# Patient Record
Sex: FEMALE | Race: BLACK OR AFRICAN AMERICAN | NOT HISPANIC OR LATINO
[De-identification: names, ages, dates, MRNs, and addresses within clinical notes are randomized per-mention and may not be internally consistent; named-entity substitution may affect disease eponyms.]

---

## 2017-09-21 ENCOUNTER — RESULT REVIEW (OUTPATIENT)
Age: 57
End: 2017-09-21

## 2018-02-22 PROBLEM — Z00.00 ENCOUNTER FOR PREVENTIVE HEALTH EXAMINATION: Status: ACTIVE | Noted: 2018-02-22

## 2018-02-26 ENCOUNTER — APPOINTMENT (OUTPATIENT)
Dept: ORTHOPEDIC SURGERY | Facility: CLINIC | Age: 58
End: 2018-02-26
Payer: MEDICAID

## 2018-02-26 VITALS
WEIGHT: 140 LBS | BODY MASS INDEX: 25.76 KG/M2 | SYSTOLIC BLOOD PRESSURE: 169 MMHG | HEART RATE: 71 BPM | DIASTOLIC BLOOD PRESSURE: 80 MMHG | HEIGHT: 62 IN

## 2018-02-26 DIAGNOSIS — M51.36 OTHER INTERVERTEBRAL DISC DEGENERATION, LUMBAR REGION: ICD-10-CM

## 2018-02-26 PROCEDURE — 99204 OFFICE O/P NEW MOD 45 MIN: CPT

## 2018-02-26 PROCEDURE — 72100 X-RAY EXAM L-S SPINE 2/3 VWS: CPT

## 2018-02-26 PROCEDURE — 72040 X-RAY EXAM NECK SPINE 2-3 VW: CPT

## 2018-02-26 RX ORDER — METHOCARBAMOL 750 MG/1
750 TABLET, FILM COATED ORAL
Qty: 60 | Refills: 0 | Status: ACTIVE | COMMUNITY
Start: 2018-02-03

## 2018-02-26 RX ORDER — TRAMADOL HYDROCHLORIDE 50 MG/1
50 TABLET, COATED ORAL
Qty: 120 | Refills: 0 | Status: ACTIVE | COMMUNITY
Start: 2018-02-07

## 2018-02-26 RX ORDER — BISACODYL 5 MG/1
5 TABLET ORAL
Qty: 4 | Refills: 0 | Status: ACTIVE | COMMUNITY
Start: 2018-02-11

## 2018-02-26 RX ORDER — POLYETHYLENE GLYCOL 3350, SODIUM CHLORIDE, SODIUM BICARBONATE AND POTASSIUM CHLORIDE WITH LEMON FLAVOR 420; 11.2; 5.72; 1.48 G/4L; G/4L; G/4L; G/4L
420 POWDER, FOR SOLUTION ORAL
Qty: 4000 | Refills: 0 | Status: ACTIVE | COMMUNITY
Start: 2018-02-09

## 2018-03-16 ENCOUNTER — APPOINTMENT (OUTPATIENT)
Dept: ORTHOPEDIC SURGERY | Facility: CLINIC | Age: 58
End: 2018-03-16

## 2018-05-21 ENCOUNTER — APPOINTMENT (OUTPATIENT)
Dept: INTERNAL MEDICINE | Facility: CLINIC | Age: 58
End: 2018-05-21
Payer: MEDICAID

## 2018-05-21 VITALS — DIASTOLIC BLOOD PRESSURE: 66 MMHG | RESPIRATION RATE: 12 BRPM | SYSTOLIC BLOOD PRESSURE: 134 MMHG | HEART RATE: 78 BPM

## 2018-05-21 VITALS — BODY MASS INDEX: 24.69 KG/M2 | WEIGHT: 135 LBS

## 2018-05-21 DIAGNOSIS — C90.00 MULTIPLE MYELOMA NOT HAVING ACHIEVED REMISSION: ICD-10-CM

## 2018-05-21 DIAGNOSIS — C88.9 MULTIPLE MYELOMA NOT HAVING ACHIEVED REMISSION: ICD-10-CM

## 2018-05-21 PROCEDURE — 99203 OFFICE O/P NEW LOW 30 MIN: CPT
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
## 2018-05-21 NOTE — REVIEW OF SYSTEMS
[Negative] : Heme/Lymph [FreeTextEntry2] : see HPI [FreeTextEntry9] : Neck pain and disabled [de-identified] : see HPI

## 2018-05-21 NOTE — HISTORY OF PRESENT ILLNESS
[FreeTextEntry1] : Hx of Multiple Myeloma and wants labs. Says it is for "personal reasons"  Goes to [facility]. Had BM Bx.  Had neck MRI and that is how MM was Dx'd.
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
## 2018-06-01 ENCOUNTER — OUTPATIENT (OUTPATIENT)
Dept: OUTPATIENT SERVICES | Facility: HOSPITAL | Age: 58
LOS: 1 days | End: 2018-06-01
Payer: MEDICAID

## 2018-06-01 PROCEDURE — G9001: CPT

## 2018-06-26 ENCOUNTER — EMERGENCY (EMERGENCY)
Facility: HOSPITAL | Age: 58
LOS: 1 days | Discharge: HOME | End: 2018-06-26
Attending: EMERGENCY MEDICINE
Payer: MEDICAID

## 2018-06-26 VITALS
HEART RATE: 75 BPM | SYSTOLIC BLOOD PRESSURE: 130 MMHG | HEIGHT: 62 IN | TEMPERATURE: 98 F | OXYGEN SATURATION: 99 % | RESPIRATION RATE: 19 BRPM | DIASTOLIC BLOOD PRESSURE: 87 MMHG | WEIGHT: 130.07 LBS

## 2018-06-26 PROCEDURE — 99283 EMERGENCY DEPT VISIT LOW MDM: CPT

## 2018-06-26 RX ORDER — LIDOCAINE 4 G/100G
15 CREAM TOPICAL
Qty: 100 | Refills: 0
Start: 2018-06-26 | End: 2018-06-30

## 2018-06-26 NOTE — ED ADULT NURSE NOTE - OBJECTIVE STATEMENT
Pt is a 58y/o who came to the ED amb c/o sores in mouth x 3 weeks. States she was prescribed Nystatin swish and spit by her pmd but stopped using it because "I didn't like the way it made me feel". States she experienced pain and numbness and tingling when using the Nystatin. Now c/o worsening pain in her mouth, difficulty chewing her food, but no dysphagia and problems swallowing. A/O x3, NAD,  denies fevers/chills, no n/v/d.

## 2018-06-26 NOTE — ED PROVIDER NOTE - MEDICAL DECISION MAKING DETAILS
Mucositis most likely but would like the patient to complete a trial of empiric candidasis treatment with Nystatin. Imtiaz: Mucositis most likely but would like the patient to complete a trial of empiric candidasis treatment with Nystatin.

## 2018-06-26 NOTE — ED PROVIDER NOTE - CARE PLAN
Principal Discharge DX:	Mucositis  Goal:	Most likely mucositis from the immunosuppression from Multiple Myeloma but would need to still undergo the trial of Nystatin before ruling out Candiasis  Assessment and plan of treatment:	Most likely mucositis from the immunosuppression from Multiple Myeloma but would need to still undergo the trial of Nystatin before ruling out Candiasis. Please take the Nystatin for another 5 days twice a day and will be prescribing a trial of topical Lidocaine. If unresolved then please followup with your Primary care doctor Dr. Forbes and your hematologist Dr. Negro for further questions and concerns.

## 2018-06-26 NOTE — ED ADULT TRIAGE NOTE - CHIEF COMPLAINT QUOTE
pt c/o ulcers in mouth for 3 weeks. pt was prescribed mouth wash by primary care- pt states that symptoms persist  pt was recently diagnosed with multiple myeloma

## 2018-06-26 NOTE — ED PROVIDER NOTE - PLAN OF CARE
Most likely mucositis from the immunosuppression from Multiple Myeloma but would need to still undergo the trial of Nystatin before ruling out Candiasis Most likely mucositis from the immunosuppression from Multiple Myeloma but would need to still undergo the trial of Nystatin before ruling out Candiasis. Please take the Nystatin for another 5 days twice a day and will be prescribing a trial of topical Lidocaine. If unresolved then please followup with your Primary care doctor Dr. Forbes and your hematologist Dr. Negro for further questions and concerns.

## 2018-06-26 NOTE — ED PROVIDER NOTE - OBJECTIVE STATEMENT
58y/o no PM p/w 58y/o PMH of MM p/w bilateral cheek ulcers. She has been complaining of these ulcers for the past month and went to see her PMD who prescribed her Nystatin swish and spit which the patient did not want to take because when she took it, she started experiencing pain and numbness/tingling. The pain in her mouth has been getting progressively worse, with difficulty eating in her mouth itself but no dysphagia and problems swallowing. No recent infections, fevers, nausea, vomiting, diarrhea. Refused to undergo treatment of her MM due to the side effects and was diagnosed 3/2018. Has had prior gingivial ulcers in the gums that were present last year that had resolved. 56y/o PMH of MM p/w bilateral cheek ulcers of the oral mucosa. She has been complaining of these ulcers for the past month and went to see her PMD who prescribed her Nystatin swish and spit which the patient did not want to take because when she took it, she started experiencing pain and numbness/tingling. The pain in her mouth has been getting progressively worse, with difficulty eating in her mouth itself but no dysphagia and problems swallowing. No recent infections, fevers, nausea, vomiting, diarrhea. Refused to undergo treatment of her MM due to the side effects and was diagnosed 3/2018. Has had prior gingival ulcers in the gums that were present last year that had resolved.

## 2018-07-01 ENCOUNTER — OUTPATIENT (OUTPATIENT)
Dept: OUTPATIENT SERVICES | Facility: HOSPITAL | Age: 58
LOS: 1 days | End: 2018-07-01

## 2018-07-02 DIAGNOSIS — R69 ILLNESS, UNSPECIFIED: ICD-10-CM

## 2018-07-19 DIAGNOSIS — Z71.89 OTHER SPECIFIED COUNSELING: ICD-10-CM

## 2019-01-14 NOTE — ED PROVIDER NOTE - NS ED ATTENDING STATEMENT MOD
30.2
I have personally seen and examined this patient.  I have fully participated in the care of this patient. I have reviewed all pertinent clinical information, including history, physical exam, plan and the Resident’s note and agree except as noted.

## 2019-03-26 ENCOUNTER — OUTPATIENT (OUTPATIENT)
Dept: OUTPATIENT SERVICES | Facility: HOSPITAL | Age: 59
LOS: 1 days | Discharge: HOME | End: 2019-03-26

## 2019-03-26 DIAGNOSIS — C90.00 MULTIPLE MYELOMA NOT HAVING ACHIEVED REMISSION: ICD-10-CM

## 2019-04-02 ENCOUNTER — APPOINTMENT (OUTPATIENT)
Dept: HEMATOLOGY ONCOLOGY | Facility: CLINIC | Age: 59
End: 2019-04-02

## 2019-06-11 ENCOUNTER — OUTPATIENT (OUTPATIENT)
Dept: OUTPATIENT SERVICES | Facility: HOSPITAL | Age: 59
LOS: 1 days | End: 2019-06-11

## 2019-06-11 VITALS
HEIGHT: 62 IN | HEART RATE: 56 BPM | DIASTOLIC BLOOD PRESSURE: 80 MMHG | OXYGEN SATURATION: 99 % | TEMPERATURE: 97 F | SYSTOLIC BLOOD PRESSURE: 140 MMHG | WEIGHT: 115.96 LBS | RESPIRATION RATE: 16 BRPM

## 2019-06-11 DIAGNOSIS — C90.00 MULTIPLE MYELOMA NOT HAVING ACHIEVED REMISSION: ICD-10-CM

## 2019-06-11 DIAGNOSIS — T78.40XA ALLERGY, UNSPECIFIED, INITIAL ENCOUNTER: ICD-10-CM

## 2019-06-11 DIAGNOSIS — Z98.890 OTHER SPECIFIED POSTPROCEDURAL STATES: Chronic | ICD-10-CM

## 2019-06-11 DIAGNOSIS — Z98.891 HISTORY OF UTERINE SCAR FROM PREVIOUS SURGERY: Chronic | ICD-10-CM

## 2019-06-11 DIAGNOSIS — Z90.711 ACQUIRED ABSENCE OF UTERUS WITH REMAINING CERVICAL STUMP: Chronic | ICD-10-CM

## 2019-06-11 LAB
ALBUMIN SERPL ELPH-MCNC: 4.3 G/DL — SIGNIFICANT CHANGE UP (ref 3.3–5)
ALP SERPL-CCNC: 62 U/L — SIGNIFICANT CHANGE UP (ref 40–120)
ALT FLD-CCNC: 8 U/L — SIGNIFICANT CHANGE UP (ref 4–33)
ANION GAP SERPL CALC-SCNC: 9 MMO/L — SIGNIFICANT CHANGE UP (ref 7–14)
AST SERPL-CCNC: 11 U/L — SIGNIFICANT CHANGE UP (ref 4–32)
BILIRUB SERPL-MCNC: < 0.2 MG/DL — LOW (ref 0.2–1.2)
BUN SERPL-MCNC: 7 MG/DL — SIGNIFICANT CHANGE UP (ref 7–23)
CALCIUM SERPL-MCNC: 10 MG/DL — SIGNIFICANT CHANGE UP (ref 8.4–10.5)
CHLORIDE SERPL-SCNC: 100 MMOL/L — SIGNIFICANT CHANGE UP (ref 98–107)
CO2 SERPL-SCNC: 31 MMOL/L — SIGNIFICANT CHANGE UP (ref 22–31)
CREAT SERPL-MCNC: 0.64 MG/DL — SIGNIFICANT CHANGE UP (ref 0.5–1.3)
GLUCOSE SERPL-MCNC: 69 MG/DL — LOW (ref 70–99)
HCT VFR BLD CALC: 33.1 % — LOW (ref 34.5–45)
HGB BLD-MCNC: 10.9 G/DL — LOW (ref 11.5–15.5)
MCHC RBC-ENTMCNC: 31.5 PG — SIGNIFICANT CHANGE UP (ref 27–34)
MCHC RBC-ENTMCNC: 32.9 % — SIGNIFICANT CHANGE UP (ref 32–36)
MCV RBC AUTO: 95.7 FL — SIGNIFICANT CHANGE UP (ref 80–100)
NRBC # FLD: 0 K/UL — SIGNIFICANT CHANGE UP (ref 0–0)
PLATELET # BLD AUTO: 240 K/UL — SIGNIFICANT CHANGE UP (ref 150–400)
PMV BLD: 11.3 FL — SIGNIFICANT CHANGE UP (ref 7–13)
POTASSIUM SERPL-MCNC: 3.6 MMOL/L — SIGNIFICANT CHANGE UP (ref 3.5–5.3)
POTASSIUM SERPL-SCNC: 3.6 MMOL/L — SIGNIFICANT CHANGE UP (ref 3.5–5.3)
PROT SERPL-MCNC: 7.8 G/DL — SIGNIFICANT CHANGE UP (ref 6–8.3)
RBC # BLD: 3.46 M/UL — LOW (ref 3.8–5.2)
RBC # FLD: 13.2 % — SIGNIFICANT CHANGE UP (ref 10.3–14.5)
SODIUM SERPL-SCNC: 140 MMOL/L — SIGNIFICANT CHANGE UP (ref 135–145)
WBC # BLD: 9.33 K/UL — SIGNIFICANT CHANGE UP (ref 3.8–10.5)
WBC # FLD AUTO: 9.33 K/UL — SIGNIFICANT CHANGE UP (ref 3.8–10.5)

## 2019-06-11 NOTE — H&P PST ADULT - OTHER CARE PROVIDERS
Dr. Lebron Owusu (oncologist) [phone] / Dr. Devonte Tong (holistic medicine) Dr. Lebron Owusu (oncologist) [phone] / Dr. Devonte Tong (holistic medicine) [phone]

## 2019-06-11 NOTE — H&P PST ADULT - NEGATIVE ENMT SYMPTOMS
no hearing difficulty/no sinus symptoms/no post-nasal discharge/no throat pain/no dysphagia/no vertigo/no nasal congestion/no nasal obstruction/no ear pain/no nasal discharge/no tinnitus

## 2019-06-11 NOTE — H&P PST ADULT - NSICDXPASTSURGICALHX_GEN_ALL_CORE_FT
PAST SURGICAL HISTORY:  H/O  section 8/15/1985    H/O colonoscopy 2018- polyp found and removedin 2018    S/P partial hysterectomy  d/t fibroids PAST SURGICAL HISTORY:  H/O  section 8/15/1985    H/O colonoscopy 2018- polyp found and removed in 2018    S/P partial hysterectomy  d/t fibroids

## 2019-06-11 NOTE — H&P PST ADULT - NSICDXPROBLEM_GEN_ALL_CORE_FT
PROBLEM DIAGNOSES  Problem: Multiple myeloma not having achieved remission  Assessment and Plan: Scheduled for abdominal fat pad biopsy on 6/18/2019. labs done and results pending. Preop instruction given and explained. Chlorhexidine antiseptic solution with written and verbal instruction given & Verbalized understanding. Famotidine provided with instruction. Patient verbalized understanding.   Medical evaluation requested by surgeon. Patient had EKG done with PCP on 5/22/2019. Will obtain.     Problem: Allergy  Assessment and Plan: Allergic to sulfa drug, shellfish. OR booking was notified via fax.

## 2019-06-11 NOTE — H&P PST ADULT - VISION (WITH CORRECTIVE LENSES IF THE PATIENT USUALLY WEARS THEM):
wear glasses for reading/Partially impaired: cannot see medication labels or newsprint, but can see obstacles in path, and the surrounding layout; can count fingers at arm's length

## 2019-06-11 NOTE — H&P PST ADULT - RS GEN PE MLT RESP DETAILS PC
no wheezes/clear to auscultation bilaterally/no rales/respirations non-labored/no rhonchi/good air movement

## 2019-06-11 NOTE — H&P PST ADULT - ITE SK HX ROS MEA POS PC
mouth sore, sore in buttock - A & D ointment & covered with bandaid . pt states, "had multiple sores since 2017 - now I just have 1" mouth sore- treating with dexamethasone, antibiotic, sore in buttock - A & D ointment & covered with bandaid . pt states, "this is ongoing problem since 2017 - multiple sores on/off since 2017" - now I just have 1"

## 2019-06-11 NOTE — H&P PST ADULT - NEGATIVE GENERAL GENITOURINARY SYMPTOMS
no urinary hesitancy/no bladder infections/no flank pain L/no flank pain R/normal urinary frequency/no hematuria/no gas in urine

## 2019-06-11 NOTE — H&P PST ADULT - NSICDXPASTMEDICALHX_GEN_ALL_CORE_FT
PAST MEDICAL HISTORY:  Multiple myeloma dx 2018 - refused chemotherapy PAST MEDICAL HISTORY:  Multiple myeloma dx 2018 - refused chemotherapy, treating with "natural herbal remedies"

## 2019-06-11 NOTE — H&P PST ADULT - NSICDXFAMILYHX_GEN_ALL_CORE_FT
FAMILY HISTORY:  Family history of scleroderma, sister  FH: Alzheimers disease, mother  FH: heart disease, mother  FH: hypertension, mother

## 2019-06-11 NOTE — H&P PST ADULT - HISTORY OF PRESENT ILLNESS
59 yo female with hx of multiple myeloma (dx 2018- refused chemotherapy) presents to have PST evaluation for abdominal fat pad biopsy on 6/18/2019. Patient reports, hx of multiple myeloma dx 2018, abdominal fat pad bx was recommended to r/t amyloidosis.

## 2019-06-11 NOTE — H&P PST ADULT - NSANTHOSAYNRD_GEN_A_CORE
No. GIANLUCA screening performed.  STOP BANG Legend: 0-2 = LOW Risk; 3-4 = INTERMEDIATE Risk; 5-8 = HIGH Risk

## 2019-06-17 ENCOUNTER — TRANSCRIPTION ENCOUNTER (OUTPATIENT)
Age: 59
End: 2019-06-17

## 2019-06-18 ENCOUNTER — OUTPATIENT (OUTPATIENT)
Dept: OUTPATIENT SERVICES | Facility: HOSPITAL | Age: 59
LOS: 1 days | Discharge: HOME | End: 2019-06-18
Payer: MEDICARE

## 2019-06-18 VITALS
HEART RATE: 59 BPM | DIASTOLIC BLOOD PRESSURE: 68 MMHG | WEIGHT: 115.96 LBS | TEMPERATURE: 98 F | HEIGHT: 62 IN | RESPIRATION RATE: 18 BRPM | OXYGEN SATURATION: 100 % | SYSTOLIC BLOOD PRESSURE: 158 MMHG

## 2019-06-18 VITALS
DIASTOLIC BLOOD PRESSURE: 67 MMHG | RESPIRATION RATE: 20 BRPM | SYSTOLIC BLOOD PRESSURE: 110 MMHG | HEART RATE: 62 BPM | OXYGEN SATURATION: 100 %

## 2019-06-18 DIAGNOSIS — Z98.891 HISTORY OF UTERINE SCAR FROM PREVIOUS SURGERY: Chronic | ICD-10-CM

## 2019-06-18 DIAGNOSIS — Z90.711 ACQUIRED ABSENCE OF UTERUS WITH REMAINING CERVICAL STUMP: Chronic | ICD-10-CM

## 2019-06-18 DIAGNOSIS — C90.00 MULTIPLE MYELOMA NOT HAVING ACHIEVED REMISSION: ICD-10-CM

## 2019-06-18 DIAGNOSIS — Z98.890 OTHER SPECIFIED POSTPROCEDURAL STATES: Chronic | ICD-10-CM

## 2019-06-18 NOTE — ASU DISCHARGE PLAN (ADULT/PEDIATRIC) - NURSING INSTRUCTIONS
Narcotic pain medication may cause nausea or constipation. Take medication with food. Increase fluids and fiber intake. No creams, lotions, powders  or ointments to incision site. Apply ice for 20 minutes several times per day for the next 24-48 hours, then as needed for comfort.

## 2019-06-18 NOTE — ASU DISCHARGE PLAN (ADULT/PEDIATRIC) - CALL YOUR DOCTOR IF YOU HAVE ANY OF THE FOLLOWING:
Nausea and vomiting that does not stop/Inability to tolerate liquids or foods/Pain not relieved by Medications/Bleeding that does not stop/Swelling that gets worse

## 2019-06-18 NOTE — ASU DISCHARGE PLAN (ADULT/PEDIATRIC) - CARE PROVIDER_API CALL
Jacquelyn Schneider)  Surgery  [address], [address]  [place] [address]  Phone: [phone]  Fax: [phone]  Follow Up Time: 2 weeks

## 2019-06-18 NOTE — ASU DISCHARGE PLAN (ADULT/PEDIATRIC) - ASU DC SPECIAL INSTRUCTIONSFT
WOUND CARE:  Please keep incisions clean and dry. Please do not Scrub or rub incisions. Do not use lotion or powder on incisions. Leave your steri strips on, they will fall off by themselves.   BATHING: Please do not submerge wound underwater. You may shower and/or sponge bathe. You can shower in 24 hours.  ACTIVITY: No heavy lifting or straining. Otherwise, you may return to your usual level of physical activity. If you are taking narcotic pain medication (such as Percocet) DO NOT drive a car, operate machinery or make important decisions.  DIET: Return to your usual diet.  NOTIFY YOUR SURGEON IF: You have any bleeding that does not stop, any pus draining from your wound(s), any fever (over 100.4 F) or chills, persistent nausea/vomiting, persistent diarrhea, or if your pain is not controlled on your discharge pain medications.  FOLLOW-UP: Please follow-up with your surgeon, within 1-2 weeks following discharge- please call to schedule an appointment.

## 2019-06-19 ENCOUNTER — RESULT REVIEW (OUTPATIENT)
Age: 59
End: 2019-06-19

## 2019-06-19 PROCEDURE — 88305 TISSUE EXAM BY PATHOLOGIST: CPT | Mod: 26

## 2019-06-19 PROCEDURE — 88313 SPECIAL STAINS GROUP 2: CPT | Mod: 26

## 2019-06-24 LAB — HEMATOPATHOLOGY REPORT: SIGNIFICANT CHANGE UP

## 2022-10-18 ENCOUNTER — INPATIENT (INPATIENT)
Facility: HOSPITAL | Age: 62
LOS: 21 days | Discharge: NURSING, REHAB OR LONG-TERM CARE | DRG: 177 | End: 2022-11-09
Attending: INTERNAL MEDICINE | Admitting: INTERNAL MEDICINE
Payer: MEDICARE

## 2022-10-18 VITALS
OXYGEN SATURATION: 93 % | HEIGHT: 62 IN | WEIGHT: 98.11 LBS | SYSTOLIC BLOOD PRESSURE: 110 MMHG | RESPIRATION RATE: 22 BRPM | TEMPERATURE: 99 F | HEART RATE: 110 BPM | DIASTOLIC BLOOD PRESSURE: 66 MMHG

## 2022-10-18 DIAGNOSIS — R06.02 SHORTNESS OF BREATH: ICD-10-CM

## 2022-10-18 DIAGNOSIS — Z98.891 HISTORY OF UTERINE SCAR FROM PREVIOUS SURGERY: Chronic | ICD-10-CM

## 2022-10-18 DIAGNOSIS — Z90.711 ACQUIRED ABSENCE OF UTERUS WITH REMAINING CERVICAL STUMP: Chronic | ICD-10-CM

## 2022-10-18 DIAGNOSIS — U07.1 COVID-19: ICD-10-CM

## 2022-10-18 DIAGNOSIS — Z98.890 OTHER SPECIFIED POSTPROCEDURAL STATES: Chronic | ICD-10-CM

## 2022-10-18 DIAGNOSIS — C90.00 MULTIPLE MYELOMA NOT HAVING ACHIEVED REMISSION: ICD-10-CM

## 2022-10-18 DIAGNOSIS — Z29.9 ENCOUNTER FOR PROPHYLACTIC MEASURES, UNSPECIFIED: ICD-10-CM

## 2022-10-18 LAB
ALBUMIN SERPL ELPH-MCNC: 3.1 G/DL — LOW (ref 3.3–5)
ALP SERPL-CCNC: 67 U/L — SIGNIFICANT CHANGE UP (ref 40–120)
ALT FLD-CCNC: 60 U/L — HIGH (ref 10–45)
ANION GAP SERPL CALC-SCNC: 10 MMOL/L — SIGNIFICANT CHANGE UP (ref 5–17)
APTT BLD: 25.9 SEC — LOW (ref 27.5–35.5)
AST SERPL-CCNC: 58 U/L — HIGH (ref 10–40)
BASE EXCESS BLDV CALC-SCNC: 1.3 MMOL/L — SIGNIFICANT CHANGE UP (ref -2–3)
BASOPHILS # BLD AUTO: 0 K/UL — SIGNIFICANT CHANGE UP (ref 0–0.2)
BASOPHILS NFR BLD AUTO: 0 % — SIGNIFICANT CHANGE UP (ref 0–2)
BILIRUB SERPL-MCNC: 0.2 MG/DL — SIGNIFICANT CHANGE UP (ref 0.2–1.2)
BUN SERPL-MCNC: 18 MG/DL — SIGNIFICANT CHANGE UP (ref 7–23)
CA-I SERPL-SCNC: 1.18 MMOL/L — SIGNIFICANT CHANGE UP (ref 1.15–1.33)
CALCIUM SERPL-MCNC: 8.6 MG/DL — SIGNIFICANT CHANGE UP (ref 8.4–10.5)
CHLORIDE BLDV-SCNC: 92 MMOL/L — LOW (ref 96–108)
CHLORIDE SERPL-SCNC: 90 MMOL/L — LOW (ref 96–108)
CO2 BLDV-SCNC: 29 MMOL/L — HIGH (ref 22–26)
CO2 SERPL-SCNC: 25 MMOL/L — SIGNIFICANT CHANGE UP (ref 22–31)
CREAT SERPL-MCNC: 0.97 MG/DL — SIGNIFICANT CHANGE UP (ref 0.5–1.3)
EGFR: 66 ML/MIN/1.73M2 — SIGNIFICANT CHANGE UP
EOSINOPHIL # BLD AUTO: 0 K/UL — SIGNIFICANT CHANGE UP (ref 0–0.5)
EOSINOPHIL NFR BLD AUTO: 0 % — SIGNIFICANT CHANGE UP (ref 0–6)
GAS PNL BLDV: 125 MMOL/L — LOW (ref 136–145)
GAS PNL BLDV: SIGNIFICANT CHANGE UP
GAS PNL BLDV: SIGNIFICANT CHANGE UP
GLUCOSE BLDV-MCNC: 119 MG/DL — HIGH (ref 70–99)
GLUCOSE SERPL-MCNC: 123 MG/DL — HIGH (ref 70–99)
HCO3 BLDV-SCNC: 28 MMOL/L — SIGNIFICANT CHANGE UP (ref 22–29)
HCT VFR BLD CALC: 23.7 % — LOW (ref 34.5–45)
HCT VFR BLDA CALC: 27 % — LOW (ref 34.5–46.5)
HGB BLD CALC-MCNC: 8.9 G/DL — LOW (ref 11.7–16.1)
HGB BLD-MCNC: 7.7 G/DL — LOW (ref 11.5–15.5)
IMM GRANULOCYTES NFR BLD AUTO: 1.7 % — HIGH (ref 0–0.9)
INR BLD: 1.1 RATIO — SIGNIFICANT CHANGE UP (ref 0.88–1.16)
LACTATE BLDV-MCNC: 1.3 MMOL/L — SIGNIFICANT CHANGE UP (ref 0.5–2)
LYMPHOCYTES # BLD AUTO: 0.63 K/UL — LOW (ref 1–3.3)
LYMPHOCYTES # BLD AUTO: 12.2 % — LOW (ref 13–44)
MANUAL SMEAR VERIFICATION: SIGNIFICANT CHANGE UP
MCHC RBC-ENTMCNC: 31.6 PG — SIGNIFICANT CHANGE UP (ref 27–34)
MCHC RBC-ENTMCNC: 32.5 GM/DL — SIGNIFICANT CHANGE UP (ref 32–36)
MCV RBC AUTO: 97.1 FL — SIGNIFICANT CHANGE UP (ref 80–100)
MONOCYTES # BLD AUTO: 0.41 K/UL — SIGNIFICANT CHANGE UP (ref 0–0.9)
MONOCYTES NFR BLD AUTO: 7.9 % — SIGNIFICANT CHANGE UP (ref 2–14)
NEUTROPHILS # BLD AUTO: 4.05 K/UL — SIGNIFICANT CHANGE UP (ref 1.8–7.4)
NEUTROPHILS NFR BLD AUTO: 78.2 % — HIGH (ref 43–77)
NRBC # BLD: 0 /100 WBCS — SIGNIFICANT CHANGE UP (ref 0–0)
NT-PROBNP SERPL-SCNC: 159 PG/ML — SIGNIFICANT CHANGE UP (ref 0–300)
PCO2 BLDV: 51 MMHG — HIGH (ref 39–42)
PH BLDV: 7.34 — SIGNIFICANT CHANGE UP (ref 7.32–7.43)
PLAT MORPH BLD: NORMAL — SIGNIFICANT CHANGE UP
PLATELET # BLD AUTO: 63 K/UL — LOW (ref 150–400)
PO2 BLDV: 23 MMHG — LOW (ref 25–45)
POTASSIUM BLDV-SCNC: 5.2 MMOL/L — HIGH (ref 3.5–5.1)
POTASSIUM SERPL-MCNC: 5.1 MMOL/L — SIGNIFICANT CHANGE UP (ref 3.5–5.3)
POTASSIUM SERPL-SCNC: 5.1 MMOL/L — SIGNIFICANT CHANGE UP (ref 3.5–5.3)
PROCALCITONIN SERPL-MCNC: 0.28 NG/ML — HIGH (ref 0.02–0.1)
PROT SERPL-MCNC: 8.4 G/DL — HIGH (ref 6–8.3)
PROTHROM AB SERPL-ACNC: 12.7 SEC — SIGNIFICANT CHANGE UP (ref 10.5–13.4)
RAPID RVP RESULT: DETECTED
RBC # BLD: 2.44 M/UL — LOW (ref 3.8–5.2)
RBC # FLD: 17.8 % — HIGH (ref 10.3–14.5)
RBC BLD AUTO: SIGNIFICANT CHANGE UP
SAO2 % BLDV: 29.5 % — LOW (ref 67–88)
SARS-COV-2 RNA SPEC QL NAA+PROBE: DETECTED
SODIUM SERPL-SCNC: 125 MMOL/L — LOW (ref 135–145)
TROPONIN T, HIGH SENSITIVITY RESULT: 30 NG/L — SIGNIFICANT CHANGE UP (ref 0–51)
WBC # BLD: 5.18 K/UL — SIGNIFICANT CHANGE UP (ref 3.8–10.5)
WBC # FLD AUTO: 5.18 K/UL — SIGNIFICANT CHANGE UP (ref 3.8–10.5)

## 2022-10-18 PROCEDURE — 99285 EMERGENCY DEPT VISIT HI MDM: CPT

## 2022-10-18 PROCEDURE — 71045 X-RAY EXAM CHEST 1 VIEW: CPT | Mod: 26

## 2022-10-18 PROCEDURE — 99223 1ST HOSP IP/OBS HIGH 75: CPT

## 2022-10-18 PROCEDURE — 71275 CT ANGIOGRAPHY CHEST: CPT | Mod: 26,MA

## 2022-10-18 RX ORDER — SENNA PLUS 8.6 MG/1
2 TABLET ORAL AT BEDTIME
Refills: 0 | Status: DISCONTINUED | OUTPATIENT
Start: 2022-10-18 | End: 2022-11-02

## 2022-10-18 RX ORDER — REMDESIVIR 5 MG/ML
200 INJECTION INTRAVENOUS EVERY 24 HOURS
Refills: 0 | Status: COMPLETED | OUTPATIENT
Start: 2022-10-19 | End: 2022-10-19

## 2022-10-18 RX ORDER — NALOXONE HYDROCHLORIDE 4 MG/.1ML
0.4 SPRAY NASAL ONCE
Refills: 0 | Status: DISCONTINUED | OUTPATIENT
Start: 2022-10-18 | End: 2022-11-09

## 2022-10-18 RX ORDER — AMLODIPINE BESYLATE 2.5 MG/1
10 TABLET ORAL DAILY
Refills: 0 | Status: DISCONTINUED | OUTPATIENT
Start: 2022-10-18 | End: 2022-10-21

## 2022-10-18 RX ORDER — ONDANSETRON 8 MG/1
4 TABLET, FILM COATED ORAL EVERY 8 HOURS
Refills: 0 | Status: DISCONTINUED | OUTPATIENT
Start: 2022-10-18 | End: 2022-11-09

## 2022-10-18 RX ORDER — PANTOPRAZOLE SODIUM 20 MG/1
40 TABLET, DELAYED RELEASE ORAL
Refills: 0 | Status: DISCONTINUED | OUTPATIENT
Start: 2022-10-18 | End: 2022-11-09

## 2022-10-18 RX ORDER — MORPHINE SULFATE 50 MG/1
2 CAPSULE, EXTENDED RELEASE ORAL ONCE
Refills: 0 | Status: DISCONTINUED | OUTPATIENT
Start: 2022-10-18 | End: 2022-10-18

## 2022-10-18 RX ORDER — VANCOMYCIN HCL 1 G
1000 VIAL (EA) INTRAVENOUS ONCE
Refills: 0 | Status: COMPLETED | OUTPATIENT
Start: 2022-10-18 | End: 2022-10-18

## 2022-10-18 RX ORDER — POLYETHYLENE GLYCOL 3350 17 G/17G
17 POWDER, FOR SOLUTION ORAL DAILY
Refills: 0 | Status: DISCONTINUED | OUTPATIENT
Start: 2022-10-18 | End: 2022-11-02

## 2022-10-18 RX ORDER — DEXAMETHASONE 0.5 MG/5ML
10 ELIXIR ORAL
Qty: 0 | Refills: 0 | DISCHARGE

## 2022-10-18 RX ORDER — DEXAMETHASONE 0.5 MG/5ML
6 ELIXIR ORAL DAILY
Refills: 0 | Status: COMPLETED | OUTPATIENT
Start: 2022-10-19 | End: 2022-10-27

## 2022-10-18 RX ORDER — DEXAMETHASONE 0.5 MG/5ML
6 ELIXIR ORAL ONCE
Refills: 0 | Status: COMPLETED | OUTPATIENT
Start: 2022-10-18 | End: 2022-10-18

## 2022-10-18 RX ORDER — CEFEPIME 1 G/1
1000 INJECTION, POWDER, FOR SOLUTION INTRAMUSCULAR; INTRAVENOUS ONCE
Refills: 0 | Status: COMPLETED | OUTPATIENT
Start: 2022-10-18 | End: 2022-10-18

## 2022-10-18 RX ORDER — HYDROMORPHONE HYDROCHLORIDE 2 MG/ML
1 INJECTION INTRAMUSCULAR; INTRAVENOUS; SUBCUTANEOUS EVERY 4 HOURS
Refills: 0 | Status: DISCONTINUED | OUTPATIENT
Start: 2022-10-18 | End: 2022-10-25

## 2022-10-18 RX ORDER — GUAIFENESIN/DEXTROMETHORPHAN 600MG-30MG
10 TABLET, EXTENDED RELEASE 12 HR ORAL EVERY 4 HOURS
Refills: 0 | Status: DISCONTINUED | OUTPATIENT
Start: 2022-10-18 | End: 2022-10-28

## 2022-10-18 RX ORDER — MEGESTROL ACETATE 40 MG/ML
40 SUSPENSION ORAL DAILY
Refills: 0 | Status: DISCONTINUED | OUTPATIENT
Start: 2022-10-18 | End: 2022-11-09

## 2022-10-18 RX ORDER — LANOLIN ALCOHOL/MO/W.PET/CERES
3 CREAM (GRAM) TOPICAL AT BEDTIME
Refills: 0 | Status: DISCONTINUED | OUTPATIENT
Start: 2022-10-18 | End: 2022-11-09

## 2022-10-18 RX ORDER — REMDESIVIR 5 MG/ML
100 INJECTION INTRAVENOUS EVERY 24 HOURS
Refills: 0 | Status: COMPLETED | OUTPATIENT
Start: 2022-10-20 | End: 2022-10-23

## 2022-10-18 RX ORDER — HYDROMORPHONE HYDROCHLORIDE 2 MG/ML
2 INJECTION INTRAMUSCULAR; INTRAVENOUS; SUBCUTANEOUS EVERY 4 HOURS
Refills: 0 | Status: DISCONTINUED | OUTPATIENT
Start: 2022-10-18 | End: 2022-10-25

## 2022-10-18 RX ORDER — REMDESIVIR 5 MG/ML
INJECTION INTRAVENOUS
Refills: 0 | Status: COMPLETED | OUTPATIENT
Start: 2022-10-19 | End: 2022-10-23

## 2022-10-18 RX ORDER — ENOXAPARIN SODIUM 100 MG/ML
40 INJECTION SUBCUTANEOUS EVERY 24 HOURS
Refills: 0 | Status: DISCONTINUED | OUTPATIENT
Start: 2022-10-18 | End: 2022-10-19

## 2022-10-18 RX ORDER — ALBUTEROL 90 UG/1
2 AEROSOL, METERED ORAL EVERY 4 HOURS
Refills: 0 | Status: DISCONTINUED | OUTPATIENT
Start: 2022-10-18 | End: 2022-11-09

## 2022-10-18 RX ORDER — TRAMADOL HYDROCHLORIDE 50 MG/1
1 TABLET ORAL
Qty: 0 | Refills: 0 | DISCHARGE

## 2022-10-18 RX ORDER — ACYCLOVIR SODIUM 500 MG
400 VIAL (EA) INTRAVENOUS DAILY
Refills: 0 | Status: DISCONTINUED | OUTPATIENT
Start: 2022-10-18 | End: 2022-11-09

## 2022-10-18 RX ADMIN — MORPHINE SULFATE 2 MILLIGRAM(S): 50 CAPSULE, EXTENDED RELEASE ORAL at 18:36

## 2022-10-18 RX ADMIN — Medication 250 MILLIGRAM(S): at 21:29

## 2022-10-18 RX ADMIN — MORPHINE SULFATE 2 MILLIGRAM(S): 50 CAPSULE, EXTENDED RELEASE ORAL at 20:18

## 2022-10-18 RX ADMIN — CEFEPIME 100 MILLIGRAM(S): 1 INJECTION, POWDER, FOR SOLUTION INTRAMUSCULAR; INTRAVENOUS at 20:22

## 2022-10-18 NOTE — H&P ADULT - PROBLEM SELECTOR PLAN 1
-reportedly hypoxic to 80s at [facility]  -currently saturating >95% on 3L NC  -c/w supplemental O2 and wean as tolerated  -started on decadron   -remdesivir ordered  -f/u cultures  - -reported hypoxic to 80s at [facility]  -currently saturating well on 3L NC  -likely from COVID-19 vs bacterial PNA  -continous pulse ox monitoring  -c/w supplemental O2 and wean as tolerated  -will hold off on abx for now as sxs likely d/t COVID-19  -if she spikes a fever, can start empirically on abx pending BCx

## 2022-10-18 NOTE — H&P ADULT - PROBLEM SELECTOR PLAN 2
-s/p Vanc/Cefepime, decadron in ED  -c/w supplemental O2 and wean as tolerated  -c/w dexamethasone   -remdesivir ordered  -f/u blood cultures  -antitussive prn

## 2022-10-18 NOTE — ED PROVIDER NOTE - PROGRESS NOTE DETAILS
Randy PGY2: hx of MM on chemo p/w fever, cough, dyspnea, hypoxia, now on 6L oxygen satting 95%, CXR showed bilateral pleural effusion, CTA negative for PE. Vancomycin and cefepime started. Will be admitted to medicine.

## 2022-10-18 NOTE — ED ADULT NURSE NOTE - OBJECTIVE STATEMENT
62YO female with PMH of sickle cell, multiple myeloma, GERD, & HLD via EMS from [facility] presenting with complaints of Chest pain. Pt stated having chest pain yesterday with, cough and fever of 102F. Chest pain is described as sharp and under b/l breast. During [facility] visit pt SPO2 was 80% on RA & was placed on 2L NC. Pt Axox4, respirations even, & labored with accessory muscle usage. Sinus Tachy on cardiac monitor lead 2, pulses strong and equal bilaterally. Skin warm, dry, and intact. Pt denies taking today analgesic, shortness of breath/o2 use at home, headache, numbness/tingling, nausea, or vomiting. Pt placed in position of comfort. Daughter at bedside. Pt educated on call bell system and provided call bell. Bed in lowest position, wheels locked, appropriate side rails raised. Pt denies needs at this time.

## 2022-10-18 NOTE — ED PROVIDER NOTE - ATTENDING CONTRIBUTION TO CARE
I, Shen Juarez, performed a history and physical exam of the patient and discussed their management with the resident and /or advanced care provider. I reviewed the resident and /or ACP's note and agree with the documented findings and plan of care. I was present and available for all procedures.    61-year-old female past with a history of multiple myeloma on active chemo presents with chest pain associated with cough and fever.  Presentation concerning for hypoxia off oxygen with a saturation of 80% and improved on 2 L satting 93 to 94%.  Patient with coarse lung sounds bilaterally and tenderness to palpation along the chest.  Presentation concerning for pneumonia, ACS, and/or PE.  Patient ordered for sepsis work-up with CT pulmonary angiogram.  CT pulm angiogram negative for acute PE patient found to be COVID-positive which is most likely the etiology for the patient's symptoms.  Bilateral haziness seen on imaging though with bilateral pleural effusions.  Patient will need admission and given a dose of Decadron while in the ED.

## 2022-10-18 NOTE — ED PROVIDER NOTE - PHYSICAL EXAMINATION
gen: Appears uncomfortable  Mentation: AAO x 3  psych: mood appropriate  HEENT: airway patent, conjunctivae clear bilaterally  Cardio: RRR, no m/r/g  Resp: Coarse breath sounds bilaterally  GI: soft/nondistended/nontender  Neuro: sensation and motor function grossly intact  Skin: No evidence of rash  [facility]: normal movement of all extremities  Lymph/Vasc: no LE edema

## 2022-10-18 NOTE — ED PROVIDER NOTE - OBJECTIVE STATEMENT
61 year old female with a PMHx of MM presenting with chest pain. Patient developed cough productive with green sputum and chest pain since yesterday. Associated fever of 102F. Patient was evaluated at [facility] and was found to be hypoxemic to the 80s and sent to the ED for further workup. Patient denies nausea, vomiting, abdominal pain.

## 2022-10-18 NOTE — H&P ADULT - NSICDXFAMILYHX_GEN_ALL_CORE_FT
FAMILY HISTORY:  Family history of scleroderma, twin sister  FH: Alzheimers disease, mother  FH: heart disease, mother  FH: hypertension, mother    Sibling  Still living? Unknown  FH: breast cancer, Age at diagnosis: Age Unknown

## 2022-10-18 NOTE — ED PROVIDER NOTE - CLINICAL SUMMARY MEDICAL DECISION MAKING FREE TEXT BOX
61 year old female with a PMHx of MM presenting with chest pain, cough productive with sputum, fever since yesterday, found to be hypoxemic at [facility] and sent to ED. Sepsis workup, troponin.

## 2022-10-18 NOTE — ED ADULT NURSE NOTE - NSIMPLEMENTINTERV_GEN_ALL_ED
Implemented All Fall Risk Interventions:  [place] to call system. Call bell, personal items and telephone within reach. Instruct patient to call for assistance. Room bathroom lighting operational. Non-slip footwear when patient is off stretcher. Physically safe environment: no spills, clutter or unnecessary equipment. Stretcher in lowest position, wheels locked, appropriate side rails in place. Provide visual cue, wrist band, yellow gown, etc. Monitor gait and stability. Monitor for mental status changes and reorient to person, place, and time. Review medications for side effects contributing to fall risk. Reinforce activity limits and safety measures with patient and family.

## 2022-10-18 NOTE — H&P ADULT - NSHPPHYSICALEXAM_GEN_ALL_CORE
Vital Signs Last 24 Hrs  T(C): 37.1 (18 Oct 2022 21:35), Max: 37.3 (18 Oct 2022 17:02)  T(F): 98.8 (18 Oct 2022 21:35), Max: 99.2 (18 Oct 2022 17:02)  HR: 107 (18 Oct 2022 21:35) (106 - 111)  BP: 122/82 (18 Oct 2022 21:35) (110/66 - 122/82)  BP(mean): 94 (18 Oct 2022 21:35) (93 - 94)  RR: 20 (18 Oct 2022 21:35) (20 - 22)  SpO2: 96% (18 Oct 2022 21:35) (93% - 96%)    Parameters below as of 18 Oct 2022 21:35  Patient On (Oxygen Delivery Method): nasal cannula  O2 Flow (L/min): 3

## 2022-10-18 NOTE — H&P ADULT - NSHPLABSRESULTS_GEN_ALL_CORE
7.7    5.18  )-----------( 63       ( 18 Oct 2022 18:09 )             23.7       10-18    125<L>  |  90<L>  |  18  ----------------------------<  123<H>  5.1   |  25  |  0.97    Ca    8.6      18 Oct 2022 18:09    TPro  8.4<H>  /  Alb  3.1<L>  /  TBili  0.2  /  DBili  x   /  AST  58<H>  /  ALT  60<H>  /  AlkPhos  67  10-18            LIVER FUNCTIONS - ( 18 Oct 2022 18:09 )  Alb: 3.1 g/dL / Pro: 8.4 g/dL / ALK PHOS: 67 U/L / ALT: 60 U/L / AST: 58 U/L / GGT: x             PT/INR - ( 18 Oct 2022 19:16 )   PT: 12.7 sec;   INR: 1.10 ratio         PTT - ( 18 Oct 2022 19:16 )  PTT:25.9 sec      I have personally reviewed imaging, CT chest with L>R pleural effusion  I have personally reviewed EKG, no e/o acute ischemia

## 2022-10-18 NOTE — ED PROVIDER NOTE - NSICDXPASTMEDICALHX_GEN_ALL_CORE_FT
PAST MEDICAL HISTORY:  Multiple myeloma dx 2018 - refused chemotherapy, treating with "natural herbal remedies"

## 2022-10-18 NOTE — H&P ADULT - PROBLEM SELECTOR PLAN 3
-very unlikely ACS given neg trops, EKG w/o acute changes and pleuritic nature of pain  -CTA r/o PE  -suspect related to moderate pleural effusions of unknown etiology at this time, very unlikely CHF given nl BNP, could be related to MM?  -can consult pulm in AM for possible thoracentesis given moderate Lt sided pleural effusions  -pain control

## 2022-10-18 NOTE — ED PROCEDURE NOTE - ATTENDING CONTRIBUTION TO CARE
I, Shen Juarez, performed a history and physical exam of the patient and discussed their management with the resident and /or advanced care provider. I reviewed the resident and /or ACP's note and agree with the documented findings and plan of care. I was present and available for all procedures.

## 2022-10-18 NOTE — ED PROVIDER NOTE - NSICDXPASTSURGICALHX_GEN_ALL_CORE_FT
PAST SURGICAL HISTORY:  H/O  section 8/15/1985    H/O colonoscopy 2018- polyp found and removed in 2018    S/P partial hysterectomy  d/t fibroids

## 2022-10-18 NOTE — H&P ADULT - PROBLEM SELECTOR PLAN 4
-currently undergoing chemo at [facility]  -heme/onc following  -pain control  -palliative consult in AM

## 2022-10-18 NOTE — ED ADULT NURSE NOTE - PAIN: BODY LOCATION
chest, general
Implemented All Universal Safety Interventions:  [place] to call system. Call bell, personal items and telephone within reach. Instruct patient to call for assistance. Room bathroom lighting operational. Non-slip footwear when patient is off stretcher. Physically safe environment: no spills, clutter or unnecessary equipment. Stretcher in lowest position, wheels locked, appropriate side rails in place.

## 2022-10-18 NOTE — H&P ADULT - NSICDXPASTMEDICALHX_GEN_ALL_CORE_FT
PAST MEDICAL HISTORY:  Multiple myeloma dx 2018 - initially refused chemotherapy, treating with "natural herbal remedies" now undergoing chemo at [facility]

## 2022-10-19 DIAGNOSIS — J96.01 ACUTE RESPIRATORY FAILURE WITH HYPOXIA: ICD-10-CM

## 2022-10-19 DIAGNOSIS — R07.9 CHEST PAIN, UNSPECIFIED: ICD-10-CM

## 2022-10-19 LAB
ALBUMIN SERPL ELPH-MCNC: 3 G/DL — LOW (ref 3.3–5)
ALP SERPL-CCNC: 65 U/L — SIGNIFICANT CHANGE UP (ref 40–120)
ALT FLD-CCNC: 45 U/L — SIGNIFICANT CHANGE UP (ref 10–45)
ANION GAP SERPL CALC-SCNC: 8 MMOL/L — SIGNIFICANT CHANGE UP (ref 5–17)
APTT BLD: 32.6 SEC — SIGNIFICANT CHANGE UP (ref 27.5–35.5)
AST SERPL-CCNC: 34 U/L — SIGNIFICANT CHANGE UP (ref 10–40)
BILIRUB SERPL-MCNC: 0.2 MG/DL — SIGNIFICANT CHANGE UP (ref 0.2–1.2)
BUN SERPL-MCNC: 19 MG/DL — SIGNIFICANT CHANGE UP (ref 7–23)
CALCIUM SERPL-MCNC: 8.7 MG/DL — SIGNIFICANT CHANGE UP (ref 8.4–10.5)
CHLORIDE SERPL-SCNC: 92 MMOL/L — LOW (ref 96–108)
CK MB BLD-MCNC: 1.2 % — SIGNIFICANT CHANGE UP (ref 0–3.5)
CK MB CFR SERPL CALC: 3.6 NG/ML — SIGNIFICANT CHANGE UP (ref 0–3.8)
CK SERPL-CCNC: 294 U/L — HIGH (ref 25–170)
CO2 SERPL-SCNC: 25 MMOL/L — SIGNIFICANT CHANGE UP (ref 22–31)
CREAT SERPL-MCNC: 0.94 MG/DL — SIGNIFICANT CHANGE UP (ref 0.5–1.3)
CRP SERPL-MCNC: 81 MG/L — HIGH (ref 0–4)
D DIMER BLD IA.RAPID-MCNC: 612 NG/ML DDU — HIGH
EGFR: 69 ML/MIN/1.73M2 — SIGNIFICANT CHANGE UP
FERRITIN SERPL-MCNC: HIGH NG/ML (ref 15–150)
GLUCOSE SERPL-MCNC: 141 MG/DL — HIGH (ref 70–99)
HCT VFR BLD CALC: 22.6 % — LOW (ref 34.5–45)
HCV AB S/CO SERPL IA: 0.06 S/CO — SIGNIFICANT CHANGE UP (ref 0–0.99)
HCV AB SERPL-IMP: SIGNIFICANT CHANGE UP
HGB BLD-MCNC: 7.3 G/DL — LOW (ref 11.5–15.5)
INR BLD: 1.14 RATIO — SIGNIFICANT CHANGE UP (ref 0.88–1.16)
LDH SERPL L TO P-CCNC: 224 U/L — SIGNIFICANT CHANGE UP (ref 50–242)
MCHC RBC-ENTMCNC: 31.2 PG — SIGNIFICANT CHANGE UP (ref 27–34)
MCHC RBC-ENTMCNC: 32.3 GM/DL — SIGNIFICANT CHANGE UP (ref 32–36)
MCV RBC AUTO: 96.6 FL — SIGNIFICANT CHANGE UP (ref 80–100)
NRBC # BLD: 0 /100 WBCS — SIGNIFICANT CHANGE UP (ref 0–0)
PLATELET # BLD AUTO: 74 K/UL — LOW (ref 150–400)
POTASSIUM SERPL-MCNC: 4.9 MMOL/L — SIGNIFICANT CHANGE UP (ref 3.5–5.3)
POTASSIUM SERPL-SCNC: 4.9 MMOL/L — SIGNIFICANT CHANGE UP (ref 3.5–5.3)
PROT SERPL-MCNC: 8 G/DL — SIGNIFICANT CHANGE UP (ref 6–8.3)
PROTHROM AB SERPL-ACNC: 13.3 SEC — SIGNIFICANT CHANGE UP (ref 10.5–13.4)
RBC # BLD: 2.34 M/UL — LOW (ref 3.8–5.2)
RBC # FLD: 17.6 % — HIGH (ref 10.3–14.5)
SODIUM SERPL-SCNC: 125 MMOL/L — LOW (ref 135–145)
TROPONIN T, HIGH SENSITIVITY RESULT: 27 NG/L — SIGNIFICANT CHANGE UP (ref 0–51)
WBC # BLD: 3.66 K/UL — LOW (ref 3.8–10.5)
WBC # FLD AUTO: 3.66 K/UL — LOW (ref 3.8–10.5)

## 2022-10-19 PROCEDURE — 99223 1ST HOSP IP/OBS HIGH 75: CPT

## 2022-10-19 PROCEDURE — 99221 1ST HOSP IP/OBS SF/LOW 40: CPT

## 2022-10-19 RX ORDER — CHLORHEXIDINE GLUCONATE 213 G/1000ML
1 SOLUTION TOPICAL DAILY
Refills: 0 | Status: DISCONTINUED | OUTPATIENT
Start: 2022-10-19 | End: 2022-11-09

## 2022-10-19 RX ORDER — MORPHINE SULFATE 50 MG/1
15 CAPSULE, EXTENDED RELEASE ORAL EVERY 4 HOURS
Refills: 0 | Status: DISCONTINUED | OUTPATIENT
Start: 2022-10-19 | End: 2022-10-19

## 2022-10-19 RX ORDER — ENOXAPARIN SODIUM 100 MG/ML
40 INJECTION SUBCUTANEOUS EVERY 12 HOURS
Refills: 0 | Status: COMPLETED | OUTPATIENT
Start: 2022-10-19 | End: 2022-10-27

## 2022-10-19 RX ORDER — FENTANYL CITRATE 50 UG/ML
1 INJECTION INTRAVENOUS
Refills: 0 | Status: DISCONTINUED | OUTPATIENT
Start: 2022-10-19 | End: 2022-10-25

## 2022-10-19 RX ORDER — CHLORHEXIDINE GLUCONATE 213 G/1000ML
1 SOLUTION TOPICAL DAILY
Refills: 0 | Status: DISCONTINUED | OUTPATIENT
Start: 2022-10-19 | End: 2022-10-19

## 2022-10-19 RX ADMIN — Medication 6 MILLIGRAM(S): at 00:11

## 2022-10-19 RX ADMIN — HYDROMORPHONE HYDROCHLORIDE 2 MILLIGRAM(S): 2 INJECTION INTRAMUSCULAR; INTRAVENOUS; SUBCUTANEOUS at 21:06

## 2022-10-19 RX ADMIN — ENOXAPARIN SODIUM 40 MILLIGRAM(S): 100 INJECTION SUBCUTANEOUS at 04:31

## 2022-10-19 RX ADMIN — HYDROMORPHONE HYDROCHLORIDE 2 MILLIGRAM(S): 2 INJECTION INTRAMUSCULAR; INTRAVENOUS; SUBCUTANEOUS at 22:49

## 2022-10-19 RX ADMIN — REMDESIVIR 500 MILLIGRAM(S): 5 INJECTION INTRAVENOUS at 11:51

## 2022-10-19 RX ADMIN — HYDROMORPHONE HYDROCHLORIDE 2 MILLIGRAM(S): 2 INJECTION INTRAMUSCULAR; INTRAVENOUS; SUBCUTANEOUS at 06:11

## 2022-10-19 RX ADMIN — Medication 400 MILLIGRAM(S): at 11:51

## 2022-10-19 RX ADMIN — AMLODIPINE BESYLATE 10 MILLIGRAM(S): 2.5 TABLET ORAL at 06:04

## 2022-10-19 RX ADMIN — PANTOPRAZOLE SODIUM 40 MILLIGRAM(S): 20 TABLET, DELAYED RELEASE ORAL at 06:03

## 2022-10-19 RX ADMIN — HYDROMORPHONE HYDROCHLORIDE 2 MILLIGRAM(S): 2 INJECTION INTRAMUSCULAR; INTRAVENOUS; SUBCUTANEOUS at 11:52

## 2022-10-19 RX ADMIN — SENNA PLUS 2 TABLET(S): 8.6 TABLET ORAL at 21:07

## 2022-10-19 RX ADMIN — HYDROMORPHONE HYDROCHLORIDE 2 MILLIGRAM(S): 2 INJECTION INTRAMUSCULAR; INTRAVENOUS; SUBCUTANEOUS at 12:22

## 2022-10-19 RX ADMIN — POLYETHYLENE GLYCOL 3350 17 GRAM(S): 17 POWDER, FOR SOLUTION ORAL at 11:53

## 2022-10-19 RX ADMIN — ENOXAPARIN SODIUM 40 MILLIGRAM(S): 100 INJECTION SUBCUTANEOUS at 18:05

## 2022-10-19 RX ADMIN — Medication 6 MILLIGRAM(S): at 06:04

## 2022-10-19 RX ADMIN — HYDROMORPHONE HYDROCHLORIDE 2 MILLIGRAM(S): 2 INJECTION INTRAMUSCULAR; INTRAVENOUS; SUBCUTANEOUS at 04:31

## 2022-10-19 RX ADMIN — FENTANYL CITRATE 1 PATCH: 50 INJECTION INTRAVENOUS at 18:02

## 2022-10-19 RX ADMIN — HYDROMORPHONE HYDROCHLORIDE 2 MILLIGRAM(S): 2 INJECTION INTRAMUSCULAR; INTRAVENOUS; SUBCUTANEOUS at 00:14

## 2022-10-19 RX ADMIN — Medication 5 MILLIGRAM(S): at 04:31

## 2022-10-19 RX ADMIN — HYDROMORPHONE HYDROCHLORIDE 2 MILLIGRAM(S): 2 INJECTION INTRAMUSCULAR; INTRAVENOUS; SUBCUTANEOUS at 00:35

## 2022-10-19 NOTE — DIETITIAN INITIAL EVALUATION ADULT - ETIOLOGY
related to increased physiological demand  related to inadequate protein-energy intake in the setting of MM

## 2022-10-19 NOTE — CONSULT NOTE ADULT - SUBJECTIVE AND OBJECTIVE BOX
10-19-22 @ 16:12    Patient is a 61y old  Female who presents with a chief complaint of SOB, CP (19 Oct 2022 15:16)      HPI:  61F unvaccinated w/ PMH MM(dx'd ) currently on chemo(last session 10/13) p/w 1-day h/o chest pain and fever. Most history per daughter at baseline as pt in significant pain and unable to talk. Pt had initially presented to [facility] with a sharp mid-chest pain gradual in onset that progressively got worse associated with a fever as high as 102.2 and a productive cough with greenish sputum. Describes the pain as lingering and worse with breathing. Of note, she has lesions in her chest from  however reports this pain feels different. At [facility], she was found to be tachycardic, hypoxic to 80% and referred to [facility] for further eval. Of note, pt had been recently hospitalized for about a week at [facility] for pain crisis. Denied dizziness, paresthesias, palpitations, abdominal pain,       ED course: Afebrile, tachy to 111, BP stable. Tachypneic to 22, 93% on  2LNC improved to >95% on 3L NC. Given vanc, cefepime, (18 Oct 2022 23:24)     ow she is found to have covid pneumonia and is on oxygen: being changed to 100% high flow:   she is alert and awake and understands my questions : daughter is at bedside :  she never tool covid vaccination:    she has pain in sternum: deniEs any sob; or significant cough :   never had pe or dvt   ?FOLLOWING PRESENT  [ X] Hx of PE/DVT, [ X] Hx COPD, [X ] Hx of Asthma, [ Y] Hx of Hospitalization, [ X]  Hx of BiPAP/CPAP use, [ X] Hx of GIANLUCA    Allergies    acetaminophen (Hives)  aspirin (Rash)  Benadryl (Rash)  shellfish (Hives)    Intolerances        PAST MEDICAL & SURGICAL HISTORY:  Multiple myeloma  dx 2018 - initially refused chemotherapy, treating with &quot;natural herbal remedies&quot; now undergoing chemo at [facility]      H/O  section  8/15/1985      H/O colonoscopy  2018- polyp found and removed in 2018      S/P partial hysterectomy   d/t fibroids          FAMILY HISTORY:  FH: hypertension  mother    FH: heart disease  mother    FH: Alzheimers disease  mother    Family history of scleroderma  twin sister    FH: breast cancer (Sibling)  sister        Social History: [ X ] TOBACCO                  [  X] ETOH                                 [  X] IVDA/DRUGS    REVIEW OF SYSTEMS      General:	FEVER    Skin/Breast:x  	  Ophthalmologic:x  	  ENMT:	x    Respiratory and Thorax:  chest pain : fever  	  Cardiovascular:	x    Gastrointestinal:	x    Genitourinary:	x    Musculoskeletal:	x    Neurological:	x    Psychiatric:	x    Hematology/Lymphatics:	x    Endocrine:	x    Allergic/Immunologic:	x    MEDICATIONS  (STANDING):  acyclovir   Oral Tab/Cap 400 milliGRAM(s) Oral daily  amLODIPine   Tablet 10 milliGRAM(s) Oral daily  dexAMETHasone  Injectable 6 milliGRAM(s) IV Push daily  enoxaparin Injectable 40 milliGRAM(s) SubCutaneous every 12 hours  fentaNYL   Patch  50 MICROgram(s)/Hr 1 Patch Transdermal every 72 hours  naloxone Injectable 0.4 milliGRAM(s) IV Push once  pantoprazole    Tablet 40 milliGRAM(s) Oral before breakfast  polyethylene glycol 3350 17 Gram(s) Oral daily  remdesivir  IVPB   IV Intermittent   senna 2 Tablet(s) Oral at bedtime    MEDICATIONS  (PRN):  ALBUTerol    90 MICROgram(s) HFA Inhaler 2 Puff(s) Inhalation every 4 hours PRN Shortness of Breath and/or Wheezing  aluminum hydroxide/magnesium hydroxide/simethicone Suspension 30 milliLiter(s) Oral every 4 hours PRN Dyspepsia  benzonatate 100 milliGRAM(s) Oral every 8 hours PRN Cough  bisacodyl 5 milliGRAM(s) Oral daily PRN Constipation  guaifenesin/dextromethorphan Oral Liquid 10 milliLiter(s) Oral every 4 hours PRN Cough  HYDROmorphone  Injectable 1 milliGRAM(s) IV Push every 4 hours PRN Moderate Pain (4 - 6)  HYDROmorphone  Injectable 2 milliGRAM(s) IV Push every 4 hours PRN Severe Pain (7 - 10)  megestrol 40 milliGRAM(s) Oral daily PRN appetite  melatonin 3 milliGRAM(s) Oral at bedtime PRN Insomnia  ondansetron Injectable 4 milliGRAM(s) IV Push every 8 hours PRN Nausea and/or Vomiting       Vital Signs Last 24 Hrs  T(C): 36.6 (19 Oct 2022 13:57), Max: 38.3 (18 Oct 2022 23:42)  T(F): 97.9 (19 Oct 2022 13:57), Max: 101 (18 Oct 2022 23:42)  HR: 109 (19 Oct 2022 13:57) (103 - 116)  BP: 118/83 (19 Oct 2022 13:57) (110/66 - 122/82)  BP(mean): 94 (18 Oct 2022 21:35) (93 - 94)  RR: 18 (19 Oct 2022 13:57) (18 - 22)  SpO2: 96% (19 Oct 2022 13:57) (89% - 96%)    Parameters below as of 19 Oct 2022 13:57  Patient On (Oxygen Delivery Method): mask, nonrebreather    Orthostatic VS          I&O's Summary    19 Oct 2022 07:01  -  19 Oct 2022 16:12  --------------------------------------------------------  IN: 240 mL / OUT: 0 mL / NET: 240 mL        Physical Exam:   GENERAL: thin cachectic women  HEENT: CARLA/   Atraumatic, Normocephalic  ENMT: No tonsillar erythema, exudates, or enlargement; Moist mucous membranes, Good dentition, No lesions  NECK: Supple, No JVD, Normal thyroid  CHEST/LUNG: poor air entry  : no wheezing:   CVS: Regular rate and rhythm; No murmurs, rubs, or gallops  GI: : Soft, Nontender, Nondistended; Bowel sounds present  NERVOUS SYSTEM:  Alert & Oriented X3  EXTREMITIES:  - edema  LYMPH: No lymphadenopathy noted  SKIN: No rashes or lesions  ENDOCRINOLOGY: No Thyromegaly  PSYCH: Appropriate    Labs:  Venous<51<4>>23<<7.345>>Venous<<3><<4><<5<<239>>SARS-CoV-2: Detected (18 Oct 2022 18:46)    CARDIAC MARKERS ( 19 Oct 2022 07:15 )  x     / x     / 294 U/L / x     / 3.6 ng/mL                            7.3    3.66  )-----------( 74       ( 19 Oct 2022 07:16 )             22.6                         7.7    5.18  )-----------( 63       ( 18 Oct 2022 18:09 )             23.7     10-19    125<L>  |  92<L>  |  19  ----------------------------<  141<H>  4.9   |  25  |  0.94  10-18    125<L>  |  90<L>  |  18  ----------------------------<  123<H>  5.1   |  25  |  0.97    Ca    8.7      19 Oct 2022 07:15  Ca    8.6      18 Oct 2022 18:09    TPro  8.0  /  Alb  3.0<L>  /  TBili  0.2  /  DBili  x   /  AST  34  /  ALT  45  /  AlkPhos  65  10-19  TPro  8.4<H>  /  Alb  3.1<L>  /  TBili  0.2  /  DBili  x   /  AST  58<H>  /  ALT  60<H>  /  AlkPhos  67  10-18    CAPILLARY BLOOD GLUCOSE        LIVER FUNCTIONS - ( 19 Oct 2022 07:15 )  Alb: 3.0 g/dL / Pro: 8.0 g/dL / ALK PHOS: 65 U/L / ALT: 45 U/L / AST: 34 U/L / GGT: x           PT/INR - ( 19 Oct 2022 07:29 )   PT: 13.3 sec;   INR: 1.14 ratio         PTT - ( 19 Oct 2022 07:29 )  PTT:32.6 sec    D DImer  Procalcitonin, Serum: 0.28 ng/mL (10-18 @ 18:09)  D-Dimer Assay, Quantitative: 612 ng/mL DDU (10-19 @ 07:29)  Serum Pro-Brain Natriuretic Peptide: 159 pg/mL (10-18 @ 18:09)      Studies  Chest X-RAY  CT SCAN Chest   CT Abdomen  Venous Dopplers: LE:   Others    rad< from: CT Angio Chest PE Protocol w/ IV Cont (10.18.22 @ 20:03) >    FINDINGS:    PULMONARY ANGIOGRAM: Contrast bolus is satisfactory. No main, right main,   left main, lobar or segmental pulmonary embolism.    LYMPH NODES: No lymphadenopathy.    HEART/VASCULATURE: The heart is normal in size. No pericardial effusion.    AIRWAYS/LUNGS/PLEURA: Small right and moderate left pleural effusions and   compressive atelectasis. Patchy areas of linear atelectasis in the   bilateral upper lobes.    UPPER ABDOMEN: Within normal limits.    BONES/SOFT TISSUES: Permeative lytic lesions throughout the osseous   structures suggestive of myeloma. Compression deformities involving T3,   T6, T10, T11, L1 and L2 vertebral bodies.    IMPRESSION:    No pulmonary embolism.    Smallright and moderate left pleural effusions and compressive   atelectasis.    --- End of Report ---           GIULIANO NAILS MD; Resident Radiologist  This document has been electronically signed.  BERT LA MD; Attending Radiologist  This document has been electronically signed. Oct 18 2022  8:21PM    < end of copied text >

## 2022-10-19 NOTE — PROGRESS NOTE ADULT - ASSESSMENT
61F unvaccinated w/ PMH MM(dx'd 2018) currently on chemo(last session 10/13) p/w 1-day h/o chest pain and fever. Found to have hypoxia in s/o COVID-19  and small-moderate pleural effusions.

## 2022-10-19 NOTE — CONSULT NOTE ADULT - SUBJECTIVE AND OBJECTIVE BOX
Reason for consult: Multiple Myeloma    HPI:  61F unvaccinated w/ PMH MM(dx'd 2018) currently on chemo(last session 10/13) p/w 1-day h/o chest pain and fever. Most history per daughter at baseline as pt in significant pain and unable to talk. Pt had initially presented to [facility] with a sharp mid-chest pain gradual in onset that progressively got worse associated with a fever as high as 102.2 and a productive cough with greenish sputum. Describes the pain as lingering and worse with breathing. Of note, she has lesions in her chest from MM however reports this pain feels different. At [facility], she was found to be tachycardic, hypoxic to 80% and referred to [facility] for further eval. Of note, pt had been recently hospitalized for about a week at [facility] for pain crisis. Denied dizziness, paresthesias, palpitations, abdominal pain,       ED course: Afebrile, tachy to 111, BP stable. Tachypneic to 22, 93% on  2LNC improved to >95% on 3L NC. Given vanc, cefepime, (18 Oct 2022 23:24)    Hematology/Oncology called to see patient who is under care of Dr. 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 She was recently hospitalized for chemotherapy/pain management at [facility], discharged 10/15/2022.     PAST MEDICAL & SURGICAL HISTORY:  Multiple myeloma  dx 2018 - initially refused chemotherapy, treating with &quot;natural herbal remedies&quot; now undergoing chemo at [facility]      H/O  section  8/15/1985      H/O colonoscopy  2018- polyp found and removed in 2018      S/P partial hysterectomy   d/t fibroids          FAMILY HISTORY:  FH: hypertension  mother    FH: heart disease  mother    FH: Alzheimers disease  mother    Family history of scleroderma  twin sister    FH: breast cancer (Sibling)  sister        Alcohol Denied  Smoking: Nonsmoker  Drug Use: Denied  Marital Status:         Allergies    acetaminophen (Hives)  aspirin (Rash)  Benadryl (Rash)  shellfish (Hives)    Intolerances        MEDICATIONS  (STANDING):  acyclovir   Oral Tab/Cap 400 milliGRAM(s) Oral daily  amLODIPine   Tablet 10 milliGRAM(s) Oral daily  dexAMETHasone  Injectable 6 milliGRAM(s) IV Push daily  enoxaparin Injectable 40 milliGRAM(s) SubCutaneous every 24 hours  naloxone Injectable 0.4 milliGRAM(s) IV Push once  pantoprazole    Tablet 40 milliGRAM(s) Oral before breakfast  polyethylene glycol 3350 17 Gram(s) Oral daily  remdesivir  IVPB   IV Intermittent   remdesivir  IVPB 200 milliGRAM(s) IV Intermittent every 24 hours  senna 2 Tablet(s) Oral at bedtime    MEDICATIONS  (PRN):  ALBUTerol    90 MICROgram(s) HFA Inhaler 2 Puff(s) Inhalation every 4 hours PRN Shortness of Breath and/or Wheezing  aluminum hydroxide/magnesium hydroxide/simethicone Suspension 30 milliLiter(s) Oral every 4 hours PRN Dyspepsia  benzonatate 100 milliGRAM(s) Oral every 8 hours PRN Cough  bisacodyl 5 milliGRAM(s) Oral daily PRN Constipation  guaifenesin/dextromethorphan Oral Liquid 10 milliLiter(s) Oral every 4 hours PRN Cough  HYDROmorphone  Injectable 1 milliGRAM(s) IV Push every 4 hours PRN Moderate Pain (4 - 6)  HYDROmorphone  Injectable 2 milliGRAM(s) IV Push every 4 hours PRN Severe Pain (7 - 10)  megestrol 40 milliGRAM(s) Oral daily PRN appetite  melatonin 3 milliGRAM(s) Oral at bedtime PRN Insomnia  ondansetron Injectable 4 milliGRAM(s) IV Push every 8 hours PRN Nausea and/or Vomiting      ROS  Fevers  Dementia  No epistaxis, HA, sore throat  Cough with green sputum  No n/v/d, abd pain, melena, hematochezia  No edema  No rash  No anxiety  Chest, back pain  No bleeding, bruising  No dysuria, hematuria    T(C): 36.8 (10-19-22 @ 03:11), Max: 38.3 (10-18-22 @ 23:42)  HR: 103 (10-19-22 @ 03:11) (103 - 116)  BP: 110/74 (10-19-22 @ 03:11) (110/66 - 122/82)  RR: 18 (10-19-22 @ 03:11) (18 - 22)  SpO2: 95% (10-19-22 @ 03:11) (89% - 96%)  Wt(kg): --    PE  NAD  Awake, alert  Anicteric, MMM  RRR  CTAB  Abd soft, NT, ND  No c/c/e  No rash grossly  FROM                          7.7    5.18  )-----------( 63       ( 18 Oct 2022 18:09 )             23.7       10-18    125<L>  |  90<L>  |  18  ----------------------------<  123<H>  5.1   |  25  |  0.97    Ca    8.6      18 Oct 2022 18:09    TPro  8.4<H>  /  Alb  3.1<L>  /  TBili  0.2  /  DBili  x   /  AST  58<H>  /  ALT  60<H>  /  AlkPhos  67  10-18    ACC: [id number] [facility]                          PROCEDURE DATE:  10/18/2022          INTERPRETATION:  CLINICAL INFORMATION: Concern for pulmonary embolism.   Chest pain.    COMPARISON: None.    CONTRAST/COMPLICATIONS:  IV Contrast: Omnipaque 350  90 cc administered   0 cc discarded  Oral Contrast: NONE  Complications: None reported at time of study completion    PROCEDURE:  CT Angiogram of the chest was obtained with intravenous contrast. Three   dimensional maximum intensity projection (MIP) images were generated.    FINDINGS:    PULMONARY ANGIOGRAM: Contrast bolus is satisfactory. No main, right main,   left main, lobar or segmental pulmonary embolism.    LYMPH NODES: No lymphadenopathy.    HEART/VASCULATURE: The heart is normal in size. No pericardial effusion.    AIRWAYS/LUNGS/PLEURA: Small right and moderate left pleural effusions and   compressive atelectasis. Patchy areas of linear atelectasis in the   bilateral upper lobes.    UPPER ABDOMEN: Within normal limits.    BONES/SOFT TISSUES: Permeative lytic lesions throughout the osseous   structures suggestive of myeloma. Compression deformities involving T3,   T6, T10, T11, L1 and L2 vertebral bodies.    IMPRESSION:    No pulmonary embolism.    Small right and moderate left pleural effusions and compressive   atelectasis.    --- End of Report ---     Reason for consult: Multiple Myeloma    HPI:  61F unvaccinated w/ PMH MM(dx'd 2018) currently on chemo(last session 10/13) p/w 1-day h/o chest pain and fever. Most history per daughter at baseline as pt in significant pain and unable to talk. Pt had initially presented to [facility] with a sharp mid-chest pain gradual in onset that progressively got worse associated with a fever as high as 102.2 and a productive cough with greenish sputum. Describes the pain as lingering and worse with breathing. Of note, she has lesions in her chest from MM however reports this pain feels different. At [facility], she was found to be tachycardic, hypoxic to 80% and referred to [facility] for further eval. Of note, pt had been recently hospitalized for about a week at [facility] for pain crisis. Denied dizziness, paresthesias, palpitations, abdominal pain,       ED course: Afebrile, tachy to 111, BP stable. Tachypneic to 22, 93% on  2LNC improved to >95% on 3L NC. Given vanc, cefepime, (18 Oct 2022 23:24)    Hematology/Oncology called to see patient who is under care of Dr. 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 She was recently hospitalized for chemotherapy/pain management at [facility], discharged 10/15/2022.     PAST MEDICAL & SURGICAL HISTORY:  Multiple myeloma  dx 2018 - initially refused chemotherapy, treating with &quot;natural herbal remedies&quot; now undergoing chemo at [facility]      H/O  section  8/15/1985      H/O colonoscopy  2018- polyp found and removed in 2018      S/P partial hysterectomy   d/t fibroids          FAMILY HISTORY:  FH: hypertension  mother    FH: heart disease  mother    FH: Alzheimers disease  mother    Family history of scleroderma  twin sister    FH: breast cancer (Sibling)  sister        Alcohol Denied  Smoking: Nonsmoker  Drug Use: Denied  Marital Status:         Allergies    acetaminophen (Hives)  aspirin (Rash)  Benadryl (Rash)  shellfish (Hives)    Intolerances        MEDICATIONS  (STANDING):  acyclovir   Oral Tab/Cap 400 milliGRAM(s) Oral daily  amLODIPine   Tablet 10 milliGRAM(s) Oral daily  dexAMETHasone  Injectable 6 milliGRAM(s) IV Push daily  enoxaparin Injectable 40 milliGRAM(s) SubCutaneous every 24 hours  naloxone Injectable 0.4 milliGRAM(s) IV Push once  pantoprazole    Tablet 40 milliGRAM(s) Oral before breakfast  polyethylene glycol 3350 17 Gram(s) Oral daily  remdesivir  IVPB   IV Intermittent   remdesivir  IVPB 200 milliGRAM(s) IV Intermittent every 24 hours  senna 2 Tablet(s) Oral at bedtime    MEDICATIONS  (PRN):  ALBUTerol    90 MICROgram(s) HFA Inhaler 2 Puff(s) Inhalation every 4 hours PRN Shortness of Breath and/or Wheezing  aluminum hydroxide/magnesium hydroxide/simethicone Suspension 30 milliLiter(s) Oral every 4 hours PRN Dyspepsia  benzonatate 100 milliGRAM(s) Oral every 8 hours PRN Cough  bisacodyl 5 milliGRAM(s) Oral daily PRN Constipation  guaifenesin/dextromethorphan Oral Liquid 10 milliLiter(s) Oral every 4 hours PRN Cough  HYDROmorphone  Injectable 1 milliGRAM(s) IV Push every 4 hours PRN Moderate Pain (4 - 6)  HYDROmorphone  Injectable 2 milliGRAM(s) IV Push every 4 hours PRN Severe Pain (7 - 10)  megestrol 40 milliGRAM(s) Oral daily PRN appetite  melatonin 3 milliGRAM(s) Oral at bedtime PRN Insomnia  ondansetron Injectable 4 milliGRAM(s) IV Push every 8 hours PRN Nausea and/or Vomiting      ROS  Fevers  Dementia by history  No epistaxis, HA, sore throat  Cough with green sputum prior to admission  Still dyspneic - on Non-rebreather mask  No n/v/d, abd pain, melena, hematochezia  No edema  No rash  No anxiety  Chest, back pain  No bleeding, bruising  No dysuria, hematuria    T(C): 36.8 (10-19-22 @ 03:11), Max: 38.3 (10-18-22 @ 23:42)  HR: 103 (10-19-22 @ 03:11) (103 - 116)  BP: 110/74 (10-19-22 @ 03:11) (110/66 - 122/82)  RR: 18 (10-19-22 @ 03:11) (18 - 22)  SpO2: 95% (10-19-22 @ 03:11) (89% - 96%)  Wt(kg): --    PE  NAD  Awake, alert  Anicteric, MMM  RRR  Rhonchi diffusely on left  Abd soft, NT, ND  No c/c/e  No rash grossly  FROM                          7.7    5.18  )-----------( 63       ( 18 Oct 2022 18:09 )             23.7       1018    125<L>  |  90<L>  |  18  ----------------------------<  123<H>  5.1   |  25  |  0.97    Ca    8.6      18 Oct 2022 18:09    TPro  8.4<H>  /  Alb  3.1<L>  /  TBili  0.2  /  DBili  x   /  AST  58<H>  /  ALT  60<H>  /  AlkPhos  67  10-18    ACC: [id number] [facility]                          PROCEDURE DATE:  10/18/2022          INTERPRETATION:  CLINICAL INFORMATION: Concern for pulmonary embolism.   Chest pain.    COMPARISON: None.    CONTRAST/COMPLICATIONS:  IV Contrast: Omnipaque 350  90 cc administered   0 cc discarded  Oral Contrast: NONE  Complications: None reported at time of study completion    PROCEDURE:  CT Angiogram of the chest was obtained with intravenous contrast. Three   dimensional maximum intensity projection (MIP) images were generated.    FINDINGS:    PULMONARY ANGIOGRAM: Contrast bolus is satisfactory. No main, right main,   left main, lobar or segmental pulmonary embolism.    LYMPH NODES: No lymphadenopathy.    HEART/VASCULATURE: The heart is normal in size. No pericardial effusion.    AIRWAYS/LUNGS/PLEURA: Small right and moderate left pleural effusions and   compressive atelectasis. Patchy areas of linear atelectasis in the   bilateral upper lobes.    UPPER ABDOMEN: Within normal limits.    BONES/SOFT TISSUES: Permeative lytic lesions throughout the osseous   structures suggestive of myeloma. Compression deformities involving T3,   T6, T10, T11, L1 and L2 vertebral bodies.    IMPRESSION:    No pulmonary embolism.    Small right and moderate left pleural effusions and compressive   atelectasis.    --- End of Report ---

## 2022-10-19 NOTE — CONSULT NOTE ADULT - ASSESSMENT
A/P:61F unvaccinated w/ PMH MM(dx'd 2018) currently on chemo(last session 10/13) p/w 1-day h/o chest pain and fever. Found to have hypoxia in s/o COVID-19  and small-moderate pleural effusions.    Wound Consult requested to assist w/ management of Incontinence Dermatitis of Buttocks  Incontinence of Urine and Stool    Buttocks/ Sacrum [place] cream BID and prn soiling //       Continue w/ attends under pads and Pericare w/ purewick care as per protocol  Abx per Medicine/ ID  Moisturize intact skin w/ SWEEN cream BID  Nutrition Consult for optimization in pt w/ Severe Protein Calorie Malnutrition,          encourage high quality protein, MVI & Vit C to promote wound healing  Hyperglycemia - improving w/ ADA diet and Lantus/ NPH,         FS w/ ISS q6h/ qmeal & qhs, consider Endo Consult, consider HgA1c  Anemia- improving transfusions, Fe studies, protonix  Continue turning and positioning w/ offloading assistive devices as per protocol  Waffle Cushion to chair when oob to chair  Continue w/ low air loss pressure redistribution bed surface   Care as per medicine, remain available as requested  Upon discharge f/u as outpatient at [facility] [address] [place], [place] [phone]  Seen w/ attng and D/w team & RN  Thank you for this consult  Estrellita Anderson PA-C CWS [id number]  I spent 30minutes face to face w/ this pt of which more than 50% of the time was spent counseling & coordinating care of this pt.  A/P:61F unvaccinated w/ PMH MM(dx'd 2018) currently on chemo(last session 10/13) p/w 1-day h/o chest pain and fever. Found to have hypoxia in s/o COVID-19  and small-moderate pleural effusions.    Wound Consult requested to assist w/ management of:  Incontinence Dermatitis of Buttocks  Incontinence of Urine and Stool    Buttocks/ Sacrum [place] cream BID and prn soiling //       Continue w/ attends under pads and Pericare w/ purewick care as per protocol  Abx per Medicine/ ID  Moisturize intact skin w/ SWEEN cream BID  Nutrition Consult for optimization in pt w/ Severe Protein Calorie Malnutrition,          encourage high quality protein, MVI & Vit C to promote wound healing  Hyperglycemia - improving w/ ADA diet and Lantus/ NPH,         FS w/ ISS q6h/ qmeal & qhs, consider Endo Consult, consider HgA1c  Anemia- improving transfusions, Fe studies, protonix  Continue turning and positioning w/ offloading assistive devices as per protocol  Waffle Cushion to chair when oob to chair  Continue w/ low air loss pressure redistribution bed surface   Care as per medicine, remain available as requested  Upon discharge f/u as outpatient at [facility] [address] [place] [address] [phone]  Seen w/ attng and D/w team & RN  Thank you for this consult  Estrellita Anderson PA-C CWS [id number]

## 2022-10-19 NOTE — CONSULT NOTE ADULT - SUBJECTIVE AND OBJECTIVE BOX
C A R D I O L O G Y  *********************    DATE OF SERVICE: 10-19-22    HISTORY OF PRESENT ILLNESS: HPI:  Patient is a 60 y/o Female with PMH of Multiple Myeloma (dx 2018) currently on chemo who presented with chest pain, SOB, and fever admitted with +COVID and b/l pleural effusions. Cardiology consulted for further evaluation. Daughter at bedside assisted with history. Patient reports chest pain slightly improved. She was recently admitted to [facility] for pain crisis due to MM. Her pain was mostly in the back and only intermittent chest pain at that time but now the chest pain has become persistent. The chest pain is non-exertional, pleuritic, and very tender to touch. She also reports sore throat and productive cough. No prior cardiac history or recent testing. Denies palpitations, dizziness, or syncope.     PAST MEDICAL & SURGICAL HISTORY:  Multiple myeloma  dx 2018 - initially refused chemotherapy, treating with &quot;natural herbal remedies&quot; now undergoing chemo at [facility]      H/O  section  8/15/1985      H/O colonoscopy  2018- polyp found and removed in 2018      S/P partial hysterectomy   d/t fibroids    MEDICATIONS:  MEDICATIONS  (STANDING):  acyclovir   Oral Tab/Cap 400 milliGRAM(s) Oral daily  amLODIPine   Tablet 10 milliGRAM(s) Oral daily  dexAMETHasone  Injectable 6 milliGRAM(s) IV Push daily  enoxaparin Injectable 40 milliGRAM(s) SubCutaneous every 24 hours  fentaNYL   Patch  50 MICROgram(s)/Hr 1 Patch Transdermal every 72 hours  naloxone Injectable 0.4 milliGRAM(s) IV Push once  pantoprazole    Tablet 40 milliGRAM(s) Oral before breakfast  polyethylene glycol 3350 17 Gram(s) Oral daily  remdesivir  IVPB   IV Intermittent   senna 2 Tablet(s) Oral at bedtime      Allergies    acetaminophen (Hives)  aspirin (Rash)  Benadryl (Rash)  shellfish (Hives)    Intolerances        FAMILY HISTORY:  FH: hypertension  mother    FH: heart disease  mother    FH: Alzheimers disease  mother    Family history of scleroderma  twin sister    FH: breast cancer (Sibling)  sister      Non-contributary for premature coronary disease or sudden cardiac death    SOCIAL HISTORY:    [x ] Non-smoker  [ ] Smoker  [ ] Alcohol    FLU VACCINE THIS YEAR STARTS IN AUGUST:  [ ] Yes    [ ] No    IF OVER 65 HAVE YOU EVER HAD A PNA VACCINE:  [ ] Yes    [ ] No       [ ] N/A      REVIEW OF SYSTEMS:  [x ]chest pain  [ x ]shortness of breath  [  ]palpitations  [  ]syncope  [ ]near syncope [ ]upper extremity weakness   [ ] lower extremity weakness  [  ]diplopia  [  ]altered mental status   [ x ]fevers  [ ]chills [ ]nausea  [ ]vomiting  [  ]dysphagia    [ ]abdominal pain  [ ]melena  [ ]BRBPR    [  ]epistaxis  [  ]rash    [ ]lower extremity edema    +cough    [X] All others negative	  [ ] Unable to obtain      LABS:	 	    CARDIAC MARKERS:  CARDIAC MARKERS ( 19 Oct 2022 07:15 )  x     / x     / 294 U/L / x     / 3.6 ng/mL                              7.3    3.66  )-----------( 74       ( 19 Oct 2022 07:16 )             22.6     Hb Trend: 7.3<--, 7.7<--    1019    125<L>  |  92<L>  |  19  ----------------------------<  141<H>  4.9   |  25  |  0.94    Ca    8.7      19 Oct 2022 07:15    TPro  8.0  /  Alb  3.0<L>  /  TBili  0.2  /  DBili  x   /  AST  34  /  ALT  45  /  AlkPhos  65  10-19    Creatinine Trend: 0.94<--, 0.97<--    Coags:  PT/INR - ( 19 Oct 2022 07:29 )   PT: 13.3 sec;   INR: 1.14 ratio         PTT - ( 19 Oct 2022 07:29 )  PTT:32.6 sec    proBNP: Serum Pro-Brain Natriuretic Peptide: 159 pg/mL (10-18 @ 18:09)    Lipid Profile:   HgA1c:   TSH:         PHYSICAL EXAM:  T(C): 36.6 (10-19-22 @ 13:57), Max: 38.3 (10-18-22 @ 23:42)  HR: 109 (10-19-22 @ 13:57) (103 - 116)  BP: 118/83 (10-19-22 @ 13:57) (110/66 - 122/82)  RR: 18 (10-19-22 @ 13:57) (18 - )  SpO2: 96% (10-19-22 @ 13:57) (89% - 96%)  Wt(kg): --   BMI (kg/m2): 19.8 (10-19-22 @ 06:24)  I&O's Summary    19 Oct 2022 07:01  -  19 Oct 2022 14:55  --------------------------------------------------------  IN: 240 mL / OUT: 0 mL / NET: 240 mL        Gen: NAD  HEENT:  (-)icterus (-)pallor  CV: N S1 S2 1/6 CHARLY (+)2 Pulses B/l  Resp:  Diminished BS at bases B/L, normal effort  GI: (+) BS Soft, NT, ND  Lymph:  (-)Edema, (-)obvious lymphadenopathy  Skin: Warm to touch, Normal turgor  Psych: Appropriate mood and affect    TELEMETRY: None	      ECG: Pending (Ordered)  	    RADIOLOGY:  < from: CT Angio Chest PE Protocol w/ IV Cont (10.18.22 @ 20:03) >  IMPRESSION:    No pulmonary embolism.    Smallright and moderate left pleural effusions and compressive   atelectasis.    --- End of Report ---    < end of copied text >      ASSESSMENT/PLAN: Patient is a 60 y/o Female with PMH of Multiple Myeloma (dx 2018) currently on chemo who presented with chest pain, SOB, and fever admitted with +COVID and b/l pleural effusions. Cardiology consulted for further evaluation.     - Patient with nonanginal chest pain that is pleuritic and very tender to palpation - suspect MSK related likely due to costochondritis and multiple myeloma lytic lesions  - Follow up EKG (ordered)  - Cardiac enzymes unremarkable  - CTA chest negative for PE, small R and mod L pleural effusions and compressive atelectasis  - Check TTE to eval LV function given pleural effusions however low BNP noted therefore unlikely due to CHF  - ID follow up - BCx pending  - Supportive care/covid management per primary team    Artur Cortes PA-C  Pager: [phone]

## 2022-10-19 NOTE — PATIENT PROFILE ADULT - FALL HARM RISK - HARM RISK INTERVENTIONS

## 2022-10-19 NOTE — CONSULT NOTE ADULT - ASSESSMENT
61F unvaccinated w/ PMH MM(dx'd 2018) currently on chemo(last session 10/13) p/w 1-day h/o chest pain and fever. Most history per daughter at baseline as pt in significant pain and unable to talk. Pt had initially [place] [facility] [place] a sharp mid-chest pain gradual in onset that progressively got worse associated with a fever as high as 102.2 and a productive cough with greenish sputum. Describes the pain as lingering and worse with breathing. Of note, she has lesions in her chest from MM however reports this pain feels different. At [facility] she was found to be tachycardic, hypoxic to 80% and referred to [facility] for further eval. Of note, pt had been recently hospitalized for about a week at [facility] for pain crisis. Denied dizziness, paresthesias, palpitations, abdominal pain,       ED course: Afebrile, tachy to 111, BP stable. Tachypneic to 22, 93% on  2LNC improved to >95% on 3L NC. Given vanc, cefepime, (18 Oct 2022 23:24)    Hematology/Oncology called to see patient who is under care of Dr. 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 She was recently hospitalized for chemotherapy/pain management at [facility], discharged 10/15/2022.     IgG lambda multiple myeloma  --Under care at [facility] - Dr. Denise Fatima  --Chemotherapy on hold during hospitalization    COVID-19 Infection  --Likely cause of SOB, fever, sputum production  --Started on Remdesivir  --O2 supplement as needed  --Management per ID, Pulm, Primary Team    Pain Management  --S/P multiple pathologic compression fractures from disease  --Patient needs aggressive pain management  --Palliative care recommended if current regimen not adequate    Anemia  --Secondary to disease, recent chemotherapy  --Please transfuse PRBC's if <7 grams    Thrombocytopenia  --Secondary to chemotherapy  --Please transfuse platelets if <10K or <50K with bleeding or prior to procedures    We will continue to follow patient and coordinate with [facility]    Bertin Rae PA-C  Hematology/Oncology  [facility]   [phone] (office)  [phone] (alt office)  Evenings and weekends please call MD on call or office       61F unvaccinated w/ PMH MM(dx'd 2018) currently on chemo(last session 10/13) p/w 1-day h/o chest pain and fever. Most history per daughter at baseline as pt in significant pain and unable to talk. Pt had initially presented to [facility] with a sharp mid-chest pain gradual in onset that progressively got worse associated with a fever as high as 102.2 and a productive cough with greenish sputum. Describes the pain as lingering and worse with breathing. Of note, she has lesions in her chest from MM however reports this pain feels different. At [facility], she was found to be tachycardic, hypoxic to 80% and referred to [facility] for further eval. Of note, pt had been recently hospitalized for about a week at [facility] for pain crisis. Denied dizziness, paresthesias, palpitations, abdominal pain,       ED course: Afebrile, tachy to 111, BP stable. Tachypneic to 22, 93% on  2LNC improved to >95% on 3L NC. Given vanc, cefepime, (18 Oct 2022 23:24)    Hematology/Oncology called to see patient who is under care of Dr. 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 She was recently hospitalized for chemotherapy/pain management at [facility], discharged 10/15/2022.     IgG lambda multiple myeloma  --Under care at [facility] - Dr. Denise Fatima  --Chemotherapy on hold during hospitalization    COVID-19 Infection  --Likely cause of SOB, fever, sputum production  --Started on Remdesevir, Dexamethasone  --O2 supplement as needed - currently on non-rebreather mask  --Management per ID, Pulm, Primary Team    Pain Management  --S/P multiple pathologic compression fractures from disease  --Patient needs aggressive pain management  --Palliative care recommended if current regimen not adequate    Anemia  --Secondary to disease, recent chemotherapy  --Please transfuse PRBC's if <7 grams    Thrombocytopenia  --Secondary to chemotherapy  --Please transfuse platelets if <10K or <50K with bleeding or prior to procedures    We will continue to follow patient and coordinate with [facility]    Bertin Rae PA-C  Hematology/Oncology  [facility]   [phone] (office)  [phone] (alt office)  Evenings and weekends please call MD on call or office       61F unvaccinated w/ PMH MM(dx'd 2018) currently on chemo(last session 10/13) p/w 1-day h/o chest pain and fever. Most history per daughter at baseline as pt in significant pain and unable to talk. Pt had initially presented to [facility] with a sharp mid-chest pain gradual in onset that progressively got worse associated with a fever as high as 102.2 and a productive cough with greenish sputum. Describes the pain as lingering and worse with breathing. Of note, she has lesions in her chest from MM however reports this pain feels different. At [facility], she was found to be tachycardic, hypoxic to 80% and referred to [facility] for further eval. Of note, pt had been recently hospitalized for about a week at [facility] for pain crisis. Denied dizziness, paresthesias, palpitations, abdominal pain,       ED course: Afebrile, tachy to 111, BP stable. Tachypneic to 22, 93% on  2LNC improved to >95% on 3L NC. Given vanc, cefepime, (18 Oct 2022 23:24)    Hematology/Oncology called to see patient who is under care of Dr. 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 She was recently hospitalized for chemotherapy/pain management at [facility], discharged 10/15/2022.     IgG lambda multiple myeloma  --Under care at [facility] [place]. Denise Fatima  --Chemotherapy on hold during hospitalization    COVID-19 Infection  --Likely cause of SOB, fever, sputum production  --Started on Remdesevir, Dexamethasone  --O2 supplement as needed - currently on non-rebreather mask  --Management per ID, Pulm, Primary Team    Chest pain  --Underlying bone disease, COVID infection  --Cardiology has been called    Pain Management  --S/P multiple pathologic compression fractures from disease  --Patient needs aggressive pain management  --Palliative care recommended if current regimen not adequate    Anemia  --Secondary to disease, recent chemotherapy  --Please transfuse PRBC's if <7 grams    Thrombocytopenia  --Secondary to chemotherapy  --Please transfuse platelets if <10K or <50K with bleeding or prior to procedures    We will continue to follow patient and coordinate with [facility]    Bertin Rae PA-C  Hematology/Oncology  [facility]   [phone] (office)  [phone] (alt office)  Evenings and weekends please call MD on call or office

## 2022-10-19 NOTE — CONSULT NOTE ADULT - GASTROINTESTINAL
Instructions (Optional): Detail Level: Simple negative

## 2022-10-19 NOTE — PROGRESS NOTE ADULT - PROBLEM SELECTOR PLAN 4
-[place] [facility]  -heme/onc following  -pain control  -palliative consult in AM -currently undergoing chemo at [facility]  -heme/onc following  -pain control  -f/u palliative consult

## 2022-10-19 NOTE — PATIENT PROFILE ADULT - FUNCTIONAL ASSESSMENT - BASIC MOBILITY 5.
Nutrition Services: RD Consultation/ New XRT Start  Geena Richey is a 61 y.o. female with diagnosis of primary lung squamous cell carcinoma, left     Past Medical History:   Diagnosis Date    Arthritis     hips    Cancer (HCC) 2014    uterine    Cholesterol blood decreased     Other specified symptom associated with female genital organs     post menopausal bleeding       Past Surgical History:   Procedure Laterality Date    WA BRONCHOSCOPY,DIAGNOSTIC N/A 8/10/2022    Procedure: BRONCHOSCOPY WITH ENDOBRONCHIAL ULTRASOUND;  Surgeon: Bonnie Boucher M.D.;  Location: SURGERY [facility];  Service: Pulmonary    ENDOBRONCHIAL US ADD-ON N/A 8/10/2022    Procedure: ENDOBRONCHIAL ULTRASOUND (EBUS);  Surgeon: Bonnie Boucher M.D.;  Location: SURGERY [facility];  Service: Pulmonary    HYSTERECTOMY ROBOTIC XI  9/25/2014    Performed by Anjum Simpson M.D. at SURGERY [facility]    LYMPH NODE DISSECTION ROBOTIC XI  9/25/2014    Performed by Anjum Simpson M.D. at SURGERY [facility]    OMENTECTOMY  9/25/2014    Performed by Anjum Simpson M.D. at SURGERY [facility]    DILATION AND CURETTAGE  8/14/2014    Performed by Sarath Waller M.D. at SURGERY SAME DAY [facility]    GYN SURGERY  1980's    tubal ligation       RD met with pt to assess current intake, appetite, and nutritional status.  Pt presents to appointment unaccompanied. Per pt, she has had a loss of appetite since July and reports it is due to her cancer diagnosis. She states she has a small appetite and usually eats 1 meal daily. Per pt, she had a UBW of 220 lbs 3 weeks ago and has been losing weight. Pt reports she does not have any nausea or vomiting. She does have dentures she uses to help with chewing. She states she has some constipation and usually has a  BM every few days and had 1 small BM today. Per pt, she does not do any type of physical activity daily. Per pt, she is currently staying in a [facility] where she has a refrigerator and a  "microwave. Per pt, she does not have any food allergies and she does not like to eat canned vegetables.  Pt did not express any further nutrition-related questions or concerns at this time.     Dietary intake pattern:  Pt reports that yesterday she had chicken fried steak with potatoes and eggs for breakfast and 2% milk with sugar cookies in the evening. She normally drinks orange juice, 2% milk and ginger ale. She likes Greek yogurt and peanut butter.     Biochemical/ Anthropometric Assessment:  Treatment Regimen: XRT and chemo (Paclitaxel and Carboplatin)  Pertinent Labs: (8/29) Glucose 114, Bun 25, Alk Phos 103, HgbAC 5.8  Pertinent Meds: Percocet  Wt Readings from Last 1 Encounters:   08/29/22 90.3 kg (199 lb 1.2 oz)      Ht Readings from Last 1 Encounters:   08/29/22 1.71 m (5' 7.32\")      BMI Readings from Last 1 Encounters:   08/29/22 30.88 kg/m²   BMI Classification: Obesity Class I    Weight History:  Wt Readings from Last 6 Encounters:   08/29/22 90.3 kg (199 lb 1.2 oz)   08/13/22 90.6 kg (199 lb 11.8 oz)   08/09/22 90 kg (198 lb 6.6 oz)   08/11/22 86.2 kg (190 lb)   08/08/22 95.3 kg (210 lb)   09/25/14 95.6 kg (210 lb 12.2 oz)       Weight Change/Malnutrition Risk: Based on weight history in chart, pt appears to have a weight loss of 5 kg in < 1 month. This results in a weight loss of 5.2% in < 1 month which is considered severe.     Interventions:  Introduced self and discussed role of dietitian throughout treatment process   Provided and and briefly discussed handout regarding general nutrition guidelines as well as nutritional recommendations for patient's with lung cancer, pt preferred to review handout on her own  Encouraged balanced diet with plant-based focus. Advised reducing or eliminating red/ processed meats as well as foods high in saturated fats, sodium, and added sugars as able.   Encouraged more frequent meals 4-6x/ day  Focus on high calorie and protein foods, fruits and vegetables with " all meals/snacks - handout provided.    Pt reports understanding and was receptive to information provided.   RD provided contact information. Encouraged pt to reach out as questions/concerns arise.   RD following and to make further recommendations as indicated.  [phone]     2 = A lot of assistance

## 2022-10-19 NOTE — PROGRESS NOTE ADULT - SUBJECTIVE AND OBJECTIVE BOX
SUBJECTIVE  Pt seen at bedside this AM. Complaining of shortness of breath and severe chest pain. No other complaints elicited 2/2 patient distress    OBJECTIVE:  ICU Vital Signs Last 24 Hrs  T(C): 36.8 (19 Oct 2022 03:11), Max: 38.3 (18 Oct 2022 23:42)  T(F): 98.2 (19 Oct 2022 03:11), Max: 101 (18 Oct 2022 23:42)  HR: 103 (19 Oct 2022 03:11) (103 - 116)  BP: 110/74 (19 Oct 2022 03:11) (110/66 - 122/82)  BP(mean): 94 (18 Oct 2022 21:35) (93 - 94)  ABP: --  ABP(mean): --  RR: 18 (19 Oct 2022 03:11) (18 - 22)  SpO2: 95% (19 Oct 2022 03:11) (89% - 96%)    O2 Parameters below as of 19 Oct 2022 00:37  Patient On (Oxygen Delivery Method): mask, nonrebreather  O2 Flow (L/min): 10            CAPILLARY BLOOD GLUCOSE          PHYSICAL EXAM:  General: in moderate distress, laying in bed, on Non-rebreather    Neck:  symmetric,  JVD absent  Respiratory: coarse breath sounds and wheezing bilaterally. Crackles in R lower field. +Accessory muscle use  Cardiovascular:  RRR, no murmurs/rubs/gallops  Abdomen: Soft, NT, ND  Extremities: No edema noted  Skin: No rashes or lesions noted  Neurological: Sensation grossly intact;  Psychiatry: AOx3,    PRN Meds:  ALBUTerol    90 MICROgram(s) HFA Inhaler 2 Puff(s) Inhalation every 4 hours PRN  aluminum hydroxide/magnesium hydroxide/simethicone Suspension 30 milliLiter(s) Oral every 4 hours PRN  benzonatate 100 milliGRAM(s) Oral every 8 hours PRN  bisacodyl 5 milliGRAM(s) Oral daily PRN  guaifenesin/dextromethorphan Oral Liquid 10 milliLiter(s) Oral every 4 hours PRN  HYDROmorphone  Injectable 1 milliGRAM(s) IV Push every 4 hours PRN  HYDROmorphone  Injectable 2 milliGRAM(s) IV Push every 4 hours PRN  megestrol 40 milliGRAM(s) Oral daily PRN  melatonin 3 milliGRAM(s) Oral at bedtime PRN  ondansetron Injectable 4 milliGRAM(s) IV Push every 8 hours PRN      LABS:                        7.3    3.66  )-----------( 74       ( 19 Oct 2022 07:16 )             22.6     Hgb Trend: 7.3<--, 7.7<--  10-19    125<L>  |  92<L>  |  19  ----------------------------<  141<H>  4.9   |  25  |  0.94    Ca    8.7      19 Oct 2022 07:15    TPro  8.0  /  Alb  3.0<L>  /  TBili  0.2  /  DBili  x   /  AST  34  /  ALT  45  /  AlkPhos  65  10-19    Creatinine Trend: 0.94<--, 0.97<--  PT/INR - ( 19 Oct 2022 07:29 )   PT: 13.3 sec;   INR: 1.14 ratio         PTT - ( 19 Oct 2022 07:29 )  PTT:32.6 sec      Venous Blood Gas:  10-18 @ 17:40  7.34/51/23/28/29.5  VBG Lactate: 1.3      MICROBIOLOGY:       RADIOLOGY:  [ ] Reviewed and interpreted by me    EKG:     SUBJECTIVE  Pt seen at bedside this AM. Complaining of shortness of breath and severe chest pain. No other complaints elicited 2/2 patient distress    OBJECTIVE:  ICU Vital Signs Last 24 Hrs  T(C): 36.8 (19 Oct 2022 03:11), Max: 38.3 (18 Oct 2022 23:42)  T(F): 98.2 (19 Oct 2022 03:11), Max: 101 (18 Oct 2022 23:42)  HR: 103 (19 Oct 2022 03:11) (103 - 116)  BP: 110/74 (19 Oct 2022 03:11) (110/66 - 122/82)  BP(mean): 94 (18 Oct 2022 21:35) (93 - 94)  ABP: --  ABP(mean): --  RR: 18 (19 Oct 2022 03:11) (18 - 22)  SpO2: 95% (19 Oct 2022 03:11) (89% - 96%)    O2 Parameters below as of 19 Oct 2022 00:37  Patient On (Oxygen Delivery Method): mask, nonrebreather  O2 Flow (L/min): 10            CAPILLARY BLOOD GLUCOSE          PHYSICAL EXAM:  General: in moderate distress, laying in bed, on Non-rebreather  Neck:  symmetric,  JVD absent  Respiratory: coarse breath sounds and wheezing bilaterally. Crackles in R lower field. +Accessory muscle use  Cardiovascular:  RRR, no murmurs/rubs/gallops  Abdomen: Soft, NT, ND  Extremities: No edema noted  Skin: No rashes or lesions noted  Neurological: Sensation grossly intact;  Psychiatry: AOx3,    PRN Meds:  ALBUTerol    90 MICROgram(s) HFA Inhaler 2 Puff(s) Inhalation every 4 hours PRN  aluminum hydroxide/magnesium hydroxide/simethicone Suspension 30 milliLiter(s) Oral every 4 hours PRN  benzonatate 100 milliGRAM(s) Oral every 8 hours PRN  bisacodyl 5 milliGRAM(s) Oral daily PRN  guaifenesin/dextromethorphan Oral Liquid 10 milliLiter(s) Oral every 4 hours PRN  HYDROmorphone  Injectable 1 milliGRAM(s) IV Push every 4 hours PRN  HYDROmorphone  Injectable 2 milliGRAM(s) IV Push every 4 hours PRN  megestrol 40 milliGRAM(s) Oral daily PRN  melatonin 3 milliGRAM(s) Oral at bedtime PRN  ondansetron Injectable 4 milliGRAM(s) IV Push every 8 hours PRN      LABS:                        7.3    3.66  )-----------( 74       ( 19 Oct 2022 07:16 )             22.6     Hgb Trend: 7.3<--, 7.7<--  10-19    125<L>  |  92<L>  |  19  ----------------------------<  141<H>  4.9   |  25  |  0.94    Ca    8.7      19 Oct 2022 07:15    TPro  8.0  /  Alb  3.0<L>  /  TBili  0.2  /  DBili  x   /  AST  34  /  ALT  45  /  AlkPhos  65  10-19    Creatinine Trend: 0.94<--, 0.97<--  PT/INR - ( 19 Oct 2022 07:29 )   PT: 13.3 sec;   INR: 1.14 ratio         PTT - ( 19 Oct 2022 07:29 )  PTT:32.6 sec      Venous Blood Gas:  10-18 @ 17:40  7.34/51/23/28/29.5  VBG Lactate: 1.3      MICROBIOLOGY:       RADIOLOGY:  [ ] Reviewed and interpreted by me    EKG:

## 2022-10-19 NOTE — CONSULT NOTE ADULT - RS GEN HX ROS MEA POS PC
Received call from Idalia in [address] asking for an appointment for this pt.  She had pt on the other line & transferred call to here. Pt stated that she feels like she is retaining fluid & is SOB.    Call transferred to  for a new pt appt.  
dyspnea/cough

## 2022-10-19 NOTE — PROGRESS NOTE ADULT - PROBLEM SELECTOR PLAN 3
-very unlikely ACS given neg trops, EKG w/o acute changes and pleuritic nature of pain  -CTA r/o PE  -patient has MM with known bone mets, most likely cause  -Aggressive pain control     -Fentanyl 50 patch q72h     -Dilaudid 1-2mg IV PRN q4h    -can consult pulm in AM for possible thoracentesis given moderate Lt sided pleural effusions

## 2022-10-19 NOTE — DIETITIAN INITIAL EVALUATION ADULT - PERTINENT MEDS FT
MEDICATIONS  (STANDING):  acyclovir   Oral Tab/Cap 400 milliGRAM(s) Oral daily  amLODIPine   Tablet 10 milliGRAM(s) Oral daily  dexAMETHasone  Injectable 6 milliGRAM(s) IV Push daily  enoxaparin Injectable 40 milliGRAM(s) SubCutaneous every 24 hours  fentaNYL   Patch  50 MICROgram(s)/Hr 1 Patch Transdermal every 72 hours  naloxone Injectable 0.4 milliGRAM(s) IV Push once  pantoprazole    Tablet 40 milliGRAM(s) Oral before breakfast  polyethylene glycol 3350 17 Gram(s) Oral daily  remdesivir  IVPB   IV Intermittent   senna 2 Tablet(s) Oral at bedtime    MEDICATIONS  (PRN):  ALBUTerol    90 MICROgram(s) HFA Inhaler 2 Puff(s) Inhalation every 4 hours PRN Shortness of Breath and/or Wheezing  aluminum hydroxide/magnesium hydroxide/simethicone Suspension 30 milliLiter(s) Oral every 4 hours PRN Dyspepsia  benzonatate 100 milliGRAM(s) Oral every 8 hours PRN Cough  bisacodyl 5 milliGRAM(s) Oral daily PRN Constipation  guaifenesin/dextromethorphan Oral Liquid 10 milliLiter(s) Oral every 4 hours PRN Cough  HYDROmorphone  Injectable 1 milliGRAM(s) IV Push every 4 hours PRN Moderate Pain (4 - 6)  HYDROmorphone  Injectable 2 milliGRAM(s) IV Push every 4 hours PRN Severe Pain (7 - 10)  megestrol 40 milliGRAM(s) Oral daily PRN appetite  melatonin 3 milliGRAM(s) Oral at bedtime PRN Insomnia  ondansetron Injectable 4 milliGRAM(s) IV Push every 8 hours PRN Nausea and/or Vomiting

## 2022-10-19 NOTE — CONSULT NOTE ADULT - NS ATTEND AMEND GEN_ALL_CORE FT
Pt seen and examined with ACP.  Assessment and plan reviewed and discussed.  Agree with above.      I spent 30  minutes face to face w/ this pt of which more than 50% of the time was spent counseling & coordinating care of this pt.
pt with covid. on treatment. Chest pain cardiology called. possibly from covid. [place] [facility] [place]

## 2022-10-19 NOTE — CONSULT NOTE ADULT - ASSESSMENT
CARDIOLOGY ATTENDING    Agree with above. Given low BNP the pleural effusions are unlikely cardiac. Would get EKG and echo. If echo and EKG unremarkable no further inpatient cardiac workup expected.

## 2022-10-19 NOTE — PATIENT PROFILE ADULT - HEALTH LITERACY UNDERSTANDING DOCTOR- DETAILS
Final Anesthesia Post-op Assessment    Patient: Bradford Park  Procedure(s) Performed: LITHOTRIPSY, ESWL LEFT  FLUORO TIME: 1 MINUTE 20 SECONDS - LEFTLITHOTRIPSY, ESWL, WITH STENT INSERTION - LEFT  Anesthesia type: General    Vitals Value Taken Time   Temp 36.1 °C (97 °F) 1/30/2020  1:10 PM   Pulse 81 1/30/2020  1:45 PM   Resp 17 1/30/2020  1:45 PM   SpO2 95 % 1/30/2020  1:45 PM   /86 1/30/2020  1:45 PM       Last 24 I/O:     Intake/Output Summary (Last 24 hours) at 1/30/2020 1350  Last data filed at 1/30/2020 1340  Gross per 24 hour   Intake 1000 ml   Output --   Net 1000 ml          Anesthesia Post-op Note      Patient: Bradford Park        Mental status recovered: patient participates in evaluation.    VS noted above and are stable.    Respiratory function satisfactory; airway patent.    Cardiovascular function and hydration status satisfactory.    Adequate pain control.    Nausea and vomiting control satisfactory.    No apparent anesthetic complications.       Not enough medical knowledge

## 2022-10-19 NOTE — CONSULT NOTE ADULT - PROBLEM SELECTOR RECOMMENDATION 2
on rx: currently she is on 100% high flow :  cta is negative for pe:  has abdirahman effusions:   check echo

## 2022-10-19 NOTE — CONSULT NOTE ADULT - ASSESSMENT
62 yo F with MM on chemo, initially with fever  Fever, leukopenia  COVID+  CT PE generally reassuring pleural effusions  PCT mild elevated  No other focal complaints to suggest source of fever  Marked elevated ferritin  Overall,  1) COVID  - Facemask O2, uncertain is attributable to COVID, but would treat considering uncertainties  - RemD 5 days then DC  - Dexa 6mg daily x 10 days  - O2 supplementation/role for anticoag per team  - Supportive care  2) Elevated Ferritin  - Robust inflammatory reaction to COVID?  - Any considerations for inflammatory process such as HLH per Heme Onc team  3) Pleural effusions  - Only finding to suggest cause of SOB on CT Chest (e.g. no evidence COVID PNA at this point)  - Monitor for alternate causes SOB  - Monitor resp status    Devonte Brice MD  Contact on TEAMS messaging from 9am - 5pm  From 5pm-9am, on weekends, or if no response call [phone]

## 2022-10-19 NOTE — DIETITIAN INITIAL EVALUATION ADULT - REASON FOR ADMISSION
62yo Female with PMH of MM with bone mets on active chemotherapy (last session 10/13). Pt admitted on 10/18 with Shortness of breath; presenting COVID-19+.

## 2022-10-19 NOTE — DIETITIAN INITIAL EVALUATION ADULT - REASON INDICATOR FOR ASSESSMENT
Consult: pressure injury >/2, decreased intake PTA, unintentional weight loss  Sources: EMR, RN, Patient

## 2022-10-19 NOTE — CONSULT NOTE ADULT - PROBLEM SELECTOR RECOMMENDATION 9
she has covid: no pe: on remdesivir and dexa for covid:   she has abdirahman effusion more on the left side  then rigth side:  needs echo and probnp:   ? etiology is not clear

## 2022-10-19 NOTE — DIETITIAN INITIAL EVALUATION ADULT - ADD RECOMMEND
1) Continue regular, vegan diet; defer consistency to SLP and Team   2) Add Hafsa Aiken PO supplements TID  3) Monitor PO intake, diet tolerance, weight trends, labs, GI function, and skin integrity  4) Promote adequate intake; honor food preferences   5) Malnutrition sticker placed

## 2022-10-19 NOTE — DIETITIAN INITIAL EVALUATION ADULT - OTHER INFO
GI/Intake:  -Per flowsheet, no intake documented  -Received [employer] during assessment; requesting multiple/day   -No BM documented thus far; noted to be constipated; bowel regimen ordered (Miralax, Dulcolax, Senna)    Onc:   -Hx of MM with mets to the bone   -On active chemotherapy outpatient     Weight Hx:  -Current dosin pounds   -Per Pt, UBW: 126 pounds x1 month ago

## 2022-10-19 NOTE — PROGRESS NOTE ADULT - PROBLEM SELECTOR PLAN 1
-reported hypoxic to 80s at [facility]  -Now requiring Non-rebreather for sufficient saturation  -likely from COVID-19  -continous pulse ox monitoring  -c/w supplemental O2 and wean as tolerated  -will hold off on abx for now as sxs likely d/t COVID-19  -if she spikes a fever, can start empirically on abx pending BCx

## 2022-10-19 NOTE — CONSULT NOTE ADULT - SUBJECTIVE AND OBJECTIVE BOX
"HPI:  61F unvaccinated w/ PMH MM(dx'd ) currently on chemo(last session 10/13) p/w 1-day h/o chest pain and fever. Most history per daughter at baseline as pt in significant pain and unable to talk. Pt had initially presented to [facility] with a sharp mid-chest pain gradual in onset that progressively got worse associated with a fever as high as 102.2 and a productive cough with greenish sputum. Describes the pain as lingering and worse with breathing. Of note, she has lesions in her chest from MM however reports this pain feels different. At [facility], she was found to be tachycardic, hypoxic to 80% and referred to [facility] for further eval. Of note, pt had been recently hospitalized for about a week at [facility] for pain crisis. Denied dizziness, paresthesias, palpitations, abdominal pain,       ED course: Afebrile, tachy to 111, BP stable. Tachypneic to 22, 93% on  2LNC improved to >95% on 3L NC. Given vanc, cefepime, (18 Oct 2022 23:24)"    Above reviewed. 60 yo F with MM on chemo, initially with fever  Here, patient with fevers and chest pain  Patient was hypoxic so sent in for further care  On facemask O2 at bedside and feels SOB  COVID+  ID called for further eval    PAST MEDICAL & SURGICAL HISTORY:  Multiple myeloma  dx 2018 - initially refused chemotherapy, treating with &quot;natural herbal remedies&quot; now undergoing chemo at [facility]      H/O  section  8/15/1985      H/O colonoscopy  2018- polyp found and removed in 2018      S/P partial hysterectomy   d/t fibroids    Allergies    acetaminophen (Hives)  aspirin (Rash)  Benadryl (Rash)  shellfish (Hives)    ANTIMICROBIALS:  acyclovir   Oral Tab/Cap 400 daily  remdesivir  IVPB      OTHER MEDS:  ALBUTerol    90 MICROgram(s) HFA Inhaler 2 Puff(s) Inhalation every 4 hours PRN  aluminum hydroxide/magnesium hydroxide/simethicone Suspension 30 milliLiter(s) Oral every 4 hours PRN  amLODIPine   Tablet 10 milliGRAM(s) Oral daily  benzonatate 100 milliGRAM(s) Oral every 8 hours PRN  bisacodyl 5 milliGRAM(s) Oral daily PRN  dexAMETHasone  Injectable 6 milliGRAM(s) IV Push daily  enoxaparin Injectable 40 milliGRAM(s) SubCutaneous every 24 hours  fentaNYL   Patch  50 MICROgram(s)/Hr 1 Patch Transdermal every 72 hours  guaifenesin/dextromethorphan Oral Liquid 10 milliLiter(s) Oral every 4 hours PRN  HYDROmorphone  Injectable 1 milliGRAM(s) IV Push every 4 hours PRN  HYDROmorphone  Injectable 2 milliGRAM(s) IV Push every 4 hours PRN  megestrol 40 milliGRAM(s) Oral daily PRN  melatonin 3 milliGRAM(s) Oral at bedtime PRN  naloxone Injectable 0.4 milliGRAM(s) IV Push once  ondansetron Injectable 4 milliGRAM(s) IV Push every 8 hours PRN  pantoprazole    Tablet 40 milliGRAM(s) Oral before breakfast  polyethylene glycol 3350 17 Gram(s) Oral daily  senna 2 Tablet(s) Oral at bedtime    SOCIAL HISTORY: No tobacco, no alcohol, no illicit drugs    FAMILY HISTORY:  FH: hypertension  mother    FH: heart disease  mother    FH: Alzheimers disease  mother    Family history of scleroderma  twin sister    FH: breast cancer (Sibling)  sister    Drug Dosing Weight  Height (cm): 149.9 (19 Oct 2022 06:24)  Weight (kg): 44.5 (19 Oct 2022 06:24)  BMI (kg/m2): 19.8 (19 Oct 2022 06:24)  BSA (m2): 1.36 (19 Oct 2022 06:24)    PE:    Vital Signs Last 24 Hrs  T(C): 36.8 (19 Oct 2022 03:11), Max: 38.3 (18 Oct 2022 23:42)  T(F): 98.2 (19 Oct 2022 03:11), Max: 101 (18 Oct 2022 23:42)  HR: 103 (19 Oct 2022 03:11) (103 - 116)  BP: 110/74 (19 Oct 2022 03:11) (110/66 - 122/82)  BP(mean): 94 (18 Oct 2022 21:35) (93 - 94)  RR: 18 (19 Oct 2022 03:11) (18 - 22)  SpO2: 95% (19 Oct 2022 03:11) (89% - 96%)    Gen: AOx3, NAD, non-toxic, pleasant  CV: S1+S2 normal, nontachycardic  Resp: Clear bilat, no resp distress, no crackles/wheezes, facemask O2  Abd: Soft, nontender, +BS  Ext: No LE edema, no wounds  : No Rascon  IV/Skin: No thrombophlebitis  Msk: No low back pain, no arthralgias, no joint swelling  Neuro: No sensory deficits, no motor deficits    LABS:                        7.3    3.66  )-----------( 74       ( 19 Oct 2022 07:16 )             22.6     10-19    125<L>  |  92<L>  |    ----------------------------<  141<H>  4.9   |  25  |  0.94    Ca    8.7      19 Oct 2022 07:15    TPro  8.0  /  Alb  3.0<L>  /  TBili  0.2  /  DBili  x   /  AST  34  /  ALT  45  /  AlkPhos  65  10-19    MICROBIOLOGY:    Rapid RVP Result: Detected (10-18 @ 18:46) COVID+    RADIOLOGY:    10/18 CT:    IMPRESSION:    No pulmonary embolism.    Small right and moderate left pleural effusions and compressive   atelectasis.

## 2022-10-19 NOTE — DIETITIAN INITIAL EVALUATION ADULT - PERTINENT LABORATORY DATA
10-19    125<L>  |  92<L>  |  19  ----------------------------<  141<H>  4.9   |  25  |  0.94    Ca    8.7      19 Oct 2022 07:15    TPro  8.0  /  Alb  3.0<L>  /  TBili  0.2  /  DBili  x   /  AST  34  /  ALT  45  /  AlkPhos  65  10-19

## 2022-10-19 NOTE — CONSULT NOTE ADULT - SUBJECTIVE AND OBJECTIVE BOX
Wound SURGERY CONSULT NOTE    HPI:  61F unvaccinated w/ PMH MM(dx'd ) currently on chemo(last session 10/13) p/w 1-day h/o chest pain and fever. Most history per daughter at baseline as pt in significant pain and unable to talk. Pt had initially presented to [facility] with a sharp mid-chest pain gradual in onset that progressively got worse associated with a fever as high as 102.2 and a productive cough with greenish sputum. Describes the pain as lingering and worse with breathing. Of note, she has lesions in her chest from MM however reports this pain feels different. At [facility], she was found to be tachycardic, hypoxic to 80% and referred to [facility] for further eval. Of note, pt had been recently hospitalized for about a week at [facility] for pain crisis. Denied dizziness, paresthesias, palpitations, abdominal pain,       ED course: Afebrile, tachy to 111, BP stable. Tachypneic to 22, 93% on  2LNC improved to >95% on 3L NC. Given vanc, cefepime, (18 Oct 2022 23:24)        N/V/D,  BM/ Flatus,   NGT,     palp/ sob/dyspnea/ cp,       F/C/S  Wound consult requested by team to assist w/ management of      wound/ pressure injury.   Pt (unable to)  c/o pain, drainage, odor, color change,  worsening swelling. Offloading and pericare initiated Increasingly sedentary 2/2 to illness. Pt is Incontinent of urine & stool. (+)perry/ ostomy.   H/o falls, trauma.  Pt seen by Wound RN  [place] Advance/  [place],TRIAD/ Drew/ Allevyn/ medihoney/ dakins/ Adaptic/ DSD recommened used at home/ while awaiting consult.  Appetite good/ decreased.  weight loss.  S&S / RD consult appreciated All questions asked and answered to pt's and family's expressed understanding and satisfaction.    Current Diet: Diet, Regular:   Vegan Accepts Vegetable Products Only (10-19-22 @ 01:45)      PAST MEDICAL & SURGICAL HISTORY:  Multiple myeloma  dx 2018 - initially refused chemotherapy, treating with &quot;natural herbal remedies&quot; now undergoing chemo at [facility]      H/O  section  8/15/1985      H/O colonoscopy  2018- polyp found and removed in 2018      S/P partial hysterectomy   d/t fibroids          REVIEW OF SYSTEMS: General/ Breast/ Skin/ Neuro/ MSK: see HPI  All other systems negative    MEDICATIONS  (STANDING):  acyclovir   Oral Tab/Cap 400 milliGRAM(s) Oral daily  amLODIPine   Tablet 10 milliGRAM(s) Oral daily  dexAMETHasone  Injectable 6 milliGRAM(s) IV Push daily  enoxaparin Injectable 40 milliGRAM(s) SubCutaneous every 24 hours  fentaNYL   Patch  50 MICROgram(s)/Hr 1 Patch Transdermal every 72 hours  naloxone Injectable 0.4 milliGRAM(s) IV Push once  pantoprazole    Tablet 40 milliGRAM(s) Oral before breakfast  polyethylene glycol 3350 17 Gram(s) Oral daily  remdesivir  IVPB   IV Intermittent   senna 2 Tablet(s) Oral at bedtime    MEDICATIONS  (PRN):  ALBUTerol    90 MICROgram(s) HFA Inhaler 2 Puff(s) Inhalation every 4 hours PRN Shortness of Breath and/or Wheezing  aluminum hydroxide/magnesium hydroxide/simethicone Suspension 30 milliLiter(s) Oral every 4 hours PRN Dyspepsia  benzonatate 100 milliGRAM(s) Oral every 8 hours PRN Cough  bisacodyl 5 milliGRAM(s) Oral daily PRN Constipation  guaifenesin/dextromethorphan Oral Liquid 10 milliLiter(s) Oral every 4 hours PRN Cough  HYDROmorphone  Injectable 1 milliGRAM(s) IV Push every 4 hours PRN Moderate Pain (4 - 6)  HYDROmorphone  Injectable 2 milliGRAM(s) IV Push every 4 hours PRN Severe Pain (7 - 10)  megestrol 40 milliGRAM(s) Oral daily PRN appetite  melatonin 3 milliGRAM(s) Oral at bedtime PRN Insomnia  ondansetron Injectable 4 milliGRAM(s) IV Push every 8 hours PRN Nausea and/or Vomiting      Allergies  acetaminophen (Hives)  aspirin (Rash)  Benadryl (Rash)  shellfish (Hives)      SOCIAL HISTORY:  / /single/ ; (+)HHA/ lives in SNF; Former smoker, Nourrent/ Denies smoking, ETOH, drugs    FAMILY HISTORY:  FH: hypertension  mother    FH: heart disease  mother    FH: Alzheimers disease  mother    Family history of scleroderma  twin sister    FH: breast cancer (Sibling)  sister     no h/o PVD or wound healing or skin/ significant problems    PHYSICAL EXAM:  Vital Signs Last 24 Hrs  T(C): 36.6 (19 Oct 2022 13:57), Max: 38.3 (18 Oct 2022 23:42)  T(F): 97.9 (19 Oct 2022 13:57), Max: 101 (18 Oct 2022 23:42)  HR: 109 (19 Oct 2022 13:57) (103 - 116)  BP: 118/83 (19 Oct 2022 13:57) (110/66 - 122/82)  BP(mean): 94 (18 Oct 2022 21:35) (93 - 94)  RR: 18 (19 Oct 2022 13:57) (18 - 22)  SpO2: 96% (19 Oct 2022 13:57) (89% - 96%)    Parameters below as of 19 Oct 2022 13:57  Patient On (Oxygen Delivery Method): mask, nonrebreather      NAD / Guarded but stable,  A&Ox3/ Alert/ Confused  cachectic/ thin/  MO/ Obese/ frail  WD/ WN/ WG/ Disheveled  Total Care Sport/ Versa Care P500 / Envella Progressa bed     HEENT:  NC/AT, PERRL, EOMI, sclera clear, mucosa moist, throat clear, trachea midline, neck supple, trach  Respiratory: nonlabored w/ equal chest rise  Gastrointestinal soft NT/ND (+)BS  (+)PEG (+)ostomy (+)NGT  : (+)perry/ purewick  Neurology:  weakened strength & sensation grossly intact/ paraesthesia  nonverbal, no follow commands/ paraplegic  Psych: calm/ appropriate/ flat affect/ easily aggitated/ restless  Musculoskeletal:  limited stiff / FROM, no deformities/ contractures  Vascular: BLE equally warm/ cool,  no cyanosis, clubbing, edema           >LE //BLE edema equal           BLE DP/PT pulses palpable           no acute ischemia noted          BLE hemosiderin staining  Skin:  moist w/ good turgor  pale, frail,  ecchymosis w/o hematoma  serosanguinous drainage  No odor, erythema, increased warmth, tenderness, induration, fluctuance, nor crepitus    LABS/ CULTURES/ RADIOLOGY:                        7.3    3.66  )-----------( 74       ( 19 Oct 2022 07:16 )             22.6       125  |  92  |  19  ----------------------------<  141      [10-19-22 @ 07:15]  4.9   |  25  |  0.94        Ca     8.7     [10-19-22 @ 07:15]    TPro  8.0  /  Alb  3.0  /  TBili  0.2  /  DBili  x   /  AST  34  /  ALT  45  /  AlkPhos  65  [10-19-22 @ 07:15]    PT/INR: PT 13.3 , INR 1.14       [10-19-22 @ 07:29]  PTT: 32.6       [10-19-22 @ 07:29]         Wound SURGERY CONSULT NOTE    HPI:  61F unvaccinated w/ PMH MM(dx'd ) currently on chemo(last session 10/13) p/w 1-day h/o chest pain and fever. Most history per daughter at baseline as pt in significant pain and unable to talk. Pt had [place] [facility] [place] sharp mid-chest pain gradual in onset that progressively got worse associated with a fever as high as 102.2 and a productive cough with greenish sputum. Describes the pain as lingering and worse with breathing. Of note, she has lesions in her chest from MM however reports this pain feels different. At [facility], she was found to be tachycardic, hypoxic to 80% and referred to [facility] for further eval. Of note, pt had been recently hospitalized for about a week at [facility] for pain crisis. Denied dizziness, paresthesias, palpitations, abdominal pain, ED course: Afebrile, tachy to 111, BP stable. Tachypneic to 22, 93% on  2LNC improved to >95% on 3L NC. Given vanc, cefepime.  Wound consult requested by team to assist w/ management of sacral/ buttock wound.  Pt w/o c/o pain, drainage, odor, color change,  worsening swelling. Offloading and pericare initiated as pt Increasingly sedentary 2/2 to illness. Pt is Incontinent of urine & stool. Pt w/o H/o bug bites, scratches, falls, trauma.   Appetite good w/o weight loss. All questions asked and answered to pt's expressed understanding and satisfaction.    Current Diet: Diet, Regular:   Vegan Accepts Vegetable Products Only (10-19-22 @ 01:45)      PAST MEDICAL & SURGICAL HISTORY:  Multiple myeloma  initially refused chemotherapy, treating with natural herbal remedies; now undergoing chemo at [facility]    s/p  section    s/p colonoscopy, polyp found and removed     S/P partial hysterectomy (fibroids)      REVIEW OF SYSTEMS: General/ Breast/ Skin/ Neuro/ MSK: see HPI  All other systems negative    MEDICATIONS  (STANDING):  acyclovir   Oral Tab/Cap 400 milliGRAM(s) Oral daily  amLODIPine   Tablet 10 milliGRAM(s) Oral daily  dexAMETHasone  Injectable 6 milliGRAM(s) IV Push daily  enoxaparin Injectable 40 milliGRAM(s) SubCutaneous every 24 hours  fentaNYL   Patch  50 MICROgram(s)/Hr 1 Patch Transdermal every 72 hours  naloxone Injectable 0.4 milliGRAM(s) IV Push once  pantoprazole    Tablet 40 milliGRAM(s) Oral before breakfast  polyethylene glycol 3350 17 Gram(s) Oral daily  remdesivir  IVPB   IV Intermittent   senna 2 Tablet(s) Oral at bedtime    MEDICATIONS  (PRN):  ALBUTerol    90 MICROgram(s) HFA Inhaler 2 Puff(s) Inhalation every 4 hours PRN Shortness of Breath and/or Wheezing  aluminum hydroxide/magnesium hydroxide/simethicone Suspension 30 milliLiter(s) Oral every 4 hours PRN Dyspepsia  benzonatate 100 milliGRAM(s) Oral every 8 hours PRN Cough  bisacodyl 5 milliGRAM(s) Oral daily PRN Constipation  guaifenesin/dextromethorphan Oral Liquid 10 milliLiter(s) Oral every 4 hours PRN Cough  HYDROmorphone  Injectable 1 milliGRAM(s) IV Push every 4 hours PRN Moderate Pain (4 - 6)  HYDROmorphone  Injectable 2 milliGRAM(s) IV Push every 4 hours PRN Severe Pain (7 - 10)  megestrol 40 milliGRAM(s) Oral daily PRN appetite  melatonin 3 milliGRAM(s) Oral at bedtime PRN Insomnia  ondansetron Injectable 4 milliGRAM(s) IV Push every 8 hours PRN Nausea and/or Vomiting      Allergies  acetaminophen (Hives)  aspirin (Rash)  Benadryl (Rash)  shellfish (Hives)      SOCIAL HISTORY: single; lives alone; Denies smoking, ETOH, drugs    FAMILY HISTORY: no h/o PVD or wound healing or skin problems  FH: hypertension (mother),  heart disease (mother), Alzheimers disease (mother),  scleroderma (twin sister),      and breast cancer (sister)     PHYSICAL EXAM:  Vital Signs Last 24 Hrs  T(C): 36.6 (19 Oct 2022 13:57), Max: 38.3 (18 Oct 2022 23:42)  T(F): 97.9 (19 Oct 2022 13:57), Max: 101 (18 Oct 2022 23:42)  HR: 109 (19 Oct 2022 13:57) (103 - 116)  BP: 118/83 (19 Oct 2022 13:57) (110/66 - 122/82)  BP(mean): 94 (18 Oct 2022 21:35) (93 - 94)  RR: 18 (19 Oct 2022 13:57) (18 - 22)  SpO2: 96% (19 Oct 2022 13:57) (89% - 96%)    Parameters below as of 19 Oct 2022 13:57  Patient On (Oxygen Delivery Method): mask, nonrebreather      NAD,  A&Ox3, thin, frail  Versa Care P500 bed  HEENT:  NC/AT, EOMI, sclera clear, mucosa moist, throat clear, trachea midline, neck supple  Respiratory: nonlabored w/ equal chest rise  Gastrointestinal soft NT/ND   : (+) purewick  Neurology:  weakened strength & sensation grossly intact  Psych: calm/ appropriate  Musculoskeletal:  FROM, no deformities/ contractures  Vascular: BLE equally warm,  no cyanosis, clubbing, edema  Skin:  moist w/ good turgor    Bilateral buttocks/ sacrum w/ hyperpigmented skin w/o blistering, open skin, drainage  No odor, erythema, increased warmth, tenderness, induration, fluctuance, nor crepitus    LABS/ CULTURES/ RADIOLOGY:                        7.3    3.66  )-----------( 74       ( 19 Oct 2022 07:16 )             22.6       125  |  92  |  19  ----------------------------<  141      [10-19-22 @ 07:15]  4.9   |  25  |  0.94        Ca     8.7     [10-19-22 @ 07:15]    TPro  8.0  /  Alb  3.0  /  TBili  0.2  /  DBili  x   /  AST  34  /  ALT  45  /  AlkPhos  65  [10-19-22 @ 07:15]    PT/INR: PT 13.3 , INR 1.14       [10-19-22 @ 07:29]  PTT: 32.6       [10-19-22 @ 07:29]         Wound SURGERY CONSULT NOTE    HPI:  61F unvaccinated w/ PMH MM(dx'd ) currently on chemo(last session 10/13) p/w 1-day h/o chest pain and fever. Most history per daughter at baseline as pt in significant pain and unable to talk. Pt had initially presented to [facility] with a sharp mid-chest pain gradual in onset that progressively got worse associated with a fever as high as 102.2 and a productive cough with greenish sputum. Describes the pain as lingering and worse with breathing. Of note, she has lesions in her chest from MM however reports this pain feels different. At [facility], she was found to be tachycardic, hypoxic to 80% and referred to [facility] for further eval. Of note, pt had been recently hospitalized for about a week at [facility] for pain crisis. Denied dizziness, paresthesias, palpitations, abdominal pain, ED course: Afebrile, tachy to 111, BP stable. Tachypneic to 22, 93% on  2LNC improved to >95% on 3L NC. Given vanc, cefepime.    Wound consult requested by team to assist w/ management of sacral/ buttock wound.  Pt w/o c/o pain, drainage, odor, color change,  worsening swelling. Offloading and pericare initiated as pt Increasingly sedentary 2/2 to illness. Pt is Incontinent of urine & stool. Pt w/o H/o bug bites, scratches, falls, trauma.   Appetite good w/o weight loss. All questions asked and answered to pt's expressed understanding and satisfaction.    Current Diet: Diet, Regular:   Vegan Accepts Vegetable Products Only (10-19-22 @ 01:45)      PAST MEDICAL & SURGICAL HISTORY:  Multiple myeloma  initially refused chemotherapy, treating with natural herbal remedies; now undergoing chemo at [facility]    s/p  section    s/p colonoscopy, polyp found and removed     S/P partial hysterectomy (fibroids)      REVIEW OF SYSTEMS: General/ Breast/ Skin/ Neuro/ MSK: see HPI  All other systems negative    MEDICATIONS  (STANDING):  acyclovir   Oral Tab/Cap 400 milliGRAM(s) Oral daily  amLODIPine   Tablet 10 milliGRAM(s) Oral daily  dexAMETHasone  Injectable 6 milliGRAM(s) IV Push daily  enoxaparin Injectable 40 milliGRAM(s) SubCutaneous every 24 hours  fentaNYL   Patch  50 MICROgram(s)/Hr 1 Patch Transdermal every 72 hours  naloxone Injectable 0.4 milliGRAM(s) IV Push once  pantoprazole    Tablet 40 milliGRAM(s) Oral before breakfast  polyethylene glycol 3350 17 Gram(s) Oral daily  remdesivir  IVPB   IV Intermittent   senna 2 Tablet(s) Oral at bedtime    MEDICATIONS  (PRN):  ALBUTerol    90 MICROgram(s) HFA Inhaler 2 Puff(s) Inhalation every 4 hours PRN Shortness of Breath and/or Wheezing  aluminum hydroxide/magnesium hydroxide/simethicone Suspension 30 milliLiter(s) Oral every 4 hours PRN Dyspepsia  benzonatate 100 milliGRAM(s) Oral every 8 hours PRN Cough  bisacodyl 5 milliGRAM(s) Oral daily PRN Constipation  guaifenesin/dextromethorphan Oral Liquid 10 milliLiter(s) Oral every 4 hours PRN Cough  HYDROmorphone  Injectable 1 milliGRAM(s) IV Push every 4 hours PRN Moderate Pain (4 - 6)  HYDROmorphone  Injectable 2 milliGRAM(s) IV Push every 4 hours PRN Severe Pain (7 - 10)  megestrol 40 milliGRAM(s) Oral daily PRN appetite  melatonin 3 milliGRAM(s) Oral at bedtime PRN Insomnia  ondansetron Injectable 4 milliGRAM(s) IV Push every 8 hours PRN Nausea and/or Vomiting      Allergies  acetaminophen (Hives)  aspirin (Rash)  Benadryl (Rash)  shellfish (Hives)      SOCIAL HISTORY: single; lives alone; Denies smoking, ETOH, drugs    FAMILY HISTORY: no h/o PVD or wound healing or skin problems  FH: hypertension (mother),  heart disease (mother), Alzheimers disease (mother),  scleroderma (twin sister),      and breast cancer (sister)     PHYSICAL EXAM:  Vital Signs Last 24 Hrs  T(C): 36.6 (19 Oct 2022 13:57), Max: 38.3 (18 Oct 2022 23:42)  T(F): 97.9 (19 Oct 2022 13:57), Max: 101 (18 Oct 2022 23:42)  HR: 109 (19 Oct 2022 13:57) (103 - 116)  BP: 118/83 (19 Oct 2022 13:57) (110/66 - 122/82)  BP(mean): 94 (18 Oct 2022 21:35) (93 - 94)  RR: 18 (19 Oct 2022 13:57) (18 - 22)  SpO2: 96% (19 Oct 2022 13:57) (89% - 96%)    Parameters below as of 19 Oct 2022 13:57  Patient On (Oxygen Delivery Method): mask, nonrebreather      NAD,  A&Ox3, thin, frail  Versa Care P500 bed  HEENT:  NC/AT, EOMI, sclera clear, mucosa moist, throat clear, trachea midline, neck supple  Respiratory: nonlabored w/ equal chest rise  Gastrointestinal soft NT/ND   : (+) purewick  Neurology:  weakened strength & sensation grossly intact  Psych: calm/ appropriate  Musculoskeletal:  FROM, no deformities/ contractures  Vascular: BLE equally warm,  no cyanosis, clubbing, edema; B/L DP and PT pulses palpable  Skin:  moist w/ good turgor    Bilateral buttocks/ sacrum w/ hyperpigmented skin w/o blistering, open skin, drainage  No odor, erythema, increased warmth, tenderness, induration, fluctuance, nor crepitus    LABS/ CULTURES/ RADIOLOGY:                        7.3    3.66  )-----------( 74       ( 19 Oct 2022 07:16 )             22.6       125  |  92  |  19  ----------------------------<  141      [10-19-22 @ 07:15]  4.9   |  25  |  0.94        Ca     8.7     [10-19-22 @ 07:15]    TPro  8.0  /  Alb  3.0  /  TBili  0.2  /  DBili  x   /  AST  34  /  ALT  45  /  AlkPhos  65  [10-19-22 @ 07:15]    PT/INR: PT 13.3 , INR 1.14       [10-19-22 @ 07:29]  PTT: 32.6       [10-19-22 @ 07:29]

## 2022-10-20 LAB
A1C WITH ESTIMATED AVERAGE GLUCOSE RESULT: 6.1 % — HIGH (ref 4–5.6)
ALBUMIN SERPL ELPH-MCNC: 3.2 G/DL — LOW (ref 3.3–5)
ALP SERPL-CCNC: 72 U/L — SIGNIFICANT CHANGE UP (ref 40–120)
ALT FLD-CCNC: 38 U/L — SIGNIFICANT CHANGE UP (ref 10–45)
ANION GAP SERPL CALC-SCNC: 9 MMOL/L — SIGNIFICANT CHANGE UP (ref 5–17)
APTT BLD: 26.7 SEC — LOW (ref 27.5–35.5)
AST SERPL-CCNC: 31 U/L — SIGNIFICANT CHANGE UP (ref 10–40)
BASOPHILS # BLD AUTO: 0.01 K/UL — SIGNIFICANT CHANGE UP (ref 0–0.2)
BASOPHILS NFR BLD AUTO: 0.2 % — SIGNIFICANT CHANGE UP (ref 0–2)
BILIRUB SERPL-MCNC: 0.2 MG/DL — SIGNIFICANT CHANGE UP (ref 0.2–1.2)
BUN SERPL-MCNC: 22 MG/DL — SIGNIFICANT CHANGE UP (ref 7–23)
CALCIUM SERPL-MCNC: 8.8 MG/DL — SIGNIFICANT CHANGE UP (ref 8.4–10.5)
CHLORIDE SERPL-SCNC: 97 MMOL/L — SIGNIFICANT CHANGE UP (ref 96–108)
CO2 SERPL-SCNC: 25 MMOL/L — SIGNIFICANT CHANGE UP (ref 22–31)
CREAT SERPL-MCNC: 0.84 MG/DL — SIGNIFICANT CHANGE UP (ref 0.5–1.3)
EGFR: 79 ML/MIN/1.73M2 — SIGNIFICANT CHANGE UP
EOSINOPHIL # BLD AUTO: 0 K/UL — SIGNIFICANT CHANGE UP (ref 0–0.5)
EOSINOPHIL NFR BLD AUTO: 0 % — SIGNIFICANT CHANGE UP (ref 0–6)
ESTIMATED AVERAGE GLUCOSE: 128 MG/DL — HIGH (ref 68–114)
GLUCOSE SERPL-MCNC: 108 MG/DL — HIGH (ref 70–99)
HCT VFR BLD CALC: 24.8 % — LOW (ref 34.5–45)
HGB BLD-MCNC: 7.9 G/DL — LOW (ref 11.5–15.5)
IMM GRANULOCYTES NFR BLD AUTO: 1.1 % — HIGH (ref 0–0.9)
INR BLD: 1.05 RATIO — SIGNIFICANT CHANGE UP (ref 0.88–1.16)
LYMPHOCYTES # BLD AUTO: 0.63 K/UL — LOW (ref 1–3.3)
LYMPHOCYTES # BLD AUTO: 9.7 % — LOW (ref 13–44)
MAGNESIUM SERPL-MCNC: 2.4 MG/DL — SIGNIFICANT CHANGE UP (ref 1.6–2.6)
MCHC RBC-ENTMCNC: 31.2 PG — SIGNIFICANT CHANGE UP (ref 27–34)
MCHC RBC-ENTMCNC: 31.9 GM/DL — LOW (ref 32–36)
MCV RBC AUTO: 98 FL — SIGNIFICANT CHANGE UP (ref 80–100)
MONOCYTES # BLD AUTO: 0.41 K/UL — SIGNIFICANT CHANGE UP (ref 0–0.9)
MONOCYTES NFR BLD AUTO: 6.3 % — SIGNIFICANT CHANGE UP (ref 2–14)
NEUTROPHILS # BLD AUTO: 5.4 K/UL — SIGNIFICANT CHANGE UP (ref 1.8–7.4)
NEUTROPHILS NFR BLD AUTO: 82.7 % — HIGH (ref 43–77)
NRBC # BLD: 0 /100 WBCS — SIGNIFICANT CHANGE UP (ref 0–0)
PHOSPHATE SERPL-MCNC: 3.1 MG/DL — SIGNIFICANT CHANGE UP (ref 2.5–4.5)
PLATELET # BLD AUTO: 107 K/UL — LOW (ref 150–400)
POTASSIUM SERPL-MCNC: 5 MMOL/L — SIGNIFICANT CHANGE UP (ref 3.5–5.3)
POTASSIUM SERPL-SCNC: 5 MMOL/L — SIGNIFICANT CHANGE UP (ref 3.5–5.3)
PROT SERPL-MCNC: 7.9 G/DL — SIGNIFICANT CHANGE UP (ref 6–8.3)
PROTHROM AB SERPL-ACNC: 12.1 SEC — SIGNIFICANT CHANGE UP (ref 10.5–13.4)
RBC # BLD: 2.53 M/UL — LOW (ref 3.8–5.2)
RBC # FLD: 17.8 % — HIGH (ref 10.3–14.5)
SODIUM SERPL-SCNC: 131 MMOL/L — LOW (ref 135–145)
WBC # BLD: 6.52 K/UL — SIGNIFICANT CHANGE UP (ref 3.8–10.5)
WBC # FLD AUTO: 6.52 K/UL — SIGNIFICANT CHANGE UP (ref 3.8–10.5)

## 2022-10-20 PROCEDURE — 99232 SBSQ HOSP IP/OBS MODERATE 35: CPT

## 2022-10-20 PROCEDURE — 93010 ELECTROCARDIOGRAM REPORT: CPT

## 2022-10-20 RX ORDER — FUROSEMIDE 40 MG
20 TABLET ORAL ONCE
Refills: 0 | Status: COMPLETED | OUTPATIENT
Start: 2022-10-20 | End: 2022-10-20

## 2022-10-20 RX ADMIN — FENTANYL CITRATE 1 PATCH: 50 INJECTION INTRAVENOUS at 19:00

## 2022-10-20 RX ADMIN — FENTANYL CITRATE 1 PATCH: 50 INJECTION INTRAVENOUS at 06:11

## 2022-10-20 RX ADMIN — REMDESIVIR 500 MILLIGRAM(S): 5 INJECTION INTRAVENOUS at 12:14

## 2022-10-20 RX ADMIN — SENNA PLUS 2 TABLET(S): 8.6 TABLET ORAL at 22:09

## 2022-10-20 RX ADMIN — ENOXAPARIN SODIUM 40 MILLIGRAM(S): 100 INJECTION SUBCUTANEOUS at 05:50

## 2022-10-20 RX ADMIN — ENOXAPARIN SODIUM 40 MILLIGRAM(S): 100 INJECTION SUBCUTANEOUS at 17:50

## 2022-10-20 RX ADMIN — HYDROMORPHONE HYDROCHLORIDE 2 MILLIGRAM(S): 2 INJECTION INTRAMUSCULAR; INTRAVENOUS; SUBCUTANEOUS at 06:11

## 2022-10-20 RX ADMIN — HYDROMORPHONE HYDROCHLORIDE 1 MILLIGRAM(S): 2 INJECTION INTRAMUSCULAR; INTRAVENOUS; SUBCUTANEOUS at 23:00

## 2022-10-20 RX ADMIN — Medication 20 MILLIGRAM(S): at 17:49

## 2022-10-20 RX ADMIN — PANTOPRAZOLE SODIUM 40 MILLIGRAM(S): 20 TABLET, DELAYED RELEASE ORAL at 06:01

## 2022-10-20 RX ADMIN — Medication 400 MILLIGRAM(S): at 12:13

## 2022-10-20 RX ADMIN — Medication 6 MILLIGRAM(S): at 06:05

## 2022-10-20 RX ADMIN — HYDROMORPHONE HYDROCHLORIDE 2 MILLIGRAM(S): 2 INJECTION INTRAMUSCULAR; INTRAVENOUS; SUBCUTANEOUS at 11:07

## 2022-10-20 RX ADMIN — HYDROMORPHONE HYDROCHLORIDE 2 MILLIGRAM(S): 2 INJECTION INTRAMUSCULAR; INTRAVENOUS; SUBCUTANEOUS at 11:37

## 2022-10-20 RX ADMIN — HYDROMORPHONE HYDROCHLORIDE 1 MILLIGRAM(S): 2 INJECTION INTRAMUSCULAR; INTRAVENOUS; SUBCUTANEOUS at 22:10

## 2022-10-20 RX ADMIN — AMLODIPINE BESYLATE 10 MILLIGRAM(S): 2.5 TABLET ORAL at 06:05

## 2022-10-20 RX ADMIN — FENTANYL CITRATE 1 PATCH: 50 INJECTION INTRAVENOUS at 07:50

## 2022-10-20 RX ADMIN — CHLORHEXIDINE GLUCONATE 1 APPLICATION(S): 213 SOLUTION TOPICAL at 12:15

## 2022-10-20 RX ADMIN — POLYETHYLENE GLYCOL 3350 17 GRAM(S): 17 POWDER, FOR SOLUTION ORAL at 12:14

## 2022-10-20 RX ADMIN — HYDROMORPHONE HYDROCHLORIDE 2 MILLIGRAM(S): 2 INJECTION INTRAMUSCULAR; INTRAVENOUS; SUBCUTANEOUS at 02:46

## 2022-10-20 NOTE — PROGRESS NOTE ADULT - SUBJECTIVE AND OBJECTIVE BOX
CC: F/U for COVID    Saw/spoke to patient. On HFNC. Breathing comfortably.    Allergies  acetaminophen (Hives)  aspirin (Rash)  Benadryl (Rash)  shellfish (Hives)    ANTIMICROBIALS:  acyclovir   Oral Tab/Cap 400 daily  remdesivir  IVPB    remdesivir  IVPB 100 every 24 hours  dexAMETHasone  Injectable 6 milliGRAM(s) IV Push daily    PE:    Vital Signs Last 24 Hrs  T(C): 36.8 (20 Oct 2022 11:44), Max: 36.8 (20 Oct 2022 11:44)  T(F): 98.2 (20 Oct 2022 11:44), Max: 98.2 (20 Oct 2022 11:44)  HR: 109 (20 Oct 2022 11:44) (97 - 120)  BP: 109/73 (20 Oct 2022 11:44) (109/73 - 118/74)  RR: 20 (20 Oct 2022 11:44) (18 - 22)  SpO2: 98% (20 Oct 2022 11:44) (93% - 100%)    Gen: AOx3, NAD, non-toxic  CV: Nontachycardic  Resp: Breathing comfortably, RA  Abd: Soft, nontender  IV/Skin: No thrombophlebitis    LABS:                        7.9    6.52  )-----------( 107      ( 20 Oct 2022 07:00 )             24.8     10-20    131<L>  |  97  |  22  ----------------------------<  108<H>  5.0   |  25  |  0.84    Ca    8.8      20 Oct 2022 07:00  Phos  3.1     10-20  Mg     2.4     10-20    TPro  7.9  /  Alb  3.2<L>  /  TBili  0.2  /  DBili  x   /  AST  31  /  ALT  38  /  AlkPhos  72  10-20    MICROBIOLOGY:    .Blood Blood-Peripheral  10-18-22   No growth to date.  --  --    .Blood Blood-Peripheral  10-18-22   No growth to date.  --  --    Rapid RVP Result: Detected (10-18 @ 18:46)    (otherwise reviewed)    RADIOLOGY:    10/18 CT:    IMPRESSION:    No pulmonary embolism.    Small right and moderate left pleural effusions and compressive   atelectasis.

## 2022-10-20 NOTE — PROGRESS NOTE ADULT - SUBJECTIVE AND OBJECTIVE BOX
C A R D I O L O G Y  **********************************     DATE OF SERVICE: 10-20-22    Discussed with RN - patient continues to have chest pain but less SOB weaned to HFNC. Review of systems otherwise negative  	  MEDICATIONS:  MEDICATIONS  (STANDING):  acyclovir   Oral Tab/Cap 400 milliGRAM(s) Oral daily  amLODIPine   Tablet 10 milliGRAM(s) Oral daily  chlorhexidine 4% Liquid 1 Application(s) Topical daily  dexAMETHasone  Injectable 6 milliGRAM(s) IV Push daily  enoxaparin Injectable 40 milliGRAM(s) SubCutaneous every 12 hours  fentaNYL   Patch  50 MICROgram(s)/Hr 1 Patch Transdermal every 72 hours  furosemide   Injectable 20 milliGRAM(s) IV Push once  naloxone Injectable 0.4 milliGRAM(s) IV Push once  pantoprazole    Tablet 40 milliGRAM(s) Oral before breakfast  polyethylene glycol 3350 17 Gram(s) Oral daily  remdesivir  IVPB   IV Intermittent   remdesivir  IVPB 100 milliGRAM(s) IV Intermittent every 24 hours  senna 2 Tablet(s) Oral at bedtime      LABS:	 	    CARDIAC MARKERS:  CARDIAC MARKERS ( 19 Oct 2022 07:15 )  x     / x     / 294 U/L / x     / 3.6 ng/mL                                7.9    6.52  )-----------( 107      ( 20 Oct 2022 07:00 )             24.8     Hemoglobin: 7.9 g/dL (10-20 @ 07:00)  Hemoglobin: 7.3 g/dL (10-19 @ 07:16)  Hemoglobin: 7.7 g/dL (10-18 @ 18:09)      10-20    131<L>  |  97  |  22  ----------------------------<  108<H>  5.0   |  25  |  0.84    Ca    8.8      20 Oct 2022 07:00  Phos  3.1     10-20  Mg     2.4     10-20    TPro  7.9  /  Alb  3.2<L>  /  TBili  0.2  /  DBili  x   /  AST  31  /  ALT  38  /  AlkPhos  72  10-20    Creatinine Trend: 0.84<--, 0.94<--, 0.97<--    COAGS:   PT/INR - ( 20 Oct 2022 07:00 )   PT: 12.1 sec;   INR: 1.05 ratio         PTT - ( 20 Oct 2022 07:00 )  PTT:26.7 sec    proBNP:   Lipid Profile:   HgA1c:   TSH:     Per Chart  PHYSICAL EXAM:  T(C): 36.8 (10-20-22 @ 11:44), Max: 36.8 (10-20-22 @ 11:44)  HR: 109 (10-20-22 @ 11:44) (97 - 120)  BP: 109/73 (10-20-22 @ 11:44) (109/73 - 118/74)  RR: 20 (10-20-22 @ 11:44) (18 - 22)  SpO2: 98% (10-20-22 @ 11:44) (93% - 100%)  Wt(kg): --  I&O's Summary    19 Oct 2022 07:01  -  20 Oct 2022 07:00  --------------------------------------------------------  IN: 240 mL / OUT: 0 mL / NET: 240 mL    20 Oct 2022 07:01  -  20 Oct 2022 14:59  --------------------------------------------------------  IN: 480 mL / OUT: 600 mL / NET: -120 mL      Gen: NAD  HEENT:  (-)icterus (-)pallor  CV: N S1 S2 1/6 CHARLY (+)2 Pulses B/l  Resp:  Clear to auscultation B/L, normal effort  GI: (+) BS Soft, NT, ND  Lymph:  (-)Edema, (-)obvious lymphadenopathy  Skin: Warm to touch, Normal turgor  Psych: Appropriate mood and affect      TELEMETRY: None	      ECG: Pending (Ordered)  	    RADIOLOGY:  < from: CT Angio Chest PE Protocol w/ IV Cont (10.18.22 @ 20:03) >  IMPRESSION:    No pulmonary embolism.    Smallright and moderate left pleural effusions and compressive   atelectasis.    --- End of Report ---    < end of copied text >      ASSESSMENT/PLAN: Patient is a 60 y/o Female with PMH of Multiple Myeloma (dx 2018) currently on chemo who presented with chest pain, SOB, and fever admitted with +COVID and b/l pleural effusions. Cardiology consulted for further evaluation.     - Patient with nonanginal chest pain that is pleuritic and very tender to palpation - suspect MSK related likely due to costochondritis and multiple myeloma lytic lesions  - Follow up EKG (ordered - d/w RN to please check today)  - Cardiac enzymes unremarkable  - CTA chest negative for PE, small R and mod L pleural effusions and compressive atelectasis  - Check TTE to eval LV function given pleural effusions however low BNP noted therefore unlikely due to CHF  - ID follow up - BCx negative so far  - Supportive care/covid management per primary team  - No further inpatient cardiac w/u expected if EKG and echo unremarkable    Artur Cortes PA-C  Pager: [phone]

## 2022-10-20 NOTE — PROGRESS NOTE ADULT - PROBLEM SELECTOR PLAN 3
-very unlikely ACS given neg trops, EKG w/o acute changes and pleuritic nature of pain  -CTA r/o PE  -patient has MM with known bone mets, most likely cause  -Aggressive pain control     -Fentanyl 50 patch q72h     -Dilaudid 1-2mg IV PRN q4h    -can consult pulm in AM for possible thoracentesis given moderate Lt sided pleural effusions -very unlikely ACS given neg trops, EKG w/o acute changes and pleuritic nature of pain  -CTA showed no PE, and showed small right and moderate left pleural effusions and compressive   atelectasis.  -patient has MM with known bone mets, most likely cause  -Aggressive pain control     -Fentanyl 50 patch q72h     -Dilaudid 1-2mg IV PRN q4h

## 2022-10-20 NOTE — PROGRESS NOTE ADULT - PROBLEM SELECTOR PLAN 1
n 80% : has covid infection:  probnp is OK: echo pending: eh has abdirahman effusions:  she has multiple myeloma:  ? add diuretics

## 2022-10-20 NOTE — PROGRESS NOTE ADULT - ASSESSMENT
61F unvaccinated w/ PMH MM(dx'd 2018) currently on chemo(last session 10/13) p/w 1-day h/o chest pain and fever. Most history per daughter at baseline as pt in significant pain and unable to talk. Pt had initially presented to [facility] [place] with a sharp mid-chest pain gradual in onset that progressively got worse associated with a fever as high as 102.2 and a productive cough with greenish sputum. Describes the pain as lingering and worse with breathing. Of note, she has lesions in her chest from MM however reports this pain feels different. At [facility] [place], she was found to be tachycardic, hypoxic to 80% and referred to [facility] for further eval. Of note, pt had been recently hospitalized for about a week at [facility] [place] for pain crisis. Denied dizziness, paresthesias, palpitations, abdominal pain,       ED course: Afebrile, tachy to 111, BP stable. Tachypneic to 22, 93% on  2LNC improved to >95% on 3L NC. Given vanc, cefepime, (18 Oct 2022 23:24)    Hematology/Oncology called to see patient who is under care of Dr. Denise Fatima of [facility] for the treatment of IgG lambda multiple myeloma with extensive bone metastases recently started on CyborD chemotherapy 10/7/2022 (LD 10/13/2022). She was recently hospitalized for chemotherapy/pain management at [facility], discharged 10/15/2022.     IgG lambda multiple myeloma  --Under care at [facility] - Dr. Denise Fatima  --Chemotherapy on hold during hospitalization    COVID-19 Infection  --Likely cause of SOB, fever, sputum production  --Started on Remdesevir, Dexamethasone  --O2 supplement as needed - has been transitioned to HFNC  --Management per ID, Pulm, Primary Team    Chest pain  --Underlying bone disease, COVID infection  --Cardiology, Pulmonary called    Pleural Effusion  --Management per Pulmonary  --Patient may improve clinical with thoracocentesis    Pain Management  --S/P multiple pathologic compression fractures from disease  --Patient needs aggressive pain management  --Palliative care recommended if current regimen not adequate    Anemia  --Secondary to disease, recent chemotherapy  --Please transfuse PRBC's if <7 grams    Thrombocytopenia  --Secondary to chemotherapy  --Recovering S/P chemotherapy  --Please transfuse platelets if <10K or <50K with bleeding or prior to procedures    We will continue to follow patient and coordinate with [facility]    Bertin Rae PA-C  Hematology/Oncology  [facility]   [phone] (office)  [phone] (alt office)  Evenings and weekends please call MD on call or office

## 2022-10-20 NOTE — PROGRESS NOTE ADULT - PROBLEM SELECTOR PLAN 1
-reported hypoxic to 80s at [facility]  -Now requiring Non-rebreather for sufficient saturation  -likely from COVID-19  -continous pulse ox monitoring  -c/w supplemental O2 and wean as tolerated  -will hold off on abx for now as sxs likely d/t COVID-19  -if she spikes a fever, can start empirically on abx pending BCx Reported hypoxic to 80s at [facility].   Ct chest showed small right and moderate left pleural effusions and compressive   atelectasis.  -Now requiring Non-rebreather for sufficient saturation  -likely from COVID-19  -continous pulse ox monitoring  -c/w supplemental O2 and wean as tolerated  -will hold off on abx for now as sxs likely d/t COVID-19  -if she spikes a fever, can start empirically on abx pending BCx  -20 mg IVP lasix

## 2022-10-20 NOTE — PROGRESS NOTE ADULT - ASSESSMENT
Agree with above assessment and plan as outlined above.    - f/u echo and EKG    Aston Werner MD, [facility]  BEEPER [phone]

## 2022-10-20 NOTE — PROGRESS NOTE ADULT - SUBJECTIVE AND OBJECTIVE BOX
Patient is a 61y old  Female who presents with a chief complaint of SOB, CP (20 Oct 2022 07:26)    Patient seen this morning. States that her breathing feels improved(on HFNC) but her bone pain is still there and affected by positioning    MEDICATIONS  (STANDING):  acyclovir   Oral Tab/Cap 400 milliGRAM(s) Oral daily  amLODIPine   Tablet 10 milliGRAM(s) Oral daily  chlorhexidine 4% Liquid 1 Application(s) Topical daily  dexAMETHasone  Injectable 6 milliGRAM(s) IV Push daily  enoxaparin Injectable 40 milliGRAM(s) SubCutaneous every 12 hours  fentaNYL   Patch  50 MICROgram(s)/Hr 1 Patch Transdermal every 72 hours  naloxone Injectable 0.4 milliGRAM(s) IV Push once  pantoprazole    Tablet 40 milliGRAM(s) Oral before breakfast  polyethylene glycol 3350 17 Gram(s) Oral daily  remdesivir  IVPB   IV Intermittent   remdesivir  IVPB 100 milliGRAM(s) IV Intermittent every 24 hours  senna 2 Tablet(s) Oral at bedtime    MEDICATIONS  (PRN):  ALBUTerol    90 MICROgram(s) HFA Inhaler 2 Puff(s) Inhalation every 4 hours PRN Shortness of Breath and/or Wheezing  aluminum hydroxide/magnesium hydroxide/simethicone Suspension 30 milliLiter(s) Oral every 4 hours PRN Dyspepsia  benzonatate 100 milliGRAM(s) Oral every 8 hours PRN Cough  bisacodyl 5 milliGRAM(s) Oral daily PRN Constipation  guaifenesin/dextromethorphan Oral Liquid 10 milliLiter(s) Oral every 4 hours PRN Cough  HYDROmorphone  Injectable 2 milliGRAM(s) IV Push every 4 hours PRN Severe Pain (7 - 10)  HYDROmorphone  Injectable 1 milliGRAM(s) IV Push every 4 hours PRN Moderate Pain (4 - 6)  megestrol 40 milliGRAM(s) Oral daily PRN appetite  melatonin 3 milliGRAM(s) Oral at bedtime PRN Insomnia  ondansetron Injectable 4 milliGRAM(s) IV Push every 8 hours PRN Nausea and/or Vomiting    Vital Signs Last 24 Hrs  T(C): 36.5 (20 Oct 2022 05:20), Max: 36.6 (19 Oct 2022 13:57)  T(F): 97.7 (20 Oct 2022 05:20), Max: 97.9 (19 Oct 2022 13:57)  HR: 120 (20 Oct 2022 10:35) (97 - 120)  BP: 118/74 (20 Oct 2022 10:35) (112/75 - 118/83)  BP(mean): --  RR: 20 (20 Oct 2022 10:35) (18 - 22)  SpO2: 94% (20 Oct 2022 10:35) (93% - 100%)    Parameters below as of 20 Oct 2022 10:35  Patient On (Oxygen Delivery Method): nasal cannula, high flow        PE  NAD  Awake, alert  On HFNC  Anicteric, MMM  No c/c/e  No rash grossly                            7.9    6.52  )-----------( 107      ( 20 Oct 2022 07:00 )             24.8       10-20    131<L>  |  97  |  22  ----------------------------<  108<H>  5.0   |  25  |  0.84    Ca    8.8      20 Oct 2022 07:00  Phos  3.1     10-20  Mg     2.4     10-20    TPro  7.9  /  Alb  3.2<L>  /  TBili  0.2  /  DBili  x   /  AST  31  /  ALT  38  /  AlkPhos  72  10-20

## 2022-10-20 NOTE — PROGRESS NOTE ADULT - PROBLEM SELECTOR PLAN 4
-currently undergoing chemo at [facility]  -heme/onc following  -pain control  -f/u palliative consult

## 2022-10-20 NOTE — PROGRESS NOTE ADULT - SUBJECTIVE AND OBJECTIVE BOX
Date of Service: 10-20-22 @ 12:26    Patient is a 61y old  Female who presents with a chief complaint of SOB, CP (20 Oct 2022 11:27)      Any change in ROS:  doingok : on 80% high flow:  sheis alert and awake and has sternal pain      MEDICATIONS  (STANDING):  acyclovir   Oral Tab/Cap 400 milliGRAM(s) Oral daily  amLODIPine   Tablet 10 milliGRAM(s) Oral daily  chlorhexidine 4% Liquid 1 Application(s) Topical daily  dexAMETHasone  Injectable 6 milliGRAM(s) IV Push daily  enoxaparin Injectable 40 milliGRAM(s) SubCutaneous every 12 hours  fentaNYL   Patch  50 MICROgram(s)/Hr 1 Patch Transdermal every 72 hours  naloxone Injectable 0.4 milliGRAM(s) IV Push once  pantoprazole    Tablet 40 milliGRAM(s) Oral before breakfast  polyethylene glycol 3350 17 Gram(s) Oral daily  remdesivir  IVPB   IV Intermittent   remdesivir  IVPB 100 milliGRAM(s) IV Intermittent every 24 hours  senna 2 Tablet(s) Oral at bedtime    MEDICATIONS  (PRN):  ALBUTerol    90 MICROgram(s) HFA Inhaler 2 Puff(s) Inhalation every 4 hours PRN Shortness of Breath and/or Wheezing  aluminum hydroxide/magnesium hydroxide/simethicone Suspension 30 milliLiter(s) Oral every 4 hours PRN Dyspepsia  benzonatate 100 milliGRAM(s) Oral every 8 hours PRN Cough  bisacodyl 5 milliGRAM(s) Oral daily PRN Constipation  guaifenesin/dextromethorphan Oral Liquid 10 milliLiter(s) Oral every 4 hours PRN Cough  HYDROmorphone  Injectable 1 milliGRAM(s) IV Push every 4 hours PRN Moderate Pain (4 - 6)  HYDROmorphone  Injectable 2 milliGRAM(s) IV Push every 4 hours PRN Severe Pain (7 - 10)  megestrol 40 milliGRAM(s) Oral daily PRN appetite  melatonin 3 milliGRAM(s) Oral at bedtime PRN Insomnia  ondansetron Injectable 4 milliGRAM(s) IV Push every 8 hours PRN Nausea and/or Vomiting    Vital Signs Last 24 Hrs  T(C): 36.8 (20 Oct 2022 11:44), Max: 36.8 (20 Oct 2022 11:44)  T(F): 98.2 (20 Oct 2022 11:44), Max: 98.2 (20 Oct 2022 11:44)  HR: 109 (20 Oct 2022 11:44) (97 - 120)  BP: 109/73 (20 Oct 2022 11:44) (109/73 - 118/83)  BP(mean): --  RR: 20 (20 Oct 2022 11:44) (18 - 22)  SpO2: 98% (20 Oct 2022 11:44) (93% - 100%)    Parameters below as of 20 Oct 2022 11:44  Patient On (Oxygen Delivery Method): nasal cannula, high flow        I&O's Summary    19 Oct 2022 07:01  -  20 Oct 2022 07:00  --------------------------------------------------------  IN: 240 mL / OUT: 0 mL / NET: 240 mL    20 Oct 2022 07:01  -  20 Oct 2022 12:26  --------------------------------------------------------  IN: 0 mL / OUT: 600 mL / NET: -600 mL          Physical Exam:   GENERAL: NAD, well-groomed, well-developed  HEENT: CARLA/   Atraumatic, Normocephalic  ENMT: No tonsillar erythema, exudates, or enlargement; Moist mucous membranes, Good dentition, No lesions  NECK: Supple, No JVD, Normal thyroid  CHEST/LUNG: Clear to ausculation ; tenderness on sternum  CVS: Regular rate and rhythm; No murmurs, rubs, or gallops  GI: : Soft, Nontender, Nondistended; Bowel sounds present  NERVOUS SYSTEM:  Alert & Oriented X3  EXTREMITIES: - edema  LYMPH: No lymphadenopathy noted  SKIN: No rashes or lesions  ENDOCRINOLOGY: No Thyromegaly  PSYCH: Appropriate    Labs:  28  CARDIAC MARKERS ( 19 Oct 2022 07:15 )  x     / x     / 294 U/L / x     / 3.6 ng/mL                            7.9    6.52  )-----------( 107      ( 20 Oct 2022 07:00 )             24.8                         7.3    3.66  )-----------( 74       ( 19 Oct 2022 07:16 )             22.6                         7.7    5.18  )-----------( 63       ( 18 Oct 2022 18:09 )             23.7     10-20    131<L>  |  97  |  22  ----------------------------<  108<H>  5.0   |  25  |  0.84  10-19    125<L>  |  92<L>  |  19  ----------------------------<  141<H>  4.9   |  25  |  0.94  10-18    125<L>  |  90<L>  |  18  ----------------------------<  123<H>  5.1   |  25  |  0.97    Ca    8.8      20 Oct 2022 07:00  Ca    8.7      19 Oct 2022 07:15  Ca    8.6      18 Oct 2022 18:09  Phos  3.1     10-20  Mg     2.4     10-20    TPro  7.9  /  Alb  3.2<L>  /  TBili  0.2  /  DBili  x   /  AST  31  /  ALT  38  /  AlkPhos  72  10-20  TPro  8.0  /  Alb  3.0<L>  /  TBili  0.2  /  DBili  x   /  AST  34  /  ALT  45  /  AlkPhos  65  10-19  TPro  8.4<H>  /  Alb  3.1<L>  /  TBili  0.2  /  DBili  x   /  AST  58<H>  /  ALT  60<H>  /  AlkPhos  67  10-18    CAPILLARY BLOOD GLUCOSE          LIVER FUNCTIONS - ( 20 Oct 2022 07:00 )  Alb: 3.2 g/dL / Pro: 7.9 g/dL / ALK PHOS: 72 U/L / ALT: 38 U/L / AST: 31 U/L / GGT: x           PT/INR - ( 20 Oct 2022 07:00 )   PT: 12.1 sec;   INR: 1.05 ratio         PTT - ( 20 Oct 2022 07:00 )  PTT:26.7 sec    D-Dimer Assay, Quantitative: 612 ng/mL DDU (10-19 @ 07:29)  Procalcitonin, Serum: 0.28 ng/mL (10-18 @ 18:09)  Serum Pro-Brain Natriuretic Peptide: 159 pg/mL (10-18 @ 18:09)        RECENT CULTURES:  10-18 @ 17:45 .Blood Blood-Peripheral       rad< from: CT Angio Chest PE Protocol w/ IV Cont (10.18.22 @ 20:03) >    AIRWAYS/LUNGS/PLEURA: Small right and moderate left pleural effusions and   compressive atelectasis. Patchy areas of linear atelectasis in the   bilateral upper lobes.    UPPER ABDOMEN: Within normal limits.    BONES/SOFT TISSUES: Permeative lytic lesions throughout the osseous   structures suggestive of myeloma. Compression deformities involving T3,   T6, T10, T11, L1 and L2 vertebral bodies.    IMPRESSION:    No pulmonary embolism.    Smallright and moderate left pleural effusions and compressive   atelectasis.    --- End of Report ---           GIULIANO NAILS MD; Resident Radiologist  This document has been electronically signed.  BERT LA MD; Attending Radiologist  This document has been electronically signed. Oct 18 2022  8:21PM    < end of copied text >           No growth to date.    10-18 @ 17:25 .Blood Blood-Peripheral                No growth to date.          RESPIRATORY CULTURES:          Studies  Chest X-RAY  CT SCAN Chest   Venous Dopplers: LE:   CT Abdomen  Others

## 2022-10-20 NOTE — PROGRESS NOTE ADULT - SUBJECTIVE AND OBJECTIVE BOX
PROGRESS NOTE:   Authored by Tish Moreland MD     Patient is a 61y old  Female who presents with a chief complaint of SOB, CP (19 Oct 2022 16:11)      SUBJECTIVE / OVERNIGHT EVENTS:    ADDITIONAL REVIEW OF SYSTEMS:    MEDICATIONS  (STANDING):  acyclovir   Oral Tab/Cap 400 milliGRAM(s) Oral daily  amLODIPine   Tablet 10 milliGRAM(s) Oral daily  chlorhexidine 4% Liquid 1 Application(s) Topical daily  dexAMETHasone  Injectable 6 milliGRAM(s) IV Push daily  enoxaparin Injectable 40 milliGRAM(s) SubCutaneous every 12 hours  fentaNYL   Patch  50 MICROgram(s)/Hr 1 Patch Transdermal every 72 hours  naloxone Injectable 0.4 milliGRAM(s) IV Push once  pantoprazole    Tablet 40 milliGRAM(s) Oral before breakfast  polyethylene glycol 3350 17 Gram(s) Oral daily  remdesivir  IVPB   IV Intermittent   remdesivir  IVPB 100 milliGRAM(s) IV Intermittent every 24 hours  senna 2 Tablet(s) Oral at bedtime    MEDICATIONS  (PRN):  ALBUTerol    90 MICROgram(s) HFA Inhaler 2 Puff(s) Inhalation every 4 hours PRN Shortness of Breath and/or Wheezing  aluminum hydroxide/magnesium hydroxide/simethicone Suspension 30 milliLiter(s) Oral every 4 hours PRN Dyspepsia  benzonatate 100 milliGRAM(s) Oral every 8 hours PRN Cough  bisacodyl 5 milliGRAM(s) Oral daily PRN Constipation  guaifenesin/dextromethorphan Oral Liquid 10 milliLiter(s) Oral every 4 hours PRN Cough  HYDROmorphone  Injectable 2 milliGRAM(s) IV Push every 4 hours PRN Severe Pain (7 - 10)  HYDROmorphone  Injectable 1 milliGRAM(s) IV Push every 4 hours PRN Moderate Pain (4 - 6)  megestrol 40 milliGRAM(s) Oral daily PRN appetite  melatonin 3 milliGRAM(s) Oral at bedtime PRN Insomnia  ondansetron Injectable 4 milliGRAM(s) IV Push every 8 hours PRN Nausea and/or Vomiting      CAPILLARY BLOOD GLUCOSE        I&O's Summary    19 Oct 2022 07:01  -  20 Oct 2022 07:00  --------------------------------------------------------  IN: 240 mL / OUT: 0 mL / NET: 240 mL        PHYSICAL EXAM:  Vital Signs Last 24 Hrs  T(C): 36.5 (20 Oct 2022 05:20), Max: 36.6 (19 Oct 2022 13:57)  T(F): 97.7 (20 Oct 2022 05:20), Max: 97.9 (19 Oct 2022 13:57)  HR: 106 (20 Oct 2022 05:20) (97 - 116)  BP: 112/75 (20 Oct 2022 05:20) (112/75 - 118/83)  BP(mean): --  RR: 20 (20 Oct 2022 05:20) (18 - 22)  SpO2: 98% (20 Oct 2022 05:20) (93% - 100%)    Parameters below as of 20 Oct 2022 05:20  Patient On (Oxygen Delivery Method): nasal cannula, high flow        GENERAL: No acute distress, well-developed  HEAD:  Atraumatic, Normocephalic  EYES: EOMI, PERRLA, conjunctiva and sclera clear  NECK: Supple, no lymphadenopathy, no JVD  CHEST/LUNG: CTAB; No wheezes, rales, or rhonchi  HEART: Regular rate and rhythm; No murmurs, rubs, or gallops  ABDOMEN: Soft, non-tender, non-distended; normal bowel sounds, no organomegaly  EXTREMITIES:  2+ peripheral pulses b/l, No clubbing, cyanosis, or edema  NEUROLOGY: A&O x 3, no focal deficits  SKIN: No rashes or lesions    LABS:                        7.9    6.52  )-----------( x        ( 20 Oct 2022 07:00 )             24.8     10-19    125<L>  |  92<L>  |  19  ----------------------------<  141<H>  4.9   |  25  |  0.94    Ca    8.7      19 Oct 2022 07:15    TPro  8.0  /  Alb  3.0<L>  /  TBili  0.2  /  DBili  x   /  AST  34  /  ALT  45  /  AlkPhos  65  10-19    PT/INR - ( 19 Oct 2022 07:29 )   PT: 13.3 sec;   INR: 1.14 ratio         PTT - ( 19 Oct 2022 07:29 )  PTT:32.6 sec  CARDIAC MARKERS ( 19 Oct 2022 07:15 )  x     / x     / 294 U/L / x     / 3.6 ng/mL          Culture - Blood (collected 18 Oct 2022 17:45)  Source: .Blood Blood-Peripheral  Preliminary Report (19 Oct 2022 23:02):    No growth to date.    Culture - Blood (collected 18 Oct 2022 17:25)  Source: .Blood Blood-Peripheral  Preliminary Report (19 Oct 2022 23:02):    No growth to date.        RADIOLOGY & ADDITIONAL TESTS:  Results Reviewed:   Imaging Personally Reviewed:  Electrocardiogram Personally Reviewed:    COORDINATION OF CARE:  Care Discussed with Consultants/Other Providers [Y/N]:  Prior or Outpatient Records Reviewed [Y/N]:   PROGRESS NOTE:   Authored by Tish Moreland MD     Patient is a 61y old  Female who presents with a chief complaint of SOB, CP (19 Oct 2022 16:11)      SUBJECTIVE / OVERNIGHT EVENTS:  SOB improved. Currently High flow was decreased to 70%. She denied chest pain, fever, abdominal pain.     ADDITIONAL REVIEW OF SYSTEMS:    MEDICATIONS  (STANDING):  acyclovir   Oral Tab/Cap 400 milliGRAM(s) Oral daily  amLODIPine   Tablet 10 milliGRAM(s) Oral daily  chlorhexidine 4% Liquid 1 Application(s) Topical daily  dexAMETHasone  Injectable 6 milliGRAM(s) IV Push daily  enoxaparin Injectable 40 milliGRAM(s) SubCutaneous every 12 hours  fentaNYL   Patch  50 MICROgram(s)/Hr 1 Patch Transdermal every 72 hours  naloxone Injectable 0.4 milliGRAM(s) IV Push once  pantoprazole    Tablet 40 milliGRAM(s) Oral before breakfast  polyethylene glycol 3350 17 Gram(s) Oral daily  remdesivir  IVPB   IV Intermittent   remdesivir  IVPB 100 milliGRAM(s) IV Intermittent every 24 hours  senna 2 Tablet(s) Oral at bedtime    MEDICATIONS  (PRN):  ALBUTerol    90 MICROgram(s) HFA Inhaler 2 Puff(s) Inhalation every 4 hours PRN Shortness of Breath and/or Wheezing  aluminum hydroxide/magnesium hydroxide/simethicone Suspension 30 milliLiter(s) Oral every 4 hours PRN Dyspepsia  benzonatate 100 milliGRAM(s) Oral every 8 hours PRN Cough  bisacodyl 5 milliGRAM(s) Oral daily PRN Constipation  guaifenesin/dextromethorphan Oral Liquid 10 milliLiter(s) Oral every 4 hours PRN Cough  HYDROmorphone  Injectable 2 milliGRAM(s) IV Push every 4 hours PRN Severe Pain (7 - 10)  HYDROmorphone  Injectable 1 milliGRAM(s) IV Push every 4 hours PRN Moderate Pain (4 - 6)  megestrol 40 milliGRAM(s) Oral daily PRN appetite  melatonin 3 milliGRAM(s) Oral at bedtime PRN Insomnia  ondansetron Injectable 4 milliGRAM(s) IV Push every 8 hours PRN Nausea and/or Vomiting      CAPILLARY BLOOD GLUCOSE        I&O's Summary    19 Oct 2022 07:01  -  20 Oct 2022 07:00  --------------------------------------------------------  IN: 240 mL / OUT: 0 mL / NET: 240 mL        PHYSICAL EXAM:  Vital Signs Last 24 Hrs  T(C): 36.5 (20 Oct 2022 05:20), Max: 36.6 (19 Oct 2022 13:57)  T(F): 97.7 (20 Oct 2022 05:20), Max: 97.9 (19 Oct 2022 13:57)  HR: 106 (20 Oct 2022 05:20) (97 - 116)  BP: 112/75 (20 Oct 2022 05:20) (112/75 - 118/83)  BP(mean): --  RR: 20 (20 Oct 2022 05:20) (18 - 22)  SpO2: 98% (20 Oct 2022 05:20) (93% - 100%)    Parameters below as of 20 Oct 2022 05:20  Patient On (Oxygen Delivery Method): nasal cannula, high flow        GENERAL: No acute distress, well-developed  HEAD:  Atraumatic, Normocephalic  EYES: EOMI, PERRLA, conjunctiva and sclera clear  NECK: Supple, no lymphadenopathy, no JVD  CHEST/LUNG: CTAB; No wheezes, rales, or rhonchi  HEART: Regular rate and rhythm; No murmurs, rubs, or gallops  ABDOMEN: Soft, non-tender, non-distended; normal bowel sounds, no organomegaly  EXTREMITIES:  2+ peripheral pulses b/l, No clubbing, cyanosis, or edema  NEUROLOGY: A&O x 3, no focal deficits  SKIN: No rashes or lesions    LABS:                        7.9    6.52  )-----------( x        ( 20 Oct 2022 07:00 )             24.8     10-19    125<L>  |  92<L>  |  19  ----------------------------<  141<H>  4.9   |  25  |  0.94    Ca    8.7      19 Oct 2022 07:15    TPro  8.0  /  Alb  3.0<L>  /  TBili  0.2  /  DBili  x   /  AST  34  /  ALT  45  /  AlkPhos  65  10-19    PT/INR - ( 19 Oct 2022 07:29 )   PT: 13.3 sec;   INR: 1.14 ratio         PTT - ( 19 Oct 2022 07:29 )  PTT:32.6 sec  CARDIAC MARKERS ( 19 Oct 2022 07:15 )  x     / x     / 294 U/L / x     / 3.6 ng/mL          Culture - Blood (collected 18 Oct 2022 17:45)  Source: .Blood Blood-Peripheral  Preliminary Report (19 Oct 2022 23:02):    No growth to date.    Culture - Blood (collected 18 Oct 2022 17:25)  Source: .Blood Blood-Peripheral  Preliminary Report (19 Oct 2022 23:02):    No growth to date.        RADIOLOGY & ADDITIONAL TESTS:  Results Reviewed:   Imaging Personally Reviewed:  Electrocardiogram Personally Reviewed:    COORDINATION OF CARE:  Care Discussed with Consultants/Other Providers [Y/N]:  Prior or Outpatient Records Reviewed [Y/N]:

## 2022-10-20 NOTE — PROGRESS NOTE ADULT - ASSESSMENT
62 yo F with MM on chemo, initially with fever  Fever, leukopenia  COVID+  CT PE generally reassuring pleural effusions  PCT mild elevated  No other focal complaints to suggest source of fever  Marked elevated ferritin  Overall,  1) COVID  - Facemask O2, uncertain is attributable to COVID, but would treat considering uncertainties  - RemD 5 days then DC  - Dexa 6mg daily x 10 days  - O2 supplementation/role for anticoag per team  - Supportive care  2) Elevated Ferritin  - Robust inflammatory reaction to COVID?  - Any considerations for inflammatory process such as HLH per Heme Onc team  3) Pleural effusions  - Only finding to suggest cause of SOB on CT Chest (e.g. no evidence COVID PNA at this point)  - Monitor for alternate causes SOB  - Monitor resp status    Devonte Brice MD  Contact on TEAMS messaging from 9am - 5pm  From 5pm-9am, on weekends, or if no response call [phone]

## 2022-10-21 ENCOUNTER — TRANSCRIPTION ENCOUNTER (OUTPATIENT)
Age: 62
End: 2022-10-21

## 2022-10-21 DIAGNOSIS — J90 PLEURAL EFFUSION, NOT ELSEWHERE CLASSIFIED: ICD-10-CM

## 2022-10-21 DIAGNOSIS — K59.00 CONSTIPATION, UNSPECIFIED: ICD-10-CM

## 2022-10-21 LAB
ALBUMIN SERPL ELPH-MCNC: 3 G/DL — LOW (ref 3.3–5)
ALP SERPL-CCNC: 75 U/L — SIGNIFICANT CHANGE UP (ref 40–120)
ALT FLD-CCNC: 32 U/L — SIGNIFICANT CHANGE UP (ref 10–45)
ANION GAP SERPL CALC-SCNC: 10 MMOL/L — SIGNIFICANT CHANGE UP (ref 5–17)
APPEARANCE UR: CLEAR — SIGNIFICANT CHANGE UP
APTT BLD: 29.1 SEC — SIGNIFICANT CHANGE UP (ref 27.5–35.5)
AST SERPL-CCNC: 24 U/L — SIGNIFICANT CHANGE UP (ref 10–40)
BASOPHILS # BLD AUTO: 0.01 K/UL — SIGNIFICANT CHANGE UP (ref 0–0.2)
BASOPHILS NFR BLD AUTO: 0.2 % — SIGNIFICANT CHANGE UP (ref 0–2)
BILIRUB SERPL-MCNC: 0.2 MG/DL — SIGNIFICANT CHANGE UP (ref 0.2–1.2)
BILIRUB UR-MCNC: NEGATIVE — SIGNIFICANT CHANGE UP
BUN SERPL-MCNC: 26 MG/DL — HIGH (ref 7–23)
CALCIUM SERPL-MCNC: 9.1 MG/DL — SIGNIFICANT CHANGE UP (ref 8.4–10.5)
CHLORIDE SERPL-SCNC: 96 MMOL/L — SIGNIFICANT CHANGE UP (ref 96–108)
CO2 SERPL-SCNC: 24 MMOL/L — SIGNIFICANT CHANGE UP (ref 22–31)
COLOR SPEC: SIGNIFICANT CHANGE UP
CREAT SERPL-MCNC: 0.88 MG/DL — SIGNIFICANT CHANGE UP (ref 0.5–1.3)
DIFF PNL FLD: NEGATIVE — SIGNIFICANT CHANGE UP
EGFR: 75 ML/MIN/1.73M2 — SIGNIFICANT CHANGE UP
EOSINOPHIL # BLD AUTO: 0 K/UL — SIGNIFICANT CHANGE UP (ref 0–0.5)
EOSINOPHIL NFR BLD AUTO: 0 % — SIGNIFICANT CHANGE UP (ref 0–6)
GLUCOSE SERPL-MCNC: 100 MG/DL — HIGH (ref 70–99)
GLUCOSE UR QL: NEGATIVE — SIGNIFICANT CHANGE UP
HCT VFR BLD CALC: 26.6 % — LOW (ref 34.5–45)
HGB BLD-MCNC: 8.8 G/DL — LOW (ref 11.5–15.5)
IMM GRANULOCYTES NFR BLD AUTO: 1.3 % — HIGH (ref 0–0.9)
INR BLD: 1.19 RATIO — HIGH (ref 0.88–1.16)
KETONES UR-MCNC: NEGATIVE — SIGNIFICANT CHANGE UP
LDH SERPL L TO P-CCNC: 293 U/L — HIGH (ref 50–242)
LEGIONELLA AG UR QL: NEGATIVE — SIGNIFICANT CHANGE UP
LEUKOCYTE ESTERASE UR-ACNC: NEGATIVE — SIGNIFICANT CHANGE UP
LYMPHOCYTES # BLD AUTO: 0.31 K/UL — LOW (ref 1–3.3)
LYMPHOCYTES # BLD AUTO: 4.9 % — LOW (ref 13–44)
MAGNESIUM SERPL-MCNC: 2.3 MG/DL — SIGNIFICANT CHANGE UP (ref 1.6–2.6)
MCHC RBC-ENTMCNC: 31.7 PG — SIGNIFICANT CHANGE UP (ref 27–34)
MCHC RBC-ENTMCNC: 33.1 GM/DL — SIGNIFICANT CHANGE UP (ref 32–36)
MCV RBC AUTO: 95.7 FL — SIGNIFICANT CHANGE UP (ref 80–100)
MONOCYTES # BLD AUTO: 0.31 K/UL — SIGNIFICANT CHANGE UP (ref 0–0.9)
MONOCYTES NFR BLD AUTO: 4.9 % — SIGNIFICANT CHANGE UP (ref 2–14)
MRSA PCR RESULT.: SIGNIFICANT CHANGE UP
NEUTROPHILS # BLD AUTO: 5.64 K/UL — SIGNIFICANT CHANGE UP (ref 1.8–7.4)
NEUTROPHILS NFR BLD AUTO: 88.7 % — HIGH (ref 43–77)
NITRITE UR-MCNC: NEGATIVE — SIGNIFICANT CHANGE UP
NRBC # BLD: 0 /100 WBCS — SIGNIFICANT CHANGE UP (ref 0–0)
PH UR: 6 — SIGNIFICANT CHANGE UP (ref 5–8)
PHOSPHATE SERPL-MCNC: 2.2 MG/DL — LOW (ref 2.5–4.5)
PLATELET # BLD AUTO: 157 K/UL — SIGNIFICANT CHANGE UP (ref 150–400)
POTASSIUM SERPL-MCNC: 5.6 MMOL/L — HIGH (ref 3.5–5.3)
POTASSIUM SERPL-SCNC: 5.6 MMOL/L — HIGH (ref 3.5–5.3)
PROT SERPL-MCNC: 8 G/DL — SIGNIFICANT CHANGE UP (ref 6–8.3)
PROT UR-MCNC: NEGATIVE — SIGNIFICANT CHANGE UP
PROTHROM AB SERPL-ACNC: 13.8 SEC — HIGH (ref 10.5–13.4)
RBC # BLD: 2.78 M/UL — LOW (ref 3.8–5.2)
RBC # FLD: 17.7 % — HIGH (ref 10.3–14.5)
S AUREUS DNA NOSE QL NAA+PROBE: DETECTED
S PNEUM AG UR QL: NEGATIVE — SIGNIFICANT CHANGE UP
SODIUM SERPL-SCNC: 130 MMOL/L — LOW (ref 135–145)
SP GR SPEC: 1.01 — SIGNIFICANT CHANGE UP (ref 1.01–1.02)
UROBILINOGEN FLD QL: NEGATIVE — SIGNIFICANT CHANGE UP
WBC # BLD: 6.35 K/UL — SIGNIFICANT CHANGE UP (ref 3.8–10.5)
WBC # FLD AUTO: 6.35 K/UL — SIGNIFICANT CHANGE UP (ref 3.8–10.5)

## 2022-10-21 PROCEDURE — 99232 SBSQ HOSP IP/OBS MODERATE 35: CPT

## 2022-10-21 RX ORDER — METHYLNALTREXONE BROMIDE 12 MG/.6ML
8 INJECTION, SOLUTION SUBCUTANEOUS ONCE
Refills: 0 | Status: COMPLETED | OUTPATIENT
Start: 2022-10-21 | End: 2022-10-21

## 2022-10-21 RX ORDER — AMLODIPINE BESYLATE 2.5 MG/1
10 TABLET ORAL DAILY
Refills: 0 | Status: DISCONTINUED | OUTPATIENT
Start: 2022-10-22 | End: 2022-11-09

## 2022-10-21 RX ADMIN — HYDROMORPHONE HYDROCHLORIDE 1 MILLIGRAM(S): 2 INJECTION INTRAMUSCULAR; INTRAVENOUS; SUBCUTANEOUS at 06:00

## 2022-10-21 RX ADMIN — Medication 6 MILLIGRAM(S): at 05:37

## 2022-10-21 RX ADMIN — HYDROMORPHONE HYDROCHLORIDE 1 MILLIGRAM(S): 2 INJECTION INTRAMUSCULAR; INTRAVENOUS; SUBCUTANEOUS at 19:08

## 2022-10-21 RX ADMIN — METHYLNALTREXONE BROMIDE 8 MILLIGRAM(S): 12 INJECTION, SOLUTION SUBCUTANEOUS at 16:07

## 2022-10-21 RX ADMIN — ENOXAPARIN SODIUM 40 MILLIGRAM(S): 100 INJECTION SUBCUTANEOUS at 05:37

## 2022-10-21 RX ADMIN — FENTANYL CITRATE 1 PATCH: 50 INJECTION INTRAVENOUS at 09:18

## 2022-10-21 RX ADMIN — REMDESIVIR 500 MILLIGRAM(S): 5 INJECTION INTRAVENOUS at 11:10

## 2022-10-21 RX ADMIN — PANTOPRAZOLE SODIUM 40 MILLIGRAM(S): 20 TABLET, DELAYED RELEASE ORAL at 05:37

## 2022-10-21 RX ADMIN — HYDROMORPHONE HYDROCHLORIDE 1 MILLIGRAM(S): 2 INJECTION INTRAMUSCULAR; INTRAVENOUS; SUBCUTANEOUS at 19:29

## 2022-10-21 RX ADMIN — FENTANYL CITRATE 1 PATCH: 50 INJECTION INTRAVENOUS at 16:58

## 2022-10-21 RX ADMIN — AMLODIPINE BESYLATE 10 MILLIGRAM(S): 2.5 TABLET ORAL at 05:37

## 2022-10-21 RX ADMIN — ENOXAPARIN SODIUM 40 MILLIGRAM(S): 100 INJECTION SUBCUTANEOUS at 17:43

## 2022-10-21 RX ADMIN — Medication 400 MILLIGRAM(S): at 12:33

## 2022-10-21 RX ADMIN — HYDROMORPHONE HYDROCHLORIDE 1 MILLIGRAM(S): 2 INJECTION INTRAMUSCULAR; INTRAVENOUS; SUBCUTANEOUS at 05:37

## 2022-10-21 RX ADMIN — CHLORHEXIDINE GLUCONATE 1 APPLICATION(S): 213 SOLUTION TOPICAL at 12:33

## 2022-10-21 RX ADMIN — POLYETHYLENE GLYCOL 3350 17 GRAM(S): 17 POWDER, FOR SOLUTION ORAL at 12:33

## 2022-10-21 NOTE — DISCHARGE NOTE PROVIDER - NSDCHHATTENDCERT_GEN_ALL_CORE
My signature below certifies that the above stated patient is homebound and upon completion of the Face-To-Face encounter, has the need for intermittent skilled nursing, physical therapy and/or speech or occupational therapy services in their home for their current diagnosis as outlined in their initial plan of care. These services will continue to be monitored by myself or another physician.
I personally performed the service described in the documentation recorded by the scribe in my presence, and it accurately and completely records my words and actions.

## 2022-10-21 NOTE — PROGRESS NOTE ADULT - PROBLEM SELECTOR PLAN 1
Reported hypoxic to 80s at [facility].   Ct chest showed small right and moderate left pleural effusions and compressive   atelectasis.  -Now requiring Non-rebreather for sufficient saturation  -likely from COVID-19  -continous pulse ox monitoring  -c/w supplemental O2 and wean as tolerated  -will hold off on abx for now as sxs likely d/t COVID-19  -if she spikes a fever, can start empirically on abx pending BCx  -20 mg IVP lasix Reported hypoxic to 80s at [facility].   Ct chest showed small right and moderate left pleural effusions and compressive   atelectasis.  -Now requiring HFNC; O2 decreased from 70%->60%, still 50L/min  -likely from COVID-19  -continous pulse ox monitoring  -c/w supplemental O2 and wean as tolerated  -will hold off on abx for now as sxs likely d/t COVID-19  -if she spikes a fever, can start empirically on abx pending BCx  -20 mg IVP lasix

## 2022-10-21 NOTE — PROGRESS NOTE ADULT - PROBLEM SELECTOR PLAN 4
-currently undergoing chemo at [facility]  -heme/onc following  -pain control  -f/u palliative consult Likely 2/2 high doses of opioid medications  -unresponsive to Miralax + senna + bisacodyl  -Give 8mg Relistor today     -dosed q2d, next dose on 10/23/2022 -very unlikely ACS given neg trops, EKG w/o acute changes and pleuritic nature of pain  -CTA showed no PE, and showed small right and moderate left pleural effusions and compressive   atelectasis.  -patient has MM with known bone mets, most likely cause  -Aggressive pain control     -Fentanyl 50 patch q72h     -Dilaudid 1-2mg IV PRN q4h

## 2022-10-21 NOTE — PROGRESS NOTE ADULT - PROBLEM SELECTOR PLAN 1
n 80% : has covid infection:  probnp is OK: echo pending: eh has abdirahman effusions:  she has multiple myeloma:  ? add diuretics    10/21: she seems to be doing ok : no sob: on 60% fio2:: he has pleural effusion; probnp is low : not added diuretics ? needs tap  on left side:

## 2022-10-21 NOTE — PROGRESS NOTE ADULT - PROBLEM SELECTOR PLAN 2
-s/p Vanc/Cefepime, decadron in ED  -c/w supplemental O2 and wean as tolerated  -c/w dexamethasone   -remdesivir ordered  -f/u blood cultures  -antitussive prn -s/p Vanc/Cefepime, decadron in ED  -c/w supplemental O2 and wean as tolerated  -c/w dexamethasone day 3/10  -c/w remdesivir day 3/5  -f/u blood cultures  -antitussive prn

## 2022-10-21 NOTE — DISCHARGE NOTE PROVIDER - CARE PROVIDER_API CALL
Riley Arreola)  [facility] Medicine; Internal Medicine  [address] [place] [address]  Phone: [phone]  Fax: [phone]  Established Patient  Follow Up Time: 1 week    Stephanie suarez  [address] [place] [address]  Phone: [phone]  Fax: (   )    -  Established Patient  Follow Up Time: 2 weeks    Raf Valerio)  Critical Care Medicine; Internal Medicine; Pulmonary Disease  [address] [place] [address]  Phone: [phone]  Fax: [phone]  Follow Up Time: 1 month

## 2022-10-21 NOTE — DISCHARGE NOTE PROVIDER - NSDCFUADDAPPT_GEN_ALL_CORE_FT
APPTS ARE READY TO BE MADE: [ X ] YES    Best Family or Patient Contact (if needed): Avani Mercer (daughter), [phone]    Additional Information about above appointments (if needed):    1: Oncology for management of Multiple Myeloma with Dr. Fatima   2: PCP for general health care with Dr. Arreola  3: Pulmonology for management of pleural effusions with Dr. Valerio    Other comments or requests:    APPTS ARE READY TO BE MADE: [ X ] YES    Best Family or Patient Contact (if needed): Avani Mercer (daughter), [phone]    Additional Information about above appointments (if needed):    1: Oncology for management of Multiple Myeloma with Dr. Fatima   2: PCP for general health care with Dr. Arreola  3: Pulmonology for management of pleural effusions with Dr. Valerio    Other comments or requests:     Patient is being discharged to rehab. Caregiver will arrange follow up.

## 2022-10-21 NOTE — DISCHARGE NOTE PROVIDER - NSDCMRMEDTOKEN_GEN_ALL_CORE_FT
acyclovir 400 mg oral tablet: 1 tab(s) orally once a day  cannabidiol 100 mg/mL oral liquid: 0.4 milliliter(s) orally every 4 hours  fentanyl topical 50 mcg/hr transdermal film, extended release (obsolete): 50 microgram(s) transdermal every 72 hours  megestrol 40 mg oral tablet: 1 tab(s) orally once a day, As Needed for appetite  MiraLax oral powder for reconstitution: 17 gram(s) orally once a day, As Needed for constipation  morphine 15 mg oral tablet: 1 tab(s) orally every 4 hours, As Needed for pain  Norvasc 10 mg oral tablet: 1 tab(s) orally once a day  omeprazole 20 mg oral delayed release tablet: 1 tab(s) orally once a day  ondansetron 4 mg oral tablet: 1 tab(s) orally every 8 hours, As Needed for nausea  Senna 8.6 mg oral tablet: 1 tab(s) orally once a day (at bedtime), As Needed for constipation   acyclovir 400 mg oral tablet: 1 tab(s) orally once a day  benzonatate 100 mg oral capsule: 1 cap(s) orally every 8 hours, As needed, Cough  cannabidiol 100 mg/mL oral liquid: 0.4 milliliter(s) orally every 4 hours  fentanyl topical 50 mcg/hr transdermal film, extended release (obsolete): 50 microgram(s) transdermal every 72 hours  megestrol 40 mg oral tablet: 1 tab(s) orally once a day, As Needed for appetite  MiraLax oral powder for reconstitution: 17 gram(s) orally once a day, As Needed for constipation  morphine 15 mg oral tablet: 1 tab(s) orally every 4 hours, As Needed for pain  Norvasc 10 mg oral tablet: 1 tab(s) orally once a day  omeprazole 20 mg oral delayed release tablet: 1 tab(s) orally once a day  ondansetron 4 mg oral tablet: 1 tab(s) orally every 8 hours, As Needed for nausea  Senna 8.6 mg oral tablet: 1 tab(s) orally once a day (at bedtime), As Needed for constipation   acyclovir 400 mg oral tablet: 1 tab(s) orally once a day  benzonatate 100 mg oral capsule: 1 cap(s) orally every 8 hours, As needed, Cough  cannabidiol 100 mg/mL oral liquid: 0.4 milliliter(s) orally every 4 hours  fentaNYL 75 mcg/hr transdermal film, extended release: 1 patch transdermal every 72 hours  megestrol 40 mg oral tablet: 1 tab(s) orally once a day, As Needed for appetite  MiraLax oral powder for reconstitution: 17 gram(s) orally once a day, As Needed for constipation  Norvasc 10 mg oral tablet: 1 tab(s) orally once a day  omeprazole 20 mg oral delayed release tablet: 1 tab(s) orally once a day  ondansetron 4 mg oral tablet: 1 tab(s) orally every 8 hours, As Needed for nausea  oxyCODONE 10 mg oral tablet: 1 tab(s) orally every 4 hours -for moderate pain MDD:60mg  Senna 8.6 mg oral tablet: 1 tab(s) orally once a day (at bedtime), As Needed for constipation   acyclovir 400 mg oral tablet: 1 tab(s) orally once a day  benzonatate 100 mg oral capsule: 1 cap(s) orally every 8 hours, As needed, Cough  cannabidiol 100 mg/mL oral liquid: 0.4 milliliter(s) orally every 4 hours  fentaNYL 75 mcg/hr transdermal film, extended release: 1 patch transdermal every 72 hours  HYDROmorphone 100 mg/50 mL-D5% intravenous solution: 1 milliliter(s) intravenous every 4 hours, As needed, Severe Pain (7 - 10)  megestrol 40 mg oral tablet: 1 tab(s) orally once a day, As Needed for appetite  MiraLax oral powder for reconstitution: 17 gram(s) orally once a day, As Needed for constipation  Norvasc 10 mg oral tablet: 1 tab(s) orally once a day  omeprazole 20 mg oral delayed release tablet: 1 tab(s) orally once a day  ondansetron 4 mg oral tablet: 1 tab(s) orally every 8 hours, As Needed for nausea  oxyCODONE 10 mg oral tablet: 1 tab(s) orally every 4 hours -for moderate pain MDD:60mg  Senna 8.6 mg oral tablet: 1 tab(s) orally once a day (at bedtime), As Needed for constipation   acyclovir 400 mg oral tablet: 1 tab(s) orally once a day  benzonatate 100 mg oral capsule: 1 cap(s) orally every 8 hours, As needed, Cough  cannabidiol 100 mg/mL oral liquid: 0.4 milliliter(s) orally every 4 hours  fentaNYL 75 mcg/hr transdermal film, extended release: 1 patch transdermal every 72 hours  megestrol 40 mg oral tablet: 1 tab(s) orally once a day, As Needed for appetite  MiraLax oral powder for reconstitution: 17 gram(s) orally once a day, As Needed for constipation  Norvasc 10 mg oral tablet: 1 tab(s) orally once a day  omeprazole 20 mg oral delayed release tablet: 1 tab(s) orally once a day  ondansetron 4 mg oral tablet: 1 tab(s) orally every 8 hours, As Needed for nausea  Senna 8.6 mg oral tablet: 1 tab(s) orally once a day (at bedtime), As Needed for constipation

## 2022-10-21 NOTE — PROGRESS NOTE ADULT - ASSESSMENT
61F unvaccinated w/ PMH MM(dx'd 2018) currently on chemo(last session 10/13) p/w 1-day h/o chest pain and fever. Most history per daughter at baseline as pt in significant pain and unable to talk. Pt had initially presented to [facility] with a sharp mid-chest pain gradual in onset that progressively got worse associated with a fever as high as 102.2 and a productive cough with greenish sputum. Describes the pain as lingering and worse with breathing. Of note, she has lesions in her chest from MM however reports this pain feels different. At [facility], she was found to be tachycardic, hypoxic to 80% and referred to [facility] for further eval. Of note, pt had been recently hospitalized for about a week at [facility] for pain crisis. Denied dizziness, paresthesias, palpitations, abdominal pain,       ED course: Afebrile, tachy to 111, BP stable. Tachypneic to 22, 93% on  2LNC improved to >95% on 3L NC. Given vanc, cefepime, (18 Oct 2022 23:24)    Hematology/Oncology called to see patient who is under care of Dr. 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 She was recently hospitalized for chemotherapy/pain management at [facility], discharged 10/15/2022.     IgG lambda multiple myeloma  --Under care at [facility] - Dr. Denise Fatima  --Chemotherapy on hold during hospitalization    COVID-19 Infection  --Previously unvaccinated  --Likely cause of SOB, fever, sputum production  --Started on Remdesevir, Dexamethasone  --O2 supplement as needed - has been transitioned to HFNC  --Management per ID, Pulm, Primary Team    Chest pain  --Underlying bone disease, COVID infection  --Cardiology, Pulmonary called    Pleural Effusion  --Management per Pulmonary  --Patient may improve clinical with thoracocentesis    Pain Management  --S/P multiple pathologic compression fractures from disease  --Patient needs aggressive pain management  --Palliative care recommended if current regimen not adequate    Anemia  --Secondary to disease, recent chemotherapy  --Please transfuse PRBC's if <7 grams    Thrombocytopenia  --Secondary to chemotherapy  --Recovered and normalized  --Please transfuse platelets if <10K or <50K with bleeding or prior to procedures    Constipation  --Likely secondary to heavy opioid requirements  --Senna had been ordered - not woeking  --Relistor (specific for opioid induced constipation) ordered    Dr. Sandy discussed case with primary team    We will continue to follow patient and coordinate with [facility]    Bertin Rae PA-C  Hematology/Oncology  [facility]   [phone] (office)  [phone] (alt office)  Evenings and weekends please call MD on call or office       No

## 2022-10-21 NOTE — PROGRESS NOTE ADULT - PROBLEM SELECTOR PLAN 6
-Diet: Vegan  -DVT ppx: Lovenox qd -currently undergoing chemo at [facility]  -heme/onc following  -pain control  -f/u palliative consult

## 2022-10-21 NOTE — PROGRESS NOTE ADULT - SUBJECTIVE AND OBJECTIVE BOX
CC: F/U for COVID    Saw/spoke to patient. no fevers, no chills. No new complaints.    Allergies  acetaminophen (Hives)  aspirin (Rash)  Benadryl (Rash)  shellfish (Hives)    ANTIMICROBIALS:  acyclovir   Oral Tab/Cap 400 daily  remdesivir  IVPB    remdesivir  IVPB 100 every 24 hours    dexAMETHasone  Injectable 6 milliGRAM(s) IV Push daily    PE:    Vital Signs Last 24 Hrs  T(C): 36.7 (21 Oct 2022 10:53), Max: 36.7 (21 Oct 2022 10:53)  T(F): 98.1 (21 Oct 2022 10:53), Max: 98.1 (21 Oct 2022 10:53)  HR: 110 (21 Oct 2022 14:55) (108 - 111)  BP: 118/81 (21 Oct 2022 10:53) (115/76 - 118/83)  RR: 20 (21 Oct 2022 14:55) (19 - 24)  SpO2: 97% (21 Oct 2022 14:55) (92% - 98%)    Gen: AOx3, NAD, non-toxic  CV: Nontachycardic  Resp: Breathing comfortably, HFNC  Abd: Soft, nontender  IV/Skin: No thrombophlebitis    LABS:                        8.8    6.35  )-----------( 157      ( 21 Oct 2022 10:25 )             26.6     10-21    130<L>  |  96  |  26<H>  ----------------------------<  100<H>  5.6<H>   |  24  |  0.88    Ca    9.1      21 Oct 2022 10:32  Phos  2.2     10-21  Mg     2.3     10-21    TPro  8.0  /  Alb  3.0<L>  /  TBili  0.2  /  DBili  x   /  AST  24  /  ALT  32  /  AlkPhos  75  10-21    Urinalysis Basic - ( 21 Oct 2022 07:49 )    Color: Light Yellow / Appearance: Clear / S.011 / pH: x  Gluc: x / Ketone: Negative  / Bili: Negative / Urobili: Negative   Blood: x / Protein: Negative / Nitrite: Negative   Leuk Esterase: Negative / RBC: x / WBC x   Sq Epi: x / Non Sq Epi: x / Bacteria: x    MICROBIOLOGY:    .Blood Blood-Peripheral  10-18-22   No growth to date.  --  --    .Blood Blood-Peripheral  10-18-22   No growth to date.  --  --    Rapid RVP Result: Detected (10-18 @ 18:46)    (otherwise reviewed)    RADIOLOGY:    10/18    CT:    IMPRESSION:    No pulmonary embolism.    Small right and moderate left pleural effusions and compressive   atelectasis.

## 2022-10-21 NOTE — DISCHARGE NOTE PROVIDER - NSDCCPCAREPLAN_GEN_ALL_CORE_FT
PRINCIPAL DISCHARGE DIAGNOSIS  Diagnosis: 2019 novel coronavirus disease (COVID-19)  Assessment and Plan of Treatment: COVID-19, or coronavirus disease 2019, is a systemic infection that is caused by a novel coronavirus called SARS-CoV-2. In some people, the virus may not cause any symptoms. In others, it may cause mild or severe symptoms. Some people with severe infection develop severe disease, which may lead to acute respiratory distress syndrome and shock.      SECONDARY DISCHARGE DIAGNOSES  Diagnosis: Multiple myeloma  Assessment and Plan of Treatment: Multiple myeloma is a form of cancer. It develops when abnormal plasma cells grow out of control. Plasma cells are a type of white blood cell that is made in the soft tissue inside the bones (bone marrow). They are part of the body's disease-fighting system (immune system).  Multiple myeloma damages bones and causes other health problems because of its effect on blood cells. Abnormal plasma cells produce monoclonal proteins (M proteins) and interfere with many important functions that normal cells perform in the body. The disease gets worse over time (progresses) and reduces the body's ability to fight infections.       PRINCIPAL DISCHARGE DIAGNOSIS  Diagnosis: 2019 novel coronavirus disease (COVID-19)  Assessment and Plan of Treatment: COVID-19, or coronavirus disease 2019, is a systemic infection that is caused by a novel coronavirus called SARS-CoV-2. In some people, the virus may not cause any symptoms. In others, it may cause mild or severe symptoms. Some people with severe infection develop severe disease, which may lead to acute respiratory distress syndrome and shock.  -Please follow up with your PCP      SECONDARY DISCHARGE DIAGNOSES  Diagnosis: Multiple myeloma  Assessment and Plan of Treatment: Multiple myeloma is a form of cancer. It develops when abnormal plasma cells grow out of control. Plasma cells are a type of white blood cell that is made in the soft tissue inside the bones (bone marrow). They are part of the body's disease-fighting system (immune system).  Multiple myeloma damages bones and causes other health problems because of its effect on blood cells. Abnormal plasma cells produce monoclonal proteins (M proteins) and interfere with many important functions that normal cells perform in the body. The disease gets worse over time (progresses) and reduces the body's ability to fight infections.  -Please follow up with your Oncologist

## 2022-10-21 NOTE — PROGRESS NOTE ADULT - PROBLEM SELECTOR PLAN 5
-Diet: Vegan  -DVT ppx: Lovenox qd -currently undergoing chemo at [facility] [place] [facility]  -heme/onc following  -pain control  -f/u palliative consult Likely 2/2 high doses of opioid medications  -unresponsive to Miralax + senna + bisacodyl  -Give 8mg Relistor today     -dosed q2d, next dose on 10/23/2022

## 2022-10-21 NOTE — DISCHARGE NOTE PROVIDER - PROVIDER TOKENS
PROVIDER:[TOKEN:[[id number]],FOLLOWUP:[1 week],ESTABLISHEDPATIENT:[T]],FREE:[LAST:[suarez],FIRST:[Stephanie],PHONE:[phone]],FAX:[(   )    -],ADDRESS:[[address] [place] [address]],FOLLOWUP:[2 weeks],ESTABLISHEDPATIENT:[T]],PROVIDER:[TOKEN:[[id number]],FOLLOWUP:[1 month]]

## 2022-10-21 NOTE — PROGRESS NOTE ADULT - SUBJECTIVE AND OBJECTIVE BOX
C A R D I O L O G Y  **********************************     DATE OF SERVICE: 10-21-22    Discussed with RN - patient still having dyspnea and chest pain. Currently on HFNC. Review of systems otherwise negative    MEDICATIONS:  acyclovir   Oral Tab/Cap 400 milliGRAM(s) Oral daily  ALBUTerol    90 MICROgram(s) HFA Inhaler 2 Puff(s) Inhalation every 4 hours PRN  aluminum hydroxide/magnesium hydroxide/simethicone Suspension 30 milliLiter(s) Oral every 4 hours PRN  benzonatate 100 milliGRAM(s) Oral every 8 hours PRN  bisacodyl 5 milliGRAM(s) Oral daily PRN  chlorhexidine 4% Liquid 1 Application(s) Topical daily  dexAMETHasone  Injectable 6 milliGRAM(s) IV Push daily  enoxaparin Injectable 40 milliGRAM(s) SubCutaneous every 12 hours  fentaNYL   Patch  50 MICROgram(s)/Hr 1 Patch Transdermal every 72 hours  guaifenesin/dextromethorphan Oral Liquid 10 milliLiter(s) Oral every 4 hours PRN  HYDROmorphone  Injectable 1 milliGRAM(s) IV Push every 4 hours PRN  HYDROmorphone  Injectable 2 milliGRAM(s) IV Push every 4 hours PRN  megestrol 40 milliGRAM(s) Oral daily PRN  melatonin 3 milliGRAM(s) Oral at bedtime PRN  methylnaltrexone Injectable 8 milliGRAM(s) SubCutaneous once  naloxone Injectable 0.4 milliGRAM(s) IV Push once  ondansetron Injectable 4 milliGRAM(s) IV Push every 8 hours PRN  pantoprazole    Tablet 40 milliGRAM(s) Oral before breakfast  polyethylene glycol 3350 17 Gram(s) Oral daily  remdesivir  IVPB   IV Intermittent   remdesivir  IVPB 100 milliGRAM(s) IV Intermittent every 24 hours  senna 2 Tablet(s) Oral at bedtime      LABS:                        8.8    6.35  )-----------( 157      ( 21 Oct 2022 10:25 )             26.6       Hemoglobin: 8.8 g/dL (10-21 @ 10:25)  Hemoglobin: 7.9 g/dL (10-20 @ 07:00)  Hemoglobin: 7.3 g/dL (10-19 @ 07:16)  Hemoglobin: 7.7 g/dL (10-18 @ 18:09)      10-21    130<L>  |  96  |  26<H>  ----------------------------<  100<H>  5.6<H>   |  24  |  0.88    Ca    9.1      21 Oct 2022 10:32  Phos  2.2     10-21  Mg     2.3     10-21    TPro  8.0  /  Alb  3.0<L>  /  TBili  0.2  /  DBili  x   /  AST  24  /  ALT  32  /  AlkPhos  75  10-21    Creatinine Trend: 0.88<--, 0.84<--, 0.94<--, 0.97<--    COAGS: PT/INR - ( 21 Oct 2022 10:33 )   PT: 13.8 sec;   INR: 1.19 ratio         PTT - ( 21 Oct 2022 10:33 )  PTT:29.1 sec    CARDIAC MARKERS ( 19 Oct 2022 07:15 )  x     / x     / 294 U/L / x     / 3.6 ng/mL      Per Chart  PHYSICAL EXAM:  T(C): 36.6 (10-21-22 @ 04:14), Max: 36.8 (10-20-22 @ 11:44)  HR: 115 (10-21-22 @ 10:53) (109 - 115)  BP: 117/78 (10-21-22 @ 04:14) (109/73 - 118/83)  RR: 22 (10-21-22 @ 10:53) (19 - 24)  SpO2: 92% (10-21-22 @ 10:53) (92% - 98%)  Wt(kg): --    I&O's Summary    20 Oct 2022 07:01  -  21 Oct 2022 07:00  --------------------------------------------------------  IN: 720 mL / OUT: 1650 mL / NET: -930 mL        Gen: NAD  HEENT:  (-)icterus (-)pallor  CV: N S1 S2 1/6 CHARLY (+)2 Pulses B/l  Resp:  Clear to auscultation B/L, normal effort  GI: (+) BS Soft, NT, ND  Lymph:  (-)Edema, (-)obvious lymphadenopathy  Skin: Warm to touch, Normal turgor  Psych: Appropriate mood and affect      TELEMETRY: None	      ECG: Sinus Tachycardia, PVC    RADIOLOGY:  < from: CT Angio Chest PE Protocol w/ IV Cont (10.18.22 @ 20:03) >  IMPRESSION:    No pulmonary embolism.    Smallright and moderate left pleural effusions and compressive   atelectasis.    --- End of Report ---    < end of copied text >      ASSESSMENT/PLAN: Patient is a 60 y/o Female with PMH of Multiple Myeloma (dx 2018) currently on chemo who presented with chest pain, SOB, and fever admitted with +COVID and b/l pleural effusions. Cardiology consulted for further evaluation.     - Patient with nonanginal chest pain that is pleuritic and very tender to palpation - suspect MSK related likely due to costochondritis and multiple myeloma lytic lesions  - EKG with sinus tachycardia and no acute ischemic changes  - Cardiac enzymes unremarkable  - CTA chest negative for PE, small R and mod L pleural effusions and compressive atelectasis  - Check TTE to eval LV function given pleural effusions however low BNP noted therefore unlikely due to CHF  - ID follow up - BCx negative so far  - Supportive care/covid management per primary team  - No further inpatient cardiac w/u expected if echo unremarkable    Artur Cortes PA-C  Pager: [phone]

## 2022-10-21 NOTE — PROGRESS NOTE ADULT - PROBLEM SELECTOR PLAN 3
-very unlikely ACS given neg trops, EKG w/o acute changes and pleuritic nature of pain  -CTA showed no PE, and showed small right and moderate left pleural effusions and compressive   atelectasis.  -patient has MM with known bone mets, most likely cause  -Aggressive pain control     -Fentanyl 50 patch q72h     -Dilaudid 1-2mg IV PRN q4h Unclear etiology, more likely malignant vs CHF  -Patient received Lasix 40IV x1 w/o much improvement  -F/u with Pulm to tap effusion after clinical improvement of patient

## 2022-10-21 NOTE — DISCHARGE NOTE PROVIDER - HOSPITAL COURSE
61F unvaccinated w/ PMH GONZALO(dx'd 2018) currently on chemo(last session 10/13) p/w 1-day h/o chest pain and fever. Most history per daughter at baseline as pt in significant pain and unable to talk. Pt had initially presented to [facility] [place] with a sharp mid-chest pain gradual in onset that progressively got worse associated with a fever as high as 102.2 and a productive cough with greenish sputum. Describes the pain as lingering and worse with breathing. Of note, she has lesions in her chest from  however reports this pain feels different. At [facility] [place] [facility], she was found to be tachycardic, hypoxic to 80% and referred to [facility] for further eval. Of note, pt had been recently hospitalized for about a week at [facility] [place] [facility] for pain crisis. Denied dizziness, paresthesias, palpitations, abdominal pain,       ED course: Afebrile, tachy to 111, BP stable. Tachypneic to 22, 93% on  2LNC improved to >95% on 3L NC. Given vanc, cefepime. Patient found to be COVID+. Admitted to medicine service for further management of COVID.    General Medicine Course:  On floors, patients initially on 4L O2 NC, subsequently uptitrated to HFNC 100% 50L/min. Patient started on Remdesivir, 61F unvaccinated w/ PMH GONZALO(dx'd 2018) currently on chemo(last session 10/13) p/w 1-day h/o chest pain and fever. Most history per daughter at baseline as pt in significant pain and unable to talk. Pt had initially presented to [facility] [place] with a sharp mid-chest pain gradual in onset that progressively got worse associated with a fever as high as 102.2 and a productive cough with greenish sputum. Describes the pain as lingering and worse with breathing. Of note, she has lesions in her chest from MM however reports this pain feels different. At [facility], she was found to be tachycardic, hypoxic to 80% and referred to [facility] for further eval. Of note, pt had been recently hospitalized for about a week at [facility] for pain crisis. Denied dizziness, paresthesias, palpitations, abdominal pain,       ED course: Afebrile, tachy to 111, BP stable. Tachypneic to 22, 93% on  2LNC improved to >95% on 3L NC. Given vanc, cefepime. Patient found to be COVID+. Admitted to medicine service for further management of COVID.    General Medicine Course:  On floors, patients initially on 4L O2 NC, subsequently uptitrated to HFNC 100% 50L/min. Patient started on Remdesivir x5 days, Decadron 6mg IV x10 days, and Lovenox 40mg BID.    CT Chest notable for bilateral effusions (small right, moderate left). Unclear etiology, Cardiology believes unlikely to be heart failure. Pulm recommended tapping after pt clinically improved, tap notable for ____.    Patient chest pain initially severe, not ACS, likely 2/2 MM bone lesions. Pain controlled with Fentanyl 50mg patch q72h and Dilaudid 1-2mg q6h prn. Patient with opioid-induced constipation, not responsive to miralax + senna + bisacodyl. Patient subsequently received Relistor 8mg with resulting _________ 61F unvaccinated w/ PMH GONZALO(dx'd 2018) currently on chemo(last session 10/13) p/w 1-day h/o chest pain and fever. Most history per daughter at baseline as pt in significant pain and unable to talk. Pt had initially presented to [facility] [place] with a sharp mid-chest pain gradual in onset that progressively got worse associated with a fever as high as 102.2 and a productive cough with greenish sputum. Describes the pain as lingering and worse with breathing. Of note, she has lesions in her chest from MM however reports this pain feels different. At [facility] [place], she was found to be tachycardic, hypoxic to 80% and referred to [facility] for further eval. Of note, pt had been recently hospitalized for about a week at [facility] [place] for pain crisis. Denied dizziness, paresthesias, palpitations, abdominal pain,       ED course: Afebrile, tachy to 111, BP stable. Tachypneic to 22, 93% on  2LNC improved to >95% on 3L NC. Given vanc, cefepime. Patient found to be COVID+. Admitted to medicine service for further management of COVID.    General Medicine Course:  On floors, patients initially on 4L O2 NC, subsequently uptitrated to HFNC 100% 50L/min. Patient started on Remdesivir x5 days, Decadron 6mg IV x10 days, and Lovenox 40mg BID. Patient subsequently downtitrated on HFNC until reaching 40% O2 at 40L/min, then transitioned to 6L O2 NC.    CT Chest notable for bilateral effusions (small right, moderate left). Unclear etiology, Cardiology believes unlikely to be heart failure. Pulm recommended tapping after pt clinically improved, tap notable for ____.    Patient chest pain initially severe, not ACS, likely 2/2 MM bone lesions. Pain controlled with Fentanyl 50mg patch q72h and Dilaudid 1-2mg q6h prn. Patient with opioid-induced constipation, not responsive to miralax + senna + bisacodyl. Patient subsequently received Relistor 8mg and Bisacodyl suppository with resulting small BM. Additional Bisacodyl suppository induced large BM, and patient w/o constipation for remainder of hospital stay. 61F unvaccinated w/ PMH GONZALO(dx'd 2018) currently on chemo(last session 10/13) p/w 1-day h/o chest pain and fever. Most history per daughter at baseline as pt in significant pain and unable to talk. [place] [facility] [place] sharp mid-chest pain gradual in onset that progressively got worse associated with a fever as high as 102.2 and a productive cough with greenish sputum. Describes the pain as lingering and worse with breathing. Of note, she has lesions in her chest from MM however reports this pain feels different. [place] [facility] [place] was found to be tachycardic, hypoxic to 80% and referred to [facility] for further eval. Of note, pt had been recently hospitalized for about a week [place] [facility] [place]. Denied dizziness, paresthesias, palpitations, abdominal pain,       ED course: Afebrile, tachy to 111, BP stable. Tachypneic to 22, 93% on  2LNC improved to >95% on 3L NC. Given vanc, cefepime. Patient found to be COVID+. Admitted to medicine service for further management of COVID.    General Medicine Course:  On floors, patients initially on 4L O2 NC, subsequently uptitrated to HFNC 100% 50L/min. Patient started on Remdesivir x5 days, Decadron 6mg IV x10 days, and Lovenox 40mg BID. Patient subsequently downtitrated on HFNC until reaching 40% O2 at 40L/min, then transitioned to 6L O2 NC. Subsequently transitioned to 1L O2 NC, then RA(?); ambulatory sats _____.     CT Chest notable for bilateral effusions (small right, moderate left). Unclear etiology, Cardiology believes unlikely to be heart failure. Pulm recommended tapping after pt clinically improved. However, pleural effusions resolved as patient's clinical status improved. Pulm deferred tapping effusions at this time, recommend outpatient [facility] [place] f/u.    Patient chest pain initially severe, not ACS, likely 2/2 MM bone lesions. Pain controlled with Fentanyl 50mg patch q72h and Dilaudid 1-2mg q6h prn. Patient with opioid-induced constipation, not responsive to miralax + senna + bisacodyl. Patient subsequently received Relistor 8mg and Bisacodyl suppository with resulting small BM. Additional Bisacodyl suppository induced large BM, and patient w/o constipation for remainder of hospital stay. 61F unvaccinated w/ PMH GONZALO(dx'd 2018) currently on chemo(last session 10/13) p/w 1-day h/o chest pain and fever. Most history per daughter at baseline as pt in significant pain and unable to talk. [place] [facility] [place] mid-chest pain gradual in onset that progressively got worse associated with a fever as high as 102.2 and a productive cough with greenish sputum. Describes the pain as lingering and worse with breathing. Of note, she has lesions in her chest from MM however reports this pain feels different. [place] [facility] [place], she was found to be tachycardic, hypoxic to 80% and referred to [facility] for further eval. Of note, pt had been recently hospitalized for about a week at [facility] for [place] crisis. Denied dizziness, paresthesias, palpitations, abdominal pain,       ED course: Afebrile, tachy to 111, BP stable. Tachypneic to 22, 93% on  2LNC improved to >95% on 3L NC. Given vanc, cefepime. Patient found to be COVID+. Admitted to medicine service for further management of COVID.    General Medicine Course:  On floors, patients initially on 4L O2 NC, subsequently uptitrated to HFNC 100% 50L/min. Patient started on Remdesivir x5 days, Decadron 6mg IV x10 days, and Lovenox 40mg BID. Patient subsequently downtitrated on HFNC until reaching 40% O2 at 40L/min, then transitioned to 6L O2 NC. Subsequently transitioned to 1L O2 NC, then room air. Patient able to ambulate on room air, but significant tachycardia.    CT Chest notable for bilateral effusions (small right, moderate left). Unclear etiology, Cardiology believes unlikely to be heart failure. Pulm recommended tapping after pt clinically improved. However, pleural effusions resolved as patient's clinical status improved. Pulm deferred tapping effusions at this time, [place] [facility] [place]    Patient chest pain initially severe, not ACS, likely 2/2 MM bone lesions. Pain controlled with Fentanyl 50mg patch q72h and Dilaudid 1-2mg q6h prn. Patient with opioid-induced constipation, not responsive to miralax + senna + bisacodyl. Patient subsequently received Relistor 8mg and Bisacodyl suppository with resulting small BM. Additional Bisacodyl suppository induced large BM, and patient w/o constipation for remainder of hospital stay. 61F unvaccinated w/ PMH GONZALO(dx'd 2018) currently on chemo(last session 10/13) p/w 1-day h/o chest pain and fever. Most history per daughter at baseline as pt in significant pain and unable to talk. Pt had initially presented to [facility] [place] with a sharp mid-chest pain gradual in onset that progressively got worse associated with a fever as high as 102.2 and a productive cough with greenish sputum. Describes the pain as lingering and worse with breathing. Of note, she has lesions in her chest from MM however reports this pain feels different. At [facility] [place], she was found to be tachycardic, hypoxic to 80% and referred to [facility] for further eval. Of note, pt had been recently hospitalized for about a week at [facility] [place] for pain crisis. Denied dizziness, paresthesias, palpitations, abdominal pain.      ED course: Afebrile, tachy to 111, BP stable. Tachypneic to 22, 93% on  2LNC improved to >95% on 3L NC. Given vanc, cefepime. Patient found to be COVID+. Admitted to medicine service for further management of COVID.    General Medicine Course:  On floors, patients initially on 4L O2 NC, subsequently uptitrated to HFNC 100% 50L/min. Patient started on Remdesivir x5 days, Decadron 6mg IV x10 days, and Lovenox 40mg BID. Patient subsequently downtitrated on HFNC until reaching 40% O2 at 40L/min, then transitioned to 6L O2 NC. Subsequently transitioned to 1L O2 NC, then room air. Patient initially able to ambulate on room air, but significant tachycardia. Subsequent sessions show that patient had some desaturations to 86% O2, persistently tachycardic to 140s.    CT Chest notable for bilateral effusions (small right, moderate left). Unclear etiology, Cardiology believes unlikely to be heart failure. Pulm recommended tapping after pt clinically improved. However, pleural effusions resolved as patient's clinical status improved. Pulm deferred tapping effusions at this time, recommend outpatient [facility] [place].    Patient chest pain initially severe, not ACS, likely 2/2 MM bone lesions. Pain controlled with Fentanyl 50mg patch q72h and Dilaudid 1-2mg q6h prn. Patient with opioid-induced constipation, not responsive to miralax + senna + bisacodyl. Patient subsequently received Relistor 8mg and Bisacodyl suppository with resulting small BM. Additional Bisacodyl suppository induced large BM, and patient w/o constipation for remainder of hospital stay.    Patient considered medically optimized for discharge to [facility]. 61F unvaccinated w/ PMH GONZALO(dx'd 2018) currently on chemo(last session 10/13) p/w 1-day h/o chest pain and fever. Most history per daughter at baseline as pt in significant pain and unable to talk. Pt had initially presented to [facility] [place] with a sharp mid-chest pain gradual in onset that progressively got worse associated with a fever as high as 102.2 and a productive cough with greenish sputum. Describes the pain as lingering and worse with breathing. Of note, she has lesions in her chest from MM however reports this pain feels different. At [facility] [place], she was found to be tachycardic, hypoxic to 80% and referred to [facility] for further eval. Of note, pt had been recently hospitalized for about a week at [facility] for pain crisis. Denied dizziness, paresthesias, palpitations, abdominal pain.    ED course: Afebrile, tachy to 111, BP stable. Tachypneic to 22, 93% on  2LNC improved to >95% on 3L NC. Given vanc, cefepime. Patient found to be COVID+. Admitted to medicine service for further management of COVID.    General Medicine Course:  On floors, patients initially on 4L O2 NC, subsequently uptitrated to HFNC 100% 50L/min. Patient started on Remdesivir x5 days, Decadron 6mg IV x10 days, and Lovenox 40mg BID. Patient subsequently downtitrated on HFNC until reaching 40% O2 at 40L/min, then transitioned to 6L O2 NC. Subsequently transitioned to 1L O2 NC, then room air. Patient initially able to ambulate on room air, but significant tachycardia. Subsequent sessions show that patient had some desaturations to 86% O2, persistently tachycardic to 140s.    CT Chest notable for bilateral effusions (small right, moderate left). Unclear etiology, Cardiology believes unlikely to be heart failure. Pulm recommended tapping after pt clinically improved. However, pleural effusions resolved as patient's clinical status improved. Pulm deferred tapping effusions at this time, recommend outpatient [facility] [place] f/u.    Patient chest pain initially severe, not ACS, likely 2/2 MM bone lesions. Pain controlled with Fentanyl 50mg patch q72h and Dilaudid 1-2mg q6h prn. Patient with opioid-induced constipation, not responsive to miralax + senna + bisacodyl. Patient subsequently received Relistor 8mg and Bisacodyl suppository with resulting small BM. Additional Bisacodyl suppository induced large BM, and patient w/o constipation for remainder of hospital stay.    Patient considered medically optimized for discharge to [facility].

## 2022-10-21 NOTE — PROGRESS NOTE ADULT - SUBJECTIVE AND OBJECTIVE BOX
Patient is a 61y old  Female who presents with a chief complaint of SOB, CP (21 Oct 2022 11:21)    Patient seen this morning. Reporting constipation - no BM in 6 days. Abdomen is distended and tender. Pain, however, is under better control today and O2 saturation is stable on HFNC.    MEDICATIONS  (STANDING):  acyclovir   Oral Tab/Cap 400 milliGRAM(s) Oral daily  chlorhexidine 4% Liquid 1 Application(s) Topical daily  dexAMETHasone  Injectable 6 milliGRAM(s) IV Push daily  enoxaparin Injectable 40 milliGRAM(s) SubCutaneous every 12 hours  fentaNYL   Patch  50 MICROgram(s)/Hr 1 Patch Transdermal every 72 hours  methylnaltrexone Injectable 8 milliGRAM(s) SubCutaneous once  naloxone Injectable 0.4 milliGRAM(s) IV Push once  pantoprazole    Tablet 40 milliGRAM(s) Oral before breakfast  polyethylene glycol 3350 17 Gram(s) Oral daily  remdesivir  IVPB   IV Intermittent   remdesivir  IVPB 100 milliGRAM(s) IV Intermittent every 24 hours  senna 2 Tablet(s) Oral at bedtime    MEDICATIONS  (PRN):  ALBUTerol    90 MICROgram(s) HFA Inhaler 2 Puff(s) Inhalation every 4 hours PRN Shortness of Breath and/or Wheezing  aluminum hydroxide/magnesium hydroxide/simethicone Suspension 30 milliLiter(s) Oral every 4 hours PRN Dyspepsia  benzonatate 100 milliGRAM(s) Oral every 8 hours PRN Cough  bisacodyl 5 milliGRAM(s) Oral daily PRN Constipation  guaifenesin/dextromethorphan Oral Liquid 10 milliLiter(s) Oral every 4 hours PRN Cough  HYDROmorphone  Injectable 1 milliGRAM(s) IV Push every 4 hours PRN Moderate Pain (4 - 6)  HYDROmorphone  Injectable 2 milliGRAM(s) IV Push every 4 hours PRN Severe Pain (7 - 10)  megestrol 40 milliGRAM(s) Oral daily PRN appetite  melatonin 3 milliGRAM(s) Oral at bedtime PRN Insomnia  ondansetron Injectable 4 milliGRAM(s) IV Push every 8 hours PRN Nausea and/or Vomiting    Vital Signs Last 24 Hrs  T(C): 36.6 (21 Oct 2022 04:14), Max: 36.6 (21 Oct 2022 02:05)  T(F): 97.8 (21 Oct 2022 04:14), Max: 97.8 (21 Oct 2022 02:05)  HR: 115 (21 Oct 2022 10:53) (109 - 115)  BP: 117/78 (21 Oct 2022 04:14) (115/76 - 118/83)  BP(mean): --  RR: 22 (21 Oct 2022 10:53) (19 - 24)  SpO2: 92% (21 Oct 2022 10:53) (92% - 98%)    Parameters below as of 21 Oct 2022 10:53  Patient On (Oxygen Delivery Method): high flow  O2 Flow (L/min): 50  O2 Concentration (%): 60    PE  NAD  Awake, alert  Anicteric, MMM  RRR  On HFNC  Abd distended, + BS  No c/c/e  No rash grossly                            8.8    6.35  )-----------( 157      ( 21 Oct 2022 10:25 )             26.6       10-21    130<L>  |  96  |  26<H>  ----------------------------<  100<H>  5.6<H>   |  24  |  0.88    Ca    9.1      21 Oct 2022 10:32  Phos  2.2     10-21  Mg     2.3     10-21    TPro  8.0  /  Alb  3.0<L>  /  TBili  0.2  /  DBili  x   /  AST  24  /  ALT  32  /  AlkPhos  75  10-21

## 2022-10-21 NOTE — PROGRESS NOTE ADULT - SUBJECTIVE AND OBJECTIVE BOX
Date of Service: 10-21-22 @ 15:46    Patient is a 61y old  Female who presents with a chief complaint of SOB, CP (21 Oct 2022 14:12)      Any change in ROS: on 60% high flow:  pain is better controlled:    MEDICATIONS  (STANDING):  acyclovir   Oral Tab/Cap 400 milliGRAM(s) Oral daily  chlorhexidine 4% Liquid 1 Application(s) Topical daily  dexAMETHasone  Injectable 6 milliGRAM(s) IV Push daily  enoxaparin Injectable 40 milliGRAM(s) SubCutaneous every 12 hours  fentaNYL   Patch  50 MICROgram(s)/Hr 1 Patch Transdermal every 72 hours  methylnaltrexone Injectable 8 milliGRAM(s) SubCutaneous once  naloxone Injectable 0.4 milliGRAM(s) IV Push once  pantoprazole    Tablet 40 milliGRAM(s) Oral before breakfast  polyethylene glycol 3350 17 Gram(s) Oral daily  remdesivir  IVPB   IV Intermittent   remdesivir  IVPB 100 milliGRAM(s) IV Intermittent every 24 hours  senna 2 Tablet(s) Oral at bedtime    MEDICATIONS  (PRN):  ALBUTerol    90 MICROgram(s) HFA Inhaler 2 Puff(s) Inhalation every 4 hours PRN Shortness of Breath and/or Wheezing  aluminum hydroxide/magnesium hydroxide/simethicone Suspension 30 milliLiter(s) Oral every 4 hours PRN Dyspepsia  benzonatate 100 milliGRAM(s) Oral every 8 hours PRN Cough  bisacodyl 5 milliGRAM(s) Oral daily PRN Constipation  guaifenesin/dextromethorphan Oral Liquid 10 milliLiter(s) Oral every 4 hours PRN Cough  HYDROmorphone  Injectable 1 milliGRAM(s) IV Push every 4 hours PRN Moderate Pain (4 - 6)  HYDROmorphone  Injectable 2 milliGRAM(s) IV Push every 4 hours PRN Severe Pain (7 - 10)  megestrol 40 milliGRAM(s) Oral daily PRN appetite  melatonin 3 milliGRAM(s) Oral at bedtime PRN Insomnia  ondansetron Injectable 4 milliGRAM(s) IV Push every 8 hours PRN Nausea and/or Vomiting    Vital Signs Last 24 Hrs  T(C): 36.7 (21 Oct 2022 10:53), Max: 36.7 (21 Oct 2022 10:53)  T(F): 98.1 (21 Oct 2022 10:53), Max: 98.1 (21 Oct 2022 10:53)  HR: 110 (21 Oct 2022 14:55) (108 - 111)  BP: 118/81 (21 Oct 2022 10:53) (115/76 - 118/83)  BP(mean): --  RR: 20 (21 Oct 2022 14:55) (19 - 24)  SpO2: 97% (21 Oct 2022 14:55) (92% - 98%)    Parameters below as of 21 Oct 2022 14:55  Patient On (Oxygen Delivery Method): nasal cannula, high flow  O2 Flow (L/min): 50  O2 Concentration (%): 60    I&O's Summary    20 Oct 2022 07:01  -  21 Oct 2022 07:00  --------------------------------------------------------  IN: 720 mL / OUT: 1650 mL / NET: -930 mL    21 Oct 2022 07:01  -  21 Oct 2022 15:46  --------------------------------------------------------  IN: 490 mL / OUT: 0 mL / NET: 490 mL          Physical Exam:   GENERAL: NAD, well-groomed, well-developed  HEENT: CARLA/   Atraumatic, Normocephalic  ENMT: No tonsillar erythema, exudates, or enlargement; Moist mucous membranes, Good dentition, No lesions  NECK: Supple, No JVD, Normal thyroid  CHEST/LUNG: Clear to auscultaion : mild sternal tenderness   CVS: Regular rate and rhythm; No murmurs, rubs, or gallops  GI: : Soft, Nontender, Nondistended; Bowel sounds present  NERVOUS SYSTEM:  Alert & Oriented X3  EXTREMITIES:- edema  LYMPH: No lymphadenopathy noted  SKIN: No rashes or lesions  ENDOCRINOLOGY: No Thyromegaly  PSYCH: Appropriate    Labs:  28                            8.8    6.35  )-----------( 157      ( 21 Oct 2022 10:25 )             26.6                         7.9    6.52  )-----------( 107      ( 20 Oct 2022 07:00 )             24.8                         7.3    3.66  )-----------( 74       ( 19 Oct 2022 07:16 )             22.6                         7.7    5.18  )-----------( 63       ( 18 Oct 2022 18:09 )             23.7     10-21    130<L>  |  96  |  26<H>  ----------------------------<  100<H>  5.6<H>   |  24  |  0.88  10-20    131<L>  |  97  |  22  ----------------------------<  108<H>  5.0   |  25  |  0.84  10-    125<L>  |  92<L>  |  19  ----------------------------<  141<H>  4.9   |  25  |  0.94  10-18    125<L>  |  90<L>  |  18  ----------------------------<  123<H>  5.1   |  25  |  0.97    Ca    9.1      21 Oct 2022 10:32  Ca    8.8      20 Oct 2022 07:00  Phos  2.2     10-21  Phos  3.1     10-20  Mg     2.3     10-21  Mg     2.4     10-20    TPro  8.0  /  Alb  3.0<L>  /  TBili  0.2  /  DBili  x   /  AST  24  /  ALT  32  /  AlkPhos  75  10-21  TPro  7.9  /  Alb  3.2<L>  /  TBili  0.2  /  DBili  x   /  AST  31  /  ALT  38  /  AlkPhos  72  10-20  TPro  8.0  /  Alb  3.0<L>  /  TBili  0.2  /  DBili  x   /  AST  34  /  ALT  45  /  AlkPhos  65  10-19  TPro  8.4<H>  /  Alb  3.1<L>  /  TBili  0.2  /  DBili  x   /  AST  58<H>  /  ALT  60<H>  /  AlkPhos  67  10-18    CAPILLARY BLOOD GLUCOSE          LIVER FUNCTIONS - ( 21 Oct 2022 10:32 )  Alb: 3.0 g/dL / Pro: 8.0 g/dL / ALK PHOS: 75 U/L / ALT: 32 U/L / AST: 24 U/L / GGT: x           PT/INR - ( 21 Oct 2022 10:33 )   PT: 13.8 sec;   INR: 1.19 ratio         PTT - ( 21 Oct 2022 10:33 )  PTT:29.1 sec  Urinalysis Basic - ( 21 Oct 2022 07:49 )    Color: Light Yellow / Appearance: Clear / S.011 / pH: x  Gluc: x / Ketone: Negative  / Bili: Negative / Urobili: Negative   Blood: x / Protein: Negative / Nitrite: Negative   Leuk Esterase: Negative / RBC: x / WBC x   Sq Epi: x / Non Sq Epi: x / Bacteria: x      D-Dimer Assay, Quantitative: 612 ng/mL DDU (10-19 @ 07:29)  Procalcitonin, Serum: 0.28 ng/mL (10-18 @ 18:09)  Serum Pro-Brain Natriuretic Peptide: 159 pg/mL (10-18 @ 18:09)        RECENT CULTURES:  10-18 @ 17:45 .Blood Blood-Peripheral       rad< from: CT Angio Chest PE Protocol w/ IV Cont (10.18.22 @ 20:03) >  Complications: None reported at time of study completion    PROCEDURE:  CT Angiogram of the chest was obtained with intravenous contrast. Three   dimensional maximum intensity projection (MIP) images were generated.    FINDINGS:    PULMONARY ANGIOGRAM: Contrast bolus is satisfactory. No main, right main,   left main, lobar or segmental pulmonary embolism.    LYMPH NODES: No lymphadenopathy.    HEART/VASCULATURE: The heart is normal in size. No pericardial effusion.    AIRWAYS/LUNGS/PLEURA: Small right and moderate left pleural effusions and   compressive atelectasis. Patchy areas of linear atelectasis in the   bilateral upper lobes.    UPPER ABDOMEN: Within normal limits.    BONES/SOFT TISSUES: Permeative lytic lesions throughout the osseous   structures suggestive of myeloma. Compression deformities involving T3,   T6, T10, T11, L1 and L2 vertebral bodies.    IMPRESSION:    No pulmonary embolism.    Smallright and moderate left pleural effusions and compressive   atelectasis.    --- End of Report ---           GIULIANO NAILS MD; Resident Radiologist  This document has been electronically signed.  BERT LA MD; Attending Radiologist  This document has been electronically signed. Oct 18 2022  8:21PM    < end of copied text >           No growth to date.    10-18 @ 17:25 .Blood Blood-Peripheral                No growth to date.          RESPIRATORY CULTURES:          Studies  Chest X-RAY  CT SCAN Chest   Venous Dopplers: LE:   CT Abdomen  Others

## 2022-10-21 NOTE — PROGRESS NOTE ADULT - ASSESSMENT
60 yo F with MM on chemo, initially with fever  Fever, leukopenia  COVID+  CT PE generally reassuring pleural effusions  PCT mild elevated  No other focal complaints to suggest source of fever  Marked elevated ferritin  Overall,  1) COVID  - Facemask O2, uncertain is attributable to COVID, but would treat considering uncertainties  - RemD 5 days then DC  - Dexa 6mg daily x 10 days then DC  - O2 supplementation/role for anticoag per team  - Supportive care  2) Elevated Ferritin  - Robust inflammatory reaction to COVID?  - Any considerations for inflammatory process such as HLH per Heme Onc team  3) Pleural effusions  - Only finding to suggest cause of SOB on CT Chest (e.g. no evidence COVID PNA at this point)  - Monitor for alternate causes SOB  - Monitor resp status    Signing off. Please call with further questions or change in status.    Devonte Brice MD  Contact on TEAMS messaging from 9am - 5pm  From 5pm-9am, on weekends, or if no response call [phone]

## 2022-10-22 LAB
ALBUMIN SERPL ELPH-MCNC: 3 G/DL — LOW (ref 3.3–5)
ALP SERPL-CCNC: 78 U/L — SIGNIFICANT CHANGE UP (ref 40–120)
ALT FLD-CCNC: 28 U/L — SIGNIFICANT CHANGE UP (ref 10–45)
ANION GAP SERPL CALC-SCNC: 8 MMOL/L — SIGNIFICANT CHANGE UP (ref 5–17)
APTT BLD: 28.2 SEC — SIGNIFICANT CHANGE UP (ref 27.5–35.5)
AST SERPL-CCNC: 23 U/L — SIGNIFICANT CHANGE UP (ref 10–40)
BASE EXCESS BLDV CALC-SCNC: 3.3 MMOL/L — HIGH (ref -2–3)
BILIRUB SERPL-MCNC: 0.2 MG/DL — SIGNIFICANT CHANGE UP (ref 0.2–1.2)
BUN SERPL-MCNC: 24 MG/DL — HIGH (ref 7–23)
CA-I SERPL-SCNC: 1.28 MMOL/L — SIGNIFICANT CHANGE UP (ref 1.15–1.33)
CALCIUM SERPL-MCNC: 9.2 MG/DL — SIGNIFICANT CHANGE UP (ref 8.4–10.5)
CHLORIDE BLDV-SCNC: 102 MMOL/L — SIGNIFICANT CHANGE UP (ref 96–108)
CHLORIDE SERPL-SCNC: 99 MMOL/L — SIGNIFICANT CHANGE UP (ref 96–108)
CO2 BLDV-SCNC: 30 MMOL/L — HIGH (ref 22–26)
CO2 SERPL-SCNC: 26 MMOL/L — SIGNIFICANT CHANGE UP (ref 22–31)
CREAT SERPL-MCNC: 0.9 MG/DL — SIGNIFICANT CHANGE UP (ref 0.5–1.3)
EGFR: 73 ML/MIN/1.73M2 — SIGNIFICANT CHANGE UP
ERYTHROCYTE [SEDIMENTATION RATE] IN BLOOD: 120 MM/HR — HIGH (ref 0–20)
FERRITIN SERPL-MCNC: HIGH NG/ML (ref 15–150)
GAS PNL BLDV: 133 MMOL/L — LOW (ref 136–145)
GAS PNL BLDV: SIGNIFICANT CHANGE UP
GLUCOSE BLDV-MCNC: 85 MG/DL — SIGNIFICANT CHANGE UP (ref 70–99)
GLUCOSE SERPL-MCNC: 81 MG/DL — SIGNIFICANT CHANGE UP (ref 70–99)
HCO3 BLDV-SCNC: 29 MMOL/L — SIGNIFICANT CHANGE UP (ref 22–29)
HCT VFR BLD CALC: 25.9 % — LOW (ref 34.5–45)
HCT VFR BLDA CALC: 27 % — LOW (ref 34.5–46.5)
HGB BLD CALC-MCNC: 8.9 G/DL — LOW (ref 11.7–16.1)
HGB BLD-MCNC: 8.4 G/DL — LOW (ref 11.5–15.5)
INR BLD: 1.22 RATIO — HIGH (ref 0.88–1.16)
LACTATE BLDV-MCNC: 1.7 MMOL/L — SIGNIFICANT CHANGE UP (ref 0.5–2)
MAGNESIUM SERPL-MCNC: 2.4 MG/DL — SIGNIFICANT CHANGE UP (ref 1.6–2.6)
MCHC RBC-ENTMCNC: 31.1 PG — SIGNIFICANT CHANGE UP (ref 27–34)
MCHC RBC-ENTMCNC: 32.4 GM/DL — SIGNIFICANT CHANGE UP (ref 32–36)
MCV RBC AUTO: 95.9 FL — SIGNIFICANT CHANGE UP (ref 80–100)
NRBC # BLD: 0 /100 WBCS — SIGNIFICANT CHANGE UP (ref 0–0)
PCO2 BLDV: 49 MMHG — HIGH (ref 39–42)
PH BLDV: 7.38 — SIGNIFICANT CHANGE UP (ref 7.32–7.43)
PHOSPHATE SERPL-MCNC: 2.6 MG/DL — SIGNIFICANT CHANGE UP (ref 2.5–4.5)
PLATELET # BLD AUTO: 231 K/UL — SIGNIFICANT CHANGE UP (ref 150–400)
PO2 BLDV: 39 MMHG — SIGNIFICANT CHANGE UP (ref 25–45)
POTASSIUM BLDV-SCNC: 5.1 MMOL/L — SIGNIFICANT CHANGE UP (ref 3.5–5.1)
POTASSIUM SERPL-MCNC: 5.5 MMOL/L — HIGH (ref 3.5–5.3)
POTASSIUM SERPL-SCNC: 5.5 MMOL/L — HIGH (ref 3.5–5.3)
PROT SERPL-MCNC: 8 G/DL — SIGNIFICANT CHANGE UP (ref 6–8.3)
PROTHROM AB SERPL-ACNC: 14.1 SEC — HIGH (ref 10.5–13.4)
RBC # BLD: 2.7 M/UL — LOW (ref 3.8–5.2)
RBC # FLD: 18.2 % — HIGH (ref 10.3–14.5)
SAO2 % BLDV: 61.3 % — LOW (ref 67–88)
SODIUM SERPL-SCNC: 133 MMOL/L — LOW (ref 135–145)
SPECIMEN SOURCE: SIGNIFICANT CHANGE UP
WBC # BLD: 6.29 K/UL — SIGNIFICANT CHANGE UP (ref 3.8–10.5)
WBC # FLD AUTO: 6.29 K/UL — SIGNIFICANT CHANGE UP (ref 3.8–10.5)

## 2022-10-22 RX ADMIN — SENNA PLUS 2 TABLET(S): 8.6 TABLET ORAL at 22:38

## 2022-10-22 RX ADMIN — CHLORHEXIDINE GLUCONATE 1 APPLICATION(S): 213 SOLUTION TOPICAL at 11:30

## 2022-10-22 RX ADMIN — FENTANYL CITRATE 1 PATCH: 50 INJECTION INTRAVENOUS at 18:22

## 2022-10-22 RX ADMIN — POLYETHYLENE GLYCOL 3350 17 GRAM(S): 17 POWDER, FOR SOLUTION ORAL at 11:30

## 2022-10-22 RX ADMIN — Medication 10 MILLIGRAM(S): at 00:24

## 2022-10-22 RX ADMIN — Medication 400 MILLIGRAM(S): at 11:31

## 2022-10-22 RX ADMIN — SENNA PLUS 2 TABLET(S): 8.6 TABLET ORAL at 00:24

## 2022-10-22 RX ADMIN — Medication 10 MILLIGRAM(S): at 11:30

## 2022-10-22 RX ADMIN — ENOXAPARIN SODIUM 40 MILLIGRAM(S): 100 INJECTION SUBCUTANEOUS at 05:29

## 2022-10-22 RX ADMIN — AMLODIPINE BESYLATE 10 MILLIGRAM(S): 2.5 TABLET ORAL at 05:29

## 2022-10-22 RX ADMIN — FENTANYL CITRATE 1 PATCH: 50 INJECTION INTRAVENOUS at 17:12

## 2022-10-22 RX ADMIN — PANTOPRAZOLE SODIUM 40 MILLIGRAM(S): 20 TABLET, DELAYED RELEASE ORAL at 05:29

## 2022-10-22 RX ADMIN — HYDROMORPHONE HYDROCHLORIDE 1 MILLIGRAM(S): 2 INJECTION INTRAMUSCULAR; INTRAVENOUS; SUBCUTANEOUS at 00:35

## 2022-10-22 RX ADMIN — ENOXAPARIN SODIUM 40 MILLIGRAM(S): 100 INJECTION SUBCUTANEOUS at 17:12

## 2022-10-22 RX ADMIN — REMDESIVIR 500 MILLIGRAM(S): 5 INJECTION INTRAVENOUS at 09:46

## 2022-10-22 RX ADMIN — FENTANYL CITRATE 1 PATCH: 50 INJECTION INTRAVENOUS at 07:30

## 2022-10-22 RX ADMIN — Medication 6 MILLIGRAM(S): at 05:29

## 2022-10-22 RX ADMIN — HYDROMORPHONE HYDROCHLORIDE 1 MILLIGRAM(S): 2 INJECTION INTRAMUSCULAR; INTRAVENOUS; SUBCUTANEOUS at 00:50

## 2022-10-22 NOTE — PROGRESS NOTE ADULT - PROBLEM SELECTOR PLAN 5
Likely 2/2 high doses of opioid medications  -unresponsive to Miralax + senna + bisacodyl  -Give 8mg Relistor today     -dosed q2d, next dose on 10/23/2022 Likely 2/2 high doses of opioid medications  -unresponsive to Miralax + senna + bisacodyl  -Give 8mg Relistor today     -dosed q2d, next dose on 10/23/2022  -received Bisacodyl suppository last night with small BM, additional today with larger BM     -c/w Bisacodyl 10mg suppository qd PRN

## 2022-10-22 NOTE — PROGRESS NOTE ADULT - PROBLEM SELECTOR PLAN 4
-very unlikely ACS given neg trops, EKG w/o acute changes and pleuritic nature of pain  -CTA showed no PE, and showed small right and moderate left pleural effusions and compressive   atelectasis.  -patient has MM with known bone mets, most likely cause  -Aggressive pain control     -Fentanyl 50 patch q72h     -Dilaudid 1-2mg IV PRN q4h

## 2022-10-22 NOTE — PROGRESS NOTE ADULT - SUBJECTIVE AND OBJECTIVE BOX
C A R D I O L O G Y  **********************************     DATE OF SERVICE: 10-22-22  pt seen and examined, no complaints, ROS - .         acyclovir   Oral Tab/Cap 400 milliGRAM(s) Oral daily  ALBUTerol    90 MICROgram(s) HFA Inhaler 2 Puff(s) Inhalation every 4 hours PRN  aluminum hydroxide/magnesium hydroxide/simethicone Suspension 30 milliLiter(s) Oral every 4 hours PRN  amLODIPine   Tablet 10 milliGRAM(s) Oral daily  benzonatate 100 milliGRAM(s) Oral every 8 hours PRN  bisacodyl 5 milliGRAM(s) Oral daily PRN  chlorhexidine 4% Liquid 1 Application(s) Topical daily  dexAMETHasone  Injectable 6 milliGRAM(s) IV Push daily  enoxaparin Injectable 40 milliGRAM(s) SubCutaneous every 12 hours  fentaNYL   Patch  50 MICROgram(s)/Hr 1 Patch Transdermal every 72 hours  guaifenesin/dextromethorphan Oral Liquid 10 milliLiter(s) Oral every 4 hours PRN  HYDROmorphone  Injectable 1 milliGRAM(s) IV Push every 4 hours PRN  HYDROmorphone  Injectable 2 milliGRAM(s) IV Push every 4 hours PRN  megestrol 40 milliGRAM(s) Oral daily PRN  melatonin 3 milliGRAM(s) Oral at bedtime PRN  naloxone Injectable 0.4 milliGRAM(s) IV Push once  ondansetron Injectable 4 milliGRAM(s) IV Push every 8 hours PRN  pantoprazole    Tablet 40 milliGRAM(s) Oral before breakfast  polyethylene glycol 3350 17 Gram(s) Oral daily  remdesivir  IVPB   IV Intermittent   remdesivir  IVPB 100 milliGRAM(s) IV Intermittent every 24 hours  senna 2 Tablet(s) Oral at bedtime                            8.4    6.29  )-----------( 231      ( 22 Oct 2022 07:49 )             25.9       Hemoglobin: 8.4 g/dL (10-22 @ 07:49)  Hemoglobin: 8.8 g/dL (10-21 @ 10:25)  Hemoglobin: 7.9 g/dL (10-20 @ 07:00)  Hemoglobin: 7.3 g/dL (10-19 @ 07:16)  Hemoglobin: 7.7 g/dL (10-18 @ 18:09)      10-22    133<L>  |  99  |  24<H>  ----------------------------<  81  5.5<H>   |  26  |  0.90    Ca    9.2      22 Oct 2022 07:50  Phos  2.6     10-22  Mg     2.4     10-22    TPro  8.0  /  Alb  3.0<L>  /  TBili  0.2  /  DBili  x   /  AST  23  /  ALT  28  /  AlkPhos  78  10-22    Creatinine Trend: 0.90<--, 0.88<--, 0.84<--, 0.94<--, 0.97<--    COAGS: PT/INR - ( 22 Oct 2022 07:51 )   PT: 14.1 sec;   INR: 1.22 ratio         PTT - ( 22 Oct 2022 07:51 )  PTT:28.2 sec          T(C): 36.6 (10-22-22 @ 05:34), Max: 36.9 (10-21-22 @ 19:27)  HR: 101 (10-22-22 @ 05:34) (101 - 116)  BP: 122/82 (10-22-22 @ 05:34) (106/70 - 122/82)  RR: 20 (10-22-22 @ 05:34) (20 - 22)  SpO2: 99% (10-22-22 @ 05:34) (92% - 99%)  Wt(kg): --    I&O's Summary    21 Oct 2022 07:01  -  22 Oct 2022 07:00  --------------------------------------------------------  IN: 690 mL / OUT: 1400 mL / NET: -710 mL      Gen: NAD  HEENT:  (-)icterus (-)pallor  CV: N S1 S2 1/6 CHARLY (+)2 Pulses B/l  Resp:  Clear to auscultation B/L, normal effort  GI: (+) BS Soft, NT, ND  Lymph:  (-)Edema, (-)obvious lymphadenopathy  Skin: Warm to touch, Normal turgor  Psych: Appropriate mood and affect      TELEMETRY: None	      ECG: Sinus Tachycardia, PVC    RADIOLOGY:  < from: CT Angio Chest PE Protocol w/ IV Cont (10.18.22 @ 20:03) >  IMPRESSION:    No pulmonary embolism.    Smallright and moderate left pleural effusions and compressive   atelectasis.    --- End of Report ---    < end of copied text >      ASSESSMENT/PLAN: Patient is a 60 y/o Female with PMH of Multiple Myeloma (dx 2018) currently on chemo who presented with chest pain, SOB, and fever admitted with +COVID and b/l pleural effusions. Cardiology consulted for further evaluation.     - Patient with nonanginal chest pain that is pleuritic and very tender to palpation - suspect [facility] related likely due to costochondritis and multiple myeloma lytic lesions  - EKG with sinus tachycardia and no acute ischemic changes  - Cardiac enzymes unremarkable  - CTA chest negative for PE, small R and mod L pleural effusions and compressive atelectasis  - Check TTE to eval LV function given pleural effusions however low BNP noted therefore unlikely due to CHF  - ID follow up - BCx negative so far  - Supportive care/covid management per primary team  - No further inpatient cardiac w/u expected if echo unremarkable

## 2022-10-22 NOTE — PROGRESS NOTE ADULT - SUBJECTIVE AND OBJECTIVE BOX
chart reviewed, SOB improved, constipation resolved but now with urinary retention, on straight cath q8h    MEDICATIONS  (STANDING):  acyclovir   Oral Tab/Cap 400 milliGRAM(s) Oral daily  amLODIPine   Tablet 10 milliGRAM(s) Oral daily  chlorhexidine 4% Liquid 1 Application(s) Topical daily  dexAMETHasone  Injectable 6 milliGRAM(s) IV Push daily  enoxaparin Injectable 40 milliGRAM(s) SubCutaneous every 12 hours  fentaNYL   Patch  50 MICROgram(s)/Hr 1 Patch Transdermal every 72 hours  naloxone Injectable 0.4 milliGRAM(s) IV Push once  pantoprazole    Tablet 40 milliGRAM(s) Oral before breakfast  polyethylene glycol 3350 17 Gram(s) Oral daily  remdesivir  IVPB   IV Intermittent   remdesivir  IVPB 100 milliGRAM(s) IV Intermittent every 24 hours  senna 2 Tablet(s) Oral at bedtime    MEDICATIONS  (PRN):  ALBUTerol    90 MICROgram(s) HFA Inhaler 2 Puff(s) Inhalation every 4 hours PRN Shortness of Breath and/or Wheezing  aluminum hydroxide/magnesium hydroxide/simethicone Suspension 30 milliLiter(s) Oral every 4 hours PRN Dyspepsia  benzonatate 100 milliGRAM(s) Oral every 8 hours PRN Cough  bisacodyl 5 milliGRAM(s) Oral daily PRN Constipation  guaifenesin/dextromethorphan Oral Liquid 10 milliLiter(s) Oral every 4 hours PRN Cough  HYDROmorphone  Injectable 1 milliGRAM(s) IV Push every 4 hours PRN Moderate Pain (4 - 6)  HYDROmorphone  Injectable 2 milliGRAM(s) IV Push every 4 hours PRN Severe Pain (7 - 10)  megestrol 40 milliGRAM(s) Oral daily PRN appetite  melatonin 3 milliGRAM(s) Oral at bedtime PRN Insomnia  ondansetron Injectable 4 milliGRAM(s) IV Push every 8 hours PRN Nausea and/or Vomiting      ROS  limited 2/2 covid, as above    Vital Signs Last 24 Hrs  T(C): 37 (22 Oct 2022 11:24), Max: 37 (22 Oct 2022 11:24)  T(F): 98.6 (22 Oct 2022 11:24), Max: 98.6 (22 Oct 2022 11:24)  HR: 110 (22 Oct 2022 11:24) (101 - 116)  BP: 104/71 (22 Oct 2022 11:24) (104/71 - 122/82)  BP(mean): --  RR: 20 (22 Oct 2022 11:24) (20 - 20)  SpO2: 95% (22 Oct 2022 11:24) (94% - 99%)    Parameters below as of 22 Oct 2022 11:24  Patient On (Oxygen Delivery Method): nasal cannula, high flow        PE  limited 2/2 covid                          8.4    6.29  )-----------( 231      ( 22 Oct 2022 07:49 )             25.9       10-22    133<L>  |  99  |  24<H>  ----------------------------<  81  5.5<H>   |  26  |  0.90    Ca    9.2      22 Oct 2022 07:50  Phos  2.6     10-22  Mg     2.4     10-22    TPro  8.0  /  Alb  3.0<L>  /  TBili  0.2  /  DBili  x   /  AST  23  /  ALT  28  /  AlkPhos  78  10-22

## 2022-10-22 NOTE — PROGRESS NOTE ADULT - PROBLEM SELECTOR PLAN 3
Unclear etiology, more likely malignant vs CHF  -Patient received Lasix 40IV x1 w/o much improvement  -F/u with Pulm to tap effusion after clinical improvement of patient

## 2022-10-22 NOTE — PROGRESS NOTE ADULT - SUBJECTIVE AND OBJECTIVE BOX
SUBJECTIVE  Pt seen at bedside this am. States that her breathing is much better, but she still has been unable to poop. She also continues to have issues urinating, which she attributes to being severely constipated.    OBJECTIVE:  ICU Vital Signs Last 24 Hrs  T(C): 36.6 (22 Oct 2022 05:34), Max: 36.9 (21 Oct 2022 19:27)  T(F): 97.9 (22 Oct 2022 05:34), Max: 98.4 (21 Oct 2022 19:27)  HR: 101 (22 Oct 2022 05:34) (101 - 116)  BP: 122/82 (22 Oct 2022 05:34) (106/70 - 122/82)  BP(mean): --  ABP: --  ABP(mean): --  RR: 20 (22 Oct 2022 05:34) (20 - 20)  SpO2: 99% (22 Oct 2022 05:34) (94% - 99%)    O2 Parameters below as of 22 Oct 2022 05:34  Patient On (Oxygen Delivery Method): nasal cannula, high flow              10-21 @ 07:01  -  10- @ 07:00  --------------------------------------------------------  IN: 690 mL / OUT: 1400 mL / NET: -710 mL      CAPILLARY BLOOD GLUCOSE          PHYSICAL EXAM:  General: Well-groomed, NAD, laying in bed, on ___O2  HEENT: PERRLA, EOMI, non-icteric  Neck:  symmetric,  JVD absent  Respiratory: Clear to ascultation bilaterally, no crackles/rales, no Resp distress; no accessory muscle use  Cardiovascular:  RRR, no murmurs/rubs/gallops  Abdomen: Soft, NT, ND  Extremities: No edema noted  Skin: No rashes or lesions noted  Neurological: Sensation grossly intact; strength 5/5 in all extremities.  Psychiatry: AOx3, appropriate insight/judgement, appropriate affect, recent/remote memory intact    PRN Meds:  ALBUTerol    90 MICROgram(s) HFA Inhaler 2 Puff(s) Inhalation every 4 hours PRN  aluminum hydroxide/magnesium hydroxide/simethicone Suspension 30 milliLiter(s) Oral every 4 hours PRN  benzonatate 100 milliGRAM(s) Oral every 8 hours PRN  bisacodyl 5 milliGRAM(s) Oral daily PRN  guaifenesin/dextromethorphan Oral Liquid 10 milliLiter(s) Oral every 4 hours PRN  HYDROmorphone  Injectable 1 milliGRAM(s) IV Push every 4 hours PRN  HYDROmorphone  Injectable 2 milliGRAM(s) IV Push every 4 hours PRN  megestrol 40 milliGRAM(s) Oral daily PRN  melatonin 3 milliGRAM(s) Oral at bedtime PRN  ondansetron Injectable 4 milliGRAM(s) IV Push every 8 hours PRN      LABS:                        8.4    6.29  )-----------( 231      ( 22 Oct 2022 07:49 )             25.9     Hgb Trend: 8.4<--, 8.8<--, 7.9<--, 7.3<--, 7.7<--  10-22    133<L>  |  99  |  24<H>  ----------------------------<  81  5.5<H>   |  26  |  0.90    Ca    9.2      22 Oct 2022 07:50  Phos  2.6     10-  Mg     2.4     10-22    TPro  8.0  /  Alb  3.0<L>  /  TBili  0.2  /  DBili  x   /  AST  23  /  ALT  28  /  AlkPhos  78  10-22    Creatinine Trend: 0.90<--, 0.88<--, 0.84<--, 0.94<--, 0.97<--  PT/INR - ( 22 Oct 2022 07:51 )   PT: 14.1 sec;   INR: 1.22 ratio         PTT - ( 22 Oct 2022 07:51 )  PTT:28.2 sec  Urinalysis Basic - ( 21 Oct 2022 07:49 )    Color: Light Yellow / Appearance: Clear / S.011 / pH: x  Gluc: x / Ketone: Negative  / Bili: Negative / Urobili: Negative   Blood: x / Protein: Negative / Nitrite: Negative   Leuk Esterase: Negative / RBC: x / WBC x   Sq Epi: x / Non Sq Epi: x / Bacteria: x        Venous Blood Gas:  10-22 @ 07:49  7.38/49/39/29/61.3  VBG Lactate: 1.7      MICROBIOLOGY:     Culture - Urine (collected 21 Oct 2022 07:49)  Source: Clean Catch Clean Catch (Midstream)  Final Report (22 Oct 2022 07:44):    <10,000 CFU/mL Normal Urogenital Precious        RADIOLOGY:  [ ] Reviewed and interpreted by me    EKG:

## 2022-10-22 NOTE — PROGRESS NOTE ADULT - PROBLEM SELECTOR PLAN 1
Reported hypoxic to 80s at [facility].   Ct chest showed small right and moderate left pleural effusions and compressive   atelectasis.  -Now requiring HFNC; O2 decreased from 70%->60%, still 50L/min  -likely from COVID-19  -continous pulse ox monitoring  -c/w supplemental O2 and wean as tolerated  -will hold off on abx for now as sxs likely d/t COVID-19  -if she spikes a fever, can start empirically on abx pending BCx  -20 mg IVP lasix

## 2022-10-22 NOTE — PROGRESS NOTE ADULT - ASSESSMENT
61F unvaccinated w/ PMH MM(dx'd 2018) currently on chemo(last session 10/13) p/w 1-day h/o chest pain and fever. Most history per daughter at baseline as pt in significant pain and unable to talk. Pt had initially presented to [facility] with a sharp mid-chest pain gradual in onset that progressively got worse associated with a fever as high as 102.2 and a productive cough with greenish sputum.     Hematology/Oncology called to see patient who is under care of Dr. 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 She was recently hospitalized for chemotherapy/pain management at [facility], discharged 10/15/2022.     IgG lambda multiple myeloma  --Under care at [facility] - Dr. Denise Fatima  --Chemotherapy on hold during hospitalization    COVID-19 Infection  --Previously unvaccinated  --Likely cause of SOB, fever, sputum production  --Started on Remdesevir, Dexamethasone  --O2 supplement as needed - has been transitioned to HFNC  --Management per ID, Pulm, Primary Team    Chest pain  --Underlying bone disease, COVID infection  --Cardiology, Pulmonary following  -- improved, none today    Pleural Effusion  --Management per Pulmonary  --Patient may improve clinical with thoracocentesis, f/u pulm recs    Pain Management  --S/P multiple pathologic compression fractures from disease  --Patient needs aggressive pain management  --Palliative care recommended if current regimen not adequate, though seems to be doing well so far    Anemia  --Secondary to disease, recent chemotherapy  --Please transfuse PRBC's if <7 grams    Thrombocytopenia  --Secondary to chemotherapy  --Recovered and normalized    Constipation  --Likely secondary to heavy opioid requirements  --aggressive bowel regimen, now resolved    urinary retention -- straight cath    will follow.

## 2022-10-22 NOTE — PROGRESS NOTE ADULT - PROBLEM SELECTOR PLAN 2
-s/p Vanc/Cefepime, decadron in ED  -c/w supplemental O2 and wean as tolerated  -c/w dexamethasone day 3/10  -c/w remdesivir day 3/5  -f/u blood cultures  -antitussive prn

## 2022-10-22 NOTE — PROGRESS NOTE ADULT - SUBJECTIVE AND OBJECTIVE BOX
Date of Service: 10-22-22 @ 10:44    Patient is a 61y old  Female who presents with a chief complaint of SOB, CP (22 Oct 2022 10:21)      Any change in ROS: on 60% fio2 : high flow     MEDICATIONS  (STANDING):  acyclovir   Oral Tab/Cap 400 milliGRAM(s) Oral daily  amLODIPine   Tablet 10 milliGRAM(s) Oral daily  chlorhexidine 4% Liquid 1 Application(s) Topical daily  dexAMETHasone  Injectable 6 milliGRAM(s) IV Push daily  enoxaparin Injectable 40 milliGRAM(s) SubCutaneous every 12 hours  fentaNYL   Patch  50 MICROgram(s)/Hr 1 Patch Transdermal every 72 hours  naloxone Injectable 0.4 milliGRAM(s) IV Push once  pantoprazole    Tablet 40 milliGRAM(s) Oral before breakfast  polyethylene glycol 3350 17 Gram(s) Oral daily  remdesivir  IVPB   IV Intermittent   remdesivir  IVPB 100 milliGRAM(s) IV Intermittent every 24 hours  senna 2 Tablet(s) Oral at bedtime    MEDICATIONS  (PRN):  ALBUTerol    90 MICROgram(s) HFA Inhaler 2 Puff(s) Inhalation every 4 hours PRN Shortness of Breath and/or Wheezing  aluminum hydroxide/magnesium hydroxide/simethicone Suspension 30 milliLiter(s) Oral every 4 hours PRN Dyspepsia  benzonatate 100 milliGRAM(s) Oral every 8 hours PRN Cough  bisacodyl 5 milliGRAM(s) Oral daily PRN Constipation  guaifenesin/dextromethorphan Oral Liquid 10 milliLiter(s) Oral every 4 hours PRN Cough  HYDROmorphone  Injectable 1 milliGRAM(s) IV Push every 4 hours PRN Moderate Pain (4 - 6)  HYDROmorphone  Injectable 2 milliGRAM(s) IV Push every 4 hours PRN Severe Pain (7 - 10)  megestrol 40 milliGRAM(s) Oral daily PRN appetite  melatonin 3 milliGRAM(s) Oral at bedtime PRN Insomnia  ondansetron Injectable 4 milliGRAM(s) IV Push every 8 hours PRN Nausea and/or Vomiting    Vital Signs Last 24 Hrs  T(C): 36.6 (22 Oct 2022 05:34), Max: 36.9 (21 Oct 2022 19:27)  T(F): 97.9 (22 Oct 2022 05:34), Max: 98.4 (21 Oct 2022 19:27)  HR: 101 (22 Oct 2022 05:34) (101 - 116)  BP: 122/82 (22 Oct 2022 05:34) (106/70 - 122/82)  BP(mean): --  RR: 20 (22 Oct 2022 05:34) (20 - 22)  SpO2: 99% (22 Oct 2022 05:34) (92% - 99%)    Parameters below as of 22 Oct 2022 05:34  Patient On (Oxygen Delivery Method): nasal cannula, high flow        I&O's Summary    21 Oct 2022 07:01  -  22 Oct 2022 07:00  --------------------------------------------------------  IN: 690 mL / OUT: 1400 mL / NET: -710 mL          Physical Exam:   GENERAL: NAD, well-groomed, well-developed  HEENT: CARLA/   Atraumatic, Normocephalic  ENMT: No tonsillar erythema, exudates, or enlargement; Moist mucous membranes, Good dentition, No lesions  NECK: Supple, No JVD, Normal thyroid  CHEST/LUNG: Clear to ausculation : tenderness in sternum :  CVS: Regular rate and rhythm; No murmurs, rubs, or gallops  GI: : Soft, Nontender, Nondistended; Bowel sounds present  NERVOUS SYSTEM:  Alert & Oriented X3  EXTREMITIES: - edema  LYMPH: No lymphadenopathy noted  SKIN: No rashes or lesions  ENDOCRINOLOGY: No Thyromegaly  PSYCH: Appropriate    Labs:  29, 28                            8.4    6.29  )-----------( 231      ( 22 Oct 2022 07:49 )             25.9                         8.8    6.35  )-----------( 157      ( 21 Oct 2022 10:25 )             26.6                         7.9    6.52  )-----------( 107      ( 20 Oct 2022 07:00 )             24.8                         7.3    3.66  )-----------( 74       ( 19 Oct 2022 07:16 )             22.6                         7.7    5.18  )-----------( 63       ( 18 Oct 2022 18:09 )             23.7     10-    133<L>  |  99  |  24<H>  ----------------------------<  81  5.5<H>   |  26  |  0.90  10-    130<L>  |  96  |  26<H>  ----------------------------<  100<H>  5.6<H>   |  24  |  0.88  10-20    131<L>  |  97  |  22  ----------------------------<  108<H>  5.0   |  25  |  0.84  10-19    125<L>  |  92<L>  |  19  ----------------------------<  141<H>  4.9   |  25  |  0.94  10-18    125<L>  |  90<L>  |  18  ----------------------------<  123<H>  5.1   |  25  |  0.97    Ca    9.2      22 Oct 2022 07:50  Ca    9.1      21 Oct 2022 10:32  Phos  2.6     10-22  Phos  2.2     10-  Mg     2.4     10-  Mg     2.3     10-    TPro  8.0  /  Alb  3.0<L>  /  TBili  0.2  /  DBili  x   /  AST  23  /  ALT  28  /  AlkPhos  78  10-22  TPro  8.0  /  Alb  3.0<L>  /  TBili  0.2  /  DBili  x   /  AST  24  /  ALT  32  /  AlkPhos  75  10-21  TPro  7.9  /  Alb  3.2<L>  /  TBili  0.2  /  DBili  x   /  AST  31  /  ALT  38  /  AlkPhos  72  10-20  TPro  8.0  /  Alb  3.0<L>  /  TBili  0.2  /  DBili  x   /  AST  34  /  ALT  45  /  AlkPhos  65  10-19  TPro  8.4<H>  /  Alb  3.1<L>  /  TBili  0.2  /  DBili  x   /  AST  58<H>  /  ALT  60<H>  /  AlkPhos  67  10-18    CAPILLARY BLOOD GLUCOSE          LIVER FUNCTIONS - ( 22 Oct 2022 07:50 )  Alb: 3.0 g/dL / Pro: 8.0 g/dL / ALK PHOS: 78 U/L / ALT: 28 U/L / AST: 23 U/L / GGT: x           PT/INR - ( 22 Oct 2022 07:51 )   PT: 14.1 sec;   INR: 1.22 ratio         PTT - ( 22 Oct 2022 07:51 )  PTT:28.2 sec  Urinalysis Basic - ( 21 Oct 2022 07:49 )    Color: Light Yellow / Appearance: Clear / S.011 / pH: x  Gluc: x / Ketone: Negative  / Bili: Negative / Urobili: Negative   Blood: x / Protein: Negative / Nitrite: Negative   Leuk Esterase: Negative / RBC: x / WBC x   Sq Epi: x / Non Sq Epi: x / Bacteria: x      D-Dimer Assay, Quantitative: 612 ng/mL DDU (10-19 @ 07:29)  Procalcitonin, Serum: 0.28 ng/mL (10-18 @ 18:09)        RECENT CULTURES:  10-21 @ 07:49 Clean Catch Clean Catch (Midstream)         rad< from: CT Angio Chest PE Protocol w/ IV Cont (10.18.22 @ 20:03) >    AIRWAYS/LUNGS/PLEURA: Small right and moderate left pleural effusions and   compressive atelectasis. Patchy areas of linear atelectasis in the   bilateral upper lobes.    UPPER ABDOMEN: Within normal limits.    BONES/SOFT TISSUES: Permeative lytic lesions throughout the osseous   structures suggestive of myeloma. Compression deformities involving T3,   T6, T10, T11, L1 and L2 vertebral bodies.    IMPRESSION:    No pulmonary embolism.    Smallright and moderate left pleural effusions and compressive   atelectasis.    --- End of Report ---           GIULIANO NAILS MD; Resident Radiologist  This document has been electronically signed.  BERT LA MD; Attending Radiologist  This document has been electronically signed. Oct 18 2022  8:21PM    < end of copied text >         <10,000 CFU/mL Normal Urogenital Precious    10-18 @ 17:45 .Blood Blood-Peripheral                No growth to date.    10-18 @ 17:25 .Blood Blood-Peripheral                No growth to date.          RESPIRATORY CULTURES:          Studies  Chest X-RAY  CT SCAN Chest   Venous Dopplers: LE:   CT Abdomen  Others        < from: CT Angio Chest PE Protocol w/ IV Cont (10.18.22 @ 20:03) >  left main, lobar or segmental pulmonary embolism.    LYMPH NODES: No lymphadenopathy.    HEART/VASCULATURE: The heart is normal in size. No pericardial effusion.    AIRWAYS/LUNGS/PLEURA: Small right and moderate left pleural effusions and   compressive atelectasis. Patchy areas of linear atelectasis in the   bilateral upper lobes.    UPPER ABDOMEN: Within normal limits.    BONES/SOFT TISSUES: Permeative lytic lesions throughout the osseous   structures suggestive of myeloma. Compression deformities involving T3,   T6, T10, T11, L1 and L2 vertebral bodies.    IMPRESSION:    No pulmonary embolism.    Smallright and moderate left pleural effusions and compressive   atelectasis.    --- End of Report ---           GIULIANO NAILS MD; Resident Radiologist  This document has been electronically signed.  BERT LA MD; Attending Radiologist  This document has been electronically signed. Oct 18 2022  8:21PM    < end of copied text >

## 2022-10-22 NOTE — PROGRESS NOTE ADULT - PROBLEM SELECTOR PLAN 6
[place] [facility] [place]/onc following  -pain control  -f/u palliative consult

## 2022-10-22 NOTE — PROGRESS NOTE ADULT - PROBLEM SELECTOR PLAN 1
n 80% : has covid infection:  probnp is OK: echo pending: eh has abdirahman effusions:  she has multiple myeloma:  ? add diuretics    10/21: she seems to be doing ok : no sob: on 60% fio2:: he has pleural effusion; probnp is low : not added diuretics ? needs tap  on left side:    10/22: seems same: on 60%:  high flow:  seems stable overnight: on dexa and remdesivir per protocol: try to wean down fio2:  no pe on ct a: has abdirahman effusion: await echo  : if the pleural effusions cant be explained on the basis of echo , the probably she  would need a tap

## 2022-10-23 LAB
ALBUMIN SERPL ELPH-MCNC: 2.8 G/DL — LOW (ref 3.3–5)
ALP SERPL-CCNC: 74 U/L — SIGNIFICANT CHANGE UP (ref 40–120)
ALT FLD-CCNC: 24 U/L — SIGNIFICANT CHANGE UP (ref 10–45)
ANION GAP SERPL CALC-SCNC: 9 MMOL/L — SIGNIFICANT CHANGE UP (ref 5–17)
APTT BLD: 20.7 SEC — LOW (ref 27.5–35.5)
AST SERPL-CCNC: 19 U/L — SIGNIFICANT CHANGE UP (ref 10–40)
BASOPHILS # BLD AUTO: 0 K/UL — SIGNIFICANT CHANGE UP (ref 0–0.2)
BASOPHILS NFR BLD AUTO: 0 % — SIGNIFICANT CHANGE UP (ref 0–2)
BILIRUB SERPL-MCNC: 0.2 MG/DL — SIGNIFICANT CHANGE UP (ref 0.2–1.2)
BUN SERPL-MCNC: 21 MG/DL — SIGNIFICANT CHANGE UP (ref 7–23)
CALCIUM SERPL-MCNC: 8.9 MG/DL — SIGNIFICANT CHANGE UP (ref 8.4–10.5)
CHLORIDE SERPL-SCNC: 103 MMOL/L — SIGNIFICANT CHANGE UP (ref 96–108)
CO2 SERPL-SCNC: 22 MMOL/L — SIGNIFICANT CHANGE UP (ref 22–31)
CREAT SERPL-MCNC: 0.84 MG/DL — SIGNIFICANT CHANGE UP (ref 0.5–1.3)
CULTURE RESULTS: SIGNIFICANT CHANGE UP
CULTURE RESULTS: SIGNIFICANT CHANGE UP
EGFR: 79 ML/MIN/1.73M2 — SIGNIFICANT CHANGE UP
EOSINOPHIL # BLD AUTO: 0 K/UL — SIGNIFICANT CHANGE UP (ref 0–0.5)
EOSINOPHIL NFR BLD AUTO: 0 % — SIGNIFICANT CHANGE UP (ref 0–6)
ERYTHROCYTE [SEDIMENTATION RATE] IN BLOOD: 98 MM/HR — HIGH (ref 0–20)
GLUCOSE SERPL-MCNC: 135 MG/DL — HIGH (ref 70–99)
HCT VFR BLD CALC: 25.4 % — LOW (ref 34.5–45)
HGB BLD-MCNC: 8.1 G/DL — LOW (ref 11.5–15.5)
IMM GRANULOCYTES NFR BLD AUTO: 2.4 % — HIGH (ref 0–0.9)
INR BLD: 1.25 RATIO — HIGH (ref 0.88–1.16)
LDH SERPL L TO P-CCNC: 230 U/L — SIGNIFICANT CHANGE UP (ref 50–242)
LYMPHOCYTES # BLD AUTO: 0.43 K/UL — LOW (ref 1–3.3)
LYMPHOCYTES # BLD AUTO: 6 % — LOW (ref 13–44)
MAGNESIUM SERPL-MCNC: 2.1 MG/DL — SIGNIFICANT CHANGE UP (ref 1.6–2.6)
MCHC RBC-ENTMCNC: 31.5 PG — SIGNIFICANT CHANGE UP (ref 27–34)
MCHC RBC-ENTMCNC: 31.9 GM/DL — LOW (ref 32–36)
MCV RBC AUTO: 98.8 FL — SIGNIFICANT CHANGE UP (ref 80–100)
MONOCYTES # BLD AUTO: 0.31 K/UL — SIGNIFICANT CHANGE UP (ref 0–0.9)
MONOCYTES NFR BLD AUTO: 4.4 % — SIGNIFICANT CHANGE UP (ref 2–14)
NEUTROPHILS # BLD AUTO: 6.2 K/UL — SIGNIFICANT CHANGE UP (ref 1.8–7.4)
NEUTROPHILS NFR BLD AUTO: 87.2 % — HIGH (ref 43–77)
NRBC # BLD: 0 /100 WBCS — SIGNIFICANT CHANGE UP (ref 0–0)
PHOSPHATE SERPL-MCNC: 1.9 MG/DL — LOW (ref 2.5–4.5)
PLATELET # BLD AUTO: 287 K/UL — SIGNIFICANT CHANGE UP (ref 150–400)
POTASSIUM SERPL-MCNC: 4.9 MMOL/L — SIGNIFICANT CHANGE UP (ref 3.5–5.3)
POTASSIUM SERPL-SCNC: 4.9 MMOL/L — SIGNIFICANT CHANGE UP (ref 3.5–5.3)
PROCALCITONIN SERPL-MCNC: 0.16 NG/ML — HIGH (ref 0.02–0.1)
PROT SERPL-MCNC: 7.6 G/DL — SIGNIFICANT CHANGE UP (ref 6–8.3)
PROTHROM AB SERPL-ACNC: 14.5 SEC — HIGH (ref 10.5–13.4)
RBC # BLD: 2.57 M/UL — LOW (ref 3.8–5.2)
RBC # FLD: 17.9 % — HIGH (ref 10.3–14.5)
SODIUM SERPL-SCNC: 134 MMOL/L — LOW (ref 135–145)
SPECIMEN SOURCE: SIGNIFICANT CHANGE UP
SPECIMEN SOURCE: SIGNIFICANT CHANGE UP
WBC # BLD: 7.11 K/UL — SIGNIFICANT CHANGE UP (ref 3.8–10.5)
WBC # FLD AUTO: 7.11 K/UL — SIGNIFICANT CHANGE UP (ref 3.8–10.5)

## 2022-10-23 RX ADMIN — SENNA PLUS 2 TABLET(S): 8.6 TABLET ORAL at 22:44

## 2022-10-23 RX ADMIN — POLYETHYLENE GLYCOL 3350 17 GRAM(S): 17 POWDER, FOR SOLUTION ORAL at 11:29

## 2022-10-23 RX ADMIN — AMLODIPINE BESYLATE 10 MILLIGRAM(S): 2.5 TABLET ORAL at 05:50

## 2022-10-23 RX ADMIN — Medication 10 MILLIGRAM(S): at 16:31

## 2022-10-23 RX ADMIN — ENOXAPARIN SODIUM 40 MILLIGRAM(S): 100 INJECTION SUBCUTANEOUS at 17:03

## 2022-10-23 RX ADMIN — REMDESIVIR 500 MILLIGRAM(S): 5 INJECTION INTRAVENOUS at 09:32

## 2022-10-23 RX ADMIN — FENTANYL CITRATE 1 PATCH: 50 INJECTION INTRAVENOUS at 17:53

## 2022-10-23 RX ADMIN — Medication 400 MILLIGRAM(S): at 11:30

## 2022-10-23 RX ADMIN — ENOXAPARIN SODIUM 40 MILLIGRAM(S): 100 INJECTION SUBCUTANEOUS at 05:50

## 2022-10-23 RX ADMIN — PANTOPRAZOLE SODIUM 40 MILLIGRAM(S): 20 TABLET, DELAYED RELEASE ORAL at 05:50

## 2022-10-23 RX ADMIN — Medication 6 MILLIGRAM(S): at 05:49

## 2022-10-23 RX ADMIN — CHLORHEXIDINE GLUCONATE 1 APPLICATION(S): 213 SOLUTION TOPICAL at 12:19

## 2022-10-23 NOTE — PROGRESS NOTE ADULT - PROBLEM SELECTOR PLAN 1
n 80% : has covid infection:  probnp is OK: echo pending: eh has abdirahman effusions:  she has multiple myeloma:  ? add diuretics    10/21: she seems to be doing ok : no sob: on 60% fio2:: he has pleural effusion; probnp is low : not added diuretics ? needs tap  on left side:    10/22: seems same: on 60%:  high flow:  seems stable overnight: on dexa and remdesivir per protocol: try to wean down fio2:  no pe on ct a: has abdirahman effusion: await echo  : if the pleural effusions cant be explained on the basis of echo , the probably she  would need a tap    10/23: still requiring 60% high flow : no reps distress:    no chest pain today  ::  on covid rx:  await echo  ? etiology of pleural effusion

## 2022-10-23 NOTE — PROGRESS NOTE ADULT - ASSESSMENT
61F unvaccinated w/ PMH MM(dx'd 2018) currently on chemo(last session 10/13) p/w 1-day h/o chest pain and fever. Most history per daughter at baseline as pt in significant pain and unable to talk. Pt had initially [place] [facility] [place] with a sharp mid-chest pain gradual in onset that progressively got worse associated with a fever as high as 102.2 and a productive cough with greenish sputum.     Hematology/Oncology called to see patient who is under care of Dr. Denise Fatima of [facility] [place] for the treatment of IgG lambda multiple myeloma with extensive bone metastases recently started on CyborD chemotherapy 10/7/2022 (LD 10/13/2022). She was recently hospitalized for chemotherapy/pain management at [facility], discharged 10/15/2022.     IgG lambda multiple myeloma  --Under care at [facility] - Dr. Denise Fatima  --Chemotherapy on hold during hospitalization    COVID-19 Infection  --Previously unvaccinated  --Likely cause of SOB, fever, sputum production  --Started on Remdesevir, Dexamethasone  --O2 supplement as needed - has been transitioned to HFNC  --Management per ID, Pulm, Primary Team    Chest pain  --Underlying bone disease, COVID infection  --Cardiology, Pulmonary following  -- no active chest pain     Pleural Effusion  --Management per Pulmonary  --Patient may improve clinical with thoracocentesis, f/u pulm recs    Pain Management  --S/P multiple pathologic compression fractures from disease  --Patient needs aggressive pain management  --Palliative care recommended if current regimen not adequate, though seems to be doing well so far    Anemia  --Secondary to disease, recent chemotherapy  --Please transfuse PRBC's if <7 grams    Thrombocytopenia  --normal     Constipation  --Likely secondary to heavy opioid requirements  --aggressive bowel regimen, now resolved    Rohith Velazco MD  HematologyOncology   O: [phone]

## 2022-10-23 NOTE — PROGRESS NOTE ADULT - SUBJECTIVE AND OBJECTIVE BOX
SUBJECTIVE  Pt seen at bedside. States she is feeling much better, hasn't required nearly as much pain medication. Had 2 more BMs overnight.    OBJECTIVE:  ICU Vital Signs Last 24 Hrs  T(C): 36.6 (23 Oct 2022 06:00), Max: 36.8 (22 Oct 2022 21:00)  T(F): 97.8 (23 Oct 2022 06:00), Max: 98.2 (22 Oct 2022 21:00)  HR: 104 (23 Oct 2022 08:58) (102 - 108)  BP: 101/70 (23 Oct 2022 06:00) (101/70 - 122/80)  BP(mean): --  ABP: --  ABP(mean): --  RR: 19 (23 Oct 2022 08:58) (2 - 20)  SpO2: 96% (23 Oct 2022 08:58) (92% - 96%)    O2 Parameters below as of 23 Oct 2022 08:58  Patient On (Oxygen Delivery Method): nasal cannula, high flow  O2 Flow (L/min): 50  O2 Concentration (%): 55          10-22 @ 07:01  -  10-23 @ 07:00  --------------------------------------------------------  IN: 360 mL / OUT: 1450 mL / NET: -1090 mL      CAPILLARY BLOOD GLUCOSE          PHYSICAL EXAM:  General: Well-groomed, NAD, laying in bed, on ___O2  HEENT: PERRLA, EOMI, non-icteric  Neck:  symmetric,  JVD absent  Respiratory: Clear to ascultation bilaterally, no crackles/rales, no Resp distress; no accessory muscle use  Cardiovascular:  RRR, no murmurs/rubs/gallops  Abdomen: Soft, NT, ND  Extremities: No edema noted  Skin: No rashes or lesions noted  Neurological: Sensation grossly intact; strength 5/5 in all extremities.  Psychiatry: AOx3, appropriate insight/judgement, appropriate affect, recent/remote memory intact    PRN Meds:  ALBUTerol    90 MICROgram(s) HFA Inhaler 2 Puff(s) Inhalation every 4 hours PRN  aluminum hydroxide/magnesium hydroxide/simethicone Suspension 30 milliLiter(s) Oral every 4 hours PRN  benzonatate 100 milliGRAM(s) Oral every 8 hours PRN  bisacodyl 5 milliGRAM(s) Oral daily PRN  bisacodyl Suppository 10 milliGRAM(s) Rectal daily PRN  guaifenesin/dextromethorphan Oral Liquid 10 milliLiter(s) Oral every 4 hours PRN  HYDROmorphone  Injectable 1 milliGRAM(s) IV Push every 4 hours PRN  HYDROmorphone  Injectable 2 milliGRAM(s) IV Push every 4 hours PRN  megestrol 40 milliGRAM(s) Oral daily PRN  melatonin 3 milliGRAM(s) Oral at bedtime PRN  ondansetron Injectable 4 milliGRAM(s) IV Push every 8 hours PRN      LABS:                        8.1    7.11  )-----------( 287      ( 23 Oct 2022 10:25 )             25.4     Hgb Trend: 8.1<--, 8.4<--, 8.8<--, 7.9<--, 7.3<--  10-23    134<L>  |  103  |  21  ----------------------------<  135<H>  4.9   |  22  |  0.84    Ca    8.9      23 Oct 2022 10:25  Phos  1.9     10-23  Mg     2.1     10-23    TPro  7.6  /  Alb  2.8<L>  /  TBili  0.2  /  DBili  x   /  AST  19  /  ALT  24  /  AlkPhos  74  10-23    Creatinine Trend: 0.84<--, 0.90<--, 0.88<--, 0.84<--, 0.94<--, 0.97<--  PT/INR - ( 23 Oct 2022 10:26 )   PT: 14.5 sec;   INR: 1.25 ratio         PTT - ( 23 Oct 2022 10:26 )  PTT:20.7 sec      Venous Blood Gas:  10-22 @ 07:49  7.38/49/39/29/61.3  VBG Lactate: 1.7      MICROBIOLOGY:     Culture - Urine (collected 21 Oct 2022 07:49)  Source: Clean Catch Clean Catch (Midstream)  Final Report (22 Oct 2022 07:44):    <10,000 CFU/mL Normal Urogenital Precious        RADIOLOGY:  [ ] Reviewed and interpreted by me    EKG: SUBJECTIVE  Pt seen at bedside. States she is feeling much better, hasn't required nearly as much pain medication. Had 2 more BMs overnight.    OBJECTIVE:  ICU Vital Signs Last 24 Hrs  T(C): 36.6 (23 Oct 2022 06:00), Max: 36.8 (22 Oct 2022 21:00)  T(F): 97.8 (23 Oct 2022 06:00), Max: 98.2 (22 Oct 2022 21:00)  HR: 104 (23 Oct 2022 08:58) (102 - 108)  BP: 101/70 (23 Oct 2022 06:00) (101/70 - 122/80)  BP(mean): --  ABP: --  ABP(mean): --  RR: 19 (23 Oct 2022 08:58) (2 - 20)  SpO2: 96% (23 Oct 2022 08:58) (92% - 96%)    O2 Parameters below as of 23 Oct 2022 08:58  Patient On (Oxygen Delivery Method): nasal cannula, high flow  O2 Flow (L/min): 50  O2 Concentration (%): 55          10-22 @ 07:01  -  10-23 @ 07:00  --------------------------------------------------------  IN: 360 mL / OUT: 1450 mL / NET: -1090 mL      CAPILLARY BLOOD GLUCOSE          PHYSICAL EXAM:  General: Well-groomed, NAD, laying in bed, on HFNC 60% 50L/min  HEENT: PERRLA, EOMI, non-icteric  Neck:  symmetric,  JVD absent  Respiratory: Coarse breath sounds on right side primarily, much improved on left side no Resp distress; no accessory muscle use  Cardiovascular:  RRR, no murmurs/rubs/gallops  Abdomen: Soft, NT, ND  Extremities: No edema noted  Skin: No rashes or lesions noted  Neurological: Sensation grossly intact; strength 5/5 in all extremities.  Psychiatry: AOx3, appropriate insight/judgement, appropriate affect, recent/remote memory intact      PRN Meds:  ALBUTerol    90 MICROgram(s) HFA Inhaler 2 Puff(s) Inhalation every 4 hours PRN  aluminum hydroxide/magnesium hydroxide/simethicone Suspension 30 milliLiter(s) Oral every 4 hours PRN  benzonatate 100 milliGRAM(s) Oral every 8 hours PRN  bisacodyl 5 milliGRAM(s) Oral daily PRN  bisacodyl Suppository 10 milliGRAM(s) Rectal daily PRN  guaifenesin/dextromethorphan Oral Liquid 10 milliLiter(s) Oral every 4 hours PRN  HYDROmorphone  Injectable 1 milliGRAM(s) IV Push every 4 hours PRN  HYDROmorphone  Injectable 2 milliGRAM(s) IV Push every 4 hours PRN  megestrol 40 milliGRAM(s) Oral daily PRN  melatonin 3 milliGRAM(s) Oral at bedtime PRN  ondansetron Injectable 4 milliGRAM(s) IV Push every 8 hours PRN      LABS:                        8.1    7.11  )-----------( 287      ( 23 Oct 2022 10:25 )             25.4     Hgb Trend: 8.1<--, 8.4<--, 8.8<--, 7.9<--, 7.3<--  10-23    134<L>  |  103  |  21  ----------------------------<  135<H>  4.9   |  22  |  0.84    Ca    8.9      23 Oct 2022 10:25  Phos  1.9     10-23  Mg     2.1     10-23    TPro  7.6  /  Alb  2.8<L>  /  TBili  0.2  /  DBili  x   /  AST  19  /  ALT  24  /  AlkPhos  74  10-23    Creatinine Trend: 0.84<--, 0.90<--, 0.88<--, 0.84<--, 0.94<--, 0.97<--  PT/INR - ( 23 Oct 2022 10:26 )   PT: 14.5 sec;   INR: 1.25 ratio         PTT - ( 23 Oct 2022 10:26 )  PTT:20.7 sec      Venous Blood Gas:  10-22 @ 07:49  7.38/49/39/29/61.3  VBG Lactate: 1.7      MICROBIOLOGY:     Culture - Urine (collected 21 Oct 2022 07:49)  Source: Clean Catch Clean Catch (Midstream)  Final Report (22 Oct 2022 07:44):    <10,000 CFU/mL Normal Urogenital Precious        RADIOLOGY:  [ ] Reviewed and interpreted by me    EKG:

## 2022-10-23 NOTE — PROGRESS NOTE ADULT - PROBLEM SELECTOR PLAN 6
-currently undergoing chemo at [facility] [place]  -heme/onc following  -pain control  -f/u palliative consult

## 2022-10-23 NOTE — PROGRESS NOTE ADULT - PROBLEM SELECTOR PLAN 2
Cont remdesivir and dexa:    10/21; cont current meds    10/22: cont current medications:    10/23: on dexa:  finished remdesivir:

## 2022-10-23 NOTE — PROGRESS NOTE ADULT - SUBJECTIVE AND OBJECTIVE BOX
Date of Service: 10-23-22 @ 10:16    Patient is a 61y old  Female who presents with a chief complaint of SOB, CP (23 Oct 2022 10:04)      Any change in ROS:  sheis still on 60% high flow : today she says she has no pain :  no sob:  no wheezing:     MEDICATIONS  (STANDING):  acyclovir   Oral Tab/Cap 400 milliGRAM(s) Oral daily  amLODIPine   Tablet 10 milliGRAM(s) Oral daily  chlorhexidine 4% Liquid 1 Application(s) Topical daily  dexAMETHasone  Injectable 6 milliGRAM(s) IV Push daily  enoxaparin Injectable 40 milliGRAM(s) SubCutaneous every 12 hours  fentaNYL   Patch  50 MICROgram(s)/Hr 1 Patch Transdermal every 72 hours  naloxone Injectable 0.4 milliGRAM(s) IV Push once  pantoprazole    Tablet 40 milliGRAM(s) Oral before breakfast  polyethylene glycol 3350 17 Gram(s) Oral daily  senna 2 Tablet(s) Oral at bedtime    MEDICATIONS  (PRN):  ALBUTerol    90 MICROgram(s) HFA Inhaler 2 Puff(s) Inhalation every 4 hours PRN Shortness of Breath and/or Wheezing  aluminum hydroxide/magnesium hydroxide/simethicone Suspension 30 milliLiter(s) Oral every 4 hours PRN Dyspepsia  benzonatate 100 milliGRAM(s) Oral every 8 hours PRN Cough  bisacodyl 5 milliGRAM(s) Oral daily PRN Constipation  bisacodyl Suppository 10 milliGRAM(s) Rectal daily PRN Constipation  guaifenesin/dextromethorphan Oral Liquid 10 milliLiter(s) Oral every 4 hours PRN Cough  HYDROmorphone  Injectable 1 milliGRAM(s) IV Push every 4 hours PRN Moderate Pain (4 - 6)  HYDROmorphone  Injectable 2 milliGRAM(s) IV Push every 4 hours PRN Severe Pain (7 - 10)  megestrol 40 milliGRAM(s) Oral daily PRN appetite  melatonin 3 milliGRAM(s) Oral at bedtime PRN Insomnia  ondansetron Injectable 4 milliGRAM(s) IV Push every 8 hours PRN Nausea and/or Vomiting    Vital Signs Last 24 Hrs  T(C): 36.6 (23 Oct 2022 06:00), Max: 37 (22 Oct 2022 11:24)  T(F): 97.8 (23 Oct 2022 06:00), Max: 98.6 (22 Oct 2022 11:24)  HR: 104 (23 Oct 2022 08:58) (102 - 110)  BP: 101/70 (23 Oct 2022 06:00) (101/70 - 122/80)  BP(mean): --  RR: 19 (23 Oct 2022 08:58) (2 - 20)  SpO2: 96% (23 Oct 2022 08:58) (92% - 98%)    Parameters below as of 23 Oct 2022 08:58  Patient On (Oxygen Delivery Method): nasal cannula, high flow  O2 Flow (L/min): 50  O2 Concentration (%): 55    I&O's Summary    22 Oct 2022 07:01  -  23 Oct 2022 07:00  --------------------------------------------------------  IN: 360 mL / OUT: 1450 mL / NET: -1090 mL          Physical Exam:   GENERAL: NAD, well-groomed, well-developed  HEENT: CARLA/   Atraumatic, Normocephalic  ENMT: No tonsillar erythema, exudates, or enlargement; Moist mucous membranes, Good dentition, No lesions  NECK: Supple, No JVD, Normal thyroid  CHEST/LUNG: decreased air entry bilaterally : no wheezing:   CVS: Regular rate and rhythm; No murmurs, rubs, or gallops  GI: : Soft, Nontender, Nondistended; Bowel sounds present  NERVOUS SYSTEM:  Alert & Oriented X3  EXTREMITIES: -edema  LYMPH: No lymphadenopathy noted  SKIN: No rashes or lesions  ENDOCRINOLOGY: No Thyromegaly  PSYCH: Appropriate    Labs:  29, 28                            8.4    6.29  )-----------( 231      ( 22 Oct 2022 07:49 )             25.9                         8.8    6.35  )-----------( 157      ( 21 Oct 2022 10:25 )             26.6                         7.9    6.52  )-----------( 107      ( 20 Oct 2022 07:00 )             24.8     10-22    133<L>  |  99  |  24<H>  ----------------------------<  81  5.5<H>   |  26  |  0.90  10-21    130<L>  |  96  |  26<H>  ----------------------------<  100<H>  5.6<H>   |  24  |  0.88  10-20    131<L>  |  97  |  22  ----------------------------<  108<H>  5.0   |  25  |  0.84    Ca    9.2      22 Oct 2022 07:50  Ca    9.1      21 Oct 2022 10:32  Phos  2.6     10-22  Phos  2.2     10-21  Mg     2.4     10-22  Mg     2.3     10-21    TPro  8.0  /  Alb  3.0<L>  /  TBili  0.2  /  DBili  x   /  AST  23  /  ALT  28  /  AlkPhos  78  10-22  TPro  8.0  /  Alb  3.0<L>  /  TBili  0.2  /  DBili  x   /  AST  24  /  ALT  32  /  AlkPhos  75  10-21  TPro  7.9  /  Alb  3.2<L>  /  TBili  0.2  /  DBili  x   /  AST  31  /  ALT  38  /  AlkPhos  72  10-20    CAPILLARY BLOOD GLUCOSE          LIVER FUNCTIONS - ( 22 Oct 2022 07:50 )  Alb: 3.0 g/dL / Pro: 8.0 g/dL / ALK PHOS: 78 U/L / ALT: 28 U/L / AST: 23 U/L / GGT: x           PT/INR - ( 22 Oct 2022 07:51 )   PT: 14.1 sec;   INR: 1.22 ratio         PTT - ( 22 Oct 2022 07:51 )  PTT:28.2 sec    D-Dimer Assay, Quantitative: 612 ng/mL DDU (10-19 @ 07:29)        RECENT CULTURES:  10-21 @ 07:49 Clean Catch Clean Catch (Midstream)                <10,000 CFU/mL Normal Urogenital Precious    10-18 @ 17:45 .Blood Blood-Peripheral       rad< from: CT Angio Chest PE Protocol w/ IV Cont (10.18.22 @ 20:03) >    LYMPH NODES: No lymphadenopathy.    HEART/VASCULATURE: The heart is normal in size. No pericardial effusion.    AIRWAYS/LUNGS/PLEURA: Small right and moderate left pleural effusions and   compressive atelectasis. Patchy areas of linear atelectasis in the   bilateral upper lobes.    UPPER ABDOMEN: Within normal limits.    BONES/SOFT TISSUES: Permeative lytic lesions throughout the osseous   structures suggestive of myeloma. Compression deformities involving T3,   T6, T10, T11, L1 and L2 vertebral bodies.    IMPRESSION:    No pulmonary embolism.    Smallright and moderate left pleural effusions and compressive   atelectasis.    --- End of Report ---           GIULIANO NAILS MD; Resident Radiologist  This document has been electronically signed.  BERT LA MD; Attending Radiologist  This document has been electronically signed. Oct 18 2022  8:21PM    < end of copied text >           No growth to date.    10-18 @ 17:25 .Blood Blood-Peripheral                No growth to date.          RESPIRATORY CULTURES:          Studies  Chest X-RAY  CT SCAN Chest   Venous Dopplers: LE:   CT Abdomen  Others

## 2022-10-23 NOTE — PROGRESS NOTE ADULT - PROBLEM SELECTOR PLAN 3
has sternal tenderness:  pain control    10/22: localised sternal tenderness: pain control    10/23: resolved:  demario SÁNCHEZ

## 2022-10-23 NOTE — PROGRESS NOTE ADULT - PROBLEM SELECTOR PLAN 4
-very unlikely ACS given neg trops, EKG w/o acute changes and pleuritic nature of pain  -CTA showed no PE, and showed small right and moderate left pleural effusions and compressive   atelectasis.  -patient has MM with known bone mets, most likely cause  -Aggressive pain control     -Fentanyl 50 patch q72h     -Dilaudid 1-2mg IV PRN q4h -now improving  -patient has MM with known bone mets, most likely cause  -Aggressive pain control     -Fentanyl 50 patch q72h     -Dilaudid 1-2mg IV PRN q4h

## 2022-10-23 NOTE — PROGRESS NOTE ADULT - SUBJECTIVE AND OBJECTIVE BOX
C A R D I O L O G Y  **********************************     DATE OF SERVICE: 10-23-22    No chest pain or sob          acyclovir   Oral Tab/Cap 400 milliGRAM(s) Oral daily  ALBUTerol    90 MICROgram(s) HFA Inhaler 2 Puff(s) Inhalation every 4 hours PRN  aluminum hydroxide/magnesium hydroxide/simethicone Suspension 30 milliLiter(s) Oral every 4 hours PRN  amLODIPine   Tablet 10 milliGRAM(s) Oral daily  benzonatate 100 milliGRAM(s) Oral every 8 hours PRN  bisacodyl 5 milliGRAM(s) Oral daily PRN  bisacodyl Suppository 10 milliGRAM(s) Rectal daily PRN  chlorhexidine 4% Liquid 1 Application(s) Topical daily  dexAMETHasone  Injectable 6 milliGRAM(s) IV Push daily  enoxaparin Injectable 40 milliGRAM(s) SubCutaneous every 12 hours  fentaNYL   Patch  50 MICROgram(s)/Hr 1 Patch Transdermal every 72 hours  guaifenesin/dextromethorphan Oral Liquid 10 milliLiter(s) Oral every 4 hours PRN  HYDROmorphone  Injectable 1 milliGRAM(s) IV Push every 4 hours PRN  HYDROmorphone  Injectable 2 milliGRAM(s) IV Push every 4 hours PRN  megestrol 40 milliGRAM(s) Oral daily PRN  melatonin 3 milliGRAM(s) Oral at bedtime PRN  naloxone Injectable 0.4 milliGRAM(s) IV Push once  ondansetron Injectable 4 milliGRAM(s) IV Push every 8 hours PRN  pantoprazole    Tablet 40 milliGRAM(s) Oral before breakfast  polyethylene glycol 3350 17 Gram(s) Oral daily  remdesivir  IVPB   IV Intermittent   remdesivir  IVPB 100 milliGRAM(s) IV Intermittent every 24 hours  senna 2 Tablet(s) Oral at bedtime                            8.4    6.29  )-----------( 231      ( 22 Oct 2022 07:49 )             25.9       Hemoglobin: 8.4 g/dL (10-22 @ 07:49)  Hemoglobin: 8.8 g/dL (10-21 @ 10:25)  Hemoglobin: 7.9 g/dL (10-20 @ 07:00)  Hemoglobin: 7.3 g/dL (10-19 @ 07:16)  Hemoglobin: 7.7 g/dL (10-18 @ 18:09)      10-22    133<L>  |  99  |  24<H>  ----------------------------<  81  5.5<H>   |  26  |  0.90    Ca    9.2      22 Oct 2022 07:50  Phos  2.6     10-22  Mg     2.4     10-22    TPro  8.0  /  Alb  3.0<L>  /  TBili  0.2  /  DBili  x   /  AST  23  /  ALT  28  /  AlkPhos  78  10-22    Creatinine Trend: 0.90<--, 0.88<--, 0.84<--, 0.94<--, 0.97<--    COAGS:           T(C): 36.6 (10-23-22 @ 06:00), Max: 37 (10-22-22 @ 11:24)  HR: 108 (10-23-22 @ 06:00) (102 - 110)  BP: 101/70 (10-23-22 @ 06:00) (101/70 - 122/80)  RR: 20 (10-23-22 @ 06:00) (2 - 20)  SpO2: 92% (10-23-22 @ 06:00) (92% - 98%)  Wt(kg): --    I&O's Summary    22 Oct 2022 07:01  -  23 Oct 2022 07:00  --------------------------------------------------------  IN: 360 mL / OUT: 1450 mL / NET: -1090 mL      Gen: NAD  HEENT:  (-)icterus (-)pallor  CV: N S1 S2 1/6 CHARLY (+)2 Pulses B/l  Resp:  Clear to auscultation B/L, normal effort  GI: (+) BS Soft, NT, ND  Lymph:  (-)Edema, (-)obvious lymphadenopathy  Skin: Warm to touch, Normal turgor  Psych: Appropriate mood and affect      TELEMETRY: None	      ECG: Sinus Tachycardia, PVC    RADIOLOGY:  < from: CT Angio Chest PE Protocol w/ IV Cont (10.18.22 @ 20:03) >  IMPRESSION:    No pulmonary embolism.    Smallright and moderate left pleural effusions and compressive   atelectasis.    --- End of Report ---    < end of copied text >      ASSESSMENT/PLAN: Patient is a 62 y/o Female with PMH of Multiple Myeloma (dx 2018) currently on chemo who presented with chest pain, SOB, and fever admitted with +COVID and b/l pleural effusions. Cardiology consulted for further evaluation.     - Patient with nonanginal chest pain that is pleuritic and very tender to palpation - suspect MSK related likely due to costochondritis and multiple myeloma lytic lesions  - EKG with sinus tachycardia and no acute ischemic changes  - Cardiac enzymes unremarkable  - CTA chest negative for PE, small R and mod L pleural effusions and compressive atelectasis  - Check TTE to eval LV function given pleural effusions however low BNP noted therefore unlikely due to CHF  - ID follow up - BCx negative so far  - Supportive care/covid management per primary team  - No further inpatient cardiac w/u expected if echo unremarkable

## 2022-10-23 NOTE — PROGRESS NOTE ADULT - PROBLEM SELECTOR PLAN 5
Likely 2/2 high doses of opioid medications  -unresponsive to Miralax + senna + bisacodyl  -Give 8mg Relistor today     -dosed q2d, next dose on 10/23/2022  -received Bisacodyl suppository last night with small BM, additional today with larger BM     -c/w Bisacodyl 10mg suppository qd PRN

## 2022-10-23 NOTE — PROGRESS NOTE ADULT - SUBJECTIVE AND OBJECTIVE BOX
Patient seen and examined at bedside. feels much better on hiflow     MEDICATIONS  (STANDING):  acyclovir   Oral Tab/Cap 400 milliGRAM(s) Oral daily  amLODIPine   Tablet 10 milliGRAM(s) Oral daily  chlorhexidine 4% Liquid 1 Application(s) Topical daily  dexAMETHasone  Injectable 6 milliGRAM(s) IV Push daily  enoxaparin Injectable 40 milliGRAM(s) SubCutaneous every 12 hours  fentaNYL   Patch  50 MICROgram(s)/Hr 1 Patch Transdermal every 72 hours  naloxone Injectable 0.4 milliGRAM(s) IV Push once  pantoprazole    Tablet 40 milliGRAM(s) Oral before breakfast  polyethylene glycol 3350 17 Gram(s) Oral daily  senna 2 Tablet(s) Oral at bedtime    MEDICATIONS  (PRN):  ALBUTerol    90 MICROgram(s) HFA Inhaler 2 Puff(s) Inhalation every 4 hours PRN Shortness of Breath and/or Wheezing  aluminum hydroxide/magnesium hydroxide/simethicone Suspension 30 milliLiter(s) Oral every 4 hours PRN Dyspepsia  benzonatate 100 milliGRAM(s) Oral every 8 hours PRN Cough  bisacodyl 5 milliGRAM(s) Oral daily PRN Constipation  bisacodyl Suppository 10 milliGRAM(s) Rectal daily PRN Constipation  guaifenesin/dextromethorphan Oral Liquid 10 milliLiter(s) Oral every 4 hours PRN Cough  HYDROmorphone  Injectable 1 milliGRAM(s) IV Push every 4 hours PRN Moderate Pain (4 - 6)  HYDROmorphone  Injectable 2 milliGRAM(s) IV Push every 4 hours PRN Severe Pain (7 - 10)  megestrol 40 milliGRAM(s) Oral daily PRN appetite  melatonin 3 milliGRAM(s) Oral at bedtime PRN Insomnia  ondansetron Injectable 4 milliGRAM(s) IV Push every 8 hours PRN Nausea and/or Vomiting        Vital Signs Last 24 Hrs  T(C): 36.6 (23 Oct 2022 06:00), Max: 37 (22 Oct 2022 11:24)  T(F): 97.8 (23 Oct 2022 06:00), Max: 98.6 (22 Oct 2022 11:24)  HR: 104 (23 Oct 2022 08:58) (102 - 110)  BP: 101/70 (23 Oct 2022 06:00) (101/70 - 122/80)  BP(mean): --  RR: 19 (23 Oct 2022 08:58) (2 - 20)  SpO2: 96% (23 Oct 2022 08:58) (92% - 98%)    Parameters below as of 23 Oct 2022 08:58  Patient On (Oxygen Delivery Method): nasal cannula, high flow  O2 Flow (L/min): 50  O2 Concentration (%): 55      PHYSICAL EXAM:     GENERAL:  Appears stated age, well-groomed  CHEST:  on hi flow   SKIN:  No rash/erythema/ecchymoses  NEURO:  Alert, oriented, no asterixis                            8.4    6.29  )-----------( 231      ( 22 Oct 2022 07:49 )             25.9       10-22    133<L>  |  99  |  24<H>  ----------------------------<  81  5.5<H>   |  26  |  0.90    Ca    9.2      22 Oct 2022 07:50  Phos  2.6     10-22  Mg     2.4     10-22    TPro  8.0  /  Alb  3.0<L>  /  TBili  0.2  /  DBili  x   /  AST  23  /  ALT  28  /  AlkPhos  78  10-22

## 2022-10-24 LAB
ALBUMIN SERPL ELPH-MCNC: 3.4 G/DL — SIGNIFICANT CHANGE UP (ref 3.3–5)
ALP SERPL-CCNC: 85 U/L — SIGNIFICANT CHANGE UP (ref 40–120)
ALT FLD-CCNC: 25 U/L — SIGNIFICANT CHANGE UP (ref 10–45)
ANION GAP SERPL CALC-SCNC: 9 MMOL/L — SIGNIFICANT CHANGE UP (ref 5–17)
APTT BLD: 30.2 SEC — SIGNIFICANT CHANGE UP (ref 27.5–35.5)
AST SERPL-CCNC: 18 U/L — SIGNIFICANT CHANGE UP (ref 10–40)
BASE EXCESS BLDV CALC-SCNC: 2.4 MMOL/L — SIGNIFICANT CHANGE UP (ref -2–3)
BASOPHILS # BLD AUTO: 0.01 K/UL — SIGNIFICANT CHANGE UP (ref 0–0.2)
BASOPHILS NFR BLD AUTO: 0.2 % — SIGNIFICANT CHANGE UP (ref 0–2)
BILIRUB SERPL-MCNC: 0.2 MG/DL — SIGNIFICANT CHANGE UP (ref 0.2–1.2)
BUN SERPL-MCNC: 24 MG/DL — HIGH (ref 7–23)
CA-I SERPL-SCNC: 1.28 MMOL/L — SIGNIFICANT CHANGE UP (ref 1.15–1.33)
CALCIUM SERPL-MCNC: 9.4 MG/DL — SIGNIFICANT CHANGE UP (ref 8.4–10.5)
CHLORIDE BLDV-SCNC: 105 MMOL/L — SIGNIFICANT CHANGE UP (ref 96–108)
CHLORIDE SERPL-SCNC: 101 MMOL/L — SIGNIFICANT CHANGE UP (ref 96–108)
CO2 BLDV-SCNC: 29 MMOL/L — HIGH (ref 22–26)
CO2 SERPL-SCNC: 26 MMOL/L — SIGNIFICANT CHANGE UP (ref 22–31)
CREAT SERPL-MCNC: 0.85 MG/DL — SIGNIFICANT CHANGE UP (ref 0.5–1.3)
EGFR: 78 ML/MIN/1.73M2 — SIGNIFICANT CHANGE UP
EOSINOPHIL # BLD AUTO: 0 K/UL — SIGNIFICANT CHANGE UP (ref 0–0.5)
EOSINOPHIL NFR BLD AUTO: 0 % — SIGNIFICANT CHANGE UP (ref 0–6)
GAS PNL BLDV: 135 MMOL/L — LOW (ref 136–145)
GAS PNL BLDV: SIGNIFICANT CHANGE UP
GAS PNL BLDV: SIGNIFICANT CHANGE UP
GLUCOSE BLDV-MCNC: 117 MG/DL — HIGH (ref 70–99)
GLUCOSE SERPL-MCNC: 117 MG/DL — HIGH (ref 70–99)
HCO3 BLDV-SCNC: 28 MMOL/L — SIGNIFICANT CHANGE UP (ref 22–29)
HCT VFR BLD CALC: 28.6 % — LOW (ref 34.5–45)
HCT VFR BLDA CALC: 28 % — LOW (ref 34.5–46.5)
HGB BLD CALC-MCNC: 9.2 G/DL — LOW (ref 11.7–16.1)
HGB BLD-MCNC: 8.9 G/DL — LOW (ref 11.5–15.5)
IMM GRANULOCYTES NFR BLD AUTO: 5 % — HIGH (ref 0–0.9)
INR BLD: 1.2 RATIO — HIGH (ref 0.88–1.16)
LACTATE BLDV-MCNC: 1.6 MMOL/L — SIGNIFICANT CHANGE UP (ref 0.5–2)
LYMPHOCYTES # BLD AUTO: 0.36 K/UL — LOW (ref 1–3.3)
LYMPHOCYTES # BLD AUTO: 5.8 % — LOW (ref 13–44)
MAGNESIUM SERPL-MCNC: 2.1 MG/DL — SIGNIFICANT CHANGE UP (ref 1.6–2.6)
MCHC RBC-ENTMCNC: 30.7 PG — SIGNIFICANT CHANGE UP (ref 27–34)
MCHC RBC-ENTMCNC: 31.1 GM/DL — LOW (ref 32–36)
MCV RBC AUTO: 98.6 FL — SIGNIFICANT CHANGE UP (ref 80–100)
MONOCYTES # BLD AUTO: 0.35 K/UL — SIGNIFICANT CHANGE UP (ref 0–0.9)
MONOCYTES NFR BLD AUTO: 5.6 % — SIGNIFICANT CHANGE UP (ref 2–14)
NEUTROPHILS # BLD AUTO: 5.17 K/UL — SIGNIFICANT CHANGE UP (ref 1.8–7.4)
NEUTROPHILS NFR BLD AUTO: 83.4 % — HIGH (ref 43–77)
NRBC # BLD: 1 /100 WBCS — HIGH (ref 0–0)
PCO2 BLDV: 46 MMHG — HIGH (ref 39–42)
PH BLDV: 7.39 — SIGNIFICANT CHANGE UP (ref 7.32–7.43)
PHOSPHATE SERPL-MCNC: 2.2 MG/DL — LOW (ref 2.5–4.5)
PLATELET # BLD AUTO: 340 K/UL — SIGNIFICANT CHANGE UP (ref 150–400)
PO2 BLDV: 21 MMHG — LOW (ref 25–45)
POTASSIUM BLDV-SCNC: 5 MMOL/L — SIGNIFICANT CHANGE UP (ref 3.5–5.1)
POTASSIUM SERPL-MCNC: 4.9 MMOL/L — SIGNIFICANT CHANGE UP (ref 3.5–5.3)
POTASSIUM SERPL-SCNC: 4.9 MMOL/L — SIGNIFICANT CHANGE UP (ref 3.5–5.3)
PROT SERPL-MCNC: 8.2 G/DL — SIGNIFICANT CHANGE UP (ref 6–8.3)
PROTHROM AB SERPL-ACNC: 13.8 SEC — HIGH (ref 10.5–13.4)
RBC # BLD: 2.9 M/UL — LOW (ref 3.8–5.2)
RBC # FLD: 18.1 % — HIGH (ref 10.3–14.5)
SAO2 % BLDV: 24 % — LOW (ref 67–88)
SODIUM SERPL-SCNC: 136 MMOL/L — SIGNIFICANT CHANGE UP (ref 135–145)
WBC # BLD: 6.2 K/UL — SIGNIFICANT CHANGE UP (ref 3.8–10.5)
WBC # FLD AUTO: 6.2 K/UL — SIGNIFICANT CHANGE UP (ref 3.8–10.5)

## 2022-10-24 RX ADMIN — Medication 6 MILLIGRAM(S): at 06:32

## 2022-10-24 RX ADMIN — PANTOPRAZOLE SODIUM 40 MILLIGRAM(S): 20 TABLET, DELAYED RELEASE ORAL at 06:32

## 2022-10-24 RX ADMIN — POLYETHYLENE GLYCOL 3350 17 GRAM(S): 17 POWDER, FOR SOLUTION ORAL at 12:46

## 2022-10-24 RX ADMIN — Medication 400 MILLIGRAM(S): at 12:46

## 2022-10-24 RX ADMIN — ENOXAPARIN SODIUM 40 MILLIGRAM(S): 100 INJECTION SUBCUTANEOUS at 06:32

## 2022-10-24 RX ADMIN — CHLORHEXIDINE GLUCONATE 1 APPLICATION(S): 213 SOLUTION TOPICAL at 12:47

## 2022-10-24 RX ADMIN — FENTANYL CITRATE 1 PATCH: 50 INJECTION INTRAVENOUS at 23:01

## 2022-10-24 RX ADMIN — ENOXAPARIN SODIUM 40 MILLIGRAM(S): 100 INJECTION SUBCUTANEOUS at 18:40

## 2022-10-24 RX ADMIN — AMLODIPINE BESYLATE 10 MILLIGRAM(S): 2.5 TABLET ORAL at 06:32

## 2022-10-24 RX ADMIN — FENTANYL CITRATE 1 PATCH: 50 INJECTION INTRAVENOUS at 07:15

## 2022-10-24 RX ADMIN — SENNA PLUS 2 TABLET(S): 8.6 TABLET ORAL at 22:56

## 2022-10-24 NOTE — PROGRESS NOTE ADULT - ASSESSMENT
61F unvaccinated w/ PMH MM(dx'd 2018) currently on chemo(last session 10/13) p/w 1-day h/o chest pain and fever. Most history per daughter at baseline as pt in significant pain and unable to talk. Pt had initially presented to [facility] with a sharp mid-chest pain gradual in onset that progressively got worse associated with a fever as high as 102.2 and a productive cough with greenish sputum.     Hematology/Oncology called to see patient who is under care of Dr. 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 She was recently hospitalized for chemotherapy/pain management at [facility], discharged 10/15/2022.     IgG lambda multiple myeloma  --Under care at [facility] - Dr. Denise Fatima  --Chemotherapy on hold during hospitalization    COVID-19 Infection  --Previously unvaccinated  --Likely cause of SOB, fever, sputum production  --Lungs now clear to auscultation  --Completed Remdesevir, continuing Dexamethasone  --O2 supplement as needed - has been transitioned to HFNC - tapering down as tolerated  --Management per ID, Pulm, Primary Team    Chest pain  --Underlying bone disease, COVID infection  --Cardiology, Pulmonary following  -- no active chest pain     Pleural Effusion  --Management per Pulmonary  --Patient may improve clinical with thoracocentesis, f/u pulm recs    Pain Management  --S/P multiple pathologic compression fractures from disease  --Patient needs aggressive pain management  --Palliative care recommended if current regimen not adequate, though seems to be doing well so far    Anemia  --Secondary to disease, recent chemotherapy  --Please transfuse PRBC's if <7 grams    Thrombocytopenia  --Was from chemotherapy  --Fully resolved    Constipation  --Likely secondary to heavy opioid requirements  --aggressive bowel regimen, now resolved    We will continue to follow patient and coordinate with [facility].    Bertin Rae PA-C  Hematology/Oncology  [facility]   [phone] (office)  [phone] (alt office)  Evenings and weekends please call MD on call or office

## 2022-10-24 NOTE — PROGRESS NOTE ADULT - SUBJECTIVE AND OBJECTIVE BOX
C A R D I O L O G Y  **********************************     DATE OF SERVICE: 10-24-22    Discussed with RN - patient remains on HFNC. Denies chest pain or SOB currently. Review of systems otherwise negative    MEDICATIONS:  acyclovir   Oral Tab/Cap 400 milliGRAM(s) Oral daily  ALBUTerol    90 MICROgram(s) HFA Inhaler 2 Puff(s) Inhalation every 4 hours PRN  aluminum hydroxide/magnesium hydroxide/simethicone Suspension 30 milliLiter(s) Oral every 4 hours PRN  amLODIPine   Tablet 10 milliGRAM(s) Oral daily  benzonatate 100 milliGRAM(s) Oral every 8 hours PRN  bisacodyl 5 milliGRAM(s) Oral daily PRN  bisacodyl Suppository 10 milliGRAM(s) Rectal daily PRN  chlorhexidine 4% Liquid 1 Application(s) Topical daily  dexAMETHasone  Injectable 6 milliGRAM(s) IV Push daily  enoxaparin Injectable 40 milliGRAM(s) SubCutaneous every 12 hours  fentaNYL   Patch  50 MICROgram(s)/Hr 1 Patch Transdermal every 72 hours  guaifenesin/dextromethorphan Oral Liquid 10 milliLiter(s) Oral every 4 hours PRN  HYDROmorphone  Injectable 2 milliGRAM(s) IV Push every 4 hours PRN  HYDROmorphone  Injectable 1 milliGRAM(s) IV Push every 4 hours PRN  megestrol 40 milliGRAM(s) Oral daily PRN  melatonin 3 milliGRAM(s) Oral at bedtime PRN  naloxone Injectable 0.4 milliGRAM(s) IV Push once  ondansetron Injectable 4 milliGRAM(s) IV Push every 8 hours PRN  pantoprazole    Tablet 40 milliGRAM(s) Oral before breakfast  polyethylene glycol 3350 17 Gram(s) Oral daily  senna 2 Tablet(s) Oral at bedtime      LABS:                        8.9    6.20  )-----------( 340      ( 24 Oct 2022 10:06 )             28.6       Hemoglobin: 8.9 g/dL (10-24 @ 10:06)  Hemoglobin: 8.1 g/dL (10-23 @ 10:25)  Hemoglobin: 8.4 g/dL (10-22 @ 07:49)  Hemoglobin: 8.8 g/dL (10-21 @ 10:25)  Hemoglobin: 7.9 g/dL (10-20 @ 07:00)      10-24    136  |  101  |  24<H>  ----------------------------<  117<H>  4.9   |  26  |  0.85    Ca    9.4      24 Oct 2022 10:06  Phos  2.2     10-24  Mg     2.1     10-24    TPro  8.2  /  Alb  3.4  /  TBili  0.2  /  DBili  x   /  AST  18  /  ALT  25  /  AlkPhos  85  10-24    Creatinine Trend: 0.85<--, 0.84<--, 0.90<--, 0.88<--, 0.84<--, 0.94<--    COAGS: PT/INR - ( 24 Oct 2022 10:06 )   PT: 13.8 sec;   INR: 1.20 ratio         PTT - ( 24 Oct 2022 10:06 )  PTT:30.2 sec        Per Chart  PHYSICAL EXAM:  T(C): 36.7 (10-24-22 @ 06:28), Max: 36.7 (10-23-22 @ 14:42)  HR: 109 (10-24-22 @ 06:28) (101 - 109)  BP: 109/72 (10-24-22 @ 06:28) (108/73 - 109/72)  RR: 20 (10-24-22 @ 09:14) (18 - 22)  SpO2: 96% (10-24-22 @ 09:14) (95% - 96%)  Wt(kg): --    I&O's Summary    23 Oct 2022 07:01  -  24 Oct 2022 07:00  --------------------------------------------------------  IN: 0 mL / OUT: 550 mL / NET: -550 mL        Gen: NAD  HEENT:  (-)icterus (-)pallor  CV: N S1 S2 1/6 CHARLY (+)2 Pulses B/l  Resp:  Clear to auscultation B/L, normal effort  GI: (+) BS Soft, NT, ND  Lymph:  (-)Edema, (-)obvious lymphadenopathy  Skin: Warm to touch, Normal turgor  Psych: Appropriate mood and affect      TELEMETRY: None	      ECG: Sinus Tachycardia, PVC    RADIOLOGY:  < from: CT Angio Chest PE Protocol w/ IV Cont (10.18.22 @ 20:03) >  IMPRESSION:    No pulmonary embolism.    Smallright and moderate left pleural effusions and compressive   atelectasis.    --- End of Report ---    < end of copied text >      ASSESSMENT/PLAN: Patient is a 60 y/o Female with PMH of Multiple Myeloma (dx 2018) currently on chemo who presented with chest pain, SOB, and fever admitted with +COVID and b/l pleural effusions. Cardiology consulted for further evaluation.     - Patient with nonanginal chest pain that is pleuritic and very tender to palpation - suspect MSK related likely due to costochondritis and multiple myeloma lytic lesions  - EKG with sinus tachycardia and no acute ischemic changes  - Cardiac enzymes unremarkable  - CTA chest negative for PE, small R and mod L pleural effusions and compressive atelectasis  - Check TTE to eval LV function given pleural effusions however low BNP noted therefore unlikely due to CHF (echo lab called to expedite today)  - Supportive care/covid management per primary team  - No further inpatient cardiac w/u expected if echo unremarkable    Artur Cortes PA-C  Pager: [phone]

## 2022-10-24 NOTE — PROGRESS NOTE ADULT - SUBJECTIVE AND OBJECTIVE BOX
SUBJECTIVE  Pt seen at bedside this AM. States     OBJECTIVE:  ICU Vital Signs Last 24 Hrs  T(C): 36.7 (24 Oct 2022 13:51), Max: 36.7 (24 Oct 2022 06:28)  T(F): 98.1 (24 Oct 2022 13:51), Max: 98.1 (24 Oct 2022 13:51)  HR: 111 (24 Oct 2022 13:51) (101 - 111)  BP: 109/72 (24 Oct 2022 13:51) (109/72 - 109/72)  BP(mean): --  ABP: --  ABP(mean): --  RR: 20 (24 Oct 2022 13:51) (18 - 22)  SpO2: 97% (24 Oct 2022 13:51) (95% - 97%)    O2 Parameters below as of 24 Oct 2022 13:51  Patient On (Oxygen Delivery Method): nasal cannula, high flow              10-23 @ 07:01  -  10-24 @ 07:00  --------------------------------------------------------  IN: 0 mL / OUT: 550 mL / NET: -550 mL      CAPILLARY BLOOD GLUCOSE          PHYSICAL EXAM:  General: Well-groomed, NAD, laying in bed, on HFNC 50% 50L/min  HEENT: PERRLA, EOMI, non-icteric  Neck:  symmetric,  JVD absent  Respiratory: Coarse breath sounds much greater on Left side than right; improved from previous. no Resp distress; no accessory muscle use  Cardiovascular:  RRR, no murmurs/rubs/gallops  Abdomen: Soft, NT, ND  Extremities: No edema noted  Skin: No rashes or lesions noted  Neurological: Sensation grossly intact  Psychiatry: AOx3, appropriate insight/judgement, appropriate affect, recent/remote memory intact    PRN Meds:  ALBUTerol    90 MICROgram(s) HFA Inhaler 2 Puff(s) Inhalation every 4 hours PRN  aluminum hydroxide/magnesium hydroxide/simethicone Suspension 30 milliLiter(s) Oral every 4 hours PRN  benzonatate 100 milliGRAM(s) Oral every 8 hours PRN  bisacodyl 5 milliGRAM(s) Oral daily PRN  bisacodyl Suppository 10 milliGRAM(s) Rectal daily PRN  guaifenesin/dextromethorphan Oral Liquid 10 milliLiter(s) Oral every 4 hours PRN  HYDROmorphone  Injectable 2 milliGRAM(s) IV Push every 4 hours PRN  HYDROmorphone  Injectable 1 milliGRAM(s) IV Push every 4 hours PRN  megestrol 40 milliGRAM(s) Oral daily PRN  melatonin 3 milliGRAM(s) Oral at bedtime PRN  ondansetron Injectable 4 milliGRAM(s) IV Push every 8 hours PRN      LABS:                        8.9    6.20  )-----------( 340      ( 24 Oct 2022 10:06 )             28.6     Hgb Trend: 8.9<--, 8.1<--, 8.4<--, 8.8<--, 7.9<--  10-24    136  |  101  |  24<H>  ----------------------------<  117<H>  4.9   |  26  |  0.85    Ca    9.4      24 Oct 2022 10:06  Phos  2.2     10-24  Mg     2.1     10-24    TPro  8.2  /  Alb  3.4  /  TBili  0.2  /  DBili  x   /  AST  18  /  ALT  25  /  AlkPhos  85  10-24    Creatinine Trend: 0.85<--, 0.84<--, 0.90<--, 0.88<--, 0.84<--, 0.94<--  PT/INR - ( 24 Oct 2022 10:06 )   PT: 13.8 sec;   INR: 1.20 ratio         PTT - ( 24 Oct 2022 10:06 )  PTT:30.2 sec      Venous Blood Gas:  10-24 @ 09:35  7.39/46/21/28/24.0  VBG Lactate: 1.6      MICROBIOLOGY:       RADIOLOGY:  [ ] Reviewed and interpreted by me    EKG:

## 2022-10-24 NOTE — PROGRESS NOTE ADULT - PROBLEM SELECTOR PLAN 6
[place] [facility]/onc following  -pain control  -f/u palliative consult

## 2022-10-24 NOTE — PROGRESS NOTE ADULT - SUBJECTIVE AND OBJECTIVE BOX
Patient is a 61y old  Female who presents with a chief complaint of SOB, CP (24 Oct 2022 11:44)    Patient seen this morning. Breathing is improved - HFNC being weaned down. Pain is controlled on current regimen.    MEDICATIONS  (STANDING):  acyclovir   Oral Tab/Cap 400 milliGRAM(s) Oral daily  amLODIPine   Tablet 10 milliGRAM(s) Oral daily  chlorhexidine 4% Liquid 1 Application(s) Topical daily  dexAMETHasone  Injectable 6 milliGRAM(s) IV Push daily  enoxaparin Injectable 40 milliGRAM(s) SubCutaneous every 12 hours  fentaNYL   Patch  50 MICROgram(s)/Hr 1 Patch Transdermal every 72 hours  naloxone Injectable 0.4 milliGRAM(s) IV Push once  pantoprazole    Tablet 40 milliGRAM(s) Oral before breakfast  polyethylene glycol 3350 17 Gram(s) Oral daily  senna 2 Tablet(s) Oral at bedtime    MEDICATIONS  (PRN):  ALBUTerol    90 MICROgram(s) HFA Inhaler 2 Puff(s) Inhalation every 4 hours PRN Shortness of Breath and/or Wheezing  aluminum hydroxide/magnesium hydroxide/simethicone Suspension 30 milliLiter(s) Oral every 4 hours PRN Dyspepsia  benzonatate 100 milliGRAM(s) Oral every 8 hours PRN Cough  bisacodyl 5 milliGRAM(s) Oral daily PRN Constipation  bisacodyl Suppository 10 milliGRAM(s) Rectal daily PRN Constipation  guaifenesin/dextromethorphan Oral Liquid 10 milliLiter(s) Oral every 4 hours PRN Cough  HYDROmorphone  Injectable 2 milliGRAM(s) IV Push every 4 hours PRN Severe Pain (7 - 10)  HYDROmorphone  Injectable 1 milliGRAM(s) IV Push every 4 hours PRN Moderate Pain (4 - 6)  megestrol 40 milliGRAM(s) Oral daily PRN appetite  melatonin 3 milliGRAM(s) Oral at bedtime PRN Insomnia  ondansetron Injectable 4 milliGRAM(s) IV Push every 8 hours PRN Nausea and/or Vomiting    Vital Signs Last 24 Hrs  T(C): 36.7 (24 Oct 2022 06:28), Max: 36.7 (23 Oct 2022 14:42)  T(F): 98 (24 Oct 2022 06:28), Max: 98.1 (23 Oct 2022 14:42)  HR: 109 (24 Oct 2022 06:28) (101 - 109)  BP: 109/72 (24 Oct 2022 06:28) (108/73 - 109/72)  BP(mean): --  RR: 20 (24 Oct 2022 09:14) (18 - 22)  SpO2: 96% (24 Oct 2022 09:14) (95% - 96%)    Parameters below as of 24 Oct 2022 09:14  Patient On (Oxygen Delivery Method): nasal cannula, high flow  O2 Flow (L/min): 50  O2 Concentration (%): 40    PE  NAD  Awake, alert  Anicteric, MMM  No c/c/e  No rash grossly                          8.9    6.20  )-----------( 340      ( 24 Oct 2022 10:06 )             28.6       10-24    136  |  101  |  24<H>  ----------------------------<  117<H>  4.9   |  26  |  0.85    Ca    9.4      24 Oct 2022 10:06  Phos  2.2     10-24  Mg     2.1     10-24    TPro  8.2  /  Alb  3.4  /  TBili  0.2  /  DBili  x   /  AST  18  /  ALT  25  /  AlkPhos  85  10-24

## 2022-10-24 NOTE — PROGRESS NOTE ADULT - PROBLEM SELECTOR PLAN 4
-now improving  -patient has MM with known bone mets, most likely cause  -Aggressive pain control     -Fentanyl 50 patch q72h     -Dilaudid 1-2mg IV PRN q4h

## 2022-10-24 NOTE — PROGRESS NOTE ADULT - PROBLEM SELECTOR PLAN 1
Reported hypoxic to 80s at [facility].   Ct chest showed small right and moderate left pleural effusions and compressive   atelectasis.  -Now requiring HFNC; O2 decreased to 50%, still 50L/min  -likely from COVID-19  -continous pulse ox monitoring  -c/w supplemental O2 and wean as tolerated  -will hold off on abx for now as sxs likely d/t COVID-19  -if she spikes a fever, can start empirically on abx pending BCx

## 2022-10-25 LAB
ALBUMIN SERPL ELPH-MCNC: 3 G/DL — LOW (ref 3.3–5)
ALP SERPL-CCNC: 77 U/L — SIGNIFICANT CHANGE UP (ref 40–120)
ALT FLD-CCNC: 20 U/L — SIGNIFICANT CHANGE UP (ref 10–45)
ANION GAP SERPL CALC-SCNC: 10 MMOL/L — SIGNIFICANT CHANGE UP (ref 5–17)
APTT BLD: 27 SEC — LOW (ref 27.5–35.5)
AST SERPL-CCNC: 15 U/L — SIGNIFICANT CHANGE UP (ref 10–40)
BASOPHILS # BLD AUTO: 0.02 K/UL — SIGNIFICANT CHANGE UP (ref 0–0.2)
BASOPHILS NFR BLD AUTO: 0.3 % — SIGNIFICANT CHANGE UP (ref 0–2)
BILIRUB SERPL-MCNC: 0.2 MG/DL — SIGNIFICANT CHANGE UP (ref 0.2–1.2)
BUN SERPL-MCNC: 22 MG/DL — SIGNIFICANT CHANGE UP (ref 7–23)
CALCIUM SERPL-MCNC: 9.2 MG/DL — SIGNIFICANT CHANGE UP (ref 8.4–10.5)
CHLORIDE SERPL-SCNC: 99 MMOL/L — SIGNIFICANT CHANGE UP (ref 96–108)
CO2 SERPL-SCNC: 24 MMOL/L — SIGNIFICANT CHANGE UP (ref 22–31)
CREAT SERPL-MCNC: 0.8 MG/DL — SIGNIFICANT CHANGE UP (ref 0.5–1.3)
EGFR: 84 ML/MIN/1.73M2 — SIGNIFICANT CHANGE UP
EOSINOPHIL # BLD AUTO: 0 K/UL — SIGNIFICANT CHANGE UP (ref 0–0.5)
EOSINOPHIL NFR BLD AUTO: 0 % — SIGNIFICANT CHANGE UP (ref 0–6)
GLUCOSE SERPL-MCNC: 77 MG/DL — SIGNIFICANT CHANGE UP (ref 70–99)
HCT VFR BLD CALC: 25.8 % — LOW (ref 34.5–45)
HGB BLD-MCNC: 8.1 G/DL — LOW (ref 11.5–15.5)
IMM GRANULOCYTES NFR BLD AUTO: 6.1 % — HIGH (ref 0–0.9)
INR BLD: 1.23 RATIO — HIGH (ref 0.88–1.16)
LYMPHOCYTES # BLD AUTO: 0.7 K/UL — LOW (ref 1–3.3)
LYMPHOCYTES # BLD AUTO: 10.8 % — LOW (ref 13–44)
MAGNESIUM SERPL-MCNC: 2 MG/DL — SIGNIFICANT CHANGE UP (ref 1.6–2.6)
MCHC RBC-ENTMCNC: 31.4 GM/DL — LOW (ref 32–36)
MCHC RBC-ENTMCNC: 31.5 PG — SIGNIFICANT CHANGE UP (ref 27–34)
MCV RBC AUTO: 100.4 FL — HIGH (ref 80–100)
MONOCYTES # BLD AUTO: 0.77 K/UL — SIGNIFICANT CHANGE UP (ref 0–0.9)
MONOCYTES NFR BLD AUTO: 11.8 % — SIGNIFICANT CHANGE UP (ref 2–14)
NEUTROPHILS # BLD AUTO: 4.62 K/UL — SIGNIFICANT CHANGE UP (ref 1.8–7.4)
NEUTROPHILS NFR BLD AUTO: 71 % — SIGNIFICANT CHANGE UP (ref 43–77)
NRBC # BLD: 1 /100 WBCS — HIGH (ref 0–0)
PHOSPHATE SERPL-MCNC: 2.8 MG/DL — SIGNIFICANT CHANGE UP (ref 2.5–4.5)
PLATELET # BLD AUTO: 375 K/UL — SIGNIFICANT CHANGE UP (ref 150–400)
POTASSIUM SERPL-MCNC: 4.8 MMOL/L — SIGNIFICANT CHANGE UP (ref 3.5–5.3)
POTASSIUM SERPL-SCNC: 4.8 MMOL/L — SIGNIFICANT CHANGE UP (ref 3.5–5.3)
PROT SERPL-MCNC: 7.7 G/DL — SIGNIFICANT CHANGE UP (ref 6–8.3)
PROTHROM AB SERPL-ACNC: 14.2 SEC — HIGH (ref 10.5–13.4)
RBC # BLD: 2.57 M/UL — LOW (ref 3.8–5.2)
RBC # FLD: 17.9 % — HIGH (ref 10.3–14.5)
SODIUM SERPL-SCNC: 133 MMOL/L — LOW (ref 135–145)
WBC # BLD: 6.51 K/UL — SIGNIFICANT CHANGE UP (ref 3.8–10.5)
WBC # FLD AUTO: 6.51 K/UL — SIGNIFICANT CHANGE UP (ref 3.8–10.5)

## 2022-10-25 RX ORDER — HYDROMORPHONE HYDROCHLORIDE 2 MG/ML
1 INJECTION INTRAMUSCULAR; INTRAVENOUS; SUBCUTANEOUS EVERY 4 HOURS
Refills: 0 | Status: DISCONTINUED | OUTPATIENT
Start: 2022-10-25 | End: 2022-10-31

## 2022-10-25 RX ORDER — FENTANYL CITRATE 50 UG/ML
1 INJECTION INTRAVENOUS
Refills: 0 | Status: DISCONTINUED | OUTPATIENT
Start: 2022-10-25 | End: 2022-10-31

## 2022-10-25 RX ORDER — HYDROMORPHONE HYDROCHLORIDE 2 MG/ML
2 INJECTION INTRAMUSCULAR; INTRAVENOUS; SUBCUTANEOUS EVERY 4 HOURS
Refills: 0 | Status: DISCONTINUED | OUTPATIENT
Start: 2022-10-25 | End: 2022-10-31

## 2022-10-25 RX ADMIN — HYDROMORPHONE HYDROCHLORIDE 2 MILLIGRAM(S): 2 INJECTION INTRAMUSCULAR; INTRAVENOUS; SUBCUTANEOUS at 06:21

## 2022-10-25 RX ADMIN — Medication 100 MILLIGRAM(S): at 01:20

## 2022-10-25 RX ADMIN — FENTANYL CITRATE 1 PATCH: 50 INJECTION INTRAVENOUS at 20:00

## 2022-10-25 RX ADMIN — POLYETHYLENE GLYCOL 3350 17 GRAM(S): 17 POWDER, FOR SOLUTION ORAL at 12:33

## 2022-10-25 RX ADMIN — Medication 400 MILLIGRAM(S): at 12:31

## 2022-10-25 RX ADMIN — AMLODIPINE BESYLATE 10 MILLIGRAM(S): 2.5 TABLET ORAL at 06:00

## 2022-10-25 RX ADMIN — ENOXAPARIN SODIUM 40 MILLIGRAM(S): 100 INJECTION SUBCUTANEOUS at 06:00

## 2022-10-25 RX ADMIN — SENNA PLUS 2 TABLET(S): 8.6 TABLET ORAL at 22:39

## 2022-10-25 RX ADMIN — HYDROMORPHONE HYDROCHLORIDE 2 MILLIGRAM(S): 2 INJECTION INTRAMUSCULAR; INTRAVENOUS; SUBCUTANEOUS at 06:00

## 2022-10-25 RX ADMIN — PANTOPRAZOLE SODIUM 40 MILLIGRAM(S): 20 TABLET, DELAYED RELEASE ORAL at 06:00

## 2022-10-25 RX ADMIN — Medication 6 MILLIGRAM(S): at 06:00

## 2022-10-25 RX ADMIN — ENOXAPARIN SODIUM 40 MILLIGRAM(S): 100 INJECTION SUBCUTANEOUS at 18:03

## 2022-10-25 RX ADMIN — FENTANYL CITRATE 1 PATCH: 50 INJECTION INTRAVENOUS at 18:03

## 2022-10-25 RX ADMIN — FENTANYL CITRATE 1 PATCH: 50 INJECTION INTRAVENOUS at 07:33

## 2022-10-25 RX ADMIN — HYDROMORPHONE HYDROCHLORIDE 2 MILLIGRAM(S): 2 INJECTION INTRAMUSCULAR; INTRAVENOUS; SUBCUTANEOUS at 01:01

## 2022-10-25 RX ADMIN — Medication 10 MILLILITER(S): at 22:39

## 2022-10-25 RX ADMIN — CHLORHEXIDINE GLUCONATE 1 APPLICATION(S): 213 SOLUTION TOPICAL at 12:31

## 2022-10-25 RX ADMIN — HYDROMORPHONE HYDROCHLORIDE 2 MILLIGRAM(S): 2 INJECTION INTRAMUSCULAR; INTRAVENOUS; SUBCUTANEOUS at 00:59

## 2022-10-25 RX ADMIN — FENTANYL CITRATE 1 PATCH: 50 INJECTION INTRAVENOUS at 17:43

## 2022-10-25 NOTE — PROGRESS NOTE ADULT - PROBLEM SELECTOR PLAN 2
Cont remdesivir and dexa:    10/21; cont current meds    10/22: cont current medications:    10/23: on dexa:  finished remdesivir:    10/25: cont dexa for 5 days

## 2022-10-25 NOTE — PROGRESS NOTE ADULT - PROBLEM SELECTOR PLAN 1
Reported hypoxic to 80s at [facility].   Ct chest showed small right and moderate left pleural effusions and compressive   atelectasis.  -Now requiring HFNC; O2 decreased to 40%, 45L/min  -likely from COVID-19  -continous pulse ox monitoring  -c/w supplemental O2 and wean as tolerated  -will hold off on abx for now as sxs likely d/t COVID-19  -if she spikes a fever, can start empirically on abx pending BCx

## 2022-10-25 NOTE — PROGRESS NOTE ADULT - SUBJECTIVE AND OBJECTIVE BOX
Date of Service: 10-25-22 @ 11:32    Patient is a 61y old  Female who presents with a chief complaint of SOB, CP (24 Oct 2022 15:48)      Any change in ROS:  doing better :  on 40% high flow today  :  could not sleep whole night:   no chest pain : no cough :       MEDICATIONS  (STANDING):  acyclovir   Oral Tab/Cap 400 milliGRAM(s) Oral daily  amLODIPine   Tablet 10 milliGRAM(s) Oral daily  chlorhexidine 4% Liquid 1 Application(s) Topical daily  dexAMETHasone  Injectable 6 milliGRAM(s) IV Push daily  enoxaparin Injectable 40 milliGRAM(s) SubCutaneous every 12 hours  fentaNYL   Patch  50 MICROgram(s)/Hr 1 Patch Transdermal every 72 hours  naloxone Injectable 0.4 milliGRAM(s) IV Push once  pantoprazole    Tablet 40 milliGRAM(s) Oral before breakfast  polyethylene glycol 3350 17 Gram(s) Oral daily  senna 2 Tablet(s) Oral at bedtime    MEDICATIONS  (PRN):  ALBUTerol    90 MICROgram(s) HFA Inhaler 2 Puff(s) Inhalation every 4 hours PRN Shortness of Breath and/or Wheezing  aluminum hydroxide/magnesium hydroxide/simethicone Suspension 30 milliLiter(s) Oral every 4 hours PRN Dyspepsia  benzonatate 100 milliGRAM(s) Oral every 8 hours PRN Cough  bisacodyl 5 milliGRAM(s) Oral daily PRN Constipation  bisacodyl Suppository 10 milliGRAM(s) Rectal daily PRN Constipation  guaifenesin/dextromethorphan Oral Liquid 10 milliLiter(s) Oral every 4 hours PRN Cough  HYDROmorphone  Injectable 2 milliGRAM(s) IV Push every 4 hours PRN Severe Pain (7 - 10)  HYDROmorphone  Injectable 1 milliGRAM(s) IV Push every 4 hours PRN Moderate Pain (4 - 6)  megestrol 40 milliGRAM(s) Oral daily PRN appetite  melatonin 3 milliGRAM(s) Oral at bedtime PRN Insomnia  ondansetron Injectable 4 milliGRAM(s) IV Push every 8 hours PRN Nausea and/or Vomiting    Vital Signs Last 24 Hrs  T(C): 36.7 (25 Oct 2022 01:24), Max: 37.1 (24 Oct 2022 20:42)  T(F): 98 (25 Oct 2022 01:24), Max: 98.7 (24 Oct 2022 20:42)  HR: 114 (25 Oct 2022 09:30) (108 - 118)  BP: 120/78 (25 Oct 2022 05:49) (109/72 - 127/85)  BP(mean): --  RR: 18 (25 Oct 2022 09:30) (18 - 20)  SpO2: 93% (25 Oct 2022 09:30) (93% - 99%)    Parameters below as of 25 Oct 2022 09:30  Patient On (Oxygen Delivery Method): nasal cannula, high flow  O2 Flow (L/min): 45  O2 Concentration (%): 40    I&O's Summary    24 Oct 2022 07:01  -  25 Oct 2022 07:00  --------------------------------------------------------  IN: 0 mL / OUT: 2400 mL / NET: -2400 mL          Physical Exam:   GENERAL: NAD, well-groomed, well-developed  HEENT: CARLA/   Atraumatic, Normocephalic  ENMT: No tonsillar erythema, exudates, or enlargement; Moist mucous membranes, Good dentition, No lesions  NECK: Supple, No JVD, Normal thyroid  CHEST/LUNG: Clear to auscultaion  CVS: Regular rate and rhythm; No murmurs, rubs, or gallops  GI: : Soft, Nontender, Nondistended; Bowel sounds present  NERVOUS SYSTEM:  Alert & Oriented X3  EXTREMITIES:  - edema  LYMPH: No lymphadenopathy noted  SKIN: No rashes or lesions  ENDOCRINOLOGY: No Thyromegaly  PSYCH: Appropriate    Labs:  28, 29, 28                            8.1    6.51  )-----------( 375      ( 25 Oct 2022 07:00 )             25.8                         8.9    6.20  )-----------( 340      ( 24 Oct 2022 10:06 )             28.6                         8.1    7.11  )-----------( 287      ( 23 Oct 2022 10:25 )             25.4                         8.4    6.29  )-----------( 231      ( 22 Oct 2022 07:49 )             25.9     10-25    133<L>  |  99  |  22  ----------------------------<  77  4.8   |  24  |  0.80  10-24    136  |  101  |  24<H>  ----------------------------<  117<H>  4.9   |  26  |  0.85  10-23    134<L>  |  103  |  21  ----------------------------<  135<H>  4.9   |  22  |  0.84  10-22    133<L>  |  99  |  24<H>  ----------------------------<  81  5.5<H>   |  26  |  0.90    Ca    9.2      25 Oct 2022 07:01  Ca    9.4      24 Oct 2022 10:06  Phos  2.8     10-25  Phos  2.2     10-24  Mg     2.0     10-25  Mg     2.1     10-24    TPro  7.7  /  Alb  3.0<L>  /  TBili  0.2  /  DBili  x   /  AST  15  /  ALT  20  /  AlkPhos  77  10-25  TPro  8.2  /  Alb  3.4  /  TBili  0.2  /  DBili  x   /  AST  18  /  ALT  25  /  AlkPhos  85  10-24  TPro  7.6  /  Alb  2.8<L>  /  TBili  0.2  /  DBili  x   /  AST  19  /  ALT  24  /  AlkPhos  74  10-23  TPro  8.0  /  Alb  3.0<L>  /  TBili  0.2  /  DBili  x   /  AST  23  /  ALT  28  /  AlkPhos  78  10-22    CAPILLARY BLOOD GLUCOSE          LIVER FUNCTIONS - ( 25 Oct 2022 07:01 )  Alb: 3.0 g/dL / Pro: 7.7 g/dL / ALK PHOS: 77 U/L / ALT: 20 U/L / AST: 15 U/L / GGT: x           PT/INR - ( 25 Oct 2022 06:59 )   PT: 14.2 sec;   INR: 1.23 ratio         PTT - ( 25 Oct 2022 06:59 )  PTT:27.0 sec    D-Dimer Assay, Quantitative: 612 ng/mL DDU (10-19 @ 07:29)  Procalcitonin, Serum: 0.16 ng/mL (10-23 @ 10:25)        RECENT CULTURES:  10-21 @ 07:49 Clean Catch Clean Catch (Midstream)                <10,000 CFU/mL Normal Urogenital Precious    10-18 @ 17:45 .Blood Blood-Peripheral            rad< from: CT Angio Chest PE Protocol w/ IV Cont (10.18.22 @ 20:03) >    LYMPH NODES: No lymphadenopathy.    HEART/VASCULATURE: The heart is normal in size. No pericardial effusion.    AIRWAYS/LUNGS/PLEURA: Small right and moderate left pleural effusions and   compressive atelectasis. Patchy areas of linear atelectasis in the   bilateral upper lobes.    UPPER ABDOMEN: Within normal limits.    BONES/SOFT TISSUES: Permeative lytic lesions throughout the osseous   structures suggestive of myeloma. Compression deformities involving T3,   T6, T10, T11, L1 and L2 vertebral bodies.    IMPRESSION:    No pulmonary embolism.    Smallright and moderate left pleural effusions and compressive   atelectasis.    --- End of Report ---           GIULIANO NAILS MD; Resident Radiologist  This document has been electronically signed.  BERT LA MD; Attending Radiologist  This document has been electronically signed. Oct 18 2022  8:21PM    < end of copied text >      No Growth Final    10-18 @ 17:25 .Blood Blood-Peripheral                No Growth Final          RESPIRATORY CULTURES:          Studies  Chest X-RAY  CT SCAN Chest   Venous Dopplers: LE:   CT Abdomen  Others

## 2022-10-25 NOTE — PROGRESS NOTE ADULT - ASSESSMENT
Attending addendum:   Agree with above  Treatment of COVID per primary team  Check TTE  If tte wnl, no further inpatient cardiac workup expected at this time     Opal Siddiqi MD

## 2022-10-25 NOTE — PROGRESS NOTE ADULT - SUBJECTIVE AND OBJECTIVE BOX
C A R D I O L O G Y  **********************************     DATE OF SERVICE: 10-25-22    Discussed with RN - remains on HFNC. No reported chest pain or SOB currently. Review of symptoms otherwise negative.    MEDICATIONS:  acyclovir   Oral Tab/Cap 400 milliGRAM(s) Oral daily  ALBUTerol    90 MICROgram(s) HFA Inhaler 2 Puff(s) Inhalation every 4 hours PRN  aluminum hydroxide/magnesium hydroxide/simethicone Suspension 30 milliLiter(s) Oral every 4 hours PRN  amLODIPine   Tablet 10 milliGRAM(s) Oral daily  benzonatate 100 milliGRAM(s) Oral every 8 hours PRN  bisacodyl 5 milliGRAM(s) Oral daily PRN  bisacodyl Suppository 10 milliGRAM(s) Rectal daily PRN  chlorhexidine 4% Liquid 1 Application(s) Topical daily  dexAMETHasone  Injectable 6 milliGRAM(s) IV Push daily  enoxaparin Injectable 40 milliGRAM(s) SubCutaneous every 12 hours  fentaNYL   Patch  50 MICROgram(s)/Hr 1 Patch Transdermal every 72 hours  guaifenesin/dextromethorphan Oral Liquid 10 milliLiter(s) Oral every 4 hours PRN  HYDROmorphone  Injectable 2 milliGRAM(s) IV Push every 4 hours PRN  HYDROmorphone  Injectable 1 milliGRAM(s) IV Push every 4 hours PRN  megestrol 40 milliGRAM(s) Oral daily PRN  melatonin 3 milliGRAM(s) Oral at bedtime PRN  naloxone Injectable 0.4 milliGRAM(s) IV Push once  ondansetron Injectable 4 milliGRAM(s) IV Push every 8 hours PRN  pantoprazole    Tablet 40 milliGRAM(s) Oral before breakfast  polyethylene glycol 3350 17 Gram(s) Oral daily  senna 2 Tablet(s) Oral at bedtime      LABS:                        8.1    6.51  )-----------( 375      ( 25 Oct 2022 07:00 )             25.8       Hemoglobin: 8.1 g/dL (10-25 @ 07:00)  Hemoglobin: 8.9 g/dL (10-24 @ 10:06)  Hemoglobin: 8.1 g/dL (10-23 @ 10:25)  Hemoglobin: 8.4 g/dL (10-22 @ 07:49)  Hemoglobin: 8.8 g/dL (10-21 @ 10:25)      10-25    133<L>  |  99  |  22  ----------------------------<  77  4.8   |  24  |  0.80    Ca    9.2      25 Oct 2022 07:01  Phos  2.8     10-25  Mg     2.0     10-25    TPro  7.7  /  Alb  3.0<L>  /  TBili  0.2  /  DBili  x   /  AST  15  /  ALT  20  /  AlkPhos  77  10-25    Creatinine Trend: 0.80<--, 0.85<--, 0.84<--, 0.90<--, 0.88<--, 0.84<--    COAGS: PT/INR - ( 25 Oct 2022 06:59 )   PT: 14.2 sec;   INR: 1.23 ratio         PTT - ( 25 Oct 2022 06:59 )  PTT:27.0 sec      Per Chart  PHYSICAL EXAM:  T(C): 36.7 (10-25-22 @ 01:24), Max: 37.1 (10-24-22 @ 20:42)  HR: 114 (10-25-22 @ 09:30) (108 - 118)  BP: 120/78 (10-25-22 @ 05:49) (109/72 - 127/85)  RR: 18 (10-25-22 @ 09:30) (18 - 20)  SpO2: 93% (10-25-22 @ 09:30) (93% - 99%)  Wt(kg): --    I&O's Summary    24 Oct 2022 07:01  -  25 Oct 2022 07:00  --------------------------------------------------------  IN: 0 mL / OUT: 2400 mL / NET: -2400 mL        Gen: NAD  HEENT:  (-)icterus (-)pallor  CV: N S1 S2 1/6 CHARLY (+)2 Pulses B/l  Resp:  Clear to auscultation B/L, normal effort  GI: (+) BS Soft, NT, ND  Lymph:  (-)Edema, (-)obvious lymphadenopathy  Skin: Warm to touch, Normal turgor  Psych: Appropriate mood and affect      TELEMETRY: None	      ECG: Sinus Tachycardia, PVC    RADIOLOGY:  < from: CT Angio Chest PE Protocol w/ IV Cont (10.18.22 @ 20:03) >  IMPRESSION:    No pulmonary embolism.    Smallright and moderate left pleural effusions and compressive   atelectasis.    --- End of Report ---    < end of copied text >      ASSESSMENT/PLAN: Patient is a 60 y/o Female with PMH of Multiple Myeloma (dx 2018) currently on chemo who presented with chest pain, SOB, and fever admitted with +COVID and b/l pleural effusions. Cardiology consulted for further evaluation.     - Patient with nonanginal chest pain that is pleuritic and very tender to palpation - suspect MSK related likely due to costochondritis and multiple myeloma lytic lesions  - EKG with sinus tachycardia and no acute ischemic changes  - Cardiac enzymes unremarkable  - CTA chest negative for PE, small R and mod L pleural effusions and compressive atelectasis  - Check TTE to eval LV function given pleural effusions however low BNP noted therefore unlikely due to CHF (echo lab called to expedite)  - Pulm follow up noted  - Supportive care/covid management per primary team  - No further inpatient cardiac w/u expected if echo unremarkable    Artur Cortes PA-C  Pager: [phone]

## 2022-10-25 NOTE — PROGRESS NOTE ADULT - PROBLEM SELECTOR PLAN 1
n 80% : has covid infection:  probnp is OK: echo pending: eh has abdirahman effusions:  she has multiple myeloma:  ? add diuretics    10/21: she seems to be doing ok : no sob: on 60% fio2:: he has pleural effusion; probnp is low : not added diuretics ? needs tap  on left side:    10/22: seems same: on 60%:  high flow:  seems stable overnight: on dexa and remdesivir per protocol: try to wean down fio2:  no pe on ct a: has abdirahman effusion: await echo  : if the pleural effusions cant be explained on the basis of echo , the probably she  would need a tap    10/23: still requiring 60% high flow : no reps distress:    no chest pain today  ::  on covid rx:  await echo  ? etiology of pleural effusion    10/25: seems OK : no chest pain : await echo : on dexa: for a total of 10 days: await echo

## 2022-10-25 NOTE — PROGRESS NOTE ADULT - PROBLEM SELECTOR PLAN 3
has sternal tenderness:  pain control    10/22: localised sternal tenderness: pain control    10/23: resolved:  has MM    10/25: no pain now;

## 2022-10-25 NOTE — PROGRESS NOTE ADULT - ASSESSMENT
61F unvaccinated w/ PMH MM(dx'd 2018) currently on chemo(last session 10/13) p/w 1-day h/o chest pain and fever. Most history per daughter at baseline as pt in significant pain and unable to talk. Pt had initially presented to [facility] with a sharp mid-chest pain gradual in onset that progressively got worse associated with a fever as high as 102.2 and a productive cough with greenish sputum.     Hematology/Oncology called to see patient who is [place] 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 She was recently hospitalized for [place] [facility] [place]15/2022.     IgG lambda multiple myeloma  --[place] [facility] [place] Denise Fatima  --Chemotherapy on hold during hospitalization    COVID-19 Infection  --Previously unvaccinated  --Likely cause of SOB, fever, sputum production  --Lungs now clear to auscultation  --Completed Remdesevir, continuing Dexamethasone  --O2 supplement as needed - has been transitioned to HFNC - tapering down as tolerated  --Management per ID, Pulm, Primary Team    Chest pain  --Underlying bone disease, COVID infection  --Cardiology, Pulmonary following  -- Well controlled on current management    Pleural Effusion  --Management per Pulmonary  --Patient may improve clinical with thoracocentesis, f/u pulm recs    Pain Management  --S/P multiple pathologic compression fractures from disease  --Patient needs aggressive pain management  --Palliative care recommended if current regimen not adequate, though seems to be doing well so far    Anemia  --Secondary to disease, recent chemotherapy  --Please transfuse PRBC's if <7 grams    Thrombocytopenia  --Was from chemotherapy  --Fully resolved    Constipation  --Likely secondary to heavy opioid requirements  --aggressive bowel regimen, now resolved    We will continue to follow patient and coordinate with [facility].    Bertin Rae PA-C  Hematology/Oncology  [facility]   [phone] (office)  [phone] (alt office)  Evenings and weekends please call MD on call or office

## 2022-10-25 NOTE — PROGRESS NOTE ADULT - SUBJECTIVE AND OBJECTIVE BOX
SUBJECTIVE  Pt seen this am at bedside. Says gonzalo had return of chest pain, but was well-controlled with prn pain meds. States that breathing is much better today, overall feeling less sick. had a formed BM yesterday. No other complaints. Denies fever, chills, abdominal pain, dizziness, n/v.    OBJECTIVE:  ICU Vital Signs Last 24 Hrs  T(C): 36.8 (25 Oct 2022 13:51), Max: 37.1 (24 Oct 2022 20:42)  T(F): 98.3 (25 Oct 2022 13:51), Max: 98.7 (24 Oct 2022 20:42)  HR: 100 (25 Oct 2022 13:51) (100 - 118)  BP: 116/75 (25 Oct 2022 13:51) (116/75 - 127/85)  BP(mean): --  ABP: --  ABP(mean): --  RR: 18 (25 Oct 2022 13:51) (18 - 20)  SpO2: 97% (25 Oct 2022 13:51) (93% - 99%)    O2 Parameters below as of 25 Oct 2022 13:51  Patient On (Oxygen Delivery Method): nasal cannula, high flow              10-24 @ 07:01  -  10-25 @ 07:00  --------------------------------------------------------  IN: 0 mL / OUT: 2400 mL / NET: -2400 mL    10-25 @ 07:01  -  10-25 @ 14:30  --------------------------------------------------------  IN: 0 mL / OUT: 450 mL / NET: -450 mL      CAPILLARY BLOOD GLUCOSE          PHYSICAL EXAM:  General: Well-groomed, NAD, laying in bed, on HFNC 40% 45L/min  HEENT: PERRLA, EOMI, non-icteric  Neck:  symmetric,  JVD absent  Respiratory: Coarse breath sounds on right side, improved from last exam; no Resp distress; no accessory muscle use  Cardiovascular:  RRR, no murmurs/rubs/gallops  Abdomen: Soft, NT, ND  Extremities: No edema noted  Skin: No rashes or lesions noted  Neurological: Sensation grossly intact  Psychiatry: AOx3, appropriate insight/judgement, appropriate affect, recent/remote memory intact    PRN Meds:  ALBUTerol    90 MICROgram(s) HFA Inhaler 2 Puff(s) Inhalation every 4 hours PRN  aluminum hydroxide/magnesium hydroxide/simethicone Suspension 30 milliLiter(s) Oral every 4 hours PRN  benzonatate 100 milliGRAM(s) Oral every 8 hours PRN  bisacodyl 5 milliGRAM(s) Oral daily PRN  bisacodyl Suppository 10 milliGRAM(s) Rectal daily PRN  guaifenesin/dextromethorphan Oral Liquid 10 milliLiter(s) Oral every 4 hours PRN  HYDROmorphone  Injectable 1 milliGRAM(s) IV Push every 4 hours PRN  HYDROmorphone  Injectable 2 milliGRAM(s) IV Push every 4 hours PRN  megestrol 40 milliGRAM(s) Oral daily PRN  melatonin 3 milliGRAM(s) Oral at bedtime PRN  ondansetron Injectable 4 milliGRAM(s) IV Push every 8 hours PRN      LABS:                        8.1    6.51  )-----------( 375      ( 25 Oct 2022 07:00 )             25.8     Hgb Trend: 8.1<--, 8.9<--, 8.1<--, 8.4<--, 8.8<--  10-25    133<L>  |  99  |  22  ----------------------------<  77  4.8   |  24  |  0.80    Ca    9.2      25 Oct 2022 07:01  Phos  2.8     10-25  Mg     2.0     10-25    TPro  7.7  /  Alb  3.0<L>  /  TBili  0.2  /  DBili  x   /  AST  15  /  ALT  20  /  AlkPhos  77  10-25    Creatinine Trend: 0.80<--, 0.85<--, 0.84<--, 0.90<--, 0.88<--, 0.84<--  PT/INR - ( 25 Oct 2022 06:59 )   PT: 14.2 sec;   INR: 1.23 ratio         PTT - ( 25 Oct 2022 06:59 )  PTT:27.0 sec      Venous Blood Gas:  10-24 @ 09:35  7.39/46/21/28/24.0  VBG Lactate: 1.6      MICROBIOLOGY:       RADIOLOGY:  [ ] Reviewed and interpreted by me    EKG:

## 2022-10-25 NOTE — PROGRESS NOTE ADULT - SUBJECTIVE AND OBJECTIVE BOX
Patient is a 61y old  Female who presents with a chief complaint of SOB, CP (25 Oct 2022 11:55)    Patient seen this morning. Feeling much better. Breathing is improved, pain is controlled. Had some pleuritic chest pain last night with coughing - improve.    MEDICATIONS  (STANDING):  acyclovir   Oral Tab/Cap 400 milliGRAM(s) Oral daily  amLODIPine   Tablet 10 milliGRAM(s) Oral daily  chlorhexidine 4% Liquid 1 Application(s) Topical daily  dexAMETHasone  Injectable 6 milliGRAM(s) IV Push daily  enoxaparin Injectable 40 milliGRAM(s) SubCutaneous every 12 hours  fentaNYL   Patch  50 MICROgram(s)/Hr 1 Patch Transdermal every 72 hours  naloxone Injectable 0.4 milliGRAM(s) IV Push once  pantoprazole    Tablet 40 milliGRAM(s) Oral before breakfast  polyethylene glycol 3350 17 Gram(s) Oral daily  senna 2 Tablet(s) Oral at bedtime    MEDICATIONS  (PRN):  ALBUTerol    90 MICROgram(s) HFA Inhaler 2 Puff(s) Inhalation every 4 hours PRN Shortness of Breath and/or Wheezing  aluminum hydroxide/magnesium hydroxide/simethicone Suspension 30 milliLiter(s) Oral every 4 hours PRN Dyspepsia  benzonatate 100 milliGRAM(s) Oral every 8 hours PRN Cough  bisacodyl 5 milliGRAM(s) Oral daily PRN Constipation  bisacodyl Suppository 10 milliGRAM(s) Rectal daily PRN Constipation  guaifenesin/dextromethorphan Oral Liquid 10 milliLiter(s) Oral every 4 hours PRN Cough  HYDROmorphone  Injectable 2 milliGRAM(s) IV Push every 4 hours PRN Severe Pain (7 - 10)  HYDROmorphone  Injectable 1 milliGRAM(s) IV Push every 4 hours PRN Moderate Pain (4 - 6)  megestrol 40 milliGRAM(s) Oral daily PRN appetite  melatonin 3 milliGRAM(s) Oral at bedtime PRN Insomnia  ondansetron Injectable 4 milliGRAM(s) IV Push every 8 hours PRN Nausea and/or Vomiting        Vital Signs Last 24 Hrs  T(C): 36.7 (25 Oct 2022 01:24), Max: 37.1 (24 Oct 2022 20:42)  T(F): 98 (25 Oct 2022 01:24), Max: 98.7 (24 Oct 2022 20:42)  HR: 114 (25 Oct 2022 09:30) (108 - 118)  BP: 120/78 (25 Oct 2022 05:49) (109/72 - 127/85)  BP(mean): --  RR: 18 (25 Oct 2022 09:30) (18 - 20)  SpO2: 93% (25 Oct 2022 09:30) (93% - 99%)    Parameters below as of 25 Oct 2022 09:30  Patient On (Oxygen Delivery Method): nasal cannula, high flow  O2 Flow (L/min): 45  O2 Concentration (%): 40    PE  NAD  Awake, alert  Anicteric, MMM  No c/c/e  No rash grossly                            8.1    6.51  )-----------( 375      ( 25 Oct 2022 07:00 )             25.8       10-25    133<L>  |  99  |  22  ----------------------------<  77  4.8   |  24  |  0.80    Ca    9.2      25 Oct 2022 07:01  Phos  2.8     10-25  Mg     2.0     10-25    TPro  7.7  /  Alb  3.0<L>  /  TBili  0.2  /  DBili  x   /  AST  15  /  ALT  20  /  AlkPhos  77  10-25

## 2022-10-25 NOTE — PROGRESS NOTE ADULT - PROBLEM SELECTOR PLAN 6
-[place] [facility] [place]heme/onc following  -pain control  -f/u palliative consult

## 2022-10-26 LAB
ALBUMIN SERPL ELPH-MCNC: 2.8 G/DL — LOW (ref 3.3–5)
ALP SERPL-CCNC: 72 U/L — SIGNIFICANT CHANGE UP (ref 40–120)
ALT FLD-CCNC: 18 U/L — SIGNIFICANT CHANGE UP (ref 10–45)
ANION GAP SERPL CALC-SCNC: 8 MMOL/L — SIGNIFICANT CHANGE UP (ref 5–17)
APTT BLD: 28.4 SEC — SIGNIFICANT CHANGE UP (ref 27.5–35.5)
AST SERPL-CCNC: 16 U/L — SIGNIFICANT CHANGE UP (ref 10–40)
BASE EXCESS BLDV CALC-SCNC: 1.8 MMOL/L — SIGNIFICANT CHANGE UP (ref -2–3)
BASOPHILS # BLD AUTO: 0.01 K/UL — SIGNIFICANT CHANGE UP (ref 0–0.2)
BASOPHILS NFR BLD AUTO: 0.2 % — SIGNIFICANT CHANGE UP (ref 0–2)
BILIRUB SERPL-MCNC: 0.3 MG/DL — SIGNIFICANT CHANGE UP (ref 0.2–1.2)
BUN SERPL-MCNC: 21 MG/DL — SIGNIFICANT CHANGE UP (ref 7–23)
CA-I SERPL-SCNC: 1.26 MMOL/L — SIGNIFICANT CHANGE UP (ref 1.15–1.33)
CALCIUM SERPL-MCNC: 8.7 MG/DL — SIGNIFICANT CHANGE UP (ref 8.4–10.5)
CHLORIDE BLDV-SCNC: 101 MMOL/L — SIGNIFICANT CHANGE UP (ref 96–108)
CHLORIDE SERPL-SCNC: 100 MMOL/L — SIGNIFICANT CHANGE UP (ref 96–108)
CO2 BLDV-SCNC: 29 MMOL/L — HIGH (ref 22–26)
CO2 SERPL-SCNC: 25 MMOL/L — SIGNIFICANT CHANGE UP (ref 22–31)
CREAT SERPL-MCNC: 0.68 MG/DL — SIGNIFICANT CHANGE UP (ref 0.5–1.3)
EGFR: 99 ML/MIN/1.73M2 — SIGNIFICANT CHANGE UP
EOSINOPHIL # BLD AUTO: 0 K/UL — SIGNIFICANT CHANGE UP (ref 0–0.5)
EOSINOPHIL NFR BLD AUTO: 0 % — SIGNIFICANT CHANGE UP (ref 0–6)
GAS PNL BLDV: 133 MMOL/L — LOW (ref 136–145)
GAS PNL BLDV: SIGNIFICANT CHANGE UP
GAS PNL BLDV: SIGNIFICANT CHANGE UP
GLUCOSE BLDV-MCNC: 73 MG/DL — SIGNIFICANT CHANGE UP (ref 70–99)
GLUCOSE SERPL-MCNC: 78 MG/DL — SIGNIFICANT CHANGE UP (ref 70–99)
HCO3 BLDV-SCNC: 28 MMOL/L — SIGNIFICANT CHANGE UP (ref 22–29)
HCT VFR BLD CALC: 27.3 % — LOW (ref 34.5–45)
HCT VFR BLDA CALC: 26 % — LOW (ref 34.5–46.5)
HGB BLD CALC-MCNC: 8.8 G/DL — LOW (ref 11.7–16.1)
HGB BLD-MCNC: 8.7 G/DL — LOW (ref 11.5–15.5)
IMM GRANULOCYTES NFR BLD AUTO: 7.2 % — HIGH (ref 0–0.9)
INR BLD: 1.18 RATIO — HIGH (ref 0.88–1.16)
LACTATE BLDV-MCNC: 1.4 MMOL/L — SIGNIFICANT CHANGE UP (ref 0.5–2)
LYMPHOCYTES # BLD AUTO: 0.44 K/UL — LOW (ref 1–3.3)
LYMPHOCYTES # BLD AUTO: 8.4 % — LOW (ref 13–44)
MAGNESIUM SERPL-MCNC: 2 MG/DL — SIGNIFICANT CHANGE UP (ref 1.6–2.6)
MCHC RBC-ENTMCNC: 31.3 PG — SIGNIFICANT CHANGE UP (ref 27–34)
MCHC RBC-ENTMCNC: 31.9 GM/DL — LOW (ref 32–36)
MCV RBC AUTO: 98.2 FL — SIGNIFICANT CHANGE UP (ref 80–100)
MONOCYTES # BLD AUTO: 0.7 K/UL — SIGNIFICANT CHANGE UP (ref 0–0.9)
MONOCYTES NFR BLD AUTO: 13.3 % — SIGNIFICANT CHANGE UP (ref 2–14)
NEUTROPHILS # BLD AUTO: 3.73 K/UL — SIGNIFICANT CHANGE UP (ref 1.8–7.4)
NEUTROPHILS NFR BLD AUTO: 70.9 % — SIGNIFICANT CHANGE UP (ref 43–77)
NRBC # BLD: 0 /100 WBCS — SIGNIFICANT CHANGE UP (ref 0–0)
PCO2 BLDV: 49 MMHG — HIGH (ref 39–42)
PH BLDV: 7.36 — SIGNIFICANT CHANGE UP (ref 7.32–7.43)
PHOSPHATE SERPL-MCNC: 2.7 MG/DL — SIGNIFICANT CHANGE UP (ref 2.5–4.5)
PLATELET # BLD AUTO: 408 K/UL — HIGH (ref 150–400)
PO2 BLDV: 40 MMHG — SIGNIFICANT CHANGE UP (ref 25–45)
POTASSIUM BLDV-SCNC: 4.4 MMOL/L — SIGNIFICANT CHANGE UP (ref 3.5–5.1)
POTASSIUM SERPL-MCNC: 4.5 MMOL/L — SIGNIFICANT CHANGE UP (ref 3.5–5.3)
POTASSIUM SERPL-SCNC: 4.5 MMOL/L — SIGNIFICANT CHANGE UP (ref 3.5–5.3)
PROT SERPL-MCNC: 7 G/DL — SIGNIFICANT CHANGE UP (ref 6–8.3)
PROTHROM AB SERPL-ACNC: 13.7 SEC — HIGH (ref 10.5–13.4)
RBC # BLD: 2.78 M/UL — LOW (ref 3.8–5.2)
RBC # FLD: 18 % — HIGH (ref 10.3–14.5)
SAO2 % BLDV: 64.1 % — LOW (ref 67–88)
SODIUM SERPL-SCNC: 133 MMOL/L — LOW (ref 135–145)
WBC # BLD: 5.26 K/UL — SIGNIFICANT CHANGE UP (ref 3.8–10.5)
WBC # FLD AUTO: 5.26 K/UL — SIGNIFICANT CHANGE UP (ref 3.8–10.5)

## 2022-10-26 PROCEDURE — 93306 TTE W/DOPPLER COMPLETE: CPT | Mod: 26

## 2022-10-26 RX ADMIN — ENOXAPARIN SODIUM 40 MILLIGRAM(S): 100 INJECTION SUBCUTANEOUS at 05:31

## 2022-10-26 RX ADMIN — Medication 6 MILLIGRAM(S): at 05:29

## 2022-10-26 RX ADMIN — CHLORHEXIDINE GLUCONATE 1 APPLICATION(S): 213 SOLUTION TOPICAL at 11:42

## 2022-10-26 RX ADMIN — ENOXAPARIN SODIUM 40 MILLIGRAM(S): 100 INJECTION SUBCUTANEOUS at 17:39

## 2022-10-26 RX ADMIN — FENTANYL CITRATE 1 PATCH: 50 INJECTION INTRAVENOUS at 07:51

## 2022-10-26 RX ADMIN — AMLODIPINE BESYLATE 10 MILLIGRAM(S): 2.5 TABLET ORAL at 05:31

## 2022-10-26 RX ADMIN — Medication 100 MILLIGRAM(S): at 00:15

## 2022-10-26 RX ADMIN — SENNA PLUS 2 TABLET(S): 8.6 TABLET ORAL at 22:17

## 2022-10-26 RX ADMIN — Medication 400 MILLIGRAM(S): at 11:41

## 2022-10-26 RX ADMIN — PANTOPRAZOLE SODIUM 40 MILLIGRAM(S): 20 TABLET, DELAYED RELEASE ORAL at 05:32

## 2022-10-26 RX ADMIN — Medication 100 MILLIGRAM(S): at 22:48

## 2022-10-26 RX ADMIN — POLYETHYLENE GLYCOL 3350 17 GRAM(S): 17 POWDER, FOR SOLUTION ORAL at 11:40

## 2022-10-26 RX ADMIN — FENTANYL CITRATE 1 PATCH: 50 INJECTION INTRAVENOUS at 19:00

## 2022-10-26 NOTE — PROGRESS NOTE ADULT - ASSESSMENT
CARDIOLOGY ATTENDING    Agree with above. Awaiting echo. If echo unremarkable then no further inpatient cardiac workup expected.

## 2022-10-26 NOTE — PROGRESS NOTE ADULT - SUBJECTIVE AND OBJECTIVE BOX
SUBJECTIVE  Pt seen bedside this AM. States that she feels a little better today and it is easier to breathe. Has not had chest pain overnight.     OBJECTIVE:  ICU Vital Signs Last 24 Hrs  T(C): 36.8 (26 Oct 2022 05:15), Max: 37.1 (25 Oct 2022 22:00)  T(F): 98.3 (26 Oct 2022 05:15), Max: 98.7 (25 Oct 2022 22:00)  HR: 94 (26 Oct 2022 08:51) (88 - 104)  BP: 107/72 (26 Oct 2022 05:15) (107/72 - 116/75)  BP(mean): --  ABP: --  ABP(mean): --  RR: 16 (26 Oct 2022 08:51) (16 - 18)  SpO2: 98% (26 Oct 2022 08:51) (95% - 100%)    O2 Parameters below as of 26 Oct 2022 08:51  Patient On (Oxygen Delivery Method): nasal cannula, high flow  O2 Flow (L/min): 40  O2 Concentration (%): 40          10-25 @ 07:01  -  10-26 @ 07:00  --------------------------------------------------------  IN: 300 mL / OUT: 1350 mL / NET: -1050 mL    10-26 @ 07:01  -  10-26 @ 12:30  --------------------------------------------------------  IN: 240 mL / OUT: 0 mL / NET: 240 mL      CAPILLARY BLOOD GLUCOSE          PHYSICAL EXAM:  General: Well-groomed, NAD, laying in bed, on ___O2  HEENT: PERRLA, EOMI, non-icteric  Neck:  symmetric,  JVD absent  Respiratory: Clear to ascultation bilaterally, no crackles/rales, no Resp distress; no accessory muscle use  Cardiovascular:  RRR, no murmurs/rubs/gallops  Abdomen: Soft, NT, ND  Extremities: No edema noted  Skin: No rashes or lesions noted  Neurological: Sensation grossly intact; strength 5/5 in all extremities.  Psychiatry: AOx3, appropriate insight/judgement, appropriate affect, recent/remote memory intact    PRN Meds:  ALBUTerol    90 MICROgram(s) HFA Inhaler 2 Puff(s) Inhalation every 4 hours PRN  aluminum hydroxide/magnesium hydroxide/simethicone Suspension 30 milliLiter(s) Oral every 4 hours PRN  benzonatate 100 milliGRAM(s) Oral every 8 hours PRN  bisacodyl 5 milliGRAM(s) Oral daily PRN  bisacodyl Suppository 10 milliGRAM(s) Rectal daily PRN  guaifenesin/dextromethorphan Oral Liquid 10 milliLiter(s) Oral every 4 hours PRN  HYDROmorphone  Injectable 1 milliGRAM(s) IV Push every 4 hours PRN  HYDROmorphone  Injectable 2 milliGRAM(s) IV Push every 4 hours PRN  megestrol 40 milliGRAM(s) Oral daily PRN  melatonin 3 milliGRAM(s) Oral at bedtime PRN  ondansetron Injectable 4 milliGRAM(s) IV Push every 8 hours PRN      LABS:                        8.7    5.26  )-----------( 408      ( 26 Oct 2022 07:16 )             27.3     Hgb Trend: 8.7<--, 8.1<--, 8.9<--, 8.1<--, 8.4<--  10-26    133<L>  |  100  |  21  ----------------------------<  78  4.5   |  25  |  0.68    Ca    8.7      26 Oct 2022 07:02  Phos  2.7     10-26  Mg     2.0     10-26    TPro  7.0  /  Alb  2.8<L>  /  TBili  0.3  /  DBili  x   /  AST  16  /  ALT  18  /  AlkPhos  72  10-26    Creatinine Trend: 0.68<--, 0.80<--, 0.85<--, 0.84<--, 0.90<--, 0.88<--  PT/INR - ( 26 Oct 2022 07:17 )   PT: 13.7 sec;   INR: 1.18 ratio         PTT - ( 26 Oct 2022 07:17 )  PTT:28.4 sec      Venous Blood Gas:  10-26 @ 07:55  7.36/49/40/28/64.1  VBG Lactate: 1.4      MICROBIOLOGY:       RADIOLOGY:  [ ] Reviewed and interpreted by me    EKG: SUBJECTIVE  Pt seen bedside this AM. States that she feels a little better today and it is easier to breathe. Has not had chest pain overnight.     OBJECTIVE:  ICU Vital Signs Last 24 Hrs  T(C): 36.8 (26 Oct 2022 05:15), Max: 37.1 (25 Oct 2022 22:00)  T(F): 98.3 (26 Oct 2022 05:15), Max: 98.7 (25 Oct 2022 22:00)  HR: 94 (26 Oct 2022 08:51) (88 - 104)  BP: 107/72 (26 Oct 2022 05:15) (107/72 - 116/75)  BP(mean): --  ABP: --  ABP(mean): --  RR: 16 (26 Oct 2022 08:51) (16 - 18)  SpO2: 98% (26 Oct 2022 08:51) (95% - 100%)    O2 Parameters below as of 26 Oct 2022 08:51  Patient On (Oxygen Delivery Method): nasal cannula, high flow  O2 Flow (L/min): 40  O2 Concentration (%): 40          10-25 @ 07:01  -  10-26 @ 07:00  --------------------------------------------------------  IN: 300 mL / OUT: 1350 mL / NET: -1050 mL    10-26 @ 07:01  -  10-26 @ 12:30  --------------------------------------------------------  IN: 240 mL / OUT: 0 mL / NET: 240 mL      CAPILLARY BLOOD GLUCOSE          PHYSICAL EXAM:  General: Well-groomed, NAD, laying in bed, on HFNC 40% 40L/min  HEENT: PERRLA, EOMI, non-icteric  Neck:  symmetric,  JVD absent  Respiratory: Clear to ascultation bilaterally, no crackles/rales, no Resp distress; no accessory muscle use  Cardiovascular:  RRR, no murmurs/rubs/gallops  Abdomen: Soft, NT, ND  Extremities: No edema noted  Skin: No rashes or lesions noted  Neurological: Sensation grossly intact  Psychiatry: AOx3, appropriate insight/judgement, appropriate affect, recent/remote memory intact    PRN Meds:  ALBUTerol    90 MICROgram(s) HFA Inhaler 2 Puff(s) Inhalation every 4 hours PRN  aluminum hydroxide/magnesium hydroxide/simethicone Suspension 30 milliLiter(s) Oral every 4 hours PRN  benzonatate 100 milliGRAM(s) Oral every 8 hours PRN  bisacodyl 5 milliGRAM(s) Oral daily PRN  bisacodyl Suppository 10 milliGRAM(s) Rectal daily PRN  guaifenesin/dextromethorphan Oral Liquid 10 milliLiter(s) Oral every 4 hours PRN  HYDROmorphone  Injectable 1 milliGRAM(s) IV Push every 4 hours PRN  HYDROmorphone  Injectable 2 milliGRAM(s) IV Push every 4 hours PRN  megestrol 40 milliGRAM(s) Oral daily PRN  melatonin 3 milliGRAM(s) Oral at bedtime PRN  ondansetron Injectable 4 milliGRAM(s) IV Push every 8 hours PRN      LABS:                        8.7    5.26  )-----------( 408      ( 26 Oct 2022 07:16 )             27.3     Hgb Trend: 8.7<--, 8.1<--, 8.9<--, 8.1<--, 8.4<--  10-26    133<L>  |  100  |  21  ----------------------------<  78  4.5   |  25  |  0.68    Ca    8.7      26 Oct 2022 07:02  Phos  2.7     10-26  Mg     2.0     10-26    TPro  7.0  /  Alb  2.8<L>  /  TBili  0.3  /  DBili  x   /  AST  16  /  ALT  18  /  AlkPhos  72  10-26    Creatinine Trend: 0.68<--, 0.80<--, 0.85<--, 0.84<--, 0.90<--, 0.88<--  PT/INR - ( 26 Oct 2022 07:17 )   PT: 13.7 sec;   INR: 1.18 ratio         PTT - ( 26 Oct 2022 07:17 )  PTT:28.4 sec      Venous Blood Gas:  10-26 @ 07:55  7.36/49/40/28/64.1  VBG Lactate: 1.4      MICROBIOLOGY:       RADIOLOGY:  [ ] Reviewed and interpreted by me    EKG:

## 2022-10-26 NOTE — PROGRESS NOTE ADULT - PROBLEM SELECTOR PLAN 3
has sternal tenderness:  pain control    10/22: localised sternal tenderness: pain control    10/23: resolved:  has MM    10/25: no pain now;    10/26: resolved

## 2022-10-26 NOTE — PROGRESS NOTE ADULT - PROBLEM SELECTOR PLAN 6
-[place] [facility] [place]  -heme/onc following  -pain control  -f/u palliative consult

## 2022-10-26 NOTE — PROGRESS NOTE ADULT - PROBLEM SELECTOR PLAN 2
-s/p Vanc/Cefepime, decadron in ED  -c/w supplemental O2 and wean as tolerated  -c/w dexamethasone day 3/10  -c/w remdesivir day 3/5  -f/u blood cultures  -antitussive prn -s/p Vanc/Cefepime, decadron in ED  -c/w supplemental O2 and wean as tolerated  -c/w dexamethasone day 3/10  -c/w remdesivir day 3/5  -BCx ngtd  -antitussive prn

## 2022-10-26 NOTE — PROGRESS NOTE ADULT - PROBLEM SELECTOR PLAN 1
Reported hypoxic to 80s at [facility].   Ct chest showed small right and moderate left pleural effusions and compressive   atelectasis.  -Now requiring HFNC; O2 decreased to 40%, 45L/min  -likely from COVID-19  -continous pulse ox monitoring  -c/w supplemental O2 and wean as tolerated  -will hold off on abx for now as sxs likely d/t COVID-19  -if she spikes a fever, can start empirically on abx pending BCx Reported hypoxic to 80s at [facility].   Ct chest showed small right and moderate left pleural effusions and compressive   atelectasis.  -Now requiring HFNC; O2 decreased to 40%, 40L/min     -satting well, can transition to non-rebreather later today  -likely from COVID-19  -continous pulse ox monitoring  -if she spikes a fever, can start empirically on abx pending BCx

## 2022-10-26 NOTE — PROGRESS NOTE ADULT - ASSESSMENT
61F unvaccinated w/ PMH MM(dx'd 2018) currently on chemo(last session 10/13) p/w 1-day h/o chest pain and fever. Most history per daughter at baseline as pt in significant pain and unable to talk. Pt had initially presented to [facility] with a sharp mid-chest pain gradual in onset that progressively got worse associated with a fever as high as 102.2 and a productive cough with greenish sputum.     Hematology/Oncology called to see patient who is [place]. Denise Fatima [place] [facility] the [place] of IgG lambda multiple myeloma with extensive bone metastases recently started on CyborD chemotherapy 10/7/2022 (LD 10/13/2022). She was recently hospitalized for [place]/pain management [place] [facility] 10/15/2022.     IgG lambda multiple myeloma  --Under [place] at [facility] [place]. Denise Fatima  --Chemotherapy on hold during hospitalization    COVID-19 Infection  --Previously unvaccinated  --Likely cause of SOB, fever, sputum production  --Lungs now clear to auscultation  --Completed Remdesevir, continuing Dexamethasone  --O2 supplement as needed - has been transitioned to HFNC - tapering down as tolerated  --Management per ID, Pulm, Primary Team    Chest pain  --Underlying bone disease, COVID infection  --Cardiology, Pulmonary following  -- Well controlled on current management    Pleural Effusion  --Management per Pulmonary    Pain Management  --S/P multiple pathologic compression fractures from disease  --Patient needs aggressive pain management  --Palliative care recommended if current regimen not adequate, though seems to be doing well so far    Anemia  --Secondary to disease, recent chemotherapy  --Please transfuse PRBC's if <7 grams    Thrombocytopenia  --Was from chemotherapy  --Fully resolved    Constipation  --Likely secondary to heavy opioid requirements  --aggressive bowel regimen, now resolved    We will continue to follow patient and coordinate with [facility].    Bertin Rae PA-C  Hematology/Oncology  [facility]   [phone] (office)  [phone] (alt office)  Evenings and weekends please call MD on call or office

## 2022-10-26 NOTE — PROGRESS NOTE ADULT - SUBJECTIVE AND OBJECTIVE BOX
Patient is a 61y old  Female who presents with a chief complaint of SOB, CP (26 Oct 2022 11:25)    Patient seen this morning. In great spirits. Pain is well controlled. Still on O2 via HFNC but weaning down slowly and breathing comfortably.    MEDICATIONS  (STANDING):  acyclovir   Oral Tab/Cap 400 milliGRAM(s) Oral daily  amLODIPine   Tablet 10 milliGRAM(s) Oral daily  chlorhexidine 4% Liquid 1 Application(s) Topical daily  dexAMETHasone  Injectable 6 milliGRAM(s) IV Push daily  enoxaparin Injectable 40 milliGRAM(s) SubCutaneous every 12 hours  fentaNYL   Patch  50 MICROgram(s)/Hr 1 Patch Transdermal every 72 hours  naloxone Injectable 0.4 milliGRAM(s) IV Push once  pantoprazole    Tablet 40 milliGRAM(s) Oral before breakfast  polyethylene glycol 3350 17 Gram(s) Oral daily  senna 2 Tablet(s) Oral at bedtime    MEDICATIONS  (PRN):  ALBUTerol    90 MICROgram(s) HFA Inhaler 2 Puff(s) Inhalation every 4 hours PRN Shortness of Breath and/or Wheezing  aluminum hydroxide/magnesium hydroxide/simethicone Suspension 30 milliLiter(s) Oral every 4 hours PRN Dyspepsia  benzonatate 100 milliGRAM(s) Oral every 8 hours PRN Cough  bisacodyl 5 milliGRAM(s) Oral daily PRN Constipation  bisacodyl Suppository 10 milliGRAM(s) Rectal daily PRN Constipation  guaifenesin/dextromethorphan Oral Liquid 10 milliLiter(s) Oral every 4 hours PRN Cough  HYDROmorphone  Injectable 1 milliGRAM(s) IV Push every 4 hours PRN Moderate Pain (4 - 6)  HYDROmorphone  Injectable 2 milliGRAM(s) IV Push every 4 hours PRN Severe Pain (7 - 10)  megestrol 40 milliGRAM(s) Oral daily PRN appetite  melatonin 3 milliGRAM(s) Oral at bedtime PRN Insomnia  ondansetron Injectable 4 milliGRAM(s) IV Push every 8 hours PRN Nausea and/or Vomiting      Vital Signs Last 24 Hrs  T(C): 36.8 (26 Oct 2022 05:15), Max: 37.1 (25 Oct 2022 22:00)  T(F): 98.3 (26 Oct 2022 05:15), Max: 98.7 (25 Oct 2022 22:00)  HR: 94 (26 Oct 2022 08:51) (88 - 104)  BP: 107/72 (26 Oct 2022 05:15) (107/72 - 116/75)  BP(mean): --  RR: 16 (26 Oct 2022 08:51) (16 - 18)  SpO2: 98% (26 Oct 2022 08:51) (95% - 100%)    Parameters below as of 26 Oct 2022 08:51  Patient On (Oxygen Delivery Method): nasal cannula, high flow  O2 Flow (L/min): 40  O2 Concentration (%): 40    PE  NAD  Awake, alert  Anicteric, MMM  No c/c/e  No rash grossly                            8.7    5.26  )-----------( 408      ( 26 Oct 2022 07:16 )             27.3       10-26    133<L>  |  100  |  21  ----------------------------<  78  4.5   |  25  |  0.68    Ca    8.7      26 Oct 2022 07:02  Phos  2.7     10-26  Mg     2.0     10-26    TPro  7.0  /  Alb  2.8<L>  /  TBili  0.3  /  DBili  x   /  AST  16  /  ALT  18  /  AlkPhos  72  10-26

## 2022-10-26 NOTE — PROGRESS NOTE ADULT - SUBJECTIVE AND OBJECTIVE BOX
Date of Service: 10-26-22 @ 16:54    Patient is a 61y old  Female who presents with a chief complaint of SOB, CP (26 Oct 2022 12:30)      Any change in ROS: seems OK : no sob:  on 40% high flow:     MEDICATIONS  (STANDING):  acyclovir   Oral Tab/Cap 400 milliGRAM(s) Oral daily  amLODIPine   Tablet 10 milliGRAM(s) Oral daily  chlorhexidine 4% Liquid 1 Application(s) Topical daily  dexAMETHasone  Injectable 6 milliGRAM(s) IV Push daily  enoxaparin Injectable 40 milliGRAM(s) SubCutaneous every 12 hours  fentaNYL   Patch  50 MICROgram(s)/Hr 1 Patch Transdermal every 72 hours  naloxone Injectable 0.4 milliGRAM(s) IV Push once  pantoprazole    Tablet 40 milliGRAM(s) Oral before breakfast  polyethylene glycol 3350 17 Gram(s) Oral daily  senna 2 Tablet(s) Oral at bedtime    MEDICATIONS  (PRN):  ALBUTerol    90 MICROgram(s) HFA Inhaler 2 Puff(s) Inhalation every 4 hours PRN Shortness of Breath and/or Wheezing  aluminum hydroxide/magnesium hydroxide/simethicone Suspension 30 milliLiter(s) Oral every 4 hours PRN Dyspepsia  benzonatate 100 milliGRAM(s) Oral every 8 hours PRN Cough  bisacodyl 5 milliGRAM(s) Oral daily PRN Constipation  bisacodyl Suppository 10 milliGRAM(s) Rectal daily PRN Constipation  guaifenesin/dextromethorphan Oral Liquid 10 milliLiter(s) Oral every 4 hours PRN Cough  HYDROmorphone  Injectable 1 milliGRAM(s) IV Push every 4 hours PRN Moderate Pain (4 - 6)  HYDROmorphone  Injectable 2 milliGRAM(s) IV Push every 4 hours PRN Severe Pain (7 - 10)  megestrol 40 milliGRAM(s) Oral daily PRN appetite  melatonin 3 milliGRAM(s) Oral at bedtime PRN Insomnia  ondansetron Injectable 4 milliGRAM(s) IV Push every 8 hours PRN Nausea and/or Vomiting    Vital Signs Last 24 Hrs  T(C): 37 (26 Oct 2022 14:42), Max: 37.1 (25 Oct 2022 22:00)  T(F): 98.6 (26 Oct 2022 14:42), Max: 98.7 (25 Oct 2022 22:00)  HR: 91 (26 Oct 2022 16:25) (88 - 105)  BP: 111/75 (26 Oct 2022 14:42) (107/72 - 111/75)  BP(mean): --  RR: 16 (26 Oct 2022 16:25) (16 - 18)  SpO2: 96% (26 Oct 2022 16:25) (95% - 100%)    Parameters below as of 26 Oct 2022 16:25  Patient On (Oxygen Delivery Method): nasal cannula, high flow  O2 Flow (L/min): 40  O2 Concentration (%): 40    I&O's Summary    25 Oct 2022 07:01  -  26 Oct 2022 07:00  --------------------------------------------------------  IN: 300 mL / OUT: 1350 mL / NET: -1050 mL    26 Oct 2022 07:01  -  26 Oct 2022 16:54  --------------------------------------------------------  IN: 840 mL / OUT: 500 mL / NET: 340 mL          Physical Exam:   GENERAL: NAD, well-groomed, well-developed  HEENT: CARLA/   Atraumatic, Normocephalic  ENMT: No tonsillar erythema, exudates, or enlargement; Moist mucous membranes, Good dentition, No lesions  NECK: Supple, No JVD, Normal thyroid  CHEST/LUNG: Clear to auscultaion, ; No rales, rhonchi, wheezing, or rubs  CVS: Regular rate and rhythm; No murmurs, rubs, or gallops  GI: : Soft, Nontender, Nondistended; Bowel sounds present  NERVOUS SYSTEM:  Alert & Oriented X3  EXTREMITIES:  - edema  LYMPH: No lymphadenopathy noted  SKIN: No rashes or lesions  ENDOCRINOLOGY: No Thyromegaly  PSYCH: Appropriate    Labs:  28, 28, 29                            8.7    5.26  )-----------( 408      ( 26 Oct 2022 07:16 )             27.3                         8.1    6.51  )-----------( 375      ( 25 Oct 2022 07:00 )             25.8                         8.9    6.20  )-----------( 340      ( 24 Oct 2022 10:06 )             28.6                         8.1    7.11  )-----------( 287      ( 23 Oct 2022 10:25 )             25.4     10-26    133<L>  |  100  |  21  ----------------------------<  78  4.5   |  25  |  0.68  10-25    133<L>  |  99  |  22  ----------------------------<  77  4.8   |  24  |  0.80  10-24    136  |  101  |  24<H>  ----------------------------<  117<H>  4.9   |  26  |  0.85  10-23    134<L>  |  103  |  21  ----------------------------<  135<H>  4.9   |  22  |  0.84    Ca    8.7      26 Oct 2022 07:02  Ca    9.2      25 Oct 2022 07:01  Phos  2.7     10-26  Phos  2.8     10-25  Mg     2.0     10-26  Mg     2.0     10-25    TPro  7.0  /  Alb  2.8<L>  /  TBili  0.3  /  DBili  x   /  AST  16  /  ALT  18  /  AlkPhos  72  10-26  TPro  7.7  /  Alb  3.0<L>  /  TBili  0.2  /  DBili  x   /  AST  15  /  ALT  20  /  AlkPhos  77  10-25  TPro  8.2  /  Alb  3.4  /  TBili  0.2  /  DBili  x   /  AST  18  /  ALT  25  /  AlkPhos  85  10-24  TPro  7.6  /  Alb  2.8<L>  /  TBili  0.2  /  DBili  x   /  AST  19  /  ALT  24  /  AlkPhos  74  10-23    CAPILLARY BLOOD GLUCOSE          LIVER FUNCTIONS - ( 26 Oct 2022 07:02 )  Alb: 2.8 g/dL / Pro: 7.0 g/dL / ALK PHOS: 72 U/L / ALT: 18 U/L / AST: 16 U/L / GGT: x           PT/INR - ( 26 Oct 2022 07:17 )   PT: 13.7 sec;   INR: 1.18 ratio         PTT - ( 26 Oct 2022 07:17 )  PTT:28.4 sec    Procalcitonin, Serum: 0.16 ng/mL (10-23 @ 10:25)        RECENT CULTURES:  10-21 @ 07:49 Clean Catch Clean Catch (Midstream)       < from: CT Angio Chest PE Protocol w/ IV Cont (10.18.22 @ 20:03) >  FINDINGS:    PULMONARY ANGIOGRAM: Contrast bolus is satisfactory. No main, right main,   left main, lobar or segmental pulmonary embolism.    LYMPH NODES: No lymphadenopathy.    HEART/VASCULATURE: The heart is normal in size. No pericardial effusion.    AIRWAYS/LUNGS/PLEURA: Small right and moderate left pleural effusions and   compressive atelectasis. Patchy areas of linear atelectasis in the   bilateral upper lobes.    UPPER ABDOMEN: Within normal limits.    BONES/SOFT TISSUES: Permeative lytic lesions throughout the osseous   structures suggestive of myeloma. Compression deformities involving T3,   T6, T10, T11, L1 and L2 vertebral bodies.    IMPRESSION:    No pulmonary embolism.    Smallright and moderate left pleural effusions and compressive   atelectasis.    --- End of Report ---           GIULIANO NAILS MD; Resident Radiologist  This document has been electronically signed.  BERT LA MD; Attending Radiologist  This document has been electronically signed. Oct 18 2022  8:21PM    < end of copied text >           <10,000 CFU/mL Normal Urogenital Precious          RESPIRATORY CULTURES:          Studies  Chest X-RAY  CT SCAN Chest   Venous Dopplers: LE:   CT Abdomen  Others          < from: Transthoracic Echocardiogram (10.26.22 @ 10:10) >    Patient name: CECILIA DUNAWAY  YOB: 1960   Age: 61 (F)   MR#: [id number]  Study Date: 10/26/2022  Location: [address] [place], [place] [id number]: Jyoti Thakur RDCS  Study quality: Technically fair  Referring Physician: Aisha Arreola MD  Blood Pressure: 107/72 mmHg  Height: 150 cm  Weight: 44 kg  BSA: 1.4 m2  Heart Rhythm: Sinus tachycardia  Heart Rate: 108 mmHg  ------------------------------------------------------------------------  PROCEDURE: Transthoracic echocardiogram with 2-D, M-Mode  and complete spectral and color flow Doppler.  INDICATION: Dyspnea, unspecified (R06.00)  ------------------------------------------------------------------------  Dimensions:    Normal Values:  LA:     2.8    2.0 - 4.0 cm  Ao:     2.4    2.0 - 3.8 cm  SEPTUM: 0.6    0.6 - 1.2 cm  PWT:    0.7    0.6 - 1.1 cm  LVIDd:  3.4    3.0 - 5.6 cm  LVIDs:  2.3    1.8 - 4.0 cm  Derived variables:  LVMI: 41 g/m2  RWT: 0.40  EF (Visual Estimate): 65 %  ------------------------------------------------------------------------  Observations:  Mitral Valve: Normal mitral valve.  Aortic Valve/Aorta: Normal aortic valve.  Normal aortic root size.  Left Atrium: Normal left atrium.  Left Ventricle: Normal left ventricular internal dimensions  and wall thicknesses.  Normal left ventricular systolic function. No segmental  wall motion abnormalities.  Right Heart: Normal right atrium. Normal right ventricular  size and function.  Normal tricuspid valve.  Pericardium/Pleura: No pericardial effusion seen.  Bilateral pleural effusions.  Hemodynamic: No evidence of pulmonary hypertension.  ------------------------------------------------------------------------  Conclusions:  Apical windows are off-axis.  Normal left ventricular systolic function. No segmental  wall motion abnormalities.  Bilateral pleural effusions.  ------------------------------------------------------------------------  Confirmed on  10/26/2022 - 13:52:16 by Jose Manzo MD, FASE  ------------------------------------------------------------------------    < end of copied text >

## 2022-10-26 NOTE — PROGRESS NOTE ADULT - PROBLEM SELECTOR PLAN 1
n 80% : has covid infection:  probnp is OK: echo pending: eh has abdirahman effusions:  she has multiple myeloma:  ? add diuretics    10/21: she seems to be doing ok : no sob: on 60% fio2:: he has pleural effusion; probnp is low : not added diuretics ? needs tap  on left side:    10/22: seems same: on 60%:  high flow:  seems stable overnight: on dexa and remdesivir per protocol: try to wean down fio2:  no pe on ct a: has abdirahman effusion: await echo  : if the pleural effusions cant be explained on the basis of echo , the probably she  would need a tap    10/23: still requiring 60% high flow : no reps distress:    no chest pain today  ::  on covid rx:  await echo  ? etiology of pleural effusion    10/25: seems OK : no chest pain : await echo : on dexa: for a total of 10 days: await echo    10/26: she seems pretty good : change to nasal cannula: echo is normal : repeat chest xray  : if still effusion:  needs thoracostenosis:

## 2022-10-26 NOTE — PROGRESS NOTE ADULT - SUBJECTIVE AND OBJECTIVE BOX
C A R D I O L O G Y  **********************************     DATE OF SERVICE: 10-26-22    Discussed with RN - no reported chest pain or SOB on HFNC. Review of symptoms otherwise negative.    MEDICATIONS:  acyclovir   Oral Tab/Cap 400 milliGRAM(s) Oral daily  ALBUTerol    90 MICROgram(s) HFA Inhaler 2 Puff(s) Inhalation every 4 hours PRN  aluminum hydroxide/magnesium hydroxide/simethicone Suspension 30 milliLiter(s) Oral every 4 hours PRN  amLODIPine   Tablet 10 milliGRAM(s) Oral daily  benzonatate 100 milliGRAM(s) Oral every 8 hours PRN  bisacodyl 5 milliGRAM(s) Oral daily PRN  bisacodyl Suppository 10 milliGRAM(s) Rectal daily PRN  chlorhexidine 4% Liquid 1 Application(s) Topical daily  dexAMETHasone  Injectable 6 milliGRAM(s) IV Push daily  enoxaparin Injectable 40 milliGRAM(s) SubCutaneous every 12 hours  fentaNYL   Patch  50 MICROgram(s)/Hr 1 Patch Transdermal every 72 hours  guaifenesin/dextromethorphan Oral Liquid 10 milliLiter(s) Oral every 4 hours PRN  HYDROmorphone  Injectable 1 milliGRAM(s) IV Push every 4 hours PRN  HYDROmorphone  Injectable 2 milliGRAM(s) IV Push every 4 hours PRN  megestrol 40 milliGRAM(s) Oral daily PRN  melatonin 3 milliGRAM(s) Oral at bedtime PRN  naloxone Injectable 0.4 milliGRAM(s) IV Push once  ondansetron Injectable 4 milliGRAM(s) IV Push every 8 hours PRN  pantoprazole    Tablet 40 milliGRAM(s) Oral before breakfast  polyethylene glycol 3350 17 Gram(s) Oral daily  senna 2 Tablet(s) Oral at bedtime      LABS:                        8.7    5.26  )-----------( 408      ( 26 Oct 2022 07:16 )             27.3       Hemoglobin: 8.7 g/dL (10-26 @ 07:16)  Hemoglobin: 8.1 g/dL (10-25 @ 07:00)  Hemoglobin: 8.9 g/dL (10-24 @ 10:06)  Hemoglobin: 8.1 g/dL (10-23 @ 10:25)  Hemoglobin: 8.4 g/dL (10-22 @ 07:49)      10-26    133<L>  |  100  |  21  ----------------------------<  78  4.5   |  25  |  0.68    Ca    8.7      26 Oct 2022 07:02  Phos  2.7     10-26  Mg     2.0     10-26    TPro  7.0  /  Alb  2.8<L>  /  TBili  0.3  /  DBili  x   /  AST  16  /  ALT  18  /  AlkPhos  72  10-26    Creatinine Trend: 0.68<--, 0.80<--, 0.85<--, 0.84<--, 0.90<--, 0.88<--    COAGS: PT/INR - ( 26 Oct 2022 07:17 )   PT: 13.7 sec;   INR: 1.18 ratio         PTT - ( 26 Oct 2022 07:17 )  PTT:28.4 sec        Per Chart  PHYSICAL EXAM:  T(C): 36.8 (10-26-22 @ 05:15), Max: 37.1 (10-25-22 @ 22:00)  HR: 94 (10-26-22 @ 08:51) (88 - 104)  BP: 107/72 (10-26-22 @ 05:15) (107/72 - 116/75)  RR: 16 (10-26-22 @ 08:51) (16 - 18)  SpO2: 98% (10-26-22 @ 08:51) (95% - 100%)  Wt(kg): --    I&O's Summary    25 Oct 2022 07:01  -  26 Oct 2022 07:00  --------------------------------------------------------  IN: 300 mL / OUT: 1350 mL / NET: -1050 mL        Gen: NAD  HEENT:  (-)icterus (-)pallor  CV: N S1 S2 1/6 CHARLY (+)2 Pulses B/l  Resp:  Clear to auscultation B/L, normal effort  GI: (+) BS Soft, NT, ND  Lymph:  (-)Edema, (-)obvious lymphadenopathy  Skin: Warm to touch, Normal turgor  Psych: Appropriate mood and affect      TELEMETRY: None	      ECG: Sinus Tachycardia, PVC    RADIOLOGY:  < from: CT Angio Chest PE Protocol w/ IV Cont (10.18.22 @ 20:03) >  IMPRESSION:    No pulmonary embolism.    Smallright and moderate left pleural effusions and compressive   atelectasis.    --- End of Report ---    < end of copied text >      ASSESSMENT/PLAN: Patient is a 60 y/o Female with PMH of Multiple Myeloma (dx 2018) currently on chemo who presented with chest pain, SOB, and fever admitted with +COVID and b/l pleural effusions. Cardiology consulted for further evaluation.     - Patient with nonanginal chest pain that is pleuritic and very tender to palpation - suspect MSK related likely due to costochondritis and multiple myeloma lytic lesions  - EKG with sinus tachycardia and no acute ischemic changes  - Cardiac enzymes unremarkable  - CTA chest negative for PE, small R and mod L pleural effusions and compressive atelectasis  - Check TTE to eval LV function given pleural effusions however low BNP noted therefore unlikely due to CHF (echo lab called again today to expedite)  - Pulm follow up - wean O2 as tolerated  - Supportive care/covid management per primary team  - No further inpatient cardiac w/u expected if echo unremarkable    Artur Cortes PA-C  Pager: [phone]

## 2022-10-26 NOTE — PROGRESS NOTE ADULT - PROBLEM SELECTOR PLAN 2
Cont remdesivir and dexa:    10/21; cont current meds    10/22: cont current medications:    10/23: on dexa:  finished remdesivir:    10/25: cont dexa for 5 days    10/26" seems K : no sob:

## 2022-10-27 LAB
ALBUMIN SERPL ELPH-MCNC: 2.6 G/DL — LOW (ref 3.3–5)
ALP SERPL-CCNC: 69 U/L — SIGNIFICANT CHANGE UP (ref 40–120)
ALT FLD-CCNC: 15 U/L — SIGNIFICANT CHANGE UP (ref 10–45)
ANION GAP SERPL CALC-SCNC: 8 MMOL/L — SIGNIFICANT CHANGE UP (ref 5–17)
ANISOCYTOSIS BLD QL: SLIGHT — SIGNIFICANT CHANGE UP
AST SERPL-CCNC: 16 U/L — SIGNIFICANT CHANGE UP (ref 10–40)
BASOPHILS # BLD AUTO: 0 K/UL — SIGNIFICANT CHANGE UP (ref 0–0.2)
BASOPHILS NFR BLD AUTO: 0 % — SIGNIFICANT CHANGE UP (ref 0–2)
BILIRUB SERPL-MCNC: 0.3 MG/DL — SIGNIFICANT CHANGE UP (ref 0.2–1.2)
BUN SERPL-MCNC: 19 MG/DL — SIGNIFICANT CHANGE UP (ref 7–23)
CALCIUM SERPL-MCNC: 8.9 MG/DL — SIGNIFICANT CHANGE UP (ref 8.4–10.5)
CHLORIDE SERPL-SCNC: 101 MMOL/L — SIGNIFICANT CHANGE UP (ref 96–108)
CO2 SERPL-SCNC: 23 MMOL/L — SIGNIFICANT CHANGE UP (ref 22–31)
CREAT SERPL-MCNC: 0.71 MG/DL — SIGNIFICANT CHANGE UP (ref 0.5–1.3)
EGFR: 97 ML/MIN/1.73M2 — SIGNIFICANT CHANGE UP
EOSINOPHIL # BLD AUTO: 0 K/UL — SIGNIFICANT CHANGE UP (ref 0–0.5)
EOSINOPHIL NFR BLD AUTO: 0 % — SIGNIFICANT CHANGE UP (ref 0–6)
GLUCOSE SERPL-MCNC: 67 MG/DL — LOW (ref 70–99)
HCT VFR BLD CALC: 23.6 % — LOW (ref 34.5–45)
HGB BLD-MCNC: 7.1 G/DL — LOW (ref 11.5–15.5)
HYPOCHROMIA BLD QL: SLIGHT — SIGNIFICANT CHANGE UP
LYMPHOCYTES # BLD AUTO: 0.66 K/UL — LOW (ref 1–3.3)
LYMPHOCYTES # BLD AUTO: 15 % — SIGNIFICANT CHANGE UP (ref 13–44)
MACROCYTES BLD QL: SLIGHT — SIGNIFICANT CHANGE UP
MAGNESIUM SERPL-MCNC: 2.1 MG/DL — SIGNIFICANT CHANGE UP (ref 1.6–2.6)
MANUAL SMEAR VERIFICATION: SIGNIFICANT CHANGE UP
MCHC RBC-ENTMCNC: 30.1 GM/DL — LOW (ref 32–36)
MCHC RBC-ENTMCNC: 31.1 PG — SIGNIFICANT CHANGE UP (ref 27–34)
MCV RBC AUTO: 103.5 FL — HIGH (ref 80–100)
METAMYELOCYTES # FLD: 2 % — HIGH (ref 0–0)
MONOCYTES # BLD AUTO: 0.13 K/UL — SIGNIFICANT CHANGE UP (ref 0–0.9)
MONOCYTES NFR BLD AUTO: 3 % — SIGNIFICANT CHANGE UP (ref 2–14)
MYELOCYTES NFR BLD: 2 % — HIGH (ref 0–0)
NEUTROPHILS # BLD AUTO: 3.43 K/UL — SIGNIFICANT CHANGE UP (ref 1.8–7.4)
NEUTROPHILS NFR BLD AUTO: 76 % — SIGNIFICANT CHANGE UP (ref 43–77)
NEUTS BAND # BLD: 2 % — SIGNIFICANT CHANGE UP (ref 0–8)
NRBC # BLD: 0 /100 — SIGNIFICANT CHANGE UP (ref 0–0)
PHOSPHATE SERPL-MCNC: 2.7 MG/DL — SIGNIFICANT CHANGE UP (ref 2.5–4.5)
PLAT MORPH BLD: NORMAL — SIGNIFICANT CHANGE UP
PLATELET # BLD AUTO: 277 K/UL — SIGNIFICANT CHANGE UP (ref 150–400)
POIKILOCYTOSIS BLD QL AUTO: SLIGHT — SIGNIFICANT CHANGE UP
POTASSIUM SERPL-MCNC: 4.7 MMOL/L — SIGNIFICANT CHANGE UP (ref 3.5–5.3)
POTASSIUM SERPL-SCNC: 4.7 MMOL/L — SIGNIFICANT CHANGE UP (ref 3.5–5.3)
PROT SERPL-MCNC: 6.9 G/DL — SIGNIFICANT CHANGE UP (ref 6–8.3)
RBC # BLD: 2.28 M/UL — LOW (ref 3.8–5.2)
RBC # FLD: 18.3 % — HIGH (ref 10.3–14.5)
RBC BLD AUTO: ABNORMAL
SODIUM SERPL-SCNC: 132 MMOL/L — LOW (ref 135–145)
WBC # BLD: 4.4 K/UL — SIGNIFICANT CHANGE UP (ref 3.8–10.5)
WBC # FLD AUTO: 4.4 K/UL — SIGNIFICANT CHANGE UP (ref 3.8–10.5)

## 2022-10-27 PROCEDURE — 71045 X-RAY EXAM CHEST 1 VIEW: CPT | Mod: 26

## 2022-10-27 RX ORDER — SODIUM,POTASSIUM PHOSPHATES 278-250MG
1 POWDER IN PACKET (EA) ORAL ONCE
Refills: 0 | Status: COMPLETED | OUTPATIENT
Start: 2022-10-27 | End: 2022-10-27

## 2022-10-27 RX ADMIN — ENOXAPARIN SODIUM 40 MILLIGRAM(S): 100 INJECTION SUBCUTANEOUS at 06:06

## 2022-10-27 RX ADMIN — Medication 6 MILLIGRAM(S): at 06:06

## 2022-10-27 RX ADMIN — ALBUTEROL 2 PUFF(S): 90 AEROSOL, METERED ORAL at 16:36

## 2022-10-27 RX ADMIN — PANTOPRAZOLE SODIUM 40 MILLIGRAM(S): 20 TABLET, DELAYED RELEASE ORAL at 06:05

## 2022-10-27 RX ADMIN — HYDROMORPHONE HYDROCHLORIDE 2 MILLIGRAM(S): 2 INJECTION INTRAMUSCULAR; INTRAVENOUS; SUBCUTANEOUS at 20:48

## 2022-10-27 RX ADMIN — HYDROMORPHONE HYDROCHLORIDE 2 MILLIGRAM(S): 2 INJECTION INTRAMUSCULAR; INTRAVENOUS; SUBCUTANEOUS at 20:18

## 2022-10-27 RX ADMIN — CHLORHEXIDINE GLUCONATE 1 APPLICATION(S): 213 SOLUTION TOPICAL at 11:45

## 2022-10-27 RX ADMIN — Medication 400 MILLIGRAM(S): at 11:44

## 2022-10-27 RX ADMIN — AMLODIPINE BESYLATE 10 MILLIGRAM(S): 2.5 TABLET ORAL at 06:06

## 2022-10-27 RX ADMIN — SENNA PLUS 2 TABLET(S): 8.6 TABLET ORAL at 22:33

## 2022-10-27 RX ADMIN — Medication 1 PACKET(S): at 11:44

## 2022-10-27 RX ADMIN — FENTANYL CITRATE 1 PATCH: 50 INJECTION INTRAVENOUS at 12:00

## 2022-10-27 RX ADMIN — FENTANYL CITRATE 1 PATCH: 50 INJECTION INTRAVENOUS at 19:00

## 2022-10-27 NOTE — PROGRESS NOTE ADULT - PROBLEM SELECTOR PLAN 3
Unclear etiology, more likely malignant vs CHF  -Patient received Lasix 40IV x1 w/o much improvement  -F/u with Pulm to tap effusion after clinical improvement of patient Unclear etiology, more likely malignant vs CHF  -Patient received Lasix 40IV x1 w/o much improvement  -Pulm to tap pleural effusion today     -f/u molecular studies

## 2022-10-27 NOTE — PROGRESS NOTE ADULT - SUBJECTIVE AND OBJECTIVE BOX
C A R D I O L O G Y  **********************************     DATE OF SERVICE: 10-27-22    Patient denies chest pain or shortness of breath now on 6L nasal cannula.   Review of symptoms otherwise negative.    MEDICATIONS:  acyclovir   Oral Tab/Cap 400 milliGRAM(s) Oral daily  ALBUTerol    90 MICROgram(s) HFA Inhaler 2 Puff(s) Inhalation every 4 hours PRN  aluminum hydroxide/magnesium hydroxide/simethicone Suspension 30 milliLiter(s) Oral every 4 hours PRN  amLODIPine   Tablet 10 milliGRAM(s) Oral daily  benzonatate 100 milliGRAM(s) Oral every 8 hours PRN  bisacodyl 5 milliGRAM(s) Oral daily PRN  bisacodyl Suppository 10 milliGRAM(s) Rectal daily PRN  chlorhexidine 4% Liquid 1 Application(s) Topical daily  enoxaparin Injectable 40 milliGRAM(s) SubCutaneous every 12 hours  fentaNYL   Patch  50 MICROgram(s)/Hr 1 Patch Transdermal every 72 hours  guaifenesin/dextromethorphan Oral Liquid 10 milliLiter(s) Oral every 4 hours PRN  HYDROmorphone  Injectable 1 milliGRAM(s) IV Push every 4 hours PRN  HYDROmorphone  Injectable 2 milliGRAM(s) IV Push every 4 hours PRN  megestrol 40 milliGRAM(s) Oral daily PRN  melatonin 3 milliGRAM(s) Oral at bedtime PRN  naloxone Injectable 0.4 milliGRAM(s) IV Push once  ondansetron Injectable 4 milliGRAM(s) IV Push every 8 hours PRN  pantoprazole    Tablet 40 milliGRAM(s) Oral before breakfast  polyethylene glycol 3350 17 Gram(s) Oral daily  senna 2 Tablet(s) Oral at bedtime      LABS:                        7.1    4.40  )-----------( 277      ( 27 Oct 2022 07:27 )             23.6       Hemoglobin: 7.1 g/dL (10-27 @ 07:27)  Hemoglobin: 8.7 g/dL (10-26 @ 07:16)  Hemoglobin: 8.1 g/dL (10-25 @ 07:00)  Hemoglobin: 8.9 g/dL (10-24 @ 10:06)  Hemoglobin: 8.1 g/dL (10-23 @ 10:25)      10-27    132<L>  |  101  |  19  ----------------------------<  67<L>  4.7   |  23  |  0.71    Ca    8.9      27 Oct 2022 07:28  Phos  2.7     10-27  Mg     2.1     10-27    TPro  6.9  /  Alb  2.6<L>  /  TBili  0.3  /  DBili  x   /  AST  16  /  ALT  15  /  AlkPhos  69  10-27    Creatinine Trend: 0.71<--, 0.68<--, 0.80<--, 0.85<--, 0.84<--, 0.90<--    COAGS:           PHYSICAL EXAM:  T(C): 36.8 (10-27-22 @ 06:08), Max: 37 (10-26-22 @ 14:42)  HR: 99 (10-27-22 @ 10:21) (90 - 105)  BP: 103/69 (10-27-22 @ 06:08) (103/69 - 111/75)  RR: 19 (10-27-22 @ 10:21) (16 - 19)  SpO2: 96% (10-27-22 @ 10:21) (96% - 100%)  Wt(kg): --    I&O's Summary    26 Oct 2022 07:01  -  27 Oct 2022 07:00  --------------------------------------------------------  IN: 840 mL / OUT: 1000 mL / NET: -160 mL    27 Oct 2022 07:01  -  27 Oct 2022 13:51  --------------------------------------------------------  IN: 240 mL / OUT: 0 mL / NET: 240 mL        Gen: NAD  HEENT:  (-)icterus (-)pallor  CV: N S1 S2 1/6 CHARLY (+)2 Pulses B/l  Resp:  Clear to auscultation B/L, normal effort  GI: (+) BS Soft, NT, ND  Lymph:  (-)Edema, (-)obvious lymphadenopathy  Skin: Warm to touch, Normal turgor  Psych: Appropriate mood and affect      TELEMETRY: None	      ECG: Sinus Tachycardia, PVC    RADIOLOGY:  < from: CT Angio Chest PE Protocol w/ IV Cont (10.18.22 @ 20:03) >  IMPRESSION:    No pulmonary embolism.    Smallright and moderate left pleural effusions and compressive   atelectasis.    --- End of Report ---    < end of copied text >      ASSESSMENT/PLAN: Patient is a 60 y/o Female with PMH of Multiple Myeloma (dx 2018) currently on chemo who presented with chest pain, SOB, and fever admitted with +COVID and b/l pleural effusions. Cardiology consulted for further evaluation.     - Patient with nonanginal chest pain that is pleuritic and very tender to palpation - suspect MSK related likely due to costochondritis and multiple myeloma lytic lesions  - EKG with sinus tachycardia and no acute ischemic changes  - Cardiac enzymes unremarkable  - CTA chest negative for PE, small R and mod L pleural effusions and compressive atelectasis  - TTE with normal LV/RV function  - Pulm follow up - wean O2 as tolerated, pending diagnostic thoracentesis  - Supportive care/covid management per primary team  - No further inpatient cardiac w/u planned    Artur Cortes PA-C  Pager: [phone]

## 2022-10-27 NOTE — PROGRESS NOTE ADULT - PROBLEM SELECTOR PLAN 2
-s/p Vanc/Cefepime, decadron in ED  -c/w supplemental O2 and wean as tolerated  -c/w dexamethasone day 3/10  -c/w remdesivir day 3/5  -BCx ngtd  -antitussive prn -s/p Vanc/Cefepime, decadron in ED  -c/w supplemental O2 and wean as tolerated  -c/w dexamethasone day 3/10  -finished Remdesivir  -BCx ngtd  -antitussive prn

## 2022-10-27 NOTE — PROGRESS NOTE ADULT - PROBLEM SELECTOR PLAN 1
Reported hypoxic to 80s at [facility].   Ct chest showed small right and moderate left pleural effusions and compressive   atelectasis.  -Now requiring HFNC; O2 decreased to 40%, 40L/min     -satting well, can transition to non-rebreather later today  -likely from COVID-19  -continous pulse ox monitoring  -if she spikes a fever, can start empirically on abx pending BCx Reported hypoxic to 80s at [facility].   Ct chest showed small right and moderate left pleural effusions and compressive   atelectasis.  -Transitioned to 6L O2 NC ovn  -likely from COVID-19  -continous pulse ox monitoring  -if she spikes a fever, can start empirically on abx pending BCx

## 2022-10-27 NOTE — PROGRESS NOTE ADULT - PROBLEM SELECTOR PLAN 3
has sternal tenderness:  pain control    10/22: localised sternal tenderness: pain control    10/23: resolved:  has MM    10/25: no pain now;    10/26: resolved    10/27: resolved

## 2022-10-27 NOTE — PROGRESS NOTE ADULT - ASSESSMENT
Agree with above assessment and plan as outlined above.    - echo with no pertinent findings  - No need for further inpatient cardiac work up.    Aston Werner MD, [facility]  BEEPER [phone]

## 2022-10-27 NOTE — PROGRESS NOTE ADULT - PROBLEM SELECTOR PLAN 2
Cont remdesivir and dexa:    10/21; cont current meds    10/22: cont current medications:    10/23: on dexa:  finished remdesivir:    10/25: cont dexa for 5 days    10/26" seems K : no sob:    10/27?: o2 with improvement:  :  on 6 L today

## 2022-10-27 NOTE — PROGRESS NOTE ADULT - PROBLEM SELECTOR PLAN 1
n 80% : has covid infection:  probnp is OK: echo pending: eh has abdirahman effusions:  she has multiple myeloma:  ? add diuretics    10/21: she seems to be doing ok : no sob: on 60% fio2:: he has pleural effusion; probnp is low : not added diuretics ? needs tap  on left side:    10/22: seems same: on 60%:  high flow:  seems stable overnight: on dexa and remdesivir per protocol: try to wean down fio2:  no pe on ct a: has abdirahman effusion: await echo  : if the pleural effusions cant be explained on the basis of echo , the probably she  would need a tap    10/23: still requiring 60% high flow : no reps distress:    no chest pain today  ::  on covid rx:  await echo  ? etiology of pleural effusion    10/25: seems OK : no chest pain : await echo : on dexa: for a total of 10 days: await echo    10/26: she seems pretty good : change to nasal cannula: echo is normal : repeat chest xray  : if still effusion:  needs thoracentesis:    10/27: she seems OK : on 6 L today: rpt chest xray with pl effusion still present:  etiology not certain : do diagnostic thoracentesis

## 2022-10-27 NOTE — PROGRESS NOTE ADULT - SUBJECTIVE AND OBJECTIVE BOX
SUBJECTIVE  pt seen bedside this am. States that breathing is better, but she has been up much of the night with a productive cough. Cough is producing clear sputum. States she has some chest pain with coughing, but she hasn't required pain medication overnight. Last BM was yesterday.    OBJECTIVE:  ICU Vital Signs Last 24 Hrs  T(C): 36.8 (27 Oct 2022 06:08), Max: 37 (26 Oct 2022 14:42)  T(F): 98.2 (27 Oct 2022 06:08), Max: 98.6 (26 Oct 2022 14:42)  HR: 90 (27 Oct 2022 06:08) (90 - 105)  BP: 103/69 (27 Oct 2022 06:08) (103/69 - 111/75)  BP(mean): --  ABP: --  ABP(mean): --  RR: 18 (27 Oct 2022 06:08) (16 - 18)  SpO2: 100% (27 Oct 2022 06:08) (96% - 100%)    O2 Parameters below as of 27 Oct 2022 06:08  Patient On (Oxygen Delivery Method): nasal cannula              10-26 @ 07:01  -  10-27 @ 07:00  --------------------------------------------------------  IN: 840 mL / OUT: 1000 mL / NET: -160 mL      CAPILLARY BLOOD GLUCOSE          PHYSICAL EXAM:  General: Well-groomed, NAD, laying in bed, on 6L O2 NC  HEENT: PERRLA, EOMI, non-icteric  Neck:  symmetric,  JVD absent  Respiratory: Limited to anterior exam.Clear to ascultation bilaterally, no crackles/rales, no Resp distress; no accessory muscle use     -Occasional productive cough  Cardiovascular:  RRR, no murmurs/rubs/gallops  Abdomen: Soft, NT, ND  Extremities: No edema noted  Skin: No rashes or lesions noted  Neurological: Sensation grossly intact; strength 5/5 in all extremities.  Psychiatry: AOx3, appropriate insight/judgement, appropriate affect, recent/remote memory intact    PRN Meds:  ALBUTerol    90 MICROgram(s) HFA Inhaler 2 Puff(s) Inhalation every 4 hours PRN  aluminum hydroxide/magnesium hydroxide/simethicone Suspension 30 milliLiter(s) Oral every 4 hours PRN  benzonatate 100 milliGRAM(s) Oral every 8 hours PRN  bisacodyl 5 milliGRAM(s) Oral daily PRN  bisacodyl Suppository 10 milliGRAM(s) Rectal daily PRN  guaifenesin/dextromethorphan Oral Liquid 10 milliLiter(s) Oral every 4 hours PRN  HYDROmorphone  Injectable 1 milliGRAM(s) IV Push every 4 hours PRN  HYDROmorphone  Injectable 2 milliGRAM(s) IV Push every 4 hours PRN  megestrol 40 milliGRAM(s) Oral daily PRN  melatonin 3 milliGRAM(s) Oral at bedtime PRN  ondansetron Injectable 4 milliGRAM(s) IV Push every 8 hours PRN      LABS:                        7.1    4.40  )-----------( 277      ( 27 Oct 2022 07:27 )             23.6     Hgb Trend: 7.1<--, 8.7<--, 8.1<--, 8.9<--, 8.1<--  10-27    132<L>  |  101  |  19  ----------------------------<  67<L>  4.7   |  23  |  0.71    Ca    8.9      27 Oct 2022 07:28  Phos  2.7     10-27  Mg     2.1     10-27    TPro  6.9  /  Alb  2.6<L>  /  TBili  0.3  /  DBili  x   /  AST  16  /  ALT  15  /  AlkPhos  69  10-27    Creatinine Trend: 0.71<--, 0.68<--, 0.80<--, 0.85<--, 0.84<--, 0.90<--  PT/INR - ( 26 Oct 2022 07:17 )   PT: 13.7 sec;   INR: 1.18 ratio         PTT - ( 26 Oct 2022 07:17 )  PTT:28.4 sec      Venous Blood Gas:  10-26 @ 07:55  7.36/49/40/28/64.1  VBG Lactate: 1.4      MICROBIOLOGY:       RADIOLOGY:  [ ] Reviewed and interpreted by sandra HOODG: normal...

## 2022-10-27 NOTE — PROGRESS NOTE ADULT - SUBJECTIVE AND OBJECTIVE BOX
Patient is a 61y old  Female who presents with a chief complaint of SOB, CP (27 Oct 2022 11:01)    Patient seen this morning. O2 has been tapered to 6L via NC. Reporting nasal congestion and cough is more productive and it hurts to cough.    MEDICATIONS  (STANDING):  acyclovir   Oral Tab/Cap 400 milliGRAM(s) Oral daily  amLODIPine   Tablet 10 milliGRAM(s) Oral daily  chlorhexidine 4% Liquid 1 Application(s) Topical daily  enoxaparin Injectable 40 milliGRAM(s) SubCutaneous every 12 hours  fentaNYL   Patch  50 MICROgram(s)/Hr 1 Patch Transdermal every 72 hours  naloxone Injectable 0.4 milliGRAM(s) IV Push once  pantoprazole    Tablet 40 milliGRAM(s) Oral before breakfast  polyethylene glycol 3350 17 Gram(s) Oral daily  potassium phosphate / sodium phosphate Powder (PHOS-NaK) 1 Packet(s) Oral once  senna 2 Tablet(s) Oral at bedtime    MEDICATIONS  (PRN):  ALBUTerol    90 MICROgram(s) HFA Inhaler 2 Puff(s) Inhalation every 4 hours PRN Shortness of Breath and/or Wheezing  aluminum hydroxide/magnesium hydroxide/simethicone Suspension 30 milliLiter(s) Oral every 4 hours PRN Dyspepsia  benzonatate 100 milliGRAM(s) Oral every 8 hours PRN Cough  bisacodyl 5 milliGRAM(s) Oral daily PRN Constipation  bisacodyl Suppository 10 milliGRAM(s) Rectal daily PRN Constipation  guaifenesin/dextromethorphan Oral Liquid 10 milliLiter(s) Oral every 4 hours PRN Cough  HYDROmorphone  Injectable 1 milliGRAM(s) IV Push every 4 hours PRN Moderate Pain (4 - 6)  HYDROmorphone  Injectable 2 milliGRAM(s) IV Push every 4 hours PRN Severe Pain (7 - 10)  megestrol 40 milliGRAM(s) Oral daily PRN appetite  melatonin 3 milliGRAM(s) Oral at bedtime PRN Insomnia  ondansetron Injectable 4 milliGRAM(s) IV Push every 8 hours PRN Nausea and/or Vomiting      Vital Signs Last 24 Hrs  T(C): 36.8 (27 Oct 2022 06:08), Max: 37 (26 Oct 2022 14:42)  T(F): 98.2 (27 Oct 2022 06:08), Max: 98.6 (26 Oct 2022 14:42)  HR: 99 (27 Oct 2022 10:21) (90 - 105)  BP: 103/69 (27 Oct 2022 06:08) (103/69 - 111/75)  BP(mean): --  RR: 19 (27 Oct 2022 10:21) (16 - 19)  SpO2: 96% (27 Oct 2022 10:21) (96% - 100%)    Parameters below as of 27 Oct 2022 10:21  Patient On (Oxygen Delivery Method): nasal cannula w/ humidification  O2 Flow (L/min): 6      PE  NAD  Awake, alert  Anicteric, MMM  No c/c/e  No rash grossly                            7.1    4.40  )-----------( 277      ( 27 Oct 2022 07:27 )             23.6       10-27    132<L>  |  101  |  19  ----------------------------<  67<L>  4.7   |  23  |  0.71    Ca    8.9      27 Oct 2022 07:28  Phos  2.7     10-27  Mg     2.1     10-27    TPro  6.9  /  Alb  2.6<L>  /  TBili  0.3  /  DBili  x   /  AST  16  /  ALT  15  /  AlkPhos  69  10-27      Patient name: CECILIA DUNAWAY  YOB: 1960   Age: 61 (F)   MR#: [id number]  Study Date: 10/26/2022  Location: [address] [place], [place] [id number]: Jyoti Thakur RDCS  Study quality: Technically fair  Referring Physician: Aisha Arreola MD  Blood Pressure: 107/72 mmHg  Height: 150 cm  Weight: 44 kg  BSA: 1.4 m2  Heart Rhythm: Sinus tachycardia  Heart Rate: 108 mmHg  ------------------------------------------------------------------------  PROCEDURE: Transthoracic echocardiogram with 2-D, M-Mode  and complete spectral and color flow Doppler.  INDICATION: Dyspnea, unspecified (R06.00)  ------------------------------------------------------------------------  Dimensions:    Normal Values:  LA:     2.8    2.0 - 4.0 cm  Ao:     2.4    2.0 - 3.8 cm  SEPTUM: 0.6    0.6 - 1.2 cm  PWT:    0.7    0.6 - 1.1 cm  LVIDd:  3.4    3.0 - 5.6 cm  LVIDs:  2.3    1.8 - 4.0 cm  Derived variables:  LVMI: 41 g/m2  RWT: 0.40  EF (Visual Estimate): 65 %  ------------------------------------------------------------------------  Observations:  Mitral Valve: Normal mitral valve.  Aortic Valve/Aorta: Normal aortic valve.  Normal aortic root size.  Left Atrium: Normal left atrium.  Left Ventricle: Normal left ventricular internal dimensions  and wall thicknesses.  Normal left ventricular systolic function. No segmental  wall motion abnormalities.  Right Heart: Normal right atrium. Normal right ventricular  size and function.  Normal tricuspid valve.  Pericardium/Pleura: No pericardial effusion seen.  Bilateral pleural effusions.  Hemodynamic: No evidence of pulmonary hypertension.  ------------------------------------------------------------------------  Conclusions:  Apical windows are off-axis.  Normal left ventricular systolic function. No segmental  wall motion abnormalities.  Bilateral pleural effusions.  ------------------------------------------------------------------------  Confirmed on  10/26/2022 - 13:52:16 by Jose Manzo MD, FASE

## 2022-10-27 NOTE — PROGRESS NOTE ADULT - ASSESSMENT
61F unvaccinated w/ PMH MM(dx'd 2018) currently on chemo(last session 10/13) p/w 1-day h/o chest pain and fever. Most history per daughter at baseline as pt in significant pain and unable to talk. [place] [facility] [place] mid-chest pain gradual in onset that progressively got worse associated with a fever as high as 102.2 and a productive cough with greenish sputum.     Hematology/Oncology called to see patient who is under care of Dr. 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 She was recently hospitalized for chemotherapy/pain management at [facility] [place], discharged 10/15/2022.     IgG lambda multiple myeloma  --Under care at [facility] [place] - Dr. Denise Fatima  --Chemotherapy on hold during hospitalization    COVID-19 Infection  --Previously unvaccinated  --Likely cause of SOB, fever, sputum production  --Lungs now clear to auscultation  --Completed Remdesevir, Dexamethasone  --O2 supplement as needed - has been transitioned 6L via NC - tapering down as tolerated  --Management per ID, Pulm, Primary Team    Chest pain  --Exacerbated by coughing  --Underlying bone disease, COVID infection  --Cardiology, Pulmonary following  -- Well controlled on current management    Pleural Effusion  --Most likely inflammatory from COVID  --Management per Pulmonary    Pain Management  --S/P multiple pathologic compression fractures from disease  --Patient needs aggressive pain management  --Palliative care recommended if current regimen not adequate, though seems to be doing well so far    Anemia  --Secondary to disease, recent chemotherapy  --Please transfuse PRBC's if <7 grams    Thrombocytopenia  --Was from chemotherapy  --Fully resolved    Constipation  --Likely secondary to heavy opioid requirements  --aggressive bowel regimen, now resolved    We will continue to follow patient and coordinate with [facility] [place].    Bertin Rae PA-C  Hematology/Oncology  [facility]   [phone] (office)  [phone] (alt office)  Evenings and weekends please call MD on call or office

## 2022-10-27 NOTE — PROGRESS NOTE ADULT - SUBJECTIVE AND OBJECTIVE BOX
Date of Service: 10-27-22 @ 11:01    Patient is a 61y old  Female who presents with a chief complaint of SOB, CP (27 Oct 2022 09:56)      Any change in ROS:  on 6 L of oxygen :  doing pretty good:       MEDICATIONS  (STANDING):  acyclovir   Oral Tab/Cap 400 milliGRAM(s) Oral daily  amLODIPine   Tablet 10 milliGRAM(s) Oral daily  chlorhexidine 4% Liquid 1 Application(s) Topical daily  enoxaparin Injectable 40 milliGRAM(s) SubCutaneous every 12 hours  fentaNYL   Patch  50 MICROgram(s)/Hr 1 Patch Transdermal every 72 hours  naloxone Injectable 0.4 milliGRAM(s) IV Push once  pantoprazole    Tablet 40 milliGRAM(s) Oral before breakfast  polyethylene glycol 3350 17 Gram(s) Oral daily  potassium phosphate / sodium phosphate Powder (PHOS-NaK) 1 Packet(s) Oral once  senna 2 Tablet(s) Oral at bedtime    MEDICATIONS  (PRN):  ALBUTerol    90 MICROgram(s) HFA Inhaler 2 Puff(s) Inhalation every 4 hours PRN Shortness of Breath and/or Wheezing  aluminum hydroxide/magnesium hydroxide/simethicone Suspension 30 milliLiter(s) Oral every 4 hours PRN Dyspepsia  benzonatate 100 milliGRAM(s) Oral every 8 hours PRN Cough  bisacodyl 5 milliGRAM(s) Oral daily PRN Constipation  bisacodyl Suppository 10 milliGRAM(s) Rectal daily PRN Constipation  guaifenesin/dextromethorphan Oral Liquid 10 milliLiter(s) Oral every 4 hours PRN Cough  HYDROmorphone  Injectable 1 milliGRAM(s) IV Push every 4 hours PRN Moderate Pain (4 - 6)  HYDROmorphone  Injectable 2 milliGRAM(s) IV Push every 4 hours PRN Severe Pain (7 - 10)  megestrol 40 milliGRAM(s) Oral daily PRN appetite  melatonin 3 milliGRAM(s) Oral at bedtime PRN Insomnia  ondansetron Injectable 4 milliGRAM(s) IV Push every 8 hours PRN Nausea and/or Vomiting    Vital Signs Last 24 Hrs  T(C): 36.8 (27 Oct 2022 06:08), Max: 37 (26 Oct 2022 14:42)  T(F): 98.2 (27 Oct 2022 06:08), Max: 98.6 (26 Oct 2022 14:42)  HR: 99 (27 Oct 2022 10:21) (90 - 105)  BP: 103/69 (27 Oct 2022 06:08) (103/69 - 111/75)  BP(mean): --  RR: 19 (27 Oct 2022 10:21) (16 - 19)  SpO2: 96% (27 Oct 2022 10:21) (96% - 100%)    Parameters below as of 27 Oct 2022 10:21  Patient On (Oxygen Delivery Method): nasal cannula w/ humidification  O2 Flow (L/min): 6      I&O's Summary    26 Oct 2022 07:01  -  27 Oct 2022 07:00  --------------------------------------------------------  IN: 840 mL / OUT: 1000 mL / NET: -160 mL    27 Oct 2022 07:01  -  27 Oct 2022 11:01  --------------------------------------------------------  IN: 240 mL / OUT: 0 mL / NET: 240 mL          Physical Exam:   GENERAL: NAD, well-groomed, well-developed  HEENT: CARLA/   Atraumatic, Normocephalic  ENMT: No tonsillar erythema, exudates, or enlargement; Moist mucous membranes, Good dentition, No lesions  NECK: Supple, No JVD, Normal thyroid  CHEST/LUNG: decreased air entry bilateral bases:   CVS: Regular rate and rhythm; No murmurs, rubs, or gallops  GI: : Soft, Nontender, Nondistended; Bowel sounds present  NERVOUS SYSTEM:  Alert & Oriented X3  EXTREMITIES:  - edema  LYMPH: No lymphadenopathy noted  SKIN: No rashes or lesions  ENDOCRINOLOGY: No Thyromegaly  PSYCH: Appropriate    Labs:  28, 28, 29                            7.1    4.40  )-----------( 277      ( 27 Oct 2022 07:27 )             23.6                         8.7    5.26  )-----------( 408      ( 26 Oct 2022 07:16 )             27.3                         8.1    6.51  )-----------( 375      ( 25 Oct 2022 07:00 )             25.8                         8.9    6.20  )-----------( 340      ( 24 Oct 2022 10:06 )             28.6     10-27    132<L>  |  101  |  19  ----------------------------<  67<L>  4.7   |  23  |  0.71  10-26    133<L>  |  100  |  21  ----------------------------<  78  4.5   |  25  |  0.68  10-25    133<L>  |  99  |  22  ----------------------------<  77  4.8   |  24  |  0.80  10-24    136  |  101  |  24<H>  ----------------------------<  117<H>  4.9   |  26  |  0.85    Ca    8.9      27 Oct 2022 07:28  Ca    8.7      26 Oct 2022 07:02  Phos  2.7     10-27  Phos  2.7     10-26  Mg     2.1     10-27  Mg     2.0     10-26    TPro  6.9  /  Alb  2.6<L>  /  TBili  0.3  /  DBili  x   /  AST  16  /  ALT  15  /  AlkPhos  69  10-27  TPro  7.0  /  Alb  2.8<L>  /  TBili  0.3  /  DBili  x   /  AST  16  /  ALT  18  /  AlkPhos  72  10-26  TPro  7.7  /  Alb  3.0<L>  /  TBili  0.2  /  DBili  x   /  AST  15  /  ALT  20  /  AlkPhos  77  10-25  TPro  8.2  /  Alb  3.4  /  TBili  0.2  /  DBili  x   /  AST  18  /  ALT  25  /  AlkPhos  85  10-24    CAPILLARY BLOOD GLUCOSE          LIVER FUNCTIONS - ( 27 Oct 2022 07:28 )  Alb: 2.6 g/dL / Pro: 6.9 g/dL / ALK PHOS: 69 U/L / ALT: 15 U/L / AST: 16 U/L / GGT: x           PT/INR - ( 26 Oct 2022 07:17 )   PT: 13.7 sec;   INR: 1.18 ratio         PTT - ( 26 Oct 2022 07:17 )  PTT:28.4 sec          RECENT CULTURES:  10-21 @ 07:49 Clean Catch Clean Catch (Midstream)       rad< from: CT Angio Chest PE Protocol w/ IV Cont (10.18.22 @ 20:03) >  LYMPH NODES: No lymphadenopathy.    HEART/VASCULATURE: The heart is normal in size. No pericardial effusion.    AIRWAYS/LUNGS/PLEURA: Small right and moderate left pleural effusions and   compressive atelectasis. Patchy areas of linear atelectasis in the   bilateral upper lobes.    UPPER ABDOMEN: Within normal limits.    BONES/SOFT TISSUES: Permeative lytic lesions throughout the osseous   structures suggestive of myeloma. Compression deformities involving T3,   T6, T10, T11, L1 and L2 vertebral bodies.    IMPRESSION:    No pulmonary embolism.    Smallright and moderate left pleural effusions and compressive   atelectasis.    --- End of Report ---           GIULIANO NAILS MD; Resident Radiologist  This document has been electronically signed.  BERT LA MD; Attending Radiologist  This document has been electronically signed. Oct 18 2022  8:21PM    < end of copied text >           <10,000 CFU/mL Normal Urogenital Precious          RESPIRATORY CULTURES:          Studies  Chest X-RAY  CT SCAN Chest   Venous Dopplers: LE:   CT Abdomen  Others

## 2022-10-27 NOTE — PROGRESS NOTE ADULT - PROBLEM SELECTOR PLAN 6
[place] [facility] [place]c following  -pain control  -f/u palliative consult -currently undergoing chemo at [facility] [place]  -heme/onc following  -pain control

## 2022-10-28 LAB
ALBUMIN SERPL ELPH-MCNC: 3.4 G/DL — SIGNIFICANT CHANGE UP (ref 3.3–5)
ALP SERPL-CCNC: 78 U/L — SIGNIFICANT CHANGE UP (ref 40–120)
ALT FLD-CCNC: 17 U/L — SIGNIFICANT CHANGE UP (ref 10–45)
ANION GAP SERPL CALC-SCNC: 11 MMOL/L — SIGNIFICANT CHANGE UP (ref 5–17)
AST SERPL-CCNC: 14 U/L — SIGNIFICANT CHANGE UP (ref 10–40)
BASOPHILS # BLD AUTO: 0.02 K/UL — SIGNIFICANT CHANGE UP (ref 0–0.2)
BASOPHILS NFR BLD AUTO: 0.2 % — SIGNIFICANT CHANGE UP (ref 0–2)
BILIRUB SERPL-MCNC: 0.4 MG/DL — SIGNIFICANT CHANGE UP (ref 0.2–1.2)
BUN SERPL-MCNC: 20 MG/DL — SIGNIFICANT CHANGE UP (ref 7–23)
CALCIUM SERPL-MCNC: 9.5 MG/DL — SIGNIFICANT CHANGE UP (ref 8.4–10.5)
CHLORIDE SERPL-SCNC: 100 MMOL/L — SIGNIFICANT CHANGE UP (ref 96–108)
CO2 SERPL-SCNC: 26 MMOL/L — SIGNIFICANT CHANGE UP (ref 22–31)
CREAT SERPL-MCNC: 0.74 MG/DL — SIGNIFICANT CHANGE UP (ref 0.5–1.3)
EGFR: 92 ML/MIN/1.73M2 — SIGNIFICANT CHANGE UP
EOSINOPHIL # BLD AUTO: 0 K/UL — SIGNIFICANT CHANGE UP (ref 0–0.5)
EOSINOPHIL NFR BLD AUTO: 0 % — SIGNIFICANT CHANGE UP (ref 0–6)
GLUCOSE SERPL-MCNC: 89 MG/DL — SIGNIFICANT CHANGE UP (ref 70–99)
HCT VFR BLD CALC: 28.8 % — LOW (ref 34.5–45)
HGB BLD-MCNC: 9.1 G/DL — LOW (ref 11.5–15.5)
IMM GRANULOCYTES NFR BLD AUTO: 3.3 % — HIGH (ref 0–0.9)
LYMPHOCYTES # BLD AUTO: 0.58 K/UL — LOW (ref 1–3.3)
LYMPHOCYTES # BLD AUTO: 6.5 % — LOW (ref 13–44)
MAGNESIUM SERPL-MCNC: 2.2 MG/DL — SIGNIFICANT CHANGE UP (ref 1.6–2.6)
MCHC RBC-ENTMCNC: 31.3 PG — SIGNIFICANT CHANGE UP (ref 27–34)
MCHC RBC-ENTMCNC: 31.6 GM/DL — LOW (ref 32–36)
MCV RBC AUTO: 99 FL — SIGNIFICANT CHANGE UP (ref 80–100)
MONOCYTES # BLD AUTO: 0.88 K/UL — SIGNIFICANT CHANGE UP (ref 0–0.9)
MONOCYTES NFR BLD AUTO: 9.8 % — SIGNIFICANT CHANGE UP (ref 2–14)
NEUTROPHILS # BLD AUTO: 7.21 K/UL — SIGNIFICANT CHANGE UP (ref 1.8–7.4)
NEUTROPHILS NFR BLD AUTO: 80.2 % — HIGH (ref 43–77)
NRBC # BLD: 0 /100 WBCS — SIGNIFICANT CHANGE UP (ref 0–0)
PHOSPHATE SERPL-MCNC: 3.5 MG/DL — SIGNIFICANT CHANGE UP (ref 2.5–4.5)
PLATELET # BLD AUTO: 501 K/UL — HIGH (ref 150–400)
POTASSIUM SERPL-MCNC: 4.6 MMOL/L — SIGNIFICANT CHANGE UP (ref 3.5–5.3)
POTASSIUM SERPL-SCNC: 4.6 MMOL/L — SIGNIFICANT CHANGE UP (ref 3.5–5.3)
PROT SERPL-MCNC: 7.9 G/DL — SIGNIFICANT CHANGE UP (ref 6–8.3)
RBC # BLD: 2.91 M/UL — LOW (ref 3.8–5.2)
RBC # FLD: 18.2 % — HIGH (ref 10.3–14.5)
SODIUM SERPL-SCNC: 137 MMOL/L — SIGNIFICANT CHANGE UP (ref 135–145)
WBC # BLD: 8.99 K/UL — SIGNIFICANT CHANGE UP (ref 3.8–10.5)
WBC # FLD AUTO: 8.99 K/UL — SIGNIFICANT CHANGE UP (ref 3.8–10.5)

## 2022-10-28 PROCEDURE — 93010 ELECTROCARDIOGRAM REPORT: CPT

## 2022-10-28 RX ORDER — ENOXAPARIN SODIUM 100 MG/ML
40 INJECTION SUBCUTANEOUS EVERY 12 HOURS
Refills: 0 | Status: DISCONTINUED | OUTPATIENT
Start: 2022-10-28 | End: 2022-11-09

## 2022-10-28 RX ADMIN — FENTANYL CITRATE 1 PATCH: 50 INJECTION INTRAVENOUS at 18:27

## 2022-10-28 RX ADMIN — ENOXAPARIN SODIUM 40 MILLIGRAM(S): 100 INJECTION SUBCUTANEOUS at 22:25

## 2022-10-28 RX ADMIN — FENTANYL CITRATE 1 PATCH: 50 INJECTION INTRAVENOUS at 18:24

## 2022-10-28 RX ADMIN — Medication 100 MILLIGRAM(S): at 14:24

## 2022-10-28 RX ADMIN — HYDROMORPHONE HYDROCHLORIDE 1 MILLIGRAM(S): 2 INJECTION INTRAMUSCULAR; INTRAVENOUS; SUBCUTANEOUS at 14:51

## 2022-10-28 RX ADMIN — FENTANYL CITRATE 1 PATCH: 50 INJECTION INTRAVENOUS at 07:10

## 2022-10-28 RX ADMIN — Medication 100 MILLIGRAM(S): at 06:10

## 2022-10-28 RX ADMIN — Medication 400 MILLIGRAM(S): at 12:34

## 2022-10-28 RX ADMIN — CHLORHEXIDINE GLUCONATE 1 APPLICATION(S): 213 SOLUTION TOPICAL at 12:21

## 2022-10-28 RX ADMIN — PANTOPRAZOLE SODIUM 40 MILLIGRAM(S): 20 TABLET, DELAYED RELEASE ORAL at 06:16

## 2022-10-28 RX ADMIN — FENTANYL CITRATE 1 PATCH: 50 INJECTION INTRAVENOUS at 19:56

## 2022-10-28 RX ADMIN — Medication 100 MILLIGRAM(S): at 22:24

## 2022-10-28 RX ADMIN — SENNA PLUS 2 TABLET(S): 8.6 TABLET ORAL at 21:45

## 2022-10-28 RX ADMIN — HYDROMORPHONE HYDROCHLORIDE 1 MILLIGRAM(S): 2 INJECTION INTRAMUSCULAR; INTRAVENOUS; SUBCUTANEOUS at 15:05

## 2022-10-28 RX ADMIN — POLYETHYLENE GLYCOL 3350 17 GRAM(S): 17 POWDER, FOR SOLUTION ORAL at 12:23

## 2022-10-28 RX ADMIN — HYDROMORPHONE HYDROCHLORIDE 2 MILLIGRAM(S): 2 INJECTION INTRAMUSCULAR; INTRAVENOUS; SUBCUTANEOUS at 06:17

## 2022-10-28 RX ADMIN — AMLODIPINE BESYLATE 10 MILLIGRAM(S): 2.5 TABLET ORAL at 06:10

## 2022-10-28 NOTE — PROGRESS NOTE ADULT - PROBLEM SELECTOR PLAN 1
n 80% : has covid infection:  probnp is OK: echo pending: eh has abdirahman effusions:  she has multiple myeloma:  ? add diuretics    10/21: she seems to be doing ok : no sob: on 60% fio2:: he has pleural effusion; probnp is low : not added diuretics ? needs tap  on left side:    10/22: seems same: on 60%:  high flow:  seems stable overnight: on dexa and remdesivir per protocol: try to wean down fio2:  no pe on ct a: has abdirahman effusion: await echo  : if the pleural effusions cant be explained on the basis of echo , the probably she  would need a tap    10/23: still requiring 60% high flow : no reps distress:    no chest pain today  ::  on covid rx:  await echo  ? etiology of pleural effusion    10/25: seems OK : no chest pain : await echo : on dexa: for a total of 10 days: await echo    10/26: she seems pretty good : change to nasal cannula: echo is normal : repeat chest xray  : if still effusion:  needs thoracentesis:    10/27: she seems OK : on 6 L today: rpt chest xray with pl effusion still present:  etiology not certain : do diagnostic thoracentesis    10/28: she is on 2-3 L of oxygen : has small effusions now : will do USG chest during  thoracentesis : if significant fluid then tap , other wise no!

## 2022-10-28 NOTE — PROGRESS NOTE ADULT - PROBLEM SELECTOR PLAN 5
Likely 2/2 high doses of opioid medications  -unresponsive to Miralax + senna + bisacodyl  -Give 8mg Relistor today     -dosed q2d, next dose on 10/23/2022  -received Bisacodyl suppository last night with small BM, additional today with larger BM     -c/w Bisacodyl 10mg suppository qd PRN Likely 2/2 high doses of opioid medications  -Standing Miralax + senna + bisacodyl  -c/w Bisacodyl 10mg suppository qd PRN  -Rilastor q2d prn if using high amounts of opioids

## 2022-10-28 NOTE — PROGRESS NOTE ADULT - SUBJECTIVE AND OBJECTIVE BOX
SUBJECTIVE  Pt    OBJECTIVE:  ICU Vital Signs Last 24 Hrs  T(C): 36.2 (28 Oct 2022 04:45), Max: 36.6 (27 Oct 2022 13:52)  T(F): 97.2 (28 Oct 2022 04:45), Max: 97.9 (27 Oct 2022 13:52)  HR: 96 (28 Oct 2022 04:45) (95 - 99)  BP: 114/78 (28 Oct 2022 04:45) (100/71 - 114/78)  BP(mean): --  ABP: --  ABP(mean): --  RR: 18 (28 Oct 2022 04:45) (18 - 19)  SpO2: 99% (28 Oct 2022 04:45) (96% - 100%)    O2 Parameters below as of 28 Oct 2022 04:45  Patient On (Oxygen Delivery Method): nasal cannula  O2 Flow (L/min): 6            10-27 @ 07:01  -  10-28 @ 07:00  --------------------------------------------------------  IN: 240 mL / OUT: 825 mL / NET: -585 mL      CAPILLARY BLOOD GLUCOSE          PHYSICAL EXAM:  General: Well-groomed, NAD, laying in bed, on ___O2  HEENT: PERRLA, EOMI, non-icteric  Neck:  symmetric,  JVD absent  Respiratory: Clear to ascultation bilaterally, no crackles/rales, no Resp distress; no accessory muscle use  Cardiovascular:  RRR, no murmurs/rubs/gallops  Abdomen: Soft, NT, ND  Extremities: No edema noted  Skin: No rashes or lesions noted  Neurological: Sensation grossly intact; strength 5/5 in all extremities.  Psychiatry: AOx3, appropriate insight/judgement, appropriate affect, recent/remote memory intact    PRN Meds:  ALBUTerol    90 MICROgram(s) HFA Inhaler 2 Puff(s) Inhalation every 4 hours PRN  aluminum hydroxide/magnesium hydroxide/simethicone Suspension 30 milliLiter(s) Oral every 4 hours PRN  benzonatate 100 milliGRAM(s) Oral every 8 hours PRN  bisacodyl 5 milliGRAM(s) Oral daily PRN  bisacodyl Suppository 10 milliGRAM(s) Rectal daily PRN  guaifenesin/dextromethorphan Oral Liquid 10 milliLiter(s) Oral every 4 hours PRN  HYDROmorphone  Injectable 1 milliGRAM(s) IV Push every 4 hours PRN  HYDROmorphone  Injectable 2 milliGRAM(s) IV Push every 4 hours PRN  megestrol 40 milliGRAM(s) Oral daily PRN  melatonin 3 milliGRAM(s) Oral at bedtime PRN  ondansetron Injectable 4 milliGRAM(s) IV Push every 8 hours PRN      LABS:                        9.1    8.99  )-----------( 501      ( 28 Oct 2022 06:34 )             28.8     Hgb Trend: 9.1<--, 7.1<--, 8.7<--, 8.1<--, 8.9<--  10-28    137  |  100  |  20  ----------------------------<  89  4.6   |  26  |  0.74    Ca    9.5      28 Oct 2022 06:36  Phos  3.5     10-28  Mg     2.2     10-28    TPro  7.9  /  Alb  3.4  /  TBili  0.4  /  DBili  x   /  AST  14  /  ALT  17  /  AlkPhos  78  10-28    Creatinine Trend: 0.74<--, 0.71<--, 0.68<--, 0.80<--, 0.85<--, 0.84<--            MICROBIOLOGY:       RADIOLOGY:  [ ] Reviewed and interpreted by me    EKG: SUBJECTIVE  Pt seen bedside this am. Says she is feeling much better, but has been kept up all night with a productive cough. She also had some chest pain yesterday requiring a prn dose of morphine. No other complaints. Denies fever, chills, abdominal pain, n/v, change in BM/urination.    OBJECTIVE:  ICU Vital Signs Last 24 Hrs  T(C): 36.2 (28 Oct 2022 04:45), Max: 36.6 (27 Oct 2022 13:52)  T(F): 97.2 (28 Oct 2022 04:45), Max: 97.9 (27 Oct 2022 13:52)  HR: 96 (28 Oct 2022 04:45) (95 - 99)  BP: 114/78 (28 Oct 2022 04:45) (100/71 - 114/78)  BP(mean): --  ABP: --  ABP(mean): --  RR: 18 (28 Oct 2022 04:45) (18 - 19)  SpO2: 99% (28 Oct 2022 04:45) (96% - 100%)    O2 Parameters below as of 28 Oct 2022 04:45  Patient On (Oxygen Delivery Method): nasal cannula  O2 Flow (L/min): 6            10-27 @ 07:01  -  10-28 @ 07:00  --------------------------------------------------------  IN: 240 mL / OUT: 825 mL / NET: -585 mL      CAPILLARY BLOOD GLUCOSE          PHYSICAL EXAM:  General: Well-groomed, NAD, laying in bed, on 3.5L O2 NC  HEENT: PERRLA, EOMI, non-icteric  Neck:  symmetric,  JVD absent  Respiratory: Clear to ascultation bilaterally, no crackles/rales, no Resp distress; no accessory muscle use  Cardiovascular:  RRR, no murmurs/rubs/gallops  Abdomen: Soft, NT, ND  Extremities: No edema noted  Skin: No rashes or lesions noted  Neurological: Sensation grossly intact  Psychiatry: AOx3, appropriate insight/judgement, appropriate affect, recent/remote memory intact    PRN Meds:  ALBUTerol    90 MICROgram(s) HFA Inhaler 2 Puff(s) Inhalation every 4 hours PRN  aluminum hydroxide/magnesium hydroxide/simethicone Suspension 30 milliLiter(s) Oral every 4 hours PRN  benzonatate 100 milliGRAM(s) Oral every 8 hours PRN  bisacodyl 5 milliGRAM(s) Oral daily PRN  bisacodyl Suppository 10 milliGRAM(s) Rectal daily PRN  guaifenesin/dextromethorphan Oral Liquid 10 milliLiter(s) Oral every 4 hours PRN  HYDROmorphone  Injectable 1 milliGRAM(s) IV Push every 4 hours PRN  HYDROmorphone  Injectable 2 milliGRAM(s) IV Push every 4 hours PRN  megestrol 40 milliGRAM(s) Oral daily PRN  melatonin 3 milliGRAM(s) Oral at bedtime PRN  ondansetron Injectable 4 milliGRAM(s) IV Push every 8 hours PRN      LABS:                        9.1    8.99  )-----------( 501      ( 28 Oct 2022 06:34 )             28.8     Hgb Trend: 9.1<--, 7.1<--, 8.7<--, 8.1<--, 8.9<--  10-28    137  |  100  |  20  ----------------------------<  89  4.6   |  26  |  0.74    Ca    9.5      28 Oct 2022 06:36  Phos  3.5     10-28  Mg     2.2     10-28    TPro  7.9  /  Alb  3.4  /  TBili  0.4  /  DBili  x   /  AST  14  /  ALT  17  /  AlkPhos  78  10-28    Creatinine Trend: 0.74<--, 0.71<--, 0.68<--, 0.80<--, 0.85<--, 0.84<--            MICROBIOLOGY:       RADIOLOGY:  [ ] Reviewed and interpreted by me    EKG:

## 2022-10-28 NOTE — PROGRESS NOTE ADULT - ASSESSMENT
Agree with above assessment and plan as outlined above.    - No need for further inpatient cardiac work up.    Aston Werner MD, [facility]  BEEPER [phone]

## 2022-10-28 NOTE — PROGRESS NOTE ADULT - SUBJECTIVE AND OBJECTIVE BOX
Date of Service: 10-28-22 @ 13:13    Patient is a 61y old  Female who presents with a chief complaint of SOB, CP (28 Oct 2022 12:54)      Any change in ROS: on 3 L of oxygen  :     MEDICATIONS  (STANDING):  acyclovir   Oral Tab/Cap 400 milliGRAM(s) Oral daily  amLODIPine   Tablet 10 milliGRAM(s) Oral daily  chlorhexidine 4% Liquid 1 Application(s) Topical daily  enoxaparin Injectable 40 milliGRAM(s) SubCutaneous every 12 hours  fentaNYL   Patch  50 MICROgram(s)/Hr 1 Patch Transdermal every 72 hours  naloxone Injectable 0.4 milliGRAM(s) IV Push once  pantoprazole    Tablet 40 milliGRAM(s) Oral before breakfast  polyethylene glycol 3350 17 Gram(s) Oral daily  senna 2 Tablet(s) Oral at bedtime    MEDICATIONS  (PRN):  ALBUTerol    90 MICROgram(s) HFA Inhaler 2 Puff(s) Inhalation every 4 hours PRN Shortness of Breath and/or Wheezing  aluminum hydroxide/magnesium hydroxide/simethicone Suspension 30 milliLiter(s) Oral every 4 hours PRN Dyspepsia  benzonatate 100 milliGRAM(s) Oral every 8 hours PRN Cough  bisacodyl 5 milliGRAM(s) Oral daily PRN Constipation  bisacodyl Suppository 10 milliGRAM(s) Rectal daily PRN Constipation  guaifenesin/dextromethorphan Oral Liquid 10 milliLiter(s) Oral every 4 hours PRN Cough  HYDROmorphone  Injectable 1 milliGRAM(s) IV Push every 4 hours PRN Moderate Pain (4 - 6)  HYDROmorphone  Injectable 2 milliGRAM(s) IV Push every 4 hours PRN Severe Pain (7 - 10)  megestrol 40 milliGRAM(s) Oral daily PRN appetite  melatonin 3 milliGRAM(s) Oral at bedtime PRN Insomnia  ondansetron Injectable 4 milliGRAM(s) IV Push every 8 hours PRN Nausea and/or Vomiting    Vital Signs Last 24 Hrs  T(C): 36.2 (28 Oct 2022 04:45), Max: 36.6 (27 Oct 2022 13:52)  T(F): 97.2 (28 Oct 2022 04:45), Max: 97.9 (27 Oct 2022 13:52)  HR: 96 (28 Oct 2022 04:45) (95 - 99)  BP: 114/78 (28 Oct 2022 04:45) (100/71 - 114/78)  BP(mean): --  RR: 18 (28 Oct 2022 04:45) (18 - 19)  SpO2: 99% (28 Oct 2022 04:45) (98% - 100%)    Parameters below as of 28 Oct 2022 04:45  Patient On (Oxygen Delivery Method): nasal cannula  O2 Flow (L/min): 6      I&O's Summary    27 Oct 2022 07:01  -  28 Oct 2022 07:00  --------------------------------------------------------  IN: 240 mL / OUT: 825 mL / NET: -585 mL          Physical Exam:   GENERAL: NAD, well-groomed, well-developed  HEENT: CARLA/   Atraumatic, Normocephalic  ENMT: No tonsillar erythema, exudates, or enlargement; Moist mucous membranes, Good dentition, No lesions  NECK: Supple, No JVD, Normal thyroid  CHEST/LUNG: Clear to auscultaion  CVS: Regular rate and rhythm; No murmurs, rubs, or gallops  GI: : Soft, Nontender, Nondistended; Bowel sounds present  NERVOUS SYSTEM:  Alert & Oriented X3  EXTREMITIES:  2+ Peripheral Pulses, No clubbing, cyanosis, or edema  LYMPH: No lymphadenopathy noted  SKIN: No rashes or lesions  ENDOCRINOLOGY: No Thyromegaly  PSYCH: Appropriate    Labs:  28, 28, 29                            9.1    8.99  )-----------( 501      ( 28 Oct 2022 06:34 )             28.8                         7.1    4.40  )-----------( 277      ( 27 Oct 2022 07:27 )             23.6                         8.7    5.26  )-----------( 408      ( 26 Oct 2022 07:16 )             27.3                         8.1    6.51  )-----------( 375      ( 25 Oct 2022 07:00 )             25.8     10-28    137  |  100  |  20  ----------------------------<  89  4.6   |  26  |  0.74  10-27    132<L>  |  101  |  19  ----------------------------<  67<L>  4.7   |  23  |  0.71  10-26    133<L>  |  100  |  21  ----------------------------<  78  4.5   |  25  |  0.68  10-25    133<L>  |  99  |  22  ----------------------------<  77  4.8   |  24  |  0.80    Ca    9.5      28 Oct 2022 06:36  Ca    8.9      27 Oct 2022 07:28  Phos  3.5     10-28  Phos  2.7     10-27  Mg     2.2     10-28  Mg     2.1     10-27    TPro  7.9  /  Alb  3.4  /  TBili  0.4  /  DBili  x   /  AST  14  /  ALT  17  /  AlkPhos  78  10-28  TPro  6.9  /  Alb  2.6<L>  /  TBili  0.3  /  DBili  x   /  AST  16  /  ALT  15  /  AlkPhos  69  10-27  TPro  7.0  /  Alb  2.8<L>  /  TBili  0.3  /  DBili  x   /  AST  16  /  ALT  18  /  AlkPhos  72  10-26  TPro  7.7  /  Alb  3.0<L>  /  TBili  0.2  /  DBili  x   /  AST  15  /  ALT  20  /  AlkPhos  77  10-25    CAPILLARY BLOOD GLUCOSE          LIVER FUNCTIONS - ( 28 Oct 2022 06:36 )  Alb: 3.4 g/dL / Pro: 7.9 g/dL / ALK PHOS: 78 U/L / ALT: 17 U/L / AST: 14 U/L / GGT: x             rad< from: Xray Chest 1 View- PORTABLE-Routine (Xray Chest 1 View- PORTABLE-Routine .) (10.27.22 @ 09:43) >      INTERPRETATION:  EXAMINATION: XR CHEST    CLINICAL INDICATION: Pleural Effusion Assessment    TECHNIQUE: Single frontal portable view ofthe chest from 10/27/2022 9:43   AM    COMPARISON: Chest x-ray 10/18/2022.    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  The heart size is not accurately assessed on this projection.  There is a streaky opacity at the left upper lung field.  There appears to be resolution of the left-sided pleural effusion.   Improving right-sided pleural effusion.  There is no pneumothorax .  There is generalized osteopenia and innumerable lytic lucencies   throughout the visualized osseous structures, compatible with known   myeloma.  IMPRESSION:  Resolved left-sided pleural effusion with trace right-sided pleural   effusion, however evaluation is limited due to patient malrotation.  New streaky opacity at the left upper lobe likely represents area of   linear atelectasis, however infectious etiology cannot be excluded.    --- End of Report ---           HAMLET TOSCANO MD; Resident Radiologist  This document has been electronically signed.  CADEN ONEILL MD; Attending Radiologist  This document has been electronically signed. Oct 28 2022 10:24AM    < end of copied text >          RECENT CULTURES:        RESPIRATORY CULTURES:          Studies  Chest X-RAY  CT SCAN Chest   Venous Dopplers: LE:   CT Abdomen  Others

## 2022-10-28 NOTE — PROGRESS NOTE ADULT - PROBLEM SELECTOR PLAN 1
Reported hypoxic to 80s at [facility].   Ct chest showed small right and moderate left pleural effusions and compressive   atelectasis.  -Transitioned to 6L O2 NC ovn  -likely from COVID-19  -continous pulse ox monitoring  -if she spikes a fever, can start empirically on abx pending BCx Reported hypoxic to 80s at [facility].   Ct chest showed small right and moderate left pleural effusions and compressive   atelectasis.  -downtitrated NC 6L-> 3.5L O2  -likely from COVID-19  -continous pulse ox monitoring  -if she spikes a fever, can start empirically on abx pending BCx

## 2022-10-28 NOTE — PROGRESS NOTE ADULT - PROBLEM SELECTOR PLAN 2
-s/p Vanc/Cefepime, decadron in ED  -c/w supplemental O2 and wean as tolerated  -c/w dexamethasone day 3/10  -finished Remdesivir  -BCx ngtd  -antitussive prn -s/p Vanc/Cefepime, decadron in ED  -c/w supplemental O2 and wean as tolerated  -finished dexamethasone  -finished Remdesivir  -BCx ngtd  -antitussive prn  -ID consult today re necessity of continued isolation

## 2022-10-28 NOTE — PROGRESS NOTE ADULT - SUBJECTIVE AND OBJECTIVE BOX
Patient is a 61y old  Female who presents with a chief complaint of SOB, CP (28 Oct 2022 10:17)    Patient seen this morning. Coughing with white sputum - had difficulty sleeping last night. O2 has been titrated down to 3 1/2 L via NC.    MEDICATIONS  (STANDING):  acyclovir   Oral Tab/Cap 400 milliGRAM(s) Oral daily  amLODIPine   Tablet 10 milliGRAM(s) Oral daily  chlorhexidine 4% Liquid 1 Application(s) Topical daily  fentaNYL   Patch  50 MICROgram(s)/Hr 1 Patch Transdermal every 72 hours  naloxone Injectable 0.4 milliGRAM(s) IV Push once  pantoprazole    Tablet 40 milliGRAM(s) Oral before breakfast  polyethylene glycol 3350 17 Gram(s) Oral daily  senna 2 Tablet(s) Oral at bedtime    MEDICATIONS  (PRN):  ALBUTerol    90 MICROgram(s) HFA Inhaler 2 Puff(s) Inhalation every 4 hours PRN Shortness of Breath and/or Wheezing  aluminum hydroxide/magnesium hydroxide/simethicone Suspension 30 milliLiter(s) Oral every 4 hours PRN Dyspepsia  benzonatate 100 milliGRAM(s) Oral every 8 hours PRN Cough  bisacodyl 5 milliGRAM(s) Oral daily PRN Constipation  bisacodyl Suppository 10 milliGRAM(s) Rectal daily PRN Constipation  guaifenesin/dextromethorphan Oral Liquid 10 milliLiter(s) Oral every 4 hours PRN Cough  HYDROmorphone  Injectable 1 milliGRAM(s) IV Push every 4 hours PRN Moderate Pain (4 - 6)  HYDROmorphone  Injectable 2 milliGRAM(s) IV Push every 4 hours PRN Severe Pain (7 - 10)  megestrol 40 milliGRAM(s) Oral daily PRN appetite  melatonin 3 milliGRAM(s) Oral at bedtime PRN Insomnia  ondansetron Injectable 4 milliGRAM(s) IV Push every 8 hours PRN Nausea and/or Vomiting    Vital Signs Last 24 Hrs  T(C): 36.2 (28 Oct 2022 04:45), Max: 36.6 (27 Oct 2022 13:52)  T(F): 97.2 (28 Oct 2022 04:45), Max: 97.9 (27 Oct 2022 13:52)  HR: 96 (28 Oct 2022 04:45) (95 - 99)  BP: 114/78 (28 Oct 2022 04:45) (100/71 - 114/78)  BP(mean): --  RR: 18 (28 Oct 2022 04:45) (18 - 19)  SpO2: 99% (28 Oct 2022 04:45) (98% - 100%)    Parameters below as of 28 Oct 2022 04:45  Patient On (Oxygen Delivery Method): nasal cannula  O2 Flow (L/min): 6      PE  NAD  Awake, alert  Anicteric, MMM  No c/c/e  No rash grossly                            9.1    8.99  )-----------( 501      ( 28 Oct 2022 06:34 )             28.8       10-28    137  |  100  |  20  ----------------------------<  89  4.6   |  26  |  0.74    Ca    9.5      28 Oct 2022 06:36  Phos  3.5     10-28  Mg     2.2     10-28    TPro  7.9  /  Alb  3.4  /  TBili  0.4  /  DBili  x   /  AST  14  /  ALT  17  /  AlkPhos  78  10-28      ACC: [id number] EXAM:  XR CHEST PORTABLE ROUTINE 1V                          PROCEDURE DATE:  10/27/2022          INTERPRETATION:  EXAMINATION: XR CHEST    CLINICAL INDICATION: Pleural Effusion Assessment    TECHNIQUE: Single frontal portable view of the chest from 10/27/2022 9:43   AM    COMPARISON: Chest x-ray 10/18/2022.    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  The heart size is not accurately assessed on this projection.  There is a streaky opacity at the left upper lung field.  There appears to be resolution of the left-sided pleural effusion.   Improving right-sided pleural effusion.  There is no pneumothorax .  There is generalized osteopenia and innumerable lytic lucencies   throughout the visualized osseous structures, compatible with known   myeloma.  IMPRESSION:  Resolved left-sided pleural effusion with trace right-sided pleural   effusion, however evaluation is limited due to patient malrotation.  New streaky opacity at the left upper lobe likely represents area of   linear atelectasis, however infectious etiology cannot be excluded.    --- End of Report ---

## 2022-10-28 NOTE — PROGRESS NOTE ADULT - PROBLEM SELECTOR PLAN 2
Cont remdesivir and dexa:    10/21; cont current meds    10/22: cont current medications:    10/23: on dexa:  finished remdesivir:    10/25: cont dexa for 5 days    10/26" seems K : no sob:    10/27?: o2 with improvement:  :  on 6 L today    10/28: on 3 L of oxygen

## 2022-10-28 NOTE — PROGRESS NOTE ADULT - PROBLEM SELECTOR PLAN 7
-Diet: Vegan  -DVT ppx: Lovenox qd -Diet: Vegan  -DVT ppx: Lovenox qd    Patient on perry for previous retention. TOV tonight

## 2022-10-28 NOTE — PROGRESS NOTE ADULT - PROBLEM SELECTOR PLAN 3
has sternal tenderness:  pain control    10/22: localised sternal tenderness: pain control    10/23: resolved:  has MM    10/25: no pain now;    10/26: resolved    10/27: resolved    10/28: resolved

## 2022-10-28 NOTE — PROGRESS NOTE ADULT - PROBLEM SELECTOR PLAN 3
Unclear etiology, more likely malignant vs CHF  -Patient received Lasix 40IV x1 w/o much improvement  -Pulm to tap pleural effusion today     -f/u molecular studies Unclear etiology, more likely malignant vs CHF  -Patient received Lasix 40IV x1 w/o much improvement  -Cannot get pulm tap as on airborne isolation

## 2022-10-28 NOTE — PROGRESS NOTE ADULT - ASSESSMENT
61F unvaccinated w/ PMH MM(dx'd 2018) currently on chemo(last session 10/13) p/w 1-day h/o chest pain and fever. Most history per daughter at baseline as pt in significant pain and unable to talk. Pt had initially presented to [facility] with a sharp mid-chest pain gradual in onset that progressively got worse associated with a fever as high as 102.2 and a productive cough with greenish sputum.     Hematology/Oncology called to see patient who is under care of Dr. Denise Fatima of [facility] for the treatment of IgG lambda multiple myeloma with extensive bone metastases recently started on CyborD chemotherapy 10/7/2022 (LD 10/13/2022). She was recently hospitalized for chemotherapy/pain management at [facility], discharged 10/15/2022.     IgG lambda multiple myeloma  --Under care at [facility] - Dr. Denise Fatima  --Chemotherapy on hold during hospitalization    COVID-19 Infection  --Previously unvaccinated  --Likely cause of SOB, fever, sputum production  --Lungs now clear to auscultation  --Completed Remdesevir, Dexamethasone  --O2 supplement as needed - has been transitioned 3.5L via NC - tapering down as tolerated  --Management per ID, Pulm, Primary Team    Chest pain  --Exacerbated by coughing  --Underlying bone disease, COVID infection  --Cardiology, Pulmonary following  -- Well controlled on current management    Pleural Effusion  --Most likely inflammatory from COVID  --Resolved on L and improved on R  --Unable to have thoracentesis at this time secondary to respiratory isolation  --Management per Pulmonary    Pain Management  --S/P multiple pathologic compression fractures from disease  --Patient needs aggressive pain management  --Palliative care recommended if current regimen not adequate, though seems to be doing well so far    Anemia  --Secondary to disease, recent chemotherapy  --Please transfuse PRBC's if <7 grams    Thrombocytopenia  --Was from chemotherapy  --Fully resolved    Constipation  --Likely secondary to heavy opioid requirements  --aggressive bowel regimen, now resolved    We will continue to follow patient and coordinate with [facility].    Bertin Rae PA-C  Hematology/Oncology  [facility]   [phone] (office)  [phone] (alt office)  Evenings and weekends please call MD on call or office

## 2022-10-28 NOTE — PROGRESS NOTE ADULT - SUBJECTIVE AND OBJECTIVE BOX
C A R D I O [facility] O G [facility]  **********************************     DATE OF SERVICE: 10-28-22    Discussed with RN - No reported chest pain or SOB on nasal cannula.  Review of symptoms otherwise negative.    MEDICATIONS:  acyclovir   Oral Tab/Cap 400 milliGRAM(s) Oral daily  ALBUTerol    90 MICROgram(s) HFA Inhaler 2 Puff(s) Inhalation every 4 hours PRN  aluminum hydroxide/magnesium hydroxide/simethicone Suspension 30 milliLiter(s) Oral every 4 hours PRN  amLODIPine   Tablet 10 milliGRAM(s) Oral daily  benzonatate 100 milliGRAM(s) Oral every 8 hours PRN  bisacodyl 5 milliGRAM(s) Oral daily PRN  bisacodyl Suppository 10 milliGRAM(s) Rectal daily PRN  chlorhexidine 4% Liquid 1 Application(s) Topical daily  fentaNYL   Patch  50 MICROgram(s)/Hr 1 Patch Transdermal every 72 hours  guaifenesin/dextromethorphan Oral Liquid 10 milliLiter(s) Oral every 4 hours PRN  HYDROmorphone  Injectable 1 milliGRAM(s) IV Push every 4 hours PRN  HYDROmorphone  Injectable 2 milliGRAM(s) IV Push every 4 hours PRN  megestrol 40 milliGRAM(s) Oral daily PRN  melatonin 3 milliGRAM(s) Oral at bedtime PRN  naloxone Injectable 0.4 milliGRAM(s) IV Push once  ondansetron Injectable 4 milliGRAM(s) IV Push every 8 hours PRN  pantoprazole    Tablet 40 milliGRAM(s) Oral before breakfast  polyethylene glycol 3350 17 Gram(s) Oral daily  senna 2 Tablet(s) Oral at bedtime      LABS:                        9.1    8.99  )-----------( 501      ( 28 Oct 2022 06:34 )             28.8       Hemoglobin: 9.1 g/dL (10-28 @ 06:34)  Hemoglobin: 7.1 g/dL (10-27 @ 07:27)  Hemoglobin: 8.7 g/dL (10-26 @ 07:16)  Hemoglobin: 8.1 g/dL (10-25 @ 07:00)  Hemoglobin: 8.9 g/dL (10-24 @ 10:06)      10-28    137  |  100  |  20  ----------------------------<  89  4.6   |  26  |  0.74    Ca    9.5      28 Oct 2022 06:36  Phos  3.5     10-28  Mg     2.2     10-28    TPro  7.9  /  Alb  3.4  /  TBili  0.4  /  DBili  x   /  AST  14  /  ALT  17  /  AlkPhos  78  10-28    Creatinine Trend: 0.74<--, 0.71<--, 0.68<--, 0.80<--, 0.85<--, 0.84<--    COAGS:         Per Chart  PHYSICAL EXAM:  T(C): 36.2 (10-28-22 @ 04:45), Max: 36.6 (10-27-22 @ 13:52)  HR: 96 (10-28-22 @ 04:45) (95 - 99)  BP: 114/78 (10-28-22 @ 04:45) (100/71 - 114/78)  RR: 18 (10-28-22 @ 04:45) (18 - 19)  SpO2: 99% (10-28-22 @ 04:45) (96% - 100%)  Wt(kg): --    I&O's Summary    27 Oct 2022 07:01  -  28 Oct 2022 07:00  --------------------------------------------------------  IN: 240 mL / OUT: 825 mL / NET: -585 mL        Gen: NAD  HEENT:  (-)icterus (-)pallor  CV: N S1 S2 1/6 CHARLY (+)2 Pulses B/l  Resp:  Clear to auscultation B/L, normal effort  GI: (+) BS Soft, NT, ND  Lymph:  (-)Edema, (-)obvious lymphadenopathy  Skin: Warm to touch, Normal turgor  Psych: Appropriate mood and affect      TELEMETRY: None	      ECG: Sinus Tachycardia, PVC    RADIOLOGY:  < from: CT Angio Chest PE Protocol w/ IV Cont (10.18.22 @ 20:03) >  IMPRESSION:    No pulmonary embolism.    Smallright and moderate left pleural effusions and compressive   atelectasis.    --- End of Report ---    < end of copied text >      ASSESSMENT/PLAN: Patient is a 62 y/o Female with PMH of Multiple Myeloma (dx 2018) currently on chemo who presented with chest pain, SOB, and fever admitted with +COVID and b/l pleural effusions. Cardiology consulted for further evaluation.     - Patient with nonanginal chest pain that is pleuritic and very tender to palpation - suspect MSK related likely due to costochondritis and multiple myeloma lytic lesions  - EKG with sinus tachycardia and no acute ischemic changes  - Cardiac enzymes unremarkable  - CTA chest negative for PE, small R and mod L pleural effusions and compressive atelectasis  - TTE with normal LV/RV function  - Pulm follow up - wean O2 as tolerated, pending diagnostic thoracentesis when off isolation  - Supportive care/covid management per primary team  - No further inpatient cardiac w/u planned    Artur Cortes PA-C  Pager: [phone]

## 2022-10-28 NOTE — PROGRESS NOTE ADULT - PROBLEM SELECTOR PLAN 6
-currently undergoing chemo at [facility]  -heme/onc following  -pain control

## 2022-10-29 LAB
ALBUMIN SERPL ELPH-MCNC: 3.2 G/DL — LOW (ref 3.3–5)
ALP SERPL-CCNC: 76 U/L — SIGNIFICANT CHANGE UP (ref 40–120)
ALT FLD-CCNC: 21 U/L — SIGNIFICANT CHANGE UP (ref 10–45)
ANION GAP SERPL CALC-SCNC: 12 MMOL/L — SIGNIFICANT CHANGE UP (ref 5–17)
APTT BLD: 28.5 SEC — SIGNIFICANT CHANGE UP (ref 27.5–35.5)
AST SERPL-CCNC: 17 U/L — SIGNIFICANT CHANGE UP (ref 10–40)
BILIRUB SERPL-MCNC: 0.3 MG/DL — SIGNIFICANT CHANGE UP (ref 0.2–1.2)
BUN SERPL-MCNC: 27 MG/DL — HIGH (ref 7–23)
CALCIUM SERPL-MCNC: 9.1 MG/DL — SIGNIFICANT CHANGE UP (ref 8.4–10.5)
CHLORIDE SERPL-SCNC: 101 MMOL/L — SIGNIFICANT CHANGE UP (ref 96–108)
CO2 SERPL-SCNC: 25 MMOL/L — SIGNIFICANT CHANGE UP (ref 22–31)
CREAT SERPL-MCNC: 0.82 MG/DL — SIGNIFICANT CHANGE UP (ref 0.5–1.3)
EGFR: 81 ML/MIN/1.73M2 — SIGNIFICANT CHANGE UP
GLUCOSE SERPL-MCNC: 101 MG/DL — HIGH (ref 70–99)
HCT VFR BLD CALC: 27.9 % — LOW (ref 34.5–45)
HGB BLD-MCNC: 9 G/DL — LOW (ref 11.5–15.5)
INR BLD: 1.14 RATIO — SIGNIFICANT CHANGE UP (ref 0.88–1.16)
MAGNESIUM SERPL-MCNC: 2.2 MG/DL — SIGNIFICANT CHANGE UP (ref 1.6–2.6)
MCHC RBC-ENTMCNC: 31.8 PG — SIGNIFICANT CHANGE UP (ref 27–34)
MCHC RBC-ENTMCNC: 32.3 GM/DL — SIGNIFICANT CHANGE UP (ref 32–36)
MCV RBC AUTO: 98.6 FL — SIGNIFICANT CHANGE UP (ref 80–100)
NRBC # BLD: 0 /100 WBCS — SIGNIFICANT CHANGE UP (ref 0–0)
PHOSPHATE SERPL-MCNC: 2.9 MG/DL — SIGNIFICANT CHANGE UP (ref 2.5–4.5)
PLATELET # BLD AUTO: 425 K/UL — HIGH (ref 150–400)
POTASSIUM SERPL-MCNC: 4 MMOL/L — SIGNIFICANT CHANGE UP (ref 3.5–5.3)
POTASSIUM SERPL-SCNC: 4 MMOL/L — SIGNIFICANT CHANGE UP (ref 3.5–5.3)
PROT SERPL-MCNC: 7.4 G/DL — SIGNIFICANT CHANGE UP (ref 6–8.3)
PROTHROM AB SERPL-ACNC: 13.3 SEC — SIGNIFICANT CHANGE UP (ref 10.5–13.4)
RBC # BLD: 2.83 M/UL — LOW (ref 3.8–5.2)
RBC # FLD: 18.6 % — HIGH (ref 10.3–14.5)
SODIUM SERPL-SCNC: 138 MMOL/L — SIGNIFICANT CHANGE UP (ref 135–145)
WBC # BLD: 14.59 K/UL — HIGH (ref 3.8–10.5)
WBC # FLD AUTO: 14.59 K/UL — HIGH (ref 3.8–10.5)

## 2022-10-29 RX ADMIN — Medication 100 MILLIGRAM(S): at 07:12

## 2022-10-29 RX ADMIN — CHLORHEXIDINE GLUCONATE 1 APPLICATION(S): 213 SOLUTION TOPICAL at 11:30

## 2022-10-29 RX ADMIN — HYDROMORPHONE HYDROCHLORIDE 1 MILLIGRAM(S): 2 INJECTION INTRAMUSCULAR; INTRAVENOUS; SUBCUTANEOUS at 19:59

## 2022-10-29 RX ADMIN — ENOXAPARIN SODIUM 40 MILLIGRAM(S): 100 INJECTION SUBCUTANEOUS at 07:12

## 2022-10-29 RX ADMIN — ENOXAPARIN SODIUM 40 MILLIGRAM(S): 100 INJECTION SUBCUTANEOUS at 19:59

## 2022-10-29 RX ADMIN — POLYETHYLENE GLYCOL 3350 17 GRAM(S): 17 POWDER, FOR SOLUTION ORAL at 11:31

## 2022-10-29 RX ADMIN — FENTANYL CITRATE 1 PATCH: 50 INJECTION INTRAVENOUS at 07:10

## 2022-10-29 RX ADMIN — PANTOPRAZOLE SODIUM 40 MILLIGRAM(S): 20 TABLET, DELAYED RELEASE ORAL at 06:38

## 2022-10-29 RX ADMIN — SENNA PLUS 2 TABLET(S): 8.6 TABLET ORAL at 21:25

## 2022-10-29 RX ADMIN — Medication 100 MILLIGRAM(S): at 15:52

## 2022-10-29 RX ADMIN — FENTANYL CITRATE 1 PATCH: 50 INJECTION INTRAVENOUS at 19:47

## 2022-10-29 RX ADMIN — ONDANSETRON 4 MILLIGRAM(S): 8 TABLET, FILM COATED ORAL at 11:32

## 2022-10-29 RX ADMIN — Medication 400 MILLIGRAM(S): at 11:31

## 2022-10-29 RX ADMIN — HYDROMORPHONE HYDROCHLORIDE 1 MILLIGRAM(S): 2 INJECTION INTRAMUSCULAR; INTRAVENOUS; SUBCUTANEOUS at 20:59

## 2022-10-29 RX ADMIN — HYDROMORPHONE HYDROCHLORIDE 1 MILLIGRAM(S): 2 INJECTION INTRAMUSCULAR; INTRAVENOUS; SUBCUTANEOUS at 03:20

## 2022-10-29 RX ADMIN — HYDROMORPHONE HYDROCHLORIDE 1 MILLIGRAM(S): 2 INJECTION INTRAMUSCULAR; INTRAVENOUS; SUBCUTANEOUS at 02:50

## 2022-10-29 NOTE — PROGRESS NOTE ADULT - PROBLEM SELECTOR PLAN 2
-s/p Vanc/Cefepime, decadron in ED  -c/w supplemental O2 and wean as tolerated  -finished dexamethasone  -finished Remdesivir  -BCx ngtd  -antitussive prn  -ID consult today re necessity of continued isolation

## 2022-10-29 NOTE — PROGRESS NOTE ADULT - PROBLEM SELECTOR PLAN 5
Likely 2/2 high doses of opioid medications  -Standing Miralax + senna + bisacodyl  -c/w Bisacodyl 10mg suppository qd PRN  -Rilastor q2d prn if using high amounts of opioids

## 2022-10-29 NOTE — PROGRESS NOTE ADULT - PROBLEM SELECTOR PLAN 6
-currently undergoing chemo at [facility] [place]  -heme/onc following  -pain control

## 2022-10-29 NOTE — PROGRESS NOTE ADULT - SUBJECTIVE AND OBJECTIVE BOX
C A R D I O L O G Y  **********************************     DATE OF SERVICE: 10-29-22    Discussed with RN - No reported chest pain or SOB on nasal cannula.  patient with persistent SOB and fatigue. wants to spend more of the day out of bed in chair. Review of symptoms otherwise negative.    acyclovir   Oral Tab/Cap 400 milliGRAM(s) Oral daily  ALBUTerol    90 MICROgram(s) HFA Inhaler 2 Puff(s) Inhalation every 4 hours PRN  aluminum hydroxide/magnesium hydroxide/simethicone Suspension 30 milliLiter(s) Oral every 4 hours PRN  amLODIPine   Tablet 10 milliGRAM(s) Oral daily  benzonatate 100 milliGRAM(s) Oral every 8 hours PRN  bisacodyl 5 milliGRAM(s) Oral daily PRN  bisacodyl Suppository 10 milliGRAM(s) Rectal daily PRN  chlorhexidine 4% Liquid 1 Application(s) Topical daily  enoxaparin Injectable 40 milliGRAM(s) SubCutaneous every 12 hours  fentaNYL   Patch  50 MICROgram(s)/Hr 1 Patch Transdermal every 72 hours  HYDROmorphone  Injectable 1 milliGRAM(s) IV Push every 4 hours PRN  HYDROmorphone  Injectable 2 milliGRAM(s) IV Push every 4 hours PRN  megestrol 40 milliGRAM(s) Oral daily PRN  melatonin 3 milliGRAM(s) Oral at bedtime PRN  naloxone Injectable 0.4 milliGRAM(s) IV Push once  ondansetron Injectable 4 milliGRAM(s) IV Push every 8 hours PRN  pantoprazole    Tablet 40 milliGRAM(s) Oral before breakfast  polyethylene glycol 3350 17 Gram(s) Oral daily  senna 2 Tablet(s) Oral at bedtime                            9.0    14.59 )-----------( 425      ( 29 Oct 2022 07:17 )             27.9     10-29    138  |  101  |  27<H>  ----------------------------<  101<H>  4.0   |  25  |  0.82    Ca    9.1      29 Oct 2022 07:15  Phos  2.9     10-29  Mg     2.2     10-29    TPro  7.4  /  Alb  3.2<L>  /  TBili  0.3  /  DBili  x   /  AST  17  /  ALT  21  /  AlkPhos  76  10-29    T(C): 36.4 (10-29-22 @ 06:55), Max: 36.8 (10-28-22 @ 20:36)  HR: 109 (10-29-22 @ 06:55) (91 - 119)  BP: 105/75 (10-29-22 @ 06:55) (99/63 - 115/75)  RR: 19 (10-29-22 @ 06:55) (18 - 19)  SpO2: 97% (10-29-22 @ 06:55) (91% - 99%)  Wt(kg): --    I&O's Summary    28 Oct 2022 07:01  -  29 Oct 2022 07:00  --------------------------------------------------------  IN: 120 mL / OUT: 1100 mL / NET: -980 mL    Gen: NAD  HEENT:  (-)icterus (-)pallor  CV: N S1 S2 1/6 CHARLY (+)2 Pulses B/l  Resp:  Clear to auscultation B/L, normal effort  GI: (+) BS Soft, NT, ND  Lymph:  (-)Edema, (-)obvious lymphadenopathy  Skin: Warm to touch, Normal turgor  Psych: Appropriate mood and affect      TELEMETRY: None	      ECG: Sinus Tachycardia, PVC    RADIOLOGY:  < from: CT Angio Chest PE Protocol w/ IV Cont (10.18.22 @ 20:03) >  IMPRESSION:    No pulmonary embolism.    Smallright and moderate left pleural effusions and compressive   atelectasis.    --- End of Report ---    < end of copied text >      ASSESSMENT/PLAN: Patient is a 60 y/o Female with PMH of Multiple Myeloma (dx 2018) currently on chemo who presented with chest pain, SOB, and fever admitted with +COVID and b/l pleural effusions. Cardiology consulted for further evaluation.     - Patient with nonanginal chest pain that is pleuritic and very tender to palpation - suspect MSK related likely due to costochondritis and multiple myeloma lytic lesions  - EKG with sinus tachycardia and no acute ischemic changes  - Cardiac enzymes unremarkable  - CTA chest negative for PE, small R and mod L pleural effusions and compressive atelectasis  - TTE with normal LV/RV function  - Pulm follow up - wean O2 as tolerated, pending diagnostic thoracentesis when off isolation  - Supportive care/covid management per primary team  - No further inpatient cardiac w/u planned    Uche Anderson M.D.  Cardiac Electrophysiology  [phone]

## 2022-10-29 NOTE — PHYSICAL THERAPY INITIAL EVALUATION ADULT - ADDITIONAL COMMENTS
lives alone / dtr in her aide and has increased hours at night, has rolling walker, commode, shower seat, and grab bars, lives alone / dtr in her aide and has increased hours at night, has rolling walker, commode, shower seat, and grab bars,/ no visual deficits, hearing good, pt is [place]

## 2022-10-29 NOTE — PROGRESS NOTE ADULT - PROBLEM SELECTOR PLAN 1
Reported hypoxic to 80s at [facility].   Ct chest showed small right and moderate left pleural effusions and compressive   atelectasis.  -downtitrated NC 6L-> 3.5L O2  -likely from COVID-19  -continous pulse ox monitoring  -if she spikes a fever, can start empirically on abx pending BCx

## 2022-10-29 NOTE — PROGRESS NOTE ADULT - SUBJECTIVE AND OBJECTIVE BOX
Patient is a 61y old  Female who presents with a chief complaint of SOB, CP (29 Oct 2022 06:43)    Patient seen in f/u. No new issues overnight     MEDICATIONS  (STANDING):  acyclovir   Oral Tab/Cap 400 milliGRAM(s) Oral daily  amLODIPine   Tablet 10 milliGRAM(s) Oral daily  chlorhexidine 4% Liquid 1 Application(s) Topical daily  enoxaparin Injectable 40 milliGRAM(s) SubCutaneous every 12 hours  fentaNYL   Patch  50 MICROgram(s)/Hr 1 Patch Transdermal every 72 hours  naloxone Injectable 0.4 milliGRAM(s) IV Push once  pantoprazole    Tablet 40 milliGRAM(s) Oral before breakfast  polyethylene glycol 3350 17 Gram(s) Oral daily  senna 2 Tablet(s) Oral at bedtime    MEDICATIONS  (PRN):  ALBUTerol    90 MICROgram(s) HFA Inhaler 2 Puff(s) Inhalation every 4 hours PRN Shortness of Breath and/or Wheezing  aluminum hydroxide/magnesium hydroxide/simethicone Suspension 30 milliLiter(s) Oral every 4 hours PRN Dyspepsia  benzonatate 100 milliGRAM(s) Oral every 8 hours PRN Cough  bisacodyl 5 milliGRAM(s) Oral daily PRN Constipation  bisacodyl Suppository 10 milliGRAM(s) Rectal daily PRN Constipation  HYDROmorphone  Injectable 1 milliGRAM(s) IV Push every 4 hours PRN Moderate Pain (4 - 6)  HYDROmorphone  Injectable 2 milliGRAM(s) IV Push every 4 hours PRN Severe Pain (7 - 10)  megestrol 40 milliGRAM(s) Oral daily PRN appetite  melatonin 3 milliGRAM(s) Oral at bedtime PRN Insomnia  ondansetron Injectable 4 milliGRAM(s) IV Push every 8 hours PRN Nausea and/or Vomiting      ROS  No fever, sweats, chills  No epistaxis, HA, sore throat  No CP, SOB, cough, sputum  No n/v/d, abd pain, melena, hematochezia  No edema  No rash  No anxiety  No back pain, joint pain  No bleeding, bruising  No dysuria, hematuria    Vital Signs Last 24 Hrs  T(C): 36.4 (29 Oct 2022 06:55), Max: 36.8 (28 Oct 2022 20:36)  T(F): 97.6 (29 Oct 2022 06:55), Max: 98.3 (28 Oct 2022 20:36)  HR: 109 (29 Oct 2022 06:55) (91 - 119)  BP: 105/75 (29 Oct 2022 06:55) (99/63 - 115/75)  BP(mean): --  RR: 19 (29 Oct 2022 06:55) (18 - 19)  SpO2: 97% (29 Oct 2022 06:55) (91% - 99%)    Parameters below as of 29 Oct 2022 06:55  Patient On (Oxygen Delivery Method): room air        PE  NAD  Awake, alert  Anicteric, MMM  RRR  CTAB  Abd soft, NT, ND  No c/c/e  No rash grossly  FROM                          9.0    14.59 )-----------( 425      ( 29 Oct 2022 07:17 )             27.9       10-29    138  |  101  |  27<H>  ----------------------------<  101<H>  4.0   |  25  |  0.82    Ca    9.1      29 Oct 2022 07:15  Phos  2.9     10-29  Mg     2.2     10-29    TPro  7.4  /  Alb  3.2<L>  /  TBili  0.3  /  DBili  x   /  AST  17  /  ALT  21  /  AlkPhos  76  10-29       Patient is a 61y old  Female who presents with a chief complaint of SOB, CP (29 Oct 2022 06:43)    Patient seen in f/u. No new issues overnight. She notes she feels a bit better this morning. No new complaints     MEDICATIONS  (STANDING):  acyclovir   Oral Tab/Cap 400 milliGRAM(s) Oral daily  amLODIPine   Tablet 10 milliGRAM(s) Oral daily  chlorhexidine 4% Liquid 1 Application(s) Topical daily  enoxaparin Injectable 40 milliGRAM(s) SubCutaneous every 12 hours  fentaNYL   Patch  50 MICROgram(s)/Hr 1 Patch Transdermal every 72 hours  naloxone Injectable 0.4 milliGRAM(s) IV Push once  pantoprazole    Tablet 40 milliGRAM(s) Oral before breakfast  polyethylene glycol 3350 17 Gram(s) Oral daily  senna 2 Tablet(s) Oral at bedtime    MEDICATIONS  (PRN):  ALBUTerol    90 MICROgram(s) HFA Inhaler 2 Puff(s) Inhalation every 4 hours PRN Shortness of Breath and/or Wheezing  aluminum hydroxide/magnesium hydroxide/simethicone Suspension 30 milliLiter(s) Oral every 4 hours PRN Dyspepsia  benzonatate 100 milliGRAM(s) Oral every 8 hours PRN Cough  bisacodyl 5 milliGRAM(s) Oral daily PRN Constipation  bisacodyl Suppository 10 milliGRAM(s) Rectal daily PRN Constipation  HYDROmorphone  Injectable 1 milliGRAM(s) IV Push every 4 hours PRN Moderate Pain (4 - 6)  HYDROmorphone  Injectable 2 milliGRAM(s) IV Push every 4 hours PRN Severe Pain (7 - 10)  megestrol 40 milliGRAM(s) Oral daily PRN appetite  melatonin 3 milliGRAM(s) Oral at bedtime PRN Insomnia  ondansetron Injectable 4 milliGRAM(s) IV Push every 8 hours PRN Nausea and/or Vomiting      ROS  No fever, sweats, chills  No epistaxis, HA, sore throat  + muskuloskeletal chest pain   No n/v/d, abd pain, melena, hematochezia  No edema  No rash  No anxiety  No back pain, joint pain  No bleeding, bruising  No dysuria, hematuria    Vital Signs Last 24 Hrs  T(C): 36.4 (29 Oct 2022 06:55), Max: 36.8 (28 Oct 2022 20:36)  T(F): 97.6 (29 Oct 2022 06:55), Max: 98.3 (28 Oct 2022 20:36)  HR: 109 (29 Oct 2022 06:55) (91 - 119)  BP: 105/75 (29 Oct 2022 06:55) (99/63 - 115/75)  BP(mean): --  RR: 19 (29 Oct 2022 06:55) (18 - 19)  SpO2: 97% (29 Oct 2022 06:55) (91% - 99%)    Parameters below as of 29 Oct 2022 06:55  Patient On (Oxygen Delivery Method): room air        PE  NAD  Awake, alert  Anicteric, MMM  RRR  CTAB anteriorly. Reproducible chest wall pain   Abd soft, NT, ND  No c/c/e  No rash grossly  FROM                          9.0    14.59 )-----------( 425      ( 29 Oct 2022 07:17 )             27.9       10-29    138  |  101  |  27<H>  ----------------------------<  101<H>  4.0   |  25  |  0.82    Ca    9.1      29 Oct 2022 07:15  Phos  2.9     10-29  Mg     2.2     10-29    TPro  7.4  /  Alb  3.2<L>  /  TBili  0.3  /  DBili  x   /  AST  17  /  ALT  21  /  AlkPhos  76  10-29

## 2022-10-29 NOTE — PROGRESS NOTE ADULT - PROBLEM SELECTOR PLAN 3
Unclear etiology, more likely malignant vs CHF  -Patient received Lasix 40IV x1 w/o much improvement  -Cannot get pulm tap as on airborne isolation

## 2022-10-29 NOTE — PROGRESS NOTE ADULT - ASSESSMENT
61F unvaccinated w/ PMH MM(dx'd 2018) currently on chemo(last session 10/13) p/w 1-day h/o chest pain and fever. Most history per daughter at baseline as pt in significant pain and unable to talk. Pt had [place] to [facility] [place] with a sharp mid-chest pain gradual in onset that progressively got worse associated with a fever as high as 102.2 and a productive cough with greenish sputum.     Hematology/Oncology called to see patient who is under care of Dr. Denise Fatima of [facility] for the treatment of IgG lambda multiple myeloma with extensive bone metastases recently started on CyborD chemotherapy 10/7/2022 (LD 10/13/2022). She was recently hospitalized for chemotherapy/pain management at [facility], discharged 10/15/2022.     IgG lambda multiple myeloma  --Under care at [facility] - Dr. Denise Fatima  --Chemotherapy on hold during hospitalization    COVID-19 Infection  --Previously unvaccinated  --Completed Remdesevir, Dexamethasone  -- Ongoing management per ID, Pulm, Primary Team    Chest pain  --Exacerbated by coughing  --Underlying bone disease, COVID infection  --Cardiology, Pulmonary following    Pleural Effusion  --Most likely inflammatory from COVID  --Resolved on L and improved on R  --Unable to have thoracentesis at this time secondary to respiratory isolation  --Management per Pulmonary    Pain Management  --S/P multiple pathologic compression fractures from disease  --Patient needs aggressive pain management  --Consider palliative care if uncontrolled pain.     Anemia  --Secondary to disease, recent chemotherapy  --Please transfuse PRBC's if <7 grams    Constipation  --Resolved with bowel regimen    We will continue to follow patient and coordinate with [facility].    Yo Beyer D.O  Hematology Oncology   [facility]  [phone] ( Office)   Evenings and weekends please call MD on call or office    61F unvaccinated w/ PMH MM(dx'd 2018) currently on chemo(last session 10/13) p/w 1-day h/o chest pain and fever. Most history per daughter at baseline as pt in significant pain and unable to talk. [place] [facility] sharp mid-chest pain gradual in onset that progressively got worse associated with a fever as high as 102.2 and a productive cough with greenish sputum.     Hematology/Oncology called to see patient who is under care of Dr. Denise Fatima of [facility] [place] [facility] for the treatment of IgG lambda multiple myeloma with extensive bone metastases recently started on CyborD chemotherapy 10/7/2022 (LD 10/13/2022). She was recently hospitalized for chemotherapy/pain management at [facility] [place] [facility], discharged 10/15/2022.     IgG lambda multiple myeloma  --Under care at [facility] [place] [facility] - Dr. Denise Fatima  --Chemotherapy on hold during hospitalization    COVID-19 Infection  --Previously unvaccinated  --Completed Remdesevir, Dexamethasone  -- Ongoing management per ID, Pulm, Primary Team    Chest pain  --Exacerbated by coughing  --Underlying bone disease, COVID infection  --Cardiology, Pulmonary following    Pleural Effusion  --Most likely inflammatory from COVID  --Resolved on L and improved on R  --Unable to have thoracentesis at this time secondary to respiratory isolation  --Management per Pulmonary    Pain Management  --S/P multiple pathologic compression fractures from disease  --Ongoing pain management. Pt notes pain controlled with current regimen.     Anemia  --Secondary to disease, recent chemotherapy  --Please transfuse PRBC's if <7 grams    Constipation  --Resolved with bowel regimen    We will continue to follow patient and coordinate with [facility].    Yo Beyer D.O  Hematology Oncology   [facility]  [phone] ( Office)   Evenings and weekends please call MD on call or office    61F unvaccinated w/ PMH MM(dx'd 2018) currently on chemo(last session 10/13) p/w 1-day h/o chest pain and fever. Most history per daughter at baseline as pt in significant pain and unable to talk. Pt had initially presented to [facility] with a sharp mid-chest pain gradual in onset that progressively got worse associated with a fever as high as 102.2 and a productive cough with greenish sputum.     Hematology/Oncology called to see patient who is under care of Dr. Denise Fatima of [facility] [place] for the treatment of IgG lambda multiple myeloma with extensive bone metastases recently started on CyborD chemotherapy 10/7/2022 (LD 10/13/2022). She was recently hospitalized for chemotherapy/pain management at [facility], discharged 10/15/2022.     IgG lambda multiple myeloma  --Under care at [facility] - Dr. Denise Fatima  --Chemotherapy on hold during hospitalization    COVID-19 Infection  --Previously unvaccinated  --Completed Remdesevir, Dexamethasone  -- Ongoing management per ID, Pulm, Primary Team    Leukocytosis: Increase in WBC this morning. Unclear etiology   - Further workup per primary team.  - Consider infectious workup      Chest pain  --Exacerbated by coughing  --Underlying bone disease, COVID infection  --Cardiology, Pulmonary following    Pleural Effusion  --Most likely inflammatory from COVID  --Resolved on L and improved on R  --Unable to have thoracentesis at this time secondary to respiratory isolation  --Management per Pulmonary    Pain Management  --S/P multiple pathologic compression fractures from disease  --Ongoing pain management. Pt notes pain controlled with current regimen.     Anemia  --Secondary to disease, recent chemotherapy  --Please transfuse PRBC's if <7 grams    Constipation  --Resolved with bowel regimen    We will continue to follow patient and coordinate with [facility].    Yo Beyer D.O  Hematology Oncology   [facility]  [phone] ( Office)   Evenings and weekends please call MD on call or office

## 2022-10-29 NOTE — PROGRESS NOTE ADULT - SUBJECTIVE AND OBJECTIVE BOX
Avelino Bonds MD  Internal Medicine, PGY-2    SUBJECTIVE      OBJECTIVE:  ICU Vital Signs Last 24 Hrs  T(C): 36.2 (28 Oct 2022 04:45), Max: 36.6 (27 Oct 2022 13:52)  T(F): 97.2 (28 Oct 2022 04:45), Max: 97.9 (27 Oct 2022 13:52)  HR: 96 (28 Oct 2022 04:45) (95 - 99)  BP: 114/78 (28 Oct 2022 04:45) (100/71 - 114/78)  BP(mean): --  ABP: --  ABP(mean): --  RR: 18 (28 Oct 2022 04:45) (18 - 19)  SpO2: 99% (28 Oct 2022 04:45) (96% - 100%)    O2 Parameters below as of 28 Oct 2022 04:45  Patient On (Oxygen Delivery Method): nasal cannula  O2 Flow (L/min): 6            10-27 @ 07:01  -  10-28 @ 07:00  --------------------------------------------------------  IN: 240 mL / OUT: 825 mL / NET: -585 mL      CAPILLARY BLOOD GLUCOSE          PHYSICAL EXAM:  General: Well-groomed, NAD, laying in bed, on 3.5L O2 NC  HEENT: PERRLA, EOMI, non-icteric  Neck:  symmetric,  JVD absent  Respiratory: Clear to ascultation bilaterally, no crackles/rales, no Resp distress; no accessory muscle use  Cardiovascular:  RRR, no murmurs/rubs/gallops  Abdomen: Soft, NT, ND  Extremities: No edema noted  Skin: No rashes or lesions noted  Neurological: Sensation grossly intact  Psychiatry: AOx3, appropriate insight/judgement, appropriate affect, recent/remote memory intact    PRN Meds:  ALBUTerol    90 MICROgram(s) HFA Inhaler 2 Puff(s) Inhalation every 4 hours PRN  aluminum hydroxide/magnesium hydroxide/simethicone Suspension 30 milliLiter(s) Oral every 4 hours PRN  benzonatate 100 milliGRAM(s) Oral every 8 hours PRN  bisacodyl 5 milliGRAM(s) Oral daily PRN  bisacodyl Suppository 10 milliGRAM(s) Rectal daily PRN  guaifenesin/dextromethorphan Oral Liquid 10 milliLiter(s) Oral every 4 hours PRN  HYDROmorphone  Injectable 1 milliGRAM(s) IV Push every 4 hours PRN  HYDROmorphone  Injectable 2 milliGRAM(s) IV Push every 4 hours PRN  megestrol 40 milliGRAM(s) Oral daily PRN  melatonin 3 milliGRAM(s) Oral at bedtime PRN  ondansetron Injectable 4 milliGRAM(s) IV Push every 8 hours PRN      LABS:                        9.1    8.99  )-----------( 501      ( 28 Oct 2022 06:34 )             28.8     Hgb Trend: 9.1<--, 7.1<--, 8.7<--, 8.1<--, 8.9<--  10-28    137  |  100  |  20  ----------------------------<  89  4.6   |  26  |  0.74    Ca    9.5      28 Oct 2022 06:36  Phos  3.5     10-28  Mg     2.2     10-28    TPro  7.9  /  Alb  3.4  /  TBili  0.4  /  DBili  x   /  AST  14  /  ALT  17  /  AlkPhos  78  10-28    Creatinine Trend: 0.74<--, 0.71<--, 0.68<--, 0.80<--, 0.85<--, 0.84<--            MICROBIOLOGY:       RADIOLOGY:  [ ] Reviewed and interpreted by me    EKG: Avelino Bonds MD  Internal Medicine, PGY-2    SUBJECTIVE  No acute events overnight. Assessed at bedside. Currently breathing well on 3L NC. Intermittently has chest pain, which she attributes to her metastases. No other concerns at this time.       OBJECTIVE:  ICU Vital Signs Last 24 Hrs  T(C): 36.2 (28 Oct 2022 04:45), Max: 36.6 (27 Oct 2022 13:52)  T(F): 97.2 (28 Oct 2022 04:45), Max: 97.9 (27 Oct 2022 13:52)  HR: 96 (28 Oct 2022 04:45) (95 - 99)  BP: 114/78 (28 Oct 2022 04:45) (100/71 - 114/78)  BP(mean): --  ABP: --  ABP(mean): --  RR: 18 (28 Oct 2022 04:45) (18 - 19)  SpO2: 99% (28 Oct 2022 04:45) (96% - 100%)    O2 Parameters below as of 28 Oct 2022 04:45  Patient On (Oxygen Delivery Method): nasal cannula  O2 Flow (L/min): 6            10-27 @ 07:01  -  10-28 @ 07:00  --------------------------------------------------------  IN: 240 mL / OUT: 825 mL / NET: -585 mL      CAPILLARY BLOOD GLUCOSE          PHYSICAL EXAM:  General: Well-groomed, NAD, laying in bed, on 3.5L O2 NC  HEENT: PERRLA, EOMI, non-icteric  Neck:  symmetric,  JVD absent  Respiratory: Clear to ascultation bilaterally, no crackles/rales, no Resp distress; no accessory muscle use  Cardiovascular:  RRR, no murmurs/rubs/gallops  Abdomen: Soft, NT, ND  Extremities: No edema noted  Skin: No rashes or lesions noted  Neurological: Sensation grossly intact  Psychiatry: AOx3, appropriate insight/judgement, appropriate affect, recent/remote memory intact    PRN Meds:  ALBUTerol    90 MICROgram(s) HFA Inhaler 2 Puff(s) Inhalation every 4 hours PRN  aluminum hydroxide/magnesium hydroxide/simethicone Suspension 30 milliLiter(s) Oral every 4 hours PRN  benzonatate 100 milliGRAM(s) Oral every 8 hours PRN  bisacodyl 5 milliGRAM(s) Oral daily PRN  bisacodyl Suppository 10 milliGRAM(s) Rectal daily PRN  guaifenesin/dextromethorphan Oral Liquid 10 milliLiter(s) Oral every 4 hours PRN  HYDROmorphone  Injectable 1 milliGRAM(s) IV Push every 4 hours PRN  HYDROmorphone  Injectable 2 milliGRAM(s) IV Push every 4 hours PRN  megestrol 40 milliGRAM(s) Oral daily PRN  melatonin 3 milliGRAM(s) Oral at bedtime PRN  ondansetron Injectable 4 milliGRAM(s) IV Push every 8 hours PRN      LABS:                        9.1    8.99  )-----------( 501      ( 28 Oct 2022 06:34 )             28.8     Hgb Trend: 9.1<--, 7.1<--, 8.7<--, 8.1<--, 8.9<--  10-28    137  |  100  |  20  ----------------------------<  89  4.6   |  26  |  0.74    Ca    9.5      28 Oct 2022 06:36  Phos  3.5     10-28  Mg     2.2     10-28    TPro  7.9  /  Alb  3.4  /  TBili  0.4  /  DBili  x   /  AST  14  /  ALT  17  /  AlkPhos  78  10-28    Creatinine Trend: 0.74<--, 0.71<--, 0.68<--, 0.80<--, 0.85<--, 0.84<--            MICROBIOLOGY:       RADIOLOGY:  [ ] Reviewed and interpreted by me    EKG:

## 2022-10-29 NOTE — PHYSICAL THERAPY INITIAL EVALUATION ADULT - PERTINENT HX OF CURRENT PROBLEM, REHAB EVAL
62 y/o female admitted to [facility] on 10/18/22  unvaccinated w/ PMH MM(dx'd 2018) currently on chemo(last session 10/13) p/w 1-day h/o chest pain and fever. Found to have hypoxia in s/o COVID-19  and small-moderate pleural effusions. + pathologic fx spine has TLSO brace 60 y/o female admitted to [facility] on 10/18/22  unvaccinated w/ PMH MM(dx'd 2018) currently on chemo(last session 10/13) p/w 1-day h/o chest pain and fever. Found to have hypoxia in s/o COVID-19  and small-moderate pleural effusions. + pathologic fx spine has TLSO brace/ Ct angio (-) PE, TTE B/L plueral effusion, CXR resolved L pleural effusion and trace R pleural effusion.

## 2022-10-30 LAB
ALBUMIN SERPL ELPH-MCNC: 3.4 G/DL — SIGNIFICANT CHANGE UP (ref 3.3–5)
ALP SERPL-CCNC: 78 U/L — SIGNIFICANT CHANGE UP (ref 40–120)
ALT FLD-CCNC: 17 U/L — SIGNIFICANT CHANGE UP (ref 10–45)
ANION GAP SERPL CALC-SCNC: 9 MMOL/L — SIGNIFICANT CHANGE UP (ref 5–17)
APTT BLD: 29.5 SEC — SIGNIFICANT CHANGE UP (ref 27.5–35.5)
AST SERPL-CCNC: 12 U/L — SIGNIFICANT CHANGE UP (ref 10–40)
BILIRUB SERPL-MCNC: 0.3 MG/DL — SIGNIFICANT CHANGE UP (ref 0.2–1.2)
BUN SERPL-MCNC: 24 MG/DL — HIGH (ref 7–23)
CALCIUM SERPL-MCNC: 9.6 MG/DL — SIGNIFICANT CHANGE UP (ref 8.4–10.5)
CHLORIDE SERPL-SCNC: 103 MMOL/L — SIGNIFICANT CHANGE UP (ref 96–108)
CO2 SERPL-SCNC: 27 MMOL/L — SIGNIFICANT CHANGE UP (ref 22–31)
CREAT SERPL-MCNC: 0.77 MG/DL — SIGNIFICANT CHANGE UP (ref 0.5–1.3)
EGFR: 88 ML/MIN/1.73M2 — SIGNIFICANT CHANGE UP
GLUCOSE SERPL-MCNC: 105 MG/DL — HIGH (ref 70–99)
HCT VFR BLD CALC: 27.2 % — LOW (ref 34.5–45)
HGB BLD-MCNC: 8.4 G/DL — LOW (ref 11.5–15.5)
INR BLD: 1.3 RATIO — HIGH (ref 0.88–1.16)
MAGNESIUM SERPL-MCNC: 2.4 MG/DL — SIGNIFICANT CHANGE UP (ref 1.6–2.6)
MCHC RBC-ENTMCNC: 30.9 GM/DL — LOW (ref 32–36)
MCHC RBC-ENTMCNC: 31.3 PG — SIGNIFICANT CHANGE UP (ref 27–34)
MCV RBC AUTO: 101.5 FL — HIGH (ref 80–100)
NRBC # BLD: 0 /100 WBCS — SIGNIFICANT CHANGE UP (ref 0–0)
PHOSPHATE SERPL-MCNC: 2.6 MG/DL — SIGNIFICANT CHANGE UP (ref 2.5–4.5)
PLATELET # BLD AUTO: 360 K/UL — SIGNIFICANT CHANGE UP (ref 150–400)
POTASSIUM SERPL-MCNC: 3.8 MMOL/L — SIGNIFICANT CHANGE UP (ref 3.5–5.3)
POTASSIUM SERPL-SCNC: 3.8 MMOL/L — SIGNIFICANT CHANGE UP (ref 3.5–5.3)
PROT SERPL-MCNC: 7.9 G/DL — SIGNIFICANT CHANGE UP (ref 6–8.3)
PROTHROM AB SERPL-ACNC: 15 SEC — HIGH (ref 10.5–13.4)
RBC # BLD: 2.68 M/UL — LOW (ref 3.8–5.2)
RBC # FLD: 18.6 % — HIGH (ref 10.3–14.5)
SODIUM SERPL-SCNC: 139 MMOL/L — SIGNIFICANT CHANGE UP (ref 135–145)
WBC # BLD: 10.89 K/UL — HIGH (ref 3.8–10.5)
WBC # FLD AUTO: 10.89 K/UL — HIGH (ref 3.8–10.5)

## 2022-10-30 RX ADMIN — Medication 100 MILLIGRAM(S): at 05:17

## 2022-10-30 RX ADMIN — Medication 100 MILLIGRAM(S): at 23:06

## 2022-10-30 RX ADMIN — POLYETHYLENE GLYCOL 3350 17 GRAM(S): 17 POWDER, FOR SOLUTION ORAL at 13:25

## 2022-10-30 RX ADMIN — ENOXAPARIN SODIUM 40 MILLIGRAM(S): 100 INJECTION SUBCUTANEOUS at 08:22

## 2022-10-30 RX ADMIN — AMLODIPINE BESYLATE 10 MILLIGRAM(S): 2.5 TABLET ORAL at 05:15

## 2022-10-30 RX ADMIN — SENNA PLUS 2 TABLET(S): 8.6 TABLET ORAL at 23:00

## 2022-10-30 RX ADMIN — HYDROMORPHONE HYDROCHLORIDE 2 MILLIGRAM(S): 2 INJECTION INTRAMUSCULAR; INTRAVENOUS; SUBCUTANEOUS at 09:00

## 2022-10-30 RX ADMIN — ENOXAPARIN SODIUM 40 MILLIGRAM(S): 100 INJECTION SUBCUTANEOUS at 23:00

## 2022-10-30 RX ADMIN — HYDROMORPHONE HYDROCHLORIDE 2 MILLIGRAM(S): 2 INJECTION INTRAMUSCULAR; INTRAVENOUS; SUBCUTANEOUS at 08:20

## 2022-10-30 RX ADMIN — HYDROMORPHONE HYDROCHLORIDE 1 MILLIGRAM(S): 2 INJECTION INTRAMUSCULAR; INTRAVENOUS; SUBCUTANEOUS at 23:04

## 2022-10-30 RX ADMIN — Medication 400 MILLIGRAM(S): at 13:25

## 2022-10-30 RX ADMIN — CHLORHEXIDINE GLUCONATE 1 APPLICATION(S): 213 SOLUTION TOPICAL at 13:26

## 2022-10-30 RX ADMIN — PANTOPRAZOLE SODIUM 40 MILLIGRAM(S): 20 TABLET, DELAYED RELEASE ORAL at 05:15

## 2022-10-30 RX ADMIN — HYDROMORPHONE HYDROCHLORIDE 1 MILLIGRAM(S): 2 INJECTION INTRAMUSCULAR; INTRAVENOUS; SUBCUTANEOUS at 23:15

## 2022-10-30 NOTE — PROGRESS NOTE ADULT - PROBLEM SELECTOR PLAN 1
n 80% : has covid infection:  probnp is OK: echo pending: eh has abdirahman effusions:  she has multiple myeloma:  ? add diuretics    10/21: she seems to be doing ok : no sob: on 60% fio2:: he has pleural effusion; probnp is low : not added diuretics ? needs tap  on left side:    10/22: seems same: on 60%:  high flow:  seems stable overnight: on dexa and remdesivir per protocol: try to wean down fio2:  no pe on ct a: has abdirahman effusion: await echo  : if the pleural effusions cant be explained on the basis of echo , the probably she  would need a tap    10/23: still requiring 60% high flow : no reps distress:    no chest pain today  ::  on covid rx:  await echo  ? etiology of pleural effusion    10/25: seems OK : no chest pain : await echo : on dexa: for a total of 10 days: await echo    10/26: she seems pretty good : change to nasal cannula: echo is normal : repeat chest xray  : if still effusion:  needs thoracentesis:    10/27: she seems OK : on 6 L today: rpt chest xray with pl effusion still present:  etiology not certain : do diagnostic thoracentesis    10/28: she is on 2-3 L of oxygen : has small effusions now : will do USG chest during  thoracentesis : if significant fluid then tap , other wise no!    10/30: she is doing pretty good :  on 3 L of oxygen  no sob:  gets PT:  : can repeat chest xray in am or usg  : if no t sign fluid  : no tapping:

## 2022-10-30 NOTE — PROGRESS NOTE ADULT - SUBJECTIVE AND OBJECTIVE BOX
C A R D I O L O G Y  **********************************     DATE OF SERVICE: 10-30-22     per nursing staff, pt offers no complaints , no chest pain          acyclovir   Oral Tab/Cap 400 milliGRAM(s) Oral daily  ALBUTerol    90 MICROgram(s) HFA Inhaler 2 Puff(s) Inhalation every 4 hours PRN  aluminum hydroxide/magnesium hydroxide/simethicone Suspension 30 milliLiter(s) Oral every 4 hours PRN  amLODIPine   Tablet 10 milliGRAM(s) Oral daily  benzonatate 100 milliGRAM(s) Oral every 8 hours PRN  bisacodyl 5 milliGRAM(s) Oral daily PRN  bisacodyl Suppository 10 milliGRAM(s) Rectal daily PRN  chlorhexidine 4% Liquid 1 Application(s) Topical daily  enoxaparin Injectable 40 milliGRAM(s) SubCutaneous every 12 hours  fentaNYL   Patch  50 MICROgram(s)/Hr 1 Patch Transdermal every 72 hours  HYDROmorphone  Injectable 1 milliGRAM(s) IV Push every 4 hours PRN  HYDROmorphone  Injectable 2 milliGRAM(s) IV Push every 4 hours PRN  megestrol 40 milliGRAM(s) Oral daily PRN  melatonin 3 milliGRAM(s) Oral at bedtime PRN  naloxone Injectable 0.4 milliGRAM(s) IV Push once  ondansetron Injectable 4 milliGRAM(s) IV Push every 8 hours PRN  pantoprazole    Tablet 40 milliGRAM(s) Oral before breakfast  polyethylene glycol 3350 17 Gram(s) Oral daily  senna 2 Tablet(s) Oral at bedtime                            9.0    14.59 )-----------( 425      ( 29 Oct 2022 07:17 )             27.9       Hemoglobin: 9.0 g/dL (10-29 @ 07:17)  Hemoglobin: 9.1 g/dL (10-28 @ 06:34)  Hemoglobin: 7.1 g/dL (10-27 @ 07:27)  Hemoglobin: 8.7 g/dL (10-26 @ 07:16)  Hemoglobin: 8.1 g/dL (10-25 @ 07:00)      10-29    138  |  101  |  27<H>  ----------------------------<  101<H>  4.0   |  25  |  0.82    Ca    9.1      29 Oct 2022 07:15  Phos  2.9     10-29  Mg     2.2     10-29    TPro  7.4  /  Alb  3.2<L>  /  TBili  0.3  /  DBili  x   /  AST  17  /  ALT  21  /  AlkPhos  76  10-29    Creatinine Trend: 0.82<--, 0.74<--, 0.71<--, 0.68<--, 0.80<--, 0.85<--    COAGS:           T(C): 36.5 (10-30-22 @ 05:02), Max: 36.9 (10-29-22 @ 13:51)  HR: 93 (10-30-22 @ 05:02) (93 - 110)  BP: 104/69 (10-30-22 @ 05:02) (104/69 - 114/77)  RR: 18 (10-30-22 @ 05:02) (18 - 19)  SpO2: 100% (10-30-22 @ 05:02) (97% - 100%)  Wt(kg): --    I&O's Summary    28 Oct 2022 07:01  -  29 Oct 2022 07:00  --------------------------------------------------------  IN: 120 mL / OUT: 1100 mL / NET: -980 mL    29 Oct 2022 07:01  -  30 Oct 2022 06:38  --------------------------------------------------------  IN: 0 mL / OUT: 350 mL / NET: -350 mL      Gen: NAD  HEENT:  (-)icterus (-)pallor  CV: N S1 S2 1/6 CHARLY (+)2 Pulses B/l  Resp:  Clear to auscultation B/L, normal effort  GI: (+) BS Soft, NT, ND  Lymph:  (-)Edema, (-)obvious lymphadenopathy  Skin: Warm to touch, Normal turgor  Psych: Appropriate mood and affect      TELEMETRY: None	      ECG: Sinus Tachycardia, PVC    RADIOLOGY:  < from: CT Angio Chest PE Protocol w/ IV Cont (10.18.22 @ 20:03) >  IMPRESSION:    No pulmonary embolism.    Smallright and moderate left pleural effusions and compressive   atelectasis.    --- End of Report ---    < end of copied text >      ASSESSMENT/PLAN: Patient is a 60 y/o Female with PMH of Multiple Myeloma (dx 2018) currently on chemo who presented with chest pain, SOB, and fever admitted with +COVID and b/l pleural effusions. Cardiology consulted for further evaluation.     - Patient with nonanginal chest pain that is pleuritic and very tender to palpation - suspect MSK related likely due to costochondritis and multiple myeloma lytic lesions  - EKG with sinus tachycardia and no acute ischemic changes  - Cardiac enzymes unremarkable  - CTA chest negative for PE, small R and mod L pleural effusions and compressive atelectasis  - TTE with normal LV/RV function  - Pulm follow up - wean O2 as tolerated, pending diagnostic thoracentesis when off isolation  - Supportive care/covid management per primary team  - No further inpatient cardiac w/u planned      C A R D I O L O G Y  **********************************     DATE OF SERVICE: 10-30-22     per nursing staff, pt offers no complaints , no chest pain        acyclovir   Oral Tab/Cap 400 milliGRAM(s) Oral daily  ALBUTerol    90 MICROgram(s) HFA Inhaler 2 Puff(s) Inhalation every 4 hours PRN  aluminum hydroxide/magnesium hydroxide/simethicone Suspension 30 milliLiter(s) Oral every 4 hours PRN  amLODIPine   Tablet 10 milliGRAM(s) Oral daily  benzonatate 100 milliGRAM(s) Oral every 8 hours PRN  bisacodyl 5 milliGRAM(s) Oral daily PRN  bisacodyl Suppository 10 milliGRAM(s) Rectal daily PRN  chlorhexidine 4% Liquid 1 Application(s) Topical daily  enoxaparin Injectable 40 milliGRAM(s) SubCutaneous every 12 hours  fentaNYL   Patch  50 MICROgram(s)/Hr 1 Patch Transdermal every 72 hours  HYDROmorphone  Injectable 1 milliGRAM(s) IV Push every 4 hours PRN  HYDROmorphone  Injectable 2 milliGRAM(s) IV Push every 4 hours PRN  megestrol 40 milliGRAM(s) Oral daily PRN  melatonin 3 milliGRAM(s) Oral at bedtime PRN  naloxone Injectable 0.4 milliGRAM(s) IV Push once  ondansetron Injectable 4 milliGRAM(s) IV Push every 8 hours PRN  pantoprazole    Tablet 40 milliGRAM(s) Oral before breakfast  polyethylene glycol 3350 17 Gram(s) Oral daily  senna 2 Tablet(s) Oral at bedtime                            9.0    14.59 )-----------( 425      ( 29 Oct 2022 07:17 )             27.9       Hemoglobin: 9.0 g/dL (10-29 @ 07:17)  Hemoglobin: 9.1 g/dL (10-28 @ 06:34)  Hemoglobin: 7.1 g/dL (10-27 @ 07:27)  Hemoglobin: 8.7 g/dL (10-26 @ 07:16)  Hemoglobin: 8.1 g/dL (10-25 @ 07:00)      10-29    138  |  101  |  27<H>  ----------------------------<  101<H>  4.0   |  25  |  0.82    Ca    9.1      29 Oct 2022 07:15  Phos  2.9     10-29  Mg     2.2     10-29    TPro  7.4  /  Alb  3.2<L>  /  TBili  0.3  /  DBili  x   /  AST  17  /  ALT  21  /  AlkPhos  76  10-29    Creatinine Trend: 0.82<--, 0.74<--, 0.71<--, 0.68<--, 0.80<--, 0.85<--    COAGS:           T(C): 36.5 (10-30-22 @ 05:02), Max: 36.9 (10-29-22 @ 13:51)  HR: 93 (10-30-22 @ 05:02) (93 - 110)  BP: 104/69 (10-30-22 @ 05:02) (104/69 - 114/77)  RR: 18 (10-30-22 @ 05:02) (18 - 19)  SpO2: 100% (10-30-22 @ 05:02) (97% - 100%)  Wt(kg): --    I&O's Summary    28 Oct 2022 07:01  -  29 Oct 2022 07:00  --------------------------------------------------------  IN: 120 mL / OUT: 1100 mL / NET: -980 mL    29 Oct 2022 07:01  -  30 Oct 2022 06:38  --------------------------------------------------------  IN: 0 mL / OUT: 350 mL / NET: -350 mL      Gen: NAD  HEENT:  (-)icterus (-)pallor  CV: N S1 S2 1/6 CHARLY (+)2 Pulses B/l  Resp:  Clear to auscultation B/L, normal effort  GI: (+) BS Soft, NT, ND  Lymph:  (-)Edema, (-)obvious lymphadenopathy  Skin: Warm to touch, Normal turgor  Psych: Appropriate mood and affect      TELEMETRY: None	      ECG: Sinus Tachycardia, PVC    RADIOLOGY:  < from: CT Angio Chest PE Protocol w/ IV Cont (10.18.22 @ 20:03) >  IMPRESSION:    No pulmonary embolism.    Smallright and moderate left pleural effusions and compressive   atelectasis.    --- End of Report ---    < end of copied text >      ASSESSMENT/PLAN: Patient is a 60 y/o Female with PMH of Multiple Myeloma (dx 2018) currently on chemo who presented with chest pain, SOB, and fever admitted with +COVID and b/l pleural effusions. Cardiology consulted for further evaluation.     - Patient with nonanginal chest pain that is pleuritic and very tender to palpation - suspect MSK related likely due to costochondritis and multiple myeloma lytic lesions  - EKG with sinus tachycardia and no acute ischemic changes  - Cardiac enzymes unremarkable  - CTA chest negative for PE, small R and mod L pleural effusions and compressive atelectasis  - TTE with normal LV/RV function  - Pulm follow up - wean O2 as tolerated, pending diagnostic thoracentesis when off isolation  - Supportive care/covid management per primary team  - No further inpatient cardiac w/u planned

## 2022-10-30 NOTE — PROGRESS NOTE ADULT - PROBLEM SELECTOR PLAN 2
-s/p Vanc/Cefepime, decadron in ED  -c/w supplemental O2 and wean as tolerated  -finished dexamethasone  -finished Remdesivir  -BCx ngtd  -antitussive prn  -ID control states isolation is 20 days for immunosuppressed patients

## 2022-10-30 NOTE — PROGRESS NOTE ADULT - PROBLEM SELECTOR PLAN 3
has sternal tenderness:  pain control    10/22: localised sternal tenderness: pain control    10/23: resolved:  has MM    10/25: no pain now;    10/26: resolved    10/27: resolved    10/28: resolved    10/30: resolved

## 2022-10-30 NOTE — PROGRESS NOTE ADULT - PROBLEM SELECTOR PLAN 6
-currently undergoing chemo at [facility]  -heme/onc following  -pain control

## 2022-10-30 NOTE — PROGRESS NOTE ADULT - SUBJECTIVE AND OBJECTIVE BOX
Patient is a 61y old  Female who presents with a chief complaint of SOB, CP (30 Oct 2022 07:11)  Patient seen in follow up today. No new issues overnight.     MEDICATIONS  (STANDING):  acyclovir   Oral Tab/Cap 400 milliGRAM(s) Oral daily  amLODIPine   Tablet 10 milliGRAM(s) Oral daily  chlorhexidine 4% Liquid 1 Application(s) Topical daily  enoxaparin Injectable 40 milliGRAM(s) SubCutaneous every 12 hours  fentaNYL   Patch  50 MICROgram(s)/Hr 1 Patch Transdermal every 72 hours  naloxone Injectable 0.4 milliGRAM(s) IV Push once  pantoprazole    Tablet 40 milliGRAM(s) Oral before breakfast  polyethylene glycol 3350 17 Gram(s) Oral daily  senna 2 Tablet(s) Oral at bedtime    MEDICATIONS  (PRN):  ALBUTerol    90 MICROgram(s) HFA Inhaler 2 Puff(s) Inhalation every 4 hours PRN Shortness of Breath and/or Wheezing  aluminum hydroxide/magnesium hydroxide/simethicone Suspension 30 milliLiter(s) Oral every 4 hours PRN Dyspepsia  benzonatate 100 milliGRAM(s) Oral every 8 hours PRN Cough  bisacodyl 5 milliGRAM(s) Oral daily PRN Constipation  bisacodyl Suppository 10 milliGRAM(s) Rectal daily PRN Constipation  HYDROmorphone  Injectable 1 milliGRAM(s) IV Push every 4 hours PRN Moderate Pain (4 - 6)  HYDROmorphone  Injectable 2 milliGRAM(s) IV Push every 4 hours PRN Severe Pain (7 - 10)  megestrol 40 milliGRAM(s) Oral daily PRN appetite  melatonin 3 milliGRAM(s) Oral at bedtime PRN Insomnia  ondansetron Injectable 4 milliGRAM(s) IV Push every 8 hours PRN Nausea and/or Vomiting      ROS  No fever, sweats, chills  No epistaxis, HA, sore throat  No CP, SOB, cough, sputum  No n/v/d, abd pain, melena, hematochezia  No edema  No rash  No anxiety  No back pain, joint pain  No bleeding, bruising  No dysuria, hematuria    Vital Signs Last 24 Hrs  T(C): 36.5 (30 Oct 2022 05:02), Max: 36.9 (29 Oct 2022 13:51)  T(F): 97.7 (30 Oct 2022 05:02), Max: 98.5 (29 Oct 2022 13:51)  HR: 93 (30 Oct 2022 05:02) (93 - 110)  BP: 104/69 (30 Oct 2022 05:02) (104/69 - 114/77)  BP(mean): --  RR: 18 (30 Oct 2022 05:02) (18 - 19)  SpO2: 100% (30 Oct 2022 05:02) (97% - 100%)    Parameters below as of 30 Oct 2022 05:02  Patient On (Oxygen Delivery Method): nasal cannula        PE  NAD  Awake, alert  Anicteric, MMM  RRR  CTAB  Abd soft, NT, ND  No c/c/e  No rash grossly  FROM                          9.0    14.59 )-----------( 425      ( 29 Oct 2022 07:17 )             27.9       10-29    138  |  101  |  27<H>  ----------------------------<  101<H>  4.0   |  25  |  0.82    Ca    9.1      29 Oct 2022 07:15  Phos  2.9     10-29  Mg     2.2     10-29    TPro  7.4  /  Alb  3.2<L>  /  TBili  0.3  /  DBili  x   /  AST  17  /  ALT  21  /  AlkPhos  76  10-29       Patient is a 61y old  Female who presents with a chief complaint of SOB, CP (30 Oct 2022 07:11)  Patient seen in follow up today. No new issues overnight. Feels better apart from ongoing cough. No other complaints today     MEDICATIONS  (STANDING):  acyclovir   Oral Tab/Cap 400 milliGRAM(s) Oral daily  amLODIPine   Tablet 10 milliGRAM(s) Oral daily  chlorhexidine 4% Liquid 1 Application(s) Topical daily  enoxaparin Injectable 40 milliGRAM(s) SubCutaneous every 12 hours  fentaNYL   Patch  50 MICROgram(s)/Hr 1 Patch Transdermal every 72 hours  naloxone Injectable 0.4 milliGRAM(s) IV Push once  pantoprazole    Tablet 40 milliGRAM(s) Oral before breakfast  polyethylene glycol 3350 17 Gram(s) Oral daily  senna 2 Tablet(s) Oral at bedtime    MEDICATIONS  (PRN):  ALBUTerol    90 MICROgram(s) HFA Inhaler 2 Puff(s) Inhalation every 4 hours PRN Shortness of Breath and/or Wheezing  aluminum hydroxide/magnesium hydroxide/simethicone Suspension 30 milliLiter(s) Oral every 4 hours PRN Dyspepsia  benzonatate 100 milliGRAM(s) Oral every 8 hours PRN Cough  bisacodyl 5 milliGRAM(s) Oral daily PRN Constipation  bisacodyl Suppository 10 milliGRAM(s) Rectal daily PRN Constipation  HYDROmorphone  Injectable 1 milliGRAM(s) IV Push every 4 hours PRN Moderate Pain (4 - 6)  HYDROmorphone  Injectable 2 milliGRAM(s) IV Push every 4 hours PRN Severe Pain (7 - 10)  megestrol 40 milliGRAM(s) Oral daily PRN appetite  melatonin 3 milliGRAM(s) Oral at bedtime PRN Insomnia  ondansetron Injectable 4 milliGRAM(s) IV Push every 8 hours PRN Nausea and/or Vomiting      ROS  No fever, sweats, chills  No epistaxis, HA, sore throat  + musculoskeletal CP, no SOB,+ cough, minimal sputum  No n/v/d, abd pain, melena, hematochezia  No edema  No rash  No anxiety  No back pain, joint pain  No bleeding, bruising  No dysuria, hematuria    Vital Signs Last 24 Hrs  T(C): 36.5 (30 Oct 2022 05:02), Max: 36.9 (29 Oct 2022 13:51)  T(F): 97.7 (30 Oct 2022 05:02), Max: 98.5 (29 Oct 2022 13:51)  HR: 93 (30 Oct 2022 05:02) (93 - 110)  BP: 104/69 (30 Oct 2022 05:02) (104/69 - 114/77)  BP(mean): --  RR: 18 (30 Oct 2022 05:02) (18 - 19)  SpO2: 100% (30 Oct 2022 05:02) (97% - 100%)    Parameters below as of 30 Oct 2022 05:02  Patient On (Oxygen Delivery Method): nasal cannula        PE  NAD  Awake, alert  Anicteric, MMM  RR mild tachycardia   Mildly course breath sounds anteriorly   Abd soft, NT, ND  No c/c/e  No rash grossly  Deconditioning   FROM                          9.0    14.59 )-----------( 425      ( 29 Oct 2022 07:17 )             27.9       10-29    138  |  101  |  27<H>  ----------------------------<  101<H>  4.0   |  25  |  0.82    Ca    9.1      29 Oct 2022 07:15  Phos  2.9     10-29  Mg     2.2     10-29    TPro  7.4  /  Alb  3.2<L>  /  TBili  0.3  /  DBili  x   /  AST  17  /  ALT  21  /  AlkPhos  76  10-29

## 2022-10-30 NOTE — PROGRESS NOTE ADULT - SUBJECTIVE AND OBJECTIVE BOX
Date of Service: 10-30-22 @ 11:14    Patient is a 61y old  Female who presents with a chief complaint of SOB, CP (30 Oct 2022 08:54)      Any change in ROS: She is on 3 L of oxygen :  she is doing much better:       MEDICATIONS  (STANDING):  acyclovir   Oral Tab/Cap 400 milliGRAM(s) Oral daily  amLODIPine   Tablet 10 milliGRAM(s) Oral daily  chlorhexidine 4% Liquid 1 Application(s) Topical daily  enoxaparin Injectable 40 milliGRAM(s) SubCutaneous every 12 hours  fentaNYL   Patch  50 MICROgram(s)/Hr 1 Patch Transdermal every 72 hours  naloxone Injectable 0.4 milliGRAM(s) IV Push once  pantoprazole    Tablet 40 milliGRAM(s) Oral before breakfast  polyethylene glycol 3350 17 Gram(s) Oral daily  senna 2 Tablet(s) Oral at bedtime    MEDICATIONS  (PRN):  ALBUTerol    90 MICROgram(s) HFA Inhaler 2 Puff(s) Inhalation every 4 hours PRN Shortness of Breath and/or Wheezing  aluminum hydroxide/magnesium hydroxide/simethicone Suspension 30 milliLiter(s) Oral every 4 hours PRN Dyspepsia  benzonatate 100 milliGRAM(s) Oral every 8 hours PRN Cough  bisacodyl 5 milliGRAM(s) Oral daily PRN Constipation  bisacodyl Suppository 10 milliGRAM(s) Rectal daily PRN Constipation  HYDROmorphone  Injectable 1 milliGRAM(s) IV Push every 4 hours PRN Moderate Pain (4 - 6)  HYDROmorphone  Injectable 2 milliGRAM(s) IV Push every 4 hours PRN Severe Pain (7 - 10)  megestrol 40 milliGRAM(s) Oral daily PRN appetite  melatonin 3 milliGRAM(s) Oral at bedtime PRN Insomnia  ondansetron Injectable 4 milliGRAM(s) IV Push every 8 hours PRN Nausea and/or Vomiting    Vital Signs Last 24 Hrs  T(C): 36.5 (30 Oct 2022 05:02), Max: 36.9 (29 Oct 2022 13:51)  T(F): 97.7 (30 Oct 2022 05:02), Max: 98.5 (29 Oct 2022 13:51)  HR: 93 (30 Oct 2022 05:02) (93 - 110)  BP: 104/69 (30 Oct 2022 05:02) (104/69 - 114/77)  BP(mean): --  RR: 18 (30 Oct 2022 05:02) (18 - 19)  SpO2: 100% (30 Oct 2022 05:02) (97% - 100%)    Parameters below as of 30 Oct 2022 05:02  Patient On (Oxygen Delivery Method): nasal cannula        I&O's Summary    29 Oct 2022 07:01  -  30 Oct 2022 07:00  --------------------------------------------------------  IN: 0 mL / OUT: 350 mL / NET: -350 mL          Physical Exam:   GENERAL: NAD, well-groomed, well-developed  HEENT: CARLA/   Atraumatic, Normocephalic  ENMT: No tonsillar erythema, exudates, or enlargement; Moist mucous membranes, Good dentition, No lesions  NECK: Supple, No JVD, Normal thyroid  CHEST/LUNG: Clear to auscultaion  CVS: Regular rate and rhythm; No murmurs, rubs, or gallops  GI: : Soft, Nontender, Nondistended; Bowel sounds present  NERVOUS SYSTEM:  Alert & Oriented X3  EXTREMITIES: -edema  LYMPH: No lymphadenopathy noted  SKIN: No rashes or lesions  ENDOCRINOLOGY: No Thyromegaly  PSYCH: Appropriate    Labs:  28, 28                            8.4    10.89 )-----------( 360      ( 30 Oct 2022 09:56 )             27.2                         9.0    14.59 )-----------( 425      ( 29 Oct 2022 07:17 )             27.9                         9.1    8.99  )-----------( 501      ( 28 Oct 2022 06:34 )             28.8                         7.1    4.40  )-----------( 277      ( 27 Oct 2022 07:27 )             23.6     10-30    139  |  103  |  24<H>  ----------------------------<  105<H>  3.8   |  27  |  0.77  10-29    138  |  101  |  27<H>  ----------------------------<  101<H>  4.0   |  25  |  0.82  10-28    137  |  100  |  20  ----------------------------<  89  4.6   |  26  |  0.74  10-27    132<L>  |  101  |  19  ----------------------------<  67<L>  4.7   |  23  |  0.71    Ca    9.6      30 Oct 2022 09:56  Ca    9.1      29 Oct 2022 07:15  Phos  2.6     10-30  Phos  2.9     10-29  Mg     2.4     10-30  Mg     2.2     10-29    TPro  7.9  /  Alb  3.4  /  TBili  0.3  /  DBili  x   /  AST  12  /  ALT  17  /  AlkPhos  78  10-30  TPro  7.4  /  Alb  3.2<L>  /  TBili  0.3  /  DBili  x   /  AST  17  /  ALT  21  /  AlkPhos  76  10-29  TPro  7.9  /  Alb  3.4  /  TBili  0.4  /  DBili  x   /  AST  14  /  ALT  17  /  AlkPhos  78  10-28  TPro  6.9  /  Alb  2.6<L>  /  TBili  0.3  /  DBili  x   /  AST  16  /  ALT  15  /  AlkPhos  69  10-27    CAPILLARY BLOOD GLUCOSE          LIVER FUNCTIONS - ( 30 Oct 2022 09:56 )  Alb: 3.4 g/dL / Pro: 7.9 g/dL / ALK PHOS: 78 U/L / ALT: 17 U/L / AST: 12 U/L / GGT: x           PT/INR - ( 30 Oct 2022 09:56 )   PT: 15.0 sec;   INR: 1.30 ratio         PTT - ( 30 Oct 2022 09:56 )  PTT:29.5 sec          RECENT CULTURES:        RESPIRATORY CULTURES:    rad< from: Xray Chest 1 View- PORTABLE-Routine (Xray Chest 1 View- PORTABLE-Routine .) (10.27.22 @ 09:43) >    COMPARISON: Chest x-ray 10/18/2022.    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  The heart size is not accurately assessed on this projection.  There is a streaky opacity at the left upper lung field.  There appears to be resolution of the left-sided pleural effusion.   Improving right-sided pleural effusion.  There is no pneumothorax .  There is generalized osteopenia and innumerable lytic lucencies   throughout the visualized osseous structures, compatible with known   myeloma.  IMPRESSION:  Resolved left-sided pleural effusion with trace right-sided pleural   effusion, however evaluation is limited due to patient malrotation.  New streaky opacity at the left upper lobe likely represents area of   linear atelectasis, however infectious etiology cannot be excluded.    --- End of Report ---           HAMLET TOSCANO MD; Resident Radiologist  This document has been electronically signed.  CADEN ONEILL MD; Attending Radiologist  This document has been electronically signed. Oct 28 2022 10:24AM    < end of copied text >        Studies  Chest X-RAY  CT SCAN Chest   Venous Dopplers: LE:   CT Abdomen  Others

## 2022-10-30 NOTE — PROGRESS NOTE ADULT - SUBJECTIVE AND OBJECTIVE BOX
SUBJECTIVE  Pt seen bedside this morning. Complaining of chest pain 2/2 her cough. Asking for medicine to help her cough up more mucous.    OBJECTIVE:  ICU Vital Signs Last 24 Hrs  T(C): 36.5 (30 Oct 2022 05:02), Max: 36.9 (29 Oct 2022 13:51)  T(F): 97.7 (30 Oct 2022 05:02), Max: 98.5 (29 Oct 2022 13:51)  HR: 93 (30 Oct 2022 05:02) (93 - 110)  BP: 104/69 (30 Oct 2022 05:02) (104/69 - 114/77)  BP(mean): --  ABP: --  ABP(mean): --  RR: 18 (30 Oct 2022 05:02) (18 - 19)  SpO2: 100% (30 Oct 2022 05:02) (97% - 100%)    O2 Parameters below as of 30 Oct 2022 05:02  Patient On (Oxygen Delivery Method): nasal cannula              10-29 @ 07:01  -  10-30 @ 07:00  --------------------------------------------------------  IN: 0 mL / OUT: 350 mL / NET: -350 mL      CAPILLARY BLOOD GLUCOSE          PHYSICAL EXAM:  General: Well-groomed, NAD, laying in bed, on 3L O2 NC  HEENT: PERRLA, EOMI, non-icteric  Neck:  symmetric,  JVD absent  Respiratory: Some coarse breath sounds bilat, no crackles/rales, no Resp distress; no accessory muscle use  Cardiovascular:  RRR, no murmurs/rubs/gallops  Abdomen: Soft, NT, ND  Extremities: No edema noted  Skin: No rashes or lesions noted  Neurological: Sensation grossly intact  Psychiatry: AOx3, appropriate insight/judgement, appropriate affect, recent/remote memory intact    PRN Meds:  ALBUTerol    90 MICROgram(s) HFA Inhaler 2 Puff(s) Inhalation every 4 hours PRN  aluminum hydroxide/magnesium hydroxide/simethicone Suspension 30 milliLiter(s) Oral every 4 hours PRN  benzonatate 100 milliGRAM(s) Oral every 8 hours PRN  bisacodyl 5 milliGRAM(s) Oral daily PRN  bisacodyl Suppository 10 milliGRAM(s) Rectal daily PRN  HYDROmorphone  Injectable 1 milliGRAM(s) IV Push every 4 hours PRN  HYDROmorphone  Injectable 2 milliGRAM(s) IV Push every 4 hours PRN  megestrol 40 milliGRAM(s) Oral daily PRN  melatonin 3 milliGRAM(s) Oral at bedtime PRN  ondansetron Injectable 4 milliGRAM(s) IV Push every 8 hours PRN      LABS:                        9.0    14.59 )-----------( 425      ( 29 Oct 2022 07:17 )             27.9     Hgb Trend: 9.0<--, 9.1<--, 7.1<--, 8.7<--, 8.1<--  10-29    138  |  101  |  27<H>  ----------------------------<  101<H>  4.0   |  25  |  0.82    Ca    9.1      29 Oct 2022 07:15  Phos  2.9     10-29  Mg     2.2     10-29    TPro  7.4  /  Alb  3.2<L>  /  TBili  0.3  /  DBili  x   /  AST  17  /  ALT  21  /  AlkPhos  76  10-29    Creatinine Trend: 0.82<--, 0.74<--, 0.71<--, 0.68<--, 0.80<--, 0.85<--  PT/INR - ( 29 Oct 2022 07:17 )   PT: 13.3 sec;   INR: 1.14 ratio         PTT - ( 29 Oct 2022 07:17 )  PTT:28.5 sec          MICROBIOLOGY:       RADIOLOGY:  [ ] Reviewed and interpreted by me    EKG:

## 2022-10-30 NOTE — PROGRESS NOTE ADULT - PROBLEM SELECTOR PLAN 3
I called [facility]  hotline to call the patient back regarding her request for an appointment. The patient stated that she her other knee is hurting her and wants an appointment with Dr. Baker the patient is aware that clinic visits are limited and that I will call her back to schedule the appointment when we are allowed to have clinic visits again. The patient verbalized understanding and has no further questions at this time.    Unclear etiology, more likely malignant vs CHF  -Patient received Lasix 40IV x1 w/o much improvement  -Cannot get pulm tap as on airborne isolation     -Ultrasound will not complete while on isolation     -talk to pulm re necessity as inpatient Unclear etiology, more likely malignant vs CHF  -Patient received Lasix 40IV x1 w/o much improvement  -Cannot get pulm tap as on airborne isolation     -Ultrasound will not complete while on isolation  -Per Pulmonology     -Repeat CxR in morning     -If negligible effusion-> no need for tap     -If fluid, setup as outpatient or inpatient if in-hospital when isolation ends Unclear etiology, more likely malignant vs CHF  -Patient received Lasix 40IV x1 w/o much improvement  -Cannot get pulm tap as on airborne isolation     -Ultrasound will not complete while on isolation  -Per Pulmonology     -Repeat CxR in morning     -If negligible effusion-> no need for tap     -If effusion increasing, setup tap as outpatient or inpatient if in-hospital when isolation ends

## 2022-10-30 NOTE — PROGRESS NOTE ADULT - ASSESSMENT
61F unvaccinated w/ PMH MM(dx'd 2018) currently on chemo(last session 10/13) p/w 1-day h/o chest pain and fever. Most history per daughter at baseline as pt in significant pain and unable to talk. [place] [facility] [place] mid-chest pain gradual in onset that progressively got worse associated with a fever as high as 102.2 and a productive cough with greenish sputum.     Hematology/Oncology called to see patient who is under care of Dr. 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 She was recently hospitalized for chemotherapy/pain management at [facility], discharged 10/15/2022.     IgG lambda multiple myeloma  --Under care at [facility] - Dr. Denise Fatima  --Chemotherapy on hold during hospitalization    COVID-19 Infection  --Previously unvaccinated  --Completed Remdesevir, Dexamethasone  -- Ongoing management per ID, Pulm, Primary Team    Leukocytosis: on CBC yesterday.   Recommend checking CBC today and further workup if indicated     Chest pain  --Exacerbated by coughing  --Underlying bone disease, COVID infection  --Cardiology, Pulmonary following    Pleural Effusion  --Most likely inflammatory from COVID  --Resolved on L and improved on R  --Unable to have thoracentesis at this time secondary to respiratory isolation  --Management per Pulmonary    Pain Management  --S/P multiple pathologic compression fractures from disease  --Ongoing pain management. Pt notes pain controlled with current regimen.     Anemia  --Secondary to disease, recent chemotherapy  --Please transfuse PRBC's if <7 grams    Constipation  --Resolved with bowel regimen    We will continue to follow along with you in collaboration with [facility].     Yo Beyer D.O  Hematology Oncology   [facility]  [phone] ( Office)   Evenings and weekends please call MD on call or office  61F unvaccinated w/ PMH MM(dx'd 2018) currently on chemo(last session 10/13) p/w 1-day h/o chest pain and fever. Most history per daughter at baseline as pt in significant pain and unable to talk. Pt had initially presented to [facility] with a sharp mid-chest pain gradual in onset that progressively got worse associated with a fever as high as 102.2 and a productive cough with greenish sputum.     Hematology/Oncology called to see patient who is under [place] of [place] 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 She was recently hospitalized for chemotherapy/pain management [place] [facility], discharged 10/15/2022.     IgG lambda multiple myeloma  --Under care at [facility] [place] Densie Fatima  --Chemotherapy on hold during hospitalization    COVID-19 Infection  --Previously unvaccinated  --Completed Remdesevir, Dexamethasone  -- Ongoing management per ID, Pulm, Primary Team    Leukocytosis: on CBC yesterday.   - Follow up on pending CBC today. Consider reevaluation for infection if still elevated     Chest pain. Exacerbated by coughing  --Underlying bone disease, COVID infection  --Cardiology, Pulmonary following    Pleural Effusion  --Most likely inflammatory from COVID  --Resolved on L and improved on R  --Unable to have thoracentesis at this time secondary to respiratory isolation  --Management per Pulmonary    Pain Management  --S/P multiple pathologic compression fractures from disease  --Ongoing pain management. Pt notes pain controlled with current regimen.     Anemia  --Secondary to disease, recent chemotherapy  --Please transfuse PRBC's if <7 grams    Constipation  --Resolved with bowel regimen    We will continue to follow along with you in collaboration with [facility].     Yo Beyer D.O  Hematology Oncology   [facility]  [phone] ( Office)   Evenings and weekends please call MD on call or office

## 2022-10-30 NOTE — PROGRESS NOTE ADULT - PROBLEM SELECTOR PLAN 2
Cont remdesivir and dexa:    10/21; cont current meds    10/22: cont current medications:    10/23: on dexa:  finished remdesivir:    10/25: cont dexa for 5 days    10/26" seems K : no sob:    10/27?: o2 with improvement:  :  on 6 L today    10/28: on 3 L of oxygen    10/30: doing pretty decent on 3 L of oxygen

## 2022-10-31 LAB
ALBUMIN SERPL ELPH-MCNC: 2.6 G/DL — LOW (ref 3.3–5)
ALP SERPL-CCNC: 68 U/L — SIGNIFICANT CHANGE UP (ref 40–120)
ALT FLD-CCNC: 12 U/L — SIGNIFICANT CHANGE UP (ref 10–45)
ANION GAP SERPL CALC-SCNC: 7 MMOL/L — SIGNIFICANT CHANGE UP (ref 5–17)
APTT BLD: 29.2 SEC — SIGNIFICANT CHANGE UP (ref 27.5–35.5)
APTT BLD: 29.7 SEC — SIGNIFICANT CHANGE UP (ref 27.5–35.5)
AST SERPL-CCNC: 13 U/L — SIGNIFICANT CHANGE UP (ref 10–40)
BASOPHILS # BLD AUTO: 0.01 K/UL — SIGNIFICANT CHANGE UP (ref 0–0.2)
BASOPHILS NFR BLD AUTO: 0.1 % — SIGNIFICANT CHANGE UP (ref 0–2)
BILIRUB SERPL-MCNC: 0.3 MG/DL — SIGNIFICANT CHANGE UP (ref 0.2–1.2)
BLD GP AB SCN SERPL QL: NEGATIVE — SIGNIFICANT CHANGE UP
BUN SERPL-MCNC: 21 MG/DL — SIGNIFICANT CHANGE UP (ref 7–23)
CALCIUM SERPL-MCNC: 8.9 MG/DL — SIGNIFICANT CHANGE UP (ref 8.4–10.5)
CHLORIDE SERPL-SCNC: 106 MMOL/L — SIGNIFICANT CHANGE UP (ref 96–108)
CO2 SERPL-SCNC: 27 MMOL/L — SIGNIFICANT CHANGE UP (ref 22–31)
CREAT SERPL-MCNC: 0.79 MG/DL — SIGNIFICANT CHANGE UP (ref 0.5–1.3)
EGFR: 85 ML/MIN/1.73M2 — SIGNIFICANT CHANGE UP
EOSINOPHIL # BLD AUTO: 0 K/UL — SIGNIFICANT CHANGE UP (ref 0–0.5)
EOSINOPHIL NFR BLD AUTO: 0 % — SIGNIFICANT CHANGE UP (ref 0–6)
GLUCOSE SERPL-MCNC: 84 MG/DL — SIGNIFICANT CHANGE UP (ref 70–99)
HCT VFR BLD CALC: 22.1 % — LOW (ref 34.5–45)
HCT VFR BLD CALC: 26.3 % — LOW (ref 34.5–45)
HGB BLD-MCNC: 6.9 G/DL — CRITICAL LOW (ref 11.5–15.5)
HGB BLD-MCNC: 8.2 G/DL — LOW (ref 11.5–15.5)
IMM GRANULOCYTES NFR BLD AUTO: 1.8 % — HIGH (ref 0–0.9)
INR BLD: 1.2 RATIO — HIGH (ref 0.88–1.16)
INR BLD: 1.23 RATIO — HIGH (ref 0.88–1.16)
LYMPHOCYTES # BLD AUTO: 6.3 % — LOW (ref 13–44)
MAGNESIUM SERPL-MCNC: 2.3 MG/DL — SIGNIFICANT CHANGE UP (ref 1.6–2.6)
MCHC RBC-ENTMCNC: 31.2 GM/DL — LOW (ref 32–36)
MCHC RBC-ENTMCNC: 31.2 GM/DL — LOW (ref 32–36)
MCHC RBC-ENTMCNC: 31.5 PG — SIGNIFICANT CHANGE UP (ref 27–34)
MCHC RBC-ENTMCNC: 31.5 PG — SIGNIFICANT CHANGE UP (ref 27–34)
MCV RBC AUTO: 100.9 FL — HIGH (ref 80–100)
MCV RBC AUTO: 101.2 FL — HIGH (ref 80–100)
MONOCYTES NFR BLD AUTO: 9.2 % — SIGNIFICANT CHANGE UP (ref 2–14)
NEUTROPHILS NFR BLD AUTO: 82.6 % — HIGH (ref 43–77)
NRBC # BLD: 0 /100 WBCS — SIGNIFICANT CHANGE UP (ref 0–0)
PHOSPHATE SERPL-MCNC: 2.2 MG/DL — LOW (ref 2.5–4.5)
PLATELET # BLD AUTO: 319 K/UL — SIGNIFICANT CHANGE UP (ref 150–400)
PLATELET # BLD AUTO: 338 K/UL — SIGNIFICANT CHANGE UP (ref 150–400)
POTASSIUM SERPL-MCNC: 4.3 MMOL/L — SIGNIFICANT CHANGE UP (ref 3.5–5.3)
POTASSIUM SERPL-SCNC: 4.3 MMOL/L — SIGNIFICANT CHANGE UP (ref 3.5–5.3)
PROT SERPL-MCNC: 6.6 G/DL — SIGNIFICANT CHANGE UP (ref 6–8.3)
PROTHROM AB SERPL-ACNC: 14 SEC — HIGH (ref 10.5–13.4)
PROTHROM AB SERPL-ACNC: 14.2 SEC — HIGH (ref 10.5–13.4)
RBC # BLD: 2.19 M/UL — LOW (ref 3.8–5.2)
RBC # BLD: 2.6 M/UL — LOW (ref 3.8–5.2)
RBC # FLD: 18.5 % — HIGH (ref 10.3–14.5)
RBC # FLD: 18.6 % — HIGH (ref 10.3–14.5)
RH IG SCN BLD-IMP: POSITIVE — SIGNIFICANT CHANGE UP
SODIUM SERPL-SCNC: 140 MMOL/L — SIGNIFICANT CHANGE UP (ref 135–145)
WBC # BLD: 8.44 K/UL — SIGNIFICANT CHANGE UP (ref 3.8–10.5)
WBC # BLD: 8.9 K/UL — SIGNIFICANT CHANGE UP (ref 3.8–10.5)
WBC # FLD AUTO: 8.44 K/UL — SIGNIFICANT CHANGE UP (ref 3.8–10.5)
WBC # FLD AUTO: 8.9 K/UL — SIGNIFICANT CHANGE UP (ref 3.8–10.5)

## 2022-10-31 PROCEDURE — 71045 X-RAY EXAM CHEST 1 VIEW: CPT | Mod: 26

## 2022-10-31 RX ORDER — HYDROMORPHONE HYDROCHLORIDE 2 MG/ML
2 INJECTION INTRAMUSCULAR; INTRAVENOUS; SUBCUTANEOUS EVERY 4 HOURS
Refills: 0 | Status: DISCONTINUED | OUTPATIENT
Start: 2022-10-31 | End: 2022-11-07

## 2022-10-31 RX ORDER — HYDROMORPHONE HYDROCHLORIDE 2 MG/ML
1 INJECTION INTRAMUSCULAR; INTRAVENOUS; SUBCUTANEOUS EVERY 4 HOURS
Refills: 0 | Status: DISCONTINUED | OUTPATIENT
Start: 2022-10-31 | End: 2022-11-07

## 2022-10-31 RX ORDER — MORPHINE SULFATE 50 MG/1
2 CAPSULE, EXTENDED RELEASE ORAL ONCE
Refills: 0 | Status: DISCONTINUED | OUTPATIENT
Start: 2022-10-31 | End: 2022-10-31

## 2022-10-31 RX ORDER — FENTANYL CITRATE 50 UG/ML
1 INJECTION INTRAVENOUS
Refills: 0 | Status: DISCONTINUED | OUTPATIENT
Start: 2022-10-31 | End: 2022-11-06

## 2022-10-31 RX ADMIN — ENOXAPARIN SODIUM 40 MILLIGRAM(S): 100 INJECTION SUBCUTANEOUS at 22:52

## 2022-10-31 RX ADMIN — FENTANYL CITRATE 1 PATCH: 50 INJECTION INTRAVENOUS at 18:05

## 2022-10-31 RX ADMIN — HYDROMORPHONE HYDROCHLORIDE 2 MILLIGRAM(S): 2 INJECTION INTRAMUSCULAR; INTRAVENOUS; SUBCUTANEOUS at 11:42

## 2022-10-31 RX ADMIN — FENTANYL CITRATE 1 PATCH: 50 INJECTION INTRAVENOUS at 18:18

## 2022-10-31 RX ADMIN — HYDROMORPHONE HYDROCHLORIDE 1 MILLIGRAM(S): 2 INJECTION INTRAMUSCULAR; INTRAVENOUS; SUBCUTANEOUS at 22:51

## 2022-10-31 RX ADMIN — PANTOPRAZOLE SODIUM 40 MILLIGRAM(S): 20 TABLET, DELAYED RELEASE ORAL at 06:17

## 2022-10-31 RX ADMIN — Medication 100 MILLIGRAM(S): at 09:51

## 2022-10-31 RX ADMIN — FENTANYL CITRATE 1 PATCH: 50 INJECTION INTRAVENOUS at 21:12

## 2022-10-31 RX ADMIN — ENOXAPARIN SODIUM 40 MILLIGRAM(S): 100 INJECTION SUBCUTANEOUS at 09:51

## 2022-10-31 RX ADMIN — POLYETHYLENE GLYCOL 3350 17 GRAM(S): 17 POWDER, FOR SOLUTION ORAL at 13:13

## 2022-10-31 RX ADMIN — CHLORHEXIDINE GLUCONATE 1 APPLICATION(S): 213 SOLUTION TOPICAL at 13:13

## 2022-10-31 RX ADMIN — HYDROMORPHONE HYDROCHLORIDE 2 MILLIGRAM(S): 2 INJECTION INTRAMUSCULAR; INTRAVENOUS; SUBCUTANEOUS at 10:19

## 2022-10-31 RX ADMIN — AMLODIPINE BESYLATE 10 MILLIGRAM(S): 2.5 TABLET ORAL at 06:17

## 2022-10-31 RX ADMIN — Medication 100 MILLIGRAM(S): at 22:53

## 2022-10-31 RX ADMIN — FENTANYL CITRATE 1 PATCH: 50 INJECTION INTRAVENOUS at 08:17

## 2022-10-31 RX ADMIN — Medication 400 MILLIGRAM(S): at 13:19

## 2022-10-31 RX ADMIN — FENTANYL CITRATE 1 PATCH: 50 INJECTION INTRAVENOUS at 08:16

## 2022-10-31 NOTE — PROGRESS NOTE ADULT - PROBLEM SELECTOR PLAN 1
10/31" her oxygenation is pretty good:  no sob:  no cough : no wheezing: :  on 3 L of o2:  today's chest x-ray to my eye: seems better then last time:  not much of pleural effusion:   however would wait for official report: if not much fluid can follow up as an outpt:

## 2022-10-31 NOTE — PROGRESS NOTE ADULT - PROBLEM SELECTOR PLAN 6
-currently undergoing chemo at [facility]  -heme/onc following  -pain control

## 2022-10-31 NOTE — PROGRESS NOTE ADULT - SUBJECTIVE AND OBJECTIVE BOX
C A R D I O L O G Y  **********************************     DATE OF SERVICE: 10-31-22    Discussed with RN - remains stable on 3L NC. No reported chest pain or shortness of breath.   Review of symptoms otherwise negative.    MEDICATIONS:  acyclovir   Oral Tab/Cap 400 milliGRAM(s) Oral daily  ALBUTerol    90 MICROgram(s) HFA Inhaler 2 Puff(s) Inhalation every 4 hours PRN  aluminum hydroxide/magnesium hydroxide/simethicone Suspension 30 milliLiter(s) Oral every 4 hours PRN  amLODIPine   Tablet 10 milliGRAM(s) Oral daily  benzonatate 100 milliGRAM(s) Oral every 8 hours PRN  bisacodyl 5 milliGRAM(s) Oral daily PRN  bisacodyl Suppository 10 milliGRAM(s) Rectal daily PRN  chlorhexidine 4% Liquid 1 Application(s) Topical daily  enoxaparin Injectable 40 milliGRAM(s) SubCutaneous every 12 hours  fentaNYL   Patch  50 MICROgram(s)/Hr 1 Patch Transdermal every 72 hours  HYDROmorphone  Injectable 1 milliGRAM(s) IV Push every 4 hours PRN  HYDROmorphone  Injectable 2 milliGRAM(s) IV Push every 4 hours PRN  megestrol 40 milliGRAM(s) Oral daily PRN  melatonin 3 milliGRAM(s) Oral at bedtime PRN  naloxone Injectable 0.4 milliGRAM(s) IV Push once  ondansetron Injectable 4 milliGRAM(s) IV Push every 8 hours PRN  pantoprazole    Tablet 40 milliGRAM(s) Oral before breakfast  polyethylene glycol 3350 17 Gram(s) Oral daily  senna 2 Tablet(s) Oral at bedtime      LABS:                        8.2    8.90  )-----------( 338      ( 31 Oct 2022 10:17 )             26.3       Hemoglobin: 8.2 g/dL (10-31 @ 10:17)  Hemoglobin: 6.9 g/dL (10-31 @ 06:36)  Hemoglobin: 8.4 g/dL (10-30 @ 09:56)  Hemoglobin: 9.0 g/dL (10-29 @ 07:17)  Hemoglobin: 9.1 g/dL (10-28 @ 06:34)      10-31    140  |  106  |  21  ----------------------------<  84  4.3   |  27  |  0.79    Ca    8.9      31 Oct 2022 06:35  Phos  2.2     10-31  Mg     2.3     10-31    TPro  6.6  /  Alb  2.6<L>  /  TBili  0.3  /  DBili  x   /  AST  13  /  ALT  12  /  AlkPhos  68  10-31    Creatinine Trend: 0.79<--, 0.77<--, 0.82<--, 0.74<--, 0.71<--, 0.68<--    COAGS: PT/INR - ( 31 Oct 2022 10:17 )   PT: 14.0 sec;   INR: 1.20 ratio         PTT - ( 31 Oct 2022 10:17 )  PTT:29.2 sec        Per Chart  PHYSICAL EXAM:  T(C): 36.7 (10-31-22 @ 05:17), Max: 37.2 (10-30-22 @ 13:12)  HR: 111 (10-31-22 @ 10:28) (93 - 111)  BP: 146/81 (10-31-22 @ 10:28) (102/67 - 146/81)  RR: 17 (10-31-22 @ 10:28) (17 - 18)  SpO2: 95% (10-31-22 @ 10:28) (95% - 97%)  Wt(kg): --    I&O's Summary    30 Oct 2022 07:01  -  31 Oct 2022 07:00  --------------------------------------------------------  IN: 0 mL / OUT: 650 mL / NET: -650 mL        Gen: NAD  HEENT:  (-)icterus (-)pallor  CV: N S1 S2 1/6 CHARLY (+)2 Pulses B/l  Resp:  Clear to auscultation B/L, normal effort  GI: (+) BS Soft, NT, ND  Lymph:  (-)Edema, (-)obvious lymphadenopathy  Skin: Warm to touch, Normal turgor  Psych: Appropriate mood and affect      TELEMETRY: None	      ECG: Sinus Tachycardia, PVC    RADIOLOGY:  < from: CT Angio Chest PE Protocol w/ IV Cont (10.18.22 @ 20:03) >  IMPRESSION:    No pulmonary embolism.    Smallright and moderate left pleural effusions and compressive   atelectasis.    --- End of Report ---    < end of copied text >      ASSESSMENT/PLAN: Patient is a 60 y/o Female with PMH of Multiple Myeloma (dx 2018) currently on chemo who presented with chest pain, SOB, and fever admitted with +COVID and b/l pleural effusions. Cardiology consulted for further evaluation.     - Patient with nonanginal chest pain that is pleuritic and very tender to palpation - suspect [facility] related likely due to costochondritis and multiple myeloma lytic lesions  - EKG with sinus tachycardia and no acute ischemic changes  - Cardiac enzymes unremarkable  - CTA chest negative for PE, small R and mod L pleural effusions and compressive atelectasis  - TTE with normal LV/RV function  - Pulm follow up - wean O2 as tolerated, pending poss diagnostic thoracentesis - f/u repeat CXR  - Supportive care/covid management per primary team  - No further inpatient cardiac w/u planned     Artur Cortes PA-C  Pager: [phone]

## 2022-10-31 NOTE — PROGRESS NOTE ADULT - SUBJECTIVE AND OBJECTIVE BOX
Patient seen and examined at bedside. has been having some cough with associated chest pain     MEDICATIONS  (STANDING):  acyclovir   Oral Tab/Cap 400 milliGRAM(s) Oral daily  amLODIPine   Tablet 10 milliGRAM(s) Oral daily  chlorhexidine 4% Liquid 1 Application(s) Topical daily  enoxaparin Injectable 40 milliGRAM(s) SubCutaneous every 12 hours  fentaNYL   Patch  50 MICROgram(s)/Hr 1 Patch Transdermal every 72 hours  morphine  - Injectable 2 milliGRAM(s) IV Push once  naloxone Injectable 0.4 milliGRAM(s) IV Push once  pantoprazole    Tablet 40 milliGRAM(s) Oral before breakfast  polyethylene glycol 3350 17 Gram(s) Oral daily  senna 2 Tablet(s) Oral at bedtime    MEDICATIONS  (PRN):  ALBUTerol    90 MICROgram(s) HFA Inhaler 2 Puff(s) Inhalation every 4 hours PRN Shortness of Breath and/or Wheezing  aluminum hydroxide/magnesium hydroxide/simethicone Suspension 30 milliLiter(s) Oral every 4 hours PRN Dyspepsia  benzonatate 100 milliGRAM(s) Oral every 8 hours PRN Cough  bisacodyl 5 milliGRAM(s) Oral daily PRN Constipation  bisacodyl Suppository 10 milliGRAM(s) Rectal daily PRN Constipation  HYDROmorphone  Injectable 1 milliGRAM(s) IV Push every 4 hours PRN Moderate Pain (4 - 6)  HYDROmorphone  Injectable 2 milliGRAM(s) IV Push every 4 hours PRN Severe Pain (7 - 10)  megestrol 40 milliGRAM(s) Oral daily PRN appetite  melatonin 3 milliGRAM(s) Oral at bedtime PRN Insomnia  ondansetron Injectable 4 milliGRAM(s) IV Push every 8 hours PRN Nausea and/or Vomiting        Vital Signs Last 24 Hrs  T(C): 36.7 (31 Oct 2022 05:17), Max: 37.2 (30 Oct 2022 13:12)  T(F): 98.1 (31 Oct 2022 05:17), Max: 98.9 (30 Oct 2022 13:12)  HR: 111 (31 Oct 2022 10:28) (93 - 111)  BP: 146/81 (31 Oct 2022 10:28) (102/67 - 146/81)  BP(mean): --  RR: 17 (31 Oct 2022 10:28) (17 - 18)  SpO2: 95% (31 Oct 2022 10:28) (95% - 97%)    Parameters below as of 31 Oct 2022 10:28  Patient On (Oxygen Delivery Method): nasal cannula  O2 Flow (L/min): 3        PHYSICAL EXAM:     GENERAL:  Appears stated age, well-groomed  HEENT:  NC/AT,  conjunctivae clear and pink  NEURO:  Alert, oriented, no asterixis                            8.2    8.90  )-----------( 338      ( 31 Oct 2022 10:17 )             26.3       10-31    140  |  106  |  21  ----------------------------<  84  4.3   |  27  |  0.79    Ca    8.9      31 Oct 2022 06:35  Phos  2.2     10-31  Mg     2.3     10-31    TPro  6.6  /  Alb  2.6<L>  /  TBili  0.3  /  DBili  x   /  AST  13  /  ALT  12  /  AlkPhos  68  10-31

## 2022-10-31 NOTE — PROGRESS NOTE ADULT - PROBLEM SELECTOR PLAN 3
Unclear etiology, more likely malignant vs CHF  -Now resolving  -Per Pulm, no indication for tap at this time

## 2022-10-31 NOTE — PROGRESS NOTE ADULT - PROBLEM SELECTOR PLAN 7
Refill Authorization Note     is requesting a refill authorization.    Brief assessment and rationale for refill: APPROVE: prr                                          Comments:     Refill Center Care Gap Closure protocols temporarily suspended.   Requested Prescriptions   Pending Prescriptions Disp Refills    atorvastatin (LIPITOR) 80 MG tablet [Pharmacy Med Name: ATORVASTATIN 80 MG TABLET] 90 tablet 1     Sig: TAKE 1 TABLET (80 MG TOTAL) BY MOUTH ONCE DAILY.       Cardiovascular:  Antilipid - Statins Passed - 4/14/2020  3:45 PM        Passed - Patient is at least 18 years old        Passed - Office visit in past 12 months or future 90 days.     Recent Outpatient Visits            1 month ago Incomplete bladder emptying    [facility] [place] [place] [facility] Christianne Israel MD    2 months ago Right leg weakness    [facility] Chico Rodriguez DO    2 months ago Essential hypertension    [facility] John Jackson MD    3 months ago Incomplete bladder emptying    [facility] Christianne Israel MD    3 months ago Incomplete bladder emptying    [facility] Chico Rodriguez DO          Future Appointments              In 1 month John Jackson MD [facility], [facility]                Passed - Lipid Panel completed in last 360 days     Lab Results   Component Value Date    CHOL 129 09/20/2019    HDL 37 (L) 09/20/2019    LDLCALC 72 09/20/2019    TRIG 121 09/20/2019             Passed - ALT is 94 or below and within 360 days     ALT   Date Value Ref Range Status   02/17/2020 27 7 - 56 unit/L Final   09/20/2019 26 9 - 46 U/L Final   11/20/2018 33 9 - 46 U/L Final              Passed - AST is 54 or below and within 360 days     AST   Date Value Ref Range Status   02/17/2020 23 7 - 40 unit/L Final   09/20/2019 23 10 - 35 U/L Final   11/20/2018 31 10 - 35 U/L Final               Appointments  past 12m or future 3m with PCP    Date Provider   Last Visit    1/20/2020 John Jackson MD   Next Visit   5/22/2020 John Jackson MD   .  ED visits in past 90 days: 0       Note composed:3:46 PM 04/14/2020      -Diet: Vegan  -DVT ppx: Lovenox qd    Patient on perry for previous retention. TOV tonight

## 2022-10-31 NOTE — PROGRESS NOTE ADULT - ASSESSMENT
61F unvaccinated w/ PMH MM(dx'd 2018) currently on chemo(last session 10/13) p/w 1-day h/o chest pain and fever. Found to have hypoxia in s/o COVID-19  and small-moderate pleural effusions. Abdomen soft, non-tender, no guarding.

## 2022-10-31 NOTE — PROGRESS NOTE ADULT - PROBLEM SELECTOR PLAN 1
Reported hypoxic to 80s at [facility].   CT chest showed small right and moderate left pleural effusions and compressive   atelectasis.  -satting well on 3L, trial on RA today  -likely from COVID-19  -continous pulse ox monitoring  -if she spikes a fever, can start empirically on abx pending BCx

## 2022-10-31 NOTE — PROGRESS NOTE ADULT - ASSESSMENT
61F unvaccinated w/ PMH MM(dx'd 2018) currently on chemo(last session 10/13) p/w 1-day h/o chest pain and fever. Most history per daughter at baseline as pt in significant pain and unable to talk. Pt had initially presented to [facility] with a sharp mid-chest pain gradual in onset that progressively got worse associated with a fever as high as 102.2 and a productive cough with greenish sputum.     Hematology/Oncology called to see patient who is under care of Dr. 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 She was recently hospitalized for chemotherapy/pain management at [facility], discharged 10/15/2022.     IgG lambda multiple myeloma  --Under care at [facility] - Dr. Denise Fatima  --Chemotherapy on hold during hospitalization    COVID-19 Infection  --Previously unvaccinated  --Completed Remdesevir, Dexamethasone  -- Ongoing management per ID, Pulm, Primary Team        Chest pain. Exacerbated by coughing  --Underlying bone disease, COVID infection  --Cardiology, Pulmonary following    Pleural Effusion  --Most likely inflammatory from COVID  --Resolved on L and improved on R  --Unable to have thoracentesis at this time secondary to respiratory isolation  --Management per Pulmonary      Anemia  --Secondary to disease, recent chemotherapy  --Please transfuse PRBC's if <7 grams      COugh   - possibly related to covid   - cough suppressent   - pain control       Rohith Velazco MD  HematologyOncology   O: [phone]

## 2022-10-31 NOTE — PROGRESS NOTE ADULT - SUBJECTIVE AND OBJECTIVE BOX
SUBJECTIVE  Pt seen bedside this AM. States that cough is still bothering her, but feels fine otherwise. Denies abdominal pain, n/v, change in BM/urination.    OBJECTIVE:  ICU Vital Signs Last 24 Hrs  T(C): 36.7 (31 Oct 2022 05:17), Max: 36.7 (30 Oct 2022 22:32)  T(F): 98.1 (31 Oct 2022 05:17), Max: 98.1 (30 Oct 2022 22:32)  HR: 111 (31 Oct 2022 10:28) (93 - 111)  BP: 146/81 (31 Oct 2022 10:28) (102/67 - 146/81)  BP(mean): --  ABP: --  ABP(mean): --  RR: 17 (31 Oct 2022 10:28) (17 - 18)  SpO2: 98% (31 Oct 2022 13:20) (95% - 98%)    O2 Parameters below as of 31 Oct 2022 13:20  Patient On (Oxygen Delivery Method): nasal cannula  O2 Flow (L/min): 2            10-30 @ 07:01  -  10-31 @ 07:00  --------------------------------------------------------  IN: 0 mL / OUT: 650 mL / NET: -650 mL      CAPILLARY BLOOD GLUCOSE          PHYSICAL EXAM:  General: Well-groomed, NAD, laying in bed, on 3L O2 NC  HEENT: PERRLA, EOMI, non-icteric  Neck:  symmetric,  JVD absent  Respiratory: Clear to ascultation bilaterally, no crackles/rales, no Resp distress; no accessory muscle use  Cardiovascular:  RRR, no murmurs/rubs/gallops  Abdomen: Soft, NT, ND  Extremities: No edema noted  Skin: No rashes or lesions noted  Neurological: Sensation grossly intact; strength 5/5 in all extremities.  Psychiatry: AOx3, appropriate insight/judgement, appropriate affect, recent/remote memory intact    PRN Meds:  ALBUTerol    90 MICROgram(s) HFA Inhaler 2 Puff(s) Inhalation every 4 hours PRN  aluminum hydroxide/magnesium hydroxide/simethicone Suspension 30 milliLiter(s) Oral every 4 hours PRN  benzonatate 100 milliGRAM(s) Oral every 8 hours PRN  bisacodyl 5 milliGRAM(s) Oral daily PRN  bisacodyl Suppository 10 milliGRAM(s) Rectal daily PRN  guaiFENesin Oral Liquid (Sugar-Free) 100 milliGRAM(s) Oral every 6 hours PRN  HYDROmorphone  Injectable 1 milliGRAM(s) IV Push every 4 hours PRN  HYDROmorphone  Injectable 2 milliGRAM(s) IV Push every 4 hours PRN  megestrol 40 milliGRAM(s) Oral daily PRN  melatonin 3 milliGRAM(s) Oral at bedtime PRN  ondansetron Injectable 4 milliGRAM(s) IV Push every 8 hours PRN      LABS:                        8.2    8.90  )-----------( 338      ( 31 Oct 2022 10:17 )             26.3     Hgb Trend: 8.2<--, 6.9<--, 8.4<--, 9.0<--, 9.1<--  10-31    140  |  106  |  21  ----------------------------<  84  4.3   |  27  |  0.79    Ca    8.9      31 Oct 2022 06:35  Phos  2.2     10-31  Mg     2.3     10-31    TPro  6.6  /  Alb  2.6<L>  /  TBili  0.3  /  DBili  x   /  AST  13  /  ALT  12  /  AlkPhos  68  10-31    Creatinine Trend: 0.79<--, 0.77<--, 0.82<--, 0.74<--, 0.71<--, 0.68<--  PT/INR - ( 31 Oct 2022 10:17 )   PT: 14.0 sec;   INR: 1.20 ratio         PTT - ( 31 Oct 2022 10:17 )  PTT:29.2 sec          MICROBIOLOGY:       RADIOLOGY:  [ ] Reviewed and interpreted by me    EKG:

## 2022-10-31 NOTE — PROGRESS NOTE ADULT - SUBJECTIVE AND OBJECTIVE BOX
Date of Service: 10-31-22 @ 10:17    Patient is a 61y old  Female who presents with a chief complaint of SOB, CP (30 Oct 2022 11:13)      Any change in ROS: seems OK : no sob:  no pain :  on 3 L of oxygen : good saturation:     MEDICATIONS  (STANDING):  acyclovir   Oral Tab/Cap 400 milliGRAM(s) Oral daily  amLODIPine   Tablet 10 milliGRAM(s) Oral daily  chlorhexidine 4% Liquid 1 Application(s) Topical daily  enoxaparin Injectable 40 milliGRAM(s) SubCutaneous every 12 hours  fentaNYL   Patch  50 MICROgram(s)/Hr 1 Patch Transdermal every 72 hours  morphine  - Injectable 2 milliGRAM(s) IV Push once  naloxone Injectable 0.4 milliGRAM(s) IV Push once  pantoprazole    Tablet 40 milliGRAM(s) Oral before breakfast  polyethylene glycol 3350 17 Gram(s) Oral daily  senna 2 Tablet(s) Oral at bedtime    MEDICATIONS  (PRN):  ALBUTerol    90 MICROgram(s) HFA Inhaler 2 Puff(s) Inhalation every 4 hours PRN Shortness of Breath and/or Wheezing  aluminum hydroxide/magnesium hydroxide/simethicone Suspension 30 milliLiter(s) Oral every 4 hours PRN Dyspepsia  benzonatate 100 milliGRAM(s) Oral every 8 hours PRN Cough  bisacodyl 5 milliGRAM(s) Oral daily PRN Constipation  bisacodyl Suppository 10 milliGRAM(s) Rectal daily PRN Constipation  HYDROmorphone  Injectable 1 milliGRAM(s) IV Push every 4 hours PRN Moderate Pain (4 - 6)  HYDROmorphone  Injectable 2 milliGRAM(s) IV Push every 4 hours PRN Severe Pain (7 - 10)  megestrol 40 milliGRAM(s) Oral daily PRN appetite  melatonin 3 milliGRAM(s) Oral at bedtime PRN Insomnia  ondansetron Injectable 4 milliGRAM(s) IV Push every 8 hours PRN Nausea and/or Vomiting    Vital Signs Last 24 Hrs  T(C): 36.7 (31 Oct 2022 05:17), Max: 37.2 (30 Oct 2022 13:12)  T(F): 98.1 (31 Oct 2022 05:17), Max: 98.9 (30 Oct 2022 13:12)  HR: 93 (31 Oct 2022 05:17) (93 - 106)  BP: 102/67 (31 Oct 2022 05:17) (102/67 - 105/72)  BP(mean): --  RR: 18 (31 Oct 2022 05:17) (18 - 18)  SpO2: 97% (31 Oct 2022 05:17) (96% - 97%)    Parameters below as of 31 Oct 2022 05:17  Patient On (Oxygen Delivery Method): nasal cannula        I&O's Summary    30 Oct 2022 07:01  -  31 Oct 2022 07:00  --------------------------------------------------------  IN: 0 mL / OUT: 650 mL / NET: -650 mL          Physical Exam:   GENERAL: NAD, well-groomed, well-developed  HEENT: CARLA/   Atraumatic, Normocephalic  ENMT: No tonsillar erythema, exudates, or enlargement; Moist mucous membranes, Good dentition, No lesions  NECK: Supple, No JVD, Normal thyroid  CHEST/LUNG: Clear to auscultaion  CVS: Regular rate and rhythm; No murmurs, rubs, or gallops  GI: : Soft, Nontender, Nondistended; Bowel sounds present  NERVOUS SYSTEM:  Alert & Oriented X3  EXTREMITIES:  - edema  LYMPH: No lymphadenopathy noted  SKIN: No rashes or lesions  ENDOCRINOLOGY: No Thyromegaly  PSYCH: Appropriate    Labs:  28                            6.9    8.44  )-----------( 319      ( 31 Oct 2022 06:36 )             22.1                         8.4    10.89 )-----------( 360      ( 30 Oct 2022 09:56 )             27.2                         9.0    14.59 )-----------( 425      ( 29 Oct 2022 07:17 )             27.9                         9.1    8.99  )-----------( 501      ( 28 Oct 2022 06:34 )             28.8     10-31    140  |  106  |  21  ----------------------------<  84  4.3   |  27  |  0.79  10-30    139  |  103  |  24<H>  ----------------------------<  105<H>  3.8   |  27  |  0.77  10-29    138  |  101  |  27<H>  ----------------------------<  101<H>  4.0   |  25  |  0.82  10-28    137  |  100  |  20  ----------------------------<  89  4.6   |  26  |  0.74    Ca    8.9      31 Oct 2022 06:35  Ca    9.6      30 Oct 2022 09:56  Phos  2.2     10-31  Phos  2.6     10-30  Mg     2.3     10-31  Mg     2.4     10-30    TPro  6.6  /  Alb  2.6<L>  /  TBili  0.3  /  DBili  x   /  AST  13  /  ALT  12  /  AlkPhos  68  10-31  TPro  7.9  /  Alb  3.4  /  TBili  0.3  /  DBili  x   /  AST  12  /  ALT  17  /  AlkPhos  78  10-30  TPro  7.4  /  Alb  3.2<L>  /  TBili  0.3  /  DBili  x   /  AST  17  /  ALT  21  /  AlkPhos  76  10-29  TPro  7.9  /  Alb  3.4  /  TBili  0.4  /  DBili  x   /  AST  14  /  ALT  17  /  AlkPhos  78  10-28    CAPILLARY BLOOD GLUCOSE          LIVER FUNCTIONS - ( 31 Oct 2022 06:35 )  Alb: 2.6 g/dL / Pro: 6.6 g/dL / ALK PHOS: 68 U/L / ALT: 12 U/L / AST: 13 U/L / GGT: x           PT/INR - ( 31 Oct 2022 06:36 )   PT: 14.2 sec;   INR: 1.23 ratio         PTT - ( 31 Oct 2022 06:36 )  PTT:29.7 sec      rad< from: Xray Chest 1 View- PORTABLE-Routine (Xray Chest 1 View- PORTABLE-Routine .) (10.27.22 @ 09:43) >    ACC: [id number] EXAM:  XR CHEST PORTABLE ROUTINE 1V                          PROCEDURE DATE:  10/27/2022          INTERPRETATION:  EXAMINATION: XR CHEST    CLINICAL INDICATION: Pleural Effusion Assessment    TECHNIQUE: Single frontal portable view ofthe chest from 10/27/2022 9:43   AM    COMPARISON: Chest x-ray 10/18/2022.    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  The heart size is not accurately assessed on this projection.  There is a streaky opacity at the left upper lung field.  There appears to be resolution of the left-sided pleural effusion.   Improving right-sided pleural effusion.  There is no pneumothorax .  There is generalized osteopenia and innumerable lytic lucencies   throughout the visualized osseous structures, compatible with known   myeloma.  IMPRESSION:  Resolved left-sided pleural effusion with trace right-sided pleural   effusion, however evaluation is limited due to patient malrotation.  New streaky opacity at the left upper lobe likely represents area of   linear atelectasis, however infectious etiology cannot be excluded.    --- End of Report ---           HAMLET TOSCANO MD; Resident Radiologist  This document has been electronically signed.  CADEN ONEILL MD; Attending Radiologist  This document has been electronically signed. Oct 28 2022 10:24AM    < end of copied text >      RECENT CULTURES:        RESPIRATORY CULTURES:          Studies  Chest X-RAY  CT SCAN Chest   Venous Dopplers: LE:   CT Abdomen  Others

## 2022-11-01 LAB
ALBUMIN SERPL ELPH-MCNC: 2.6 G/DL — LOW (ref 3.3–5)
ALP SERPL-CCNC: 63 U/L — SIGNIFICANT CHANGE UP (ref 40–120)
ALT FLD-CCNC: 9 U/L — LOW (ref 10–45)
ANION GAP SERPL CALC-SCNC: 8 MMOL/L — SIGNIFICANT CHANGE UP (ref 5–17)
ANISOCYTOSIS BLD QL: SLIGHT — SIGNIFICANT CHANGE UP
APTT BLD: 30.6 SEC — SIGNIFICANT CHANGE UP (ref 27.5–35.5)
AST SERPL-CCNC: 10 U/L — SIGNIFICANT CHANGE UP (ref 10–40)
BASOPHILS # BLD AUTO: 0 K/UL — SIGNIFICANT CHANGE UP (ref 0–0.2)
BASOPHILS NFR BLD AUTO: 0 % — SIGNIFICANT CHANGE UP (ref 0–2)
BILIRUB SERPL-MCNC: 0.2 MG/DL — SIGNIFICANT CHANGE UP (ref 0.2–1.2)
BUN SERPL-MCNC: 19 MG/DL — SIGNIFICANT CHANGE UP (ref 7–23)
CALCIUM SERPL-MCNC: 8.5 MG/DL — SIGNIFICANT CHANGE UP (ref 8.4–10.5)
CHLORIDE SERPL-SCNC: 105 MMOL/L — SIGNIFICANT CHANGE UP (ref 96–108)
CO2 SERPL-SCNC: 27 MMOL/L — SIGNIFICANT CHANGE UP (ref 22–31)
CREAT SERPL-MCNC: 0.76 MG/DL — SIGNIFICANT CHANGE UP (ref 0.5–1.3)
DACRYOCYTES BLD QL SMEAR: SLIGHT — SIGNIFICANT CHANGE UP
EGFR: 89 ML/MIN/1.73M2 — SIGNIFICANT CHANGE UP
EOSINOPHIL # BLD AUTO: 0 K/UL — SIGNIFICANT CHANGE UP (ref 0–0.5)
EOSINOPHIL NFR BLD AUTO: 0 % — SIGNIFICANT CHANGE UP (ref 0–6)
GLUCOSE SERPL-MCNC: 82 MG/DL — SIGNIFICANT CHANGE UP (ref 70–99)
HCT VFR BLD CALC: 21.6 % — LOW (ref 34.5–45)
HCT VFR BLD CALC: 23.1 % — LOW (ref 34.5–45)
HGB BLD-MCNC: 6.7 G/DL — CRITICAL LOW (ref 11.5–15.5)
HGB BLD-MCNC: 7.2 G/DL — LOW (ref 11.5–15.5)
HYPOCHROMIA BLD QL: SLIGHT — SIGNIFICANT CHANGE UP
INR BLD: 1.16 RATIO — SIGNIFICANT CHANGE UP (ref 0.88–1.16)
LYMPHOCYTES # BLD AUTO: 0.13 K/UL — LOW (ref 1–3.3)
LYMPHOCYTES # BLD AUTO: 1.7 % — LOW (ref 13–44)
MAGNESIUM SERPL-MCNC: 2.2 MG/DL — SIGNIFICANT CHANGE UP (ref 1.6–2.6)
MANUAL SMEAR VERIFICATION: SIGNIFICANT CHANGE UP
MCHC RBC-ENTMCNC: 31 GM/DL — LOW (ref 32–36)
MCHC RBC-ENTMCNC: 31 PG — SIGNIFICANT CHANGE UP (ref 27–34)
MCHC RBC-ENTMCNC: 31 PG — SIGNIFICANT CHANGE UP (ref 27–34)
MCHC RBC-ENTMCNC: 31.2 GM/DL — LOW (ref 32–36)
MCV RBC AUTO: 100 FL — SIGNIFICANT CHANGE UP (ref 80–100)
MCV RBC AUTO: 99.6 FL — SIGNIFICANT CHANGE UP (ref 80–100)
MONOCYTES # BLD AUTO: 0.44 K/UL — SIGNIFICANT CHANGE UP (ref 0–0.9)
MONOCYTES NFR BLD AUTO: 5.9 % — SIGNIFICANT CHANGE UP (ref 2–14)
NEUTROPHILS # BLD AUTO: 6.96 K/UL — SIGNIFICANT CHANGE UP (ref 1.8–7.4)
NEUTROPHILS NFR BLD AUTO: 91.5 % — HIGH (ref 43–77)
NEUTS BAND # BLD: 0.9 % — SIGNIFICANT CHANGE UP (ref 0–8)
NRBC # BLD: 0 /100 WBCS — SIGNIFICANT CHANGE UP (ref 0–0)
OVALOCYTES BLD QL SMEAR: SLIGHT — SIGNIFICANT CHANGE UP
PHOSPHATE SERPL-MCNC: 2.3 MG/DL — LOW (ref 2.5–4.5)
PLAT MORPH BLD: NORMAL — SIGNIFICANT CHANGE UP
PLATELET # BLD AUTO: 294 K/UL — SIGNIFICANT CHANGE UP (ref 150–400)
PLATELET # BLD AUTO: 297 K/UL — SIGNIFICANT CHANGE UP (ref 150–400)
POIKILOCYTOSIS BLD QL AUTO: SLIGHT — SIGNIFICANT CHANGE UP
POLYCHROMASIA BLD QL SMEAR: SLIGHT — SIGNIFICANT CHANGE UP
POTASSIUM SERPL-MCNC: 4.6 MMOL/L — SIGNIFICANT CHANGE UP (ref 3.5–5.3)
POTASSIUM SERPL-SCNC: 4.6 MMOL/L — SIGNIFICANT CHANGE UP (ref 3.5–5.3)
PROT SERPL-MCNC: 6.4 G/DL — SIGNIFICANT CHANGE UP (ref 6–8.3)
PROTHROM AB SERPL-ACNC: 13.4 SEC — SIGNIFICANT CHANGE UP (ref 10.5–13.4)
RBC # BLD: 2.16 M/UL — LOW (ref 3.8–5.2)
RBC # BLD: 2.32 M/UL — LOW (ref 3.8–5.2)
RBC # FLD: 18.3 % — HIGH (ref 10.3–14.5)
RBC # FLD: 18.4 % — HIGH (ref 10.3–14.5)
RBC BLD AUTO: ABNORMAL
SCHISTOCYTES BLD QL AUTO: SLIGHT — SIGNIFICANT CHANGE UP
SODIUM SERPL-SCNC: 140 MMOL/L — SIGNIFICANT CHANGE UP (ref 135–145)
TARGETS BLD QL SMEAR: SLIGHT — SIGNIFICANT CHANGE UP
WBC # BLD: 6.82 K/UL — SIGNIFICANT CHANGE UP (ref 3.8–10.5)
WBC # BLD: 7.53 K/UL — SIGNIFICANT CHANGE UP (ref 3.8–10.5)
WBC # FLD AUTO: 6.82 K/UL — SIGNIFICANT CHANGE UP (ref 3.8–10.5)
WBC # FLD AUTO: 7.53 K/UL — SIGNIFICANT CHANGE UP (ref 3.8–10.5)

## 2022-11-01 RX ORDER — SODIUM,POTASSIUM PHOSPHATES 278-250MG
2 POWDER IN PACKET (EA) ORAL
Refills: 0 | Status: COMPLETED | OUTPATIENT
Start: 2022-11-01 | End: 2022-11-01

## 2022-11-01 RX ADMIN — FENTANYL CITRATE 1 PATCH: 50 INJECTION INTRAVENOUS at 09:43

## 2022-11-01 RX ADMIN — FENTANYL CITRATE 1 PATCH: 50 INJECTION INTRAVENOUS at 19:00

## 2022-11-01 RX ADMIN — HYDROMORPHONE HYDROCHLORIDE 1 MILLIGRAM(S): 2 INJECTION INTRAMUSCULAR; INTRAVENOUS; SUBCUTANEOUS at 17:29

## 2022-11-01 RX ADMIN — Medication 2 PACKET(S): at 17:10

## 2022-11-01 RX ADMIN — ENOXAPARIN SODIUM 40 MILLIGRAM(S): 100 INJECTION SUBCUTANEOUS at 11:15

## 2022-11-01 RX ADMIN — Medication 2 PACKET(S): at 11:16

## 2022-11-01 RX ADMIN — POLYETHYLENE GLYCOL 3350 17 GRAM(S): 17 POWDER, FOR SOLUTION ORAL at 11:16

## 2022-11-01 RX ADMIN — PANTOPRAZOLE SODIUM 40 MILLIGRAM(S): 20 TABLET, DELAYED RELEASE ORAL at 06:20

## 2022-11-01 RX ADMIN — Medication 400 MILLIGRAM(S): at 11:16

## 2022-11-01 RX ADMIN — SENNA PLUS 2 TABLET(S): 8.6 TABLET ORAL at 22:41

## 2022-11-01 RX ADMIN — Medication 100 MILLIGRAM(S): at 22:47

## 2022-11-01 RX ADMIN — HYDROMORPHONE HYDROCHLORIDE 1 MILLIGRAM(S): 2 INJECTION INTRAMUSCULAR; INTRAVENOUS; SUBCUTANEOUS at 17:14

## 2022-11-01 RX ADMIN — ENOXAPARIN SODIUM 40 MILLIGRAM(S): 100 INJECTION SUBCUTANEOUS at 20:43

## 2022-11-01 RX ADMIN — CHLORHEXIDINE GLUCONATE 1 APPLICATION(S): 213 SOLUTION TOPICAL at 11:16

## 2022-11-01 RX ADMIN — ONDANSETRON 4 MILLIGRAM(S): 8 TABLET, FILM COATED ORAL at 06:20

## 2022-11-01 RX ADMIN — Medication 100 MILLIGRAM(S): at 11:18

## 2022-11-01 NOTE — CHART NOTE - NSCHARTNOTEFT_GEN_A_CORE
CECILIA DUNAWAY  61y Female    Called by RN as pt with fever of 101, tachycardic and hypoxic to 85% on 3L NC, O2 up titrated to 5L but O2 sat remained in 80's. Pt placed on non rebreather. Pt seen and evaluated. + SOB, cough and pleuritic chest pain,      PAST MEDICAL & SURGICAL HISTORY:  Multiple myeloma  dx 2018 - initially refused chemotherapy, treating with &quot;natural herbal remedies&quot; now undergoing chemo at [facility] [place]      H/O  section  8/15/1985      H/O colonoscopy  2018- polyp found and removed in 2018      S/P partial hysterectomy   d/t fibroids        Vital Signs Last 24 Hrs  T(C): 38.3 (18 Oct 2022 23:42), Max: 38.3 (18 Oct 2022 23:42)  T(F): 101 (18 Oct 2022 23:42), Max: 101 (18 Oct 2022 23:42)  HR: 116 (18 Oct 2022 23:42) (106 - 116)  BP: 117/76 (18 Oct 2022 23:42) (110/66 - 122/82)  BP(mean): 94 (18 Oct 2022 21:35) (93 - 94)  RR: 19 (18 Oct 2022 23:42) (19 - 22)  SpO2: 95% (19 Oct 2022 00:37) (89% - 96%)    Parameters below as of 19 Oct 2022 00:37  Patient On (Oxygen Delivery Method): mask, nonrebreather  O2 Flow (L/min): 10        Event Summary:    61F unvaccinated w/ PMH MM(dx'd ) currently on chemo(last session 10/13) p/w 1-day h/o chest pain and fever. Found to have hypoxia in s/o COVID-19  and small-moderate pleural effusions. Now with worsening hypoxic resp failure in the setting of fever. Unable to give Tylenol or Motrin as pt allergic.     Acute hypoxic resp failure  - Due to covid 19  - Continue Dexamethasone and Remdesvir.   - O2 at 100% NRB, will wean off as tolerates. Unable to wean off O2 on floor  - Will place pt on continuous pulse ox monitor.   - Will continue to monitor pt  F/U with primary team in AM.       Wilner Driscoll NP  [id number]
Confidential Drug Utilization Report  Search Terms: Catherine Iraheta, 1960Search Date: 10/19/2022 09:00:05 AM  Searching on behalf of: [address] [place] [address]  The Drug Utilization Report below displays all of the controlled substance prescriptions, if any, that your patient has filled in the last twelve months. The information displayed on this report is compiled from pharmacy submissions to the Department, and accurately reflects the information as submitted by the pharmacies.    This report was requested by: Tab Suazo | Reference #: [id number]    Others' Prescriptions  Patient Name: Catherine Davis Date: 1960  Address: [address] [place] [id number]: Female  Rx Written	Rx Dispensed	Drug	Quantity	Days Supply	Prescriber Name  10/12/2022	10/13/2022	fentanyl 50 mcg/hr patch	10	30	Sylvain Mayra  10/13/2022	10/13/2022	morphine sulfate ir 15 mg tab	84	14	Chin, Mayra  06/09/2022	09/15/2022	curaleaf (1:1) 2.5mgthc and 2.5 mgcbd/0.5 ml tct unflavored	1	2	Moritz, Jacques MD  06/09/2022	07/21/2022	curaleaf (1:1) 2.5mgthc and 2.5 mgcbd/0.5 ml tct lemon	1	4	Moritz, Jacques MD  06/09/2022	07/21/2022	curaleaf (1:1) 2.5mgthc and 2.5 mgcbd/0.5 ml tct lemon	1	4	Moritz, Jacques MD    Patient Name: Catherine Davis Date: 1960  Address: [address] [place] [id number]: Female  Rx Written	Rx Dispensed	Drug	Quantity	Days Supply	Prescriber Name  09/09/2022	10/05/2022	tramadol hcl 50 mg tablet	100	13	Silvio Brown MD  09/09/2022	09/09/2022	tramadol hcl 50 mg tablet	100	13	Silvio Brown MD  06/28/2022	07/25/2022	tramadol hcl 50 mg tablet	100	13	Silvio Brown MD  06/28/2022	06/28/2022	tramadol hcl 50 mg tablet	100	13	Kevin, Silvio E MD  06/02/2022	06/02/2022	tramadol hcl 50 mg tablet	100	13	Kevin, Silvio E MD  02/10/2022	05/10/2022	tramadol hcl 50 mg tablet	90	30	Kevin, Silvio E MD  05/08/2022	05/08/2022	hydromorphone 2 mg tablet	16	4	Vinnieymcristobal Shelley   02/10/2022	04/11/2022	tramadol hcl 50 mg tablet	90	30	Kevin, Silvio E MD  02/10/2022	03/11/2022	tramadol hcl 50 mg tablet	90	30	Kevin, Silvio E MD  02/10/2022	02/10/2022	tramadol hcl 50 mg tablet	90	30	Kevin, Silvio E MD  10/13/2021	01/13/2022	tramadol hcl 50 mg tablet	90	30	Kevin, Silvio E MD  10/13/2021	12/13/2021	tramadol hcl 50 mg tablet	90	30	Kevin, Silvio E MD  10/13/2021	11/13/2021	tramadol hcl 50 mg tablet	90	30	Kevin, Silvio E MD  * - Drugs marked with an asterisk are compound drugs. If the compound drug is made up of more than one controlled substance, then each controlled substance will be a separate row in the table.
Nutrition Follow Up Note  Patient seen for: 1st malnutrition follow up    Chart reviewed, events noted. "61F unvaccinated w/ PMH MM(dx'd 2018) currently on chemo(last session 10/13) p/w 1-day h/o chest pain and fever. Found to have hypoxia in s/o COVID-19  and small-moderate pleural effusions."     Source: [X] Patient       [x] EMR        [] RN        [] Family at bedside       [] Other:    -If unable to interview patient: [] Trach/Vent/BiPAP  [] Disoriented/confused/inappropriate to interview    Diet Order:   Diet, Regular:   Vegan {Accepts Vegetable Products Only} (10-19-22)    - Is current order appropriate/adequate? [X] Yes  []  No:     - PO intake :   [] >75%  Adequate    [X] 50-75%  Fair       [] <50%  Poor    - Nutrition-related concerns:      - Provider to RN (10/19), "Please accommodate pt with diet preferences of egg whites(omelette), salmon and almond milk." Food preferences confirmed with pt. &   updated in pt. menu profile       - Hyponatremia- Ha=531(L, 10/21), 20 mg IVP Lasix discontinued, will monitor.       - Constipation, likely secondary to  opioid medications receiving  Miralax + senna       -  Multiple myeloma-currently undergoing chemo at [facility] [place] (receiving Megace for appetite stimulant)     GI:  Last BM No bowel movement documented in flow sheets.  Bowel Regimen? [X] Yes   [] No    Weights: Drug Dosing Weight: Weight (kg): 44.5 97.9 pounds  (19 Oct 2022 06:24), BMI (kg/m2): 19.8 (19 Oct 2022 06:24)    Nutritionally Pertinent MEDICATIONS  (STANDING):  acyclovir   Oral Tab/Cap  dexAMETHasone  Injectable  methylnaltrexone Injectable  pantoprazole    Tablet  polyethylene glycol 3350  remdesivir  IVPB  remdesivir  IVPB  senna  megestrol     Pertinent Labs: 10-21 @ 10:32: Na 130<L>, BUN 26<H>, Cr 0.88, <H>, K+ 5.6<H>, Phos 2.2<L>, Mg 2.3, Alk Phos 75, ALT/SGPT 32, AST/SGOT 24, HbA1c --    A1C with Estimated Average Glucose Result: 6.1 % (10-20-22 @ 07:01)    Skin per nursing documentation: No pressure injuries noted per flow sheets   Edema:  No edema noted per flow sheets    Estimated Needs:   [X] no change since previous assessment  [] recalculated:     Previous Nutrition Diagnosis:   1. Severe malnutrition the context of chronic illness  2. Increased Nutrient Needs.  Nutrition Diagnosis is: [X] ongoing  [] resolved [] not applicable     New Nutrition Diagnosis: [X] Not applicable    Nutrition Care Plan:  [X] In Progress- addressed with diet order, PO encouragement and nutritional supplements   [] Achieved  [] Not applicable    Nutrition Interventions:     Education Provided:       [X] Yes: Encourage PO intake of non-animal sources of protein-rich, nutrient dense foods.   [] No:        Recommendations:      1) Continue regular, vegan diet; defer consistency to SLP and Team   2) Continue [employer] PO supplements TID  3) Encourage adequate PO intakes; Honor food preferences as appropriate and available (per Provider to RN order).  4) Monitor bowel movements, pt. may benefit from further interventions to promote regular bowel movements    Monitoring and Evaluation:   Continue to monitor nutritional intake, tolerance to diet prescription, weights, labs, skin integrity    RD remains available upon request and will follow up per protocol  Keely Ortega, MS,RDN,CDN Pager #[phone]
Nutrition Follow Up Note  Patient seen for: malnutrition follow up     Chart reviewed, events noted.    Source: EMR, RN, Team      Per chart, "61F unvaccinated w/ PMH MM(dx'd 2018) currently on chemo(last session 10/13) p/w 1-day h/o chest pain and fever. Found to have hypoxia in s/o COVID-19  and small-moderate pleural effusions."    Diet Order:   Diet, Regular:   Vegan {Accepts Vegetable Products Only} (10-28-22)    Nutrition-related events:  -Intake: per flowsheet, 26-75% intake of meals and 26-50% intake of supplements  -GI: previously c/o constipation likely 2/2 high dose opioid use; last BM documented 10/31; bowel regimen ordered (Miralax, Dulcolax, Senna)  -Renal: hypophosphatemic; PHOS-NaK ordered for repletion; continue to monitor and replete lytes PRN   -Resp: acute respiratory failure; COVID-19; pleural effusion resolve on L side, improving on R side   -Onc: MM with extensive mets to the bone; chemo on hold during admission       Nutritionally Pertinent MEDICATIONS  (STANDING):  acyclovir   Oral Tab/Cap  amLODIPine   Tablet  pantoprazole    Tablet  polyethylene glycol 3350  potassium phosphate / sodium phosphate Powder (PHOS-NaK)  senna    Pertinent Labs: 11-01 @ 07:28: Na 140, BUN 19, Cr 0.76, BG 82, K+ 4.6, Phos 2.3<L>, Mg 2.2, Alk Phos 63, ALT/SGPT 9<L>, AST/SGOT 10, HbA1c --    A1C with Estimated Average Glucose Result: 6.1 % (10-20-22 @ 07:01)      (Per flowsheet)  Pressure Injuries: none noted     Edema: none noted     Estimated Needs: based on   [x] no change since previous assessment      Previous Nutrition Diagnosis: severe malnutrition; increased protein-energy needs   Nutrition Diagnosis is: ongoing     New Nutrition Diagnosis: [x] Not applicable    Nutrition Care Plan:  [x] In Progress  [] Achieved  [] Not applicable    Nutrition Interventions:      Education Provided:       [] Yes:  [x] No:     Recommendations:      1) Continue regular/vegan diet   2) Continue [employer] TID   3) Honor food preferences and promote adequate intake   4) Monitor GI function closely      Monitoring and Evaluation:   Continue to monitor nutritional intake, tolerance to diet prescription, weights, labs, skin integrity      RD remains available upon request and will follow up per protocol    FRANCOIS Dove (Pager #[phone])
This is a 61 year old female with multiple myeloma diagnosed in 2018 who initially deferred treatment, but had recent MRI which showed new multilevel pathologic compression fractures, now receiving CyBorD (LD 10/13). Patient presented to [facility] with complaint of fever, productive cough with green sputum, and chest pain since yesterday. She was found to be hypoxic to 80% on room air, improved on 2L NC. Blood cultures and respiratory panel pending at [facility]. She was transported to [facility] for chest pain and fever workup.    If patient is admitted to medicine, please admit under Dr. Aisha Arreola who is aware of patient.    Full consult to follow in AM if admitted.     Thank you,    Melania Doshi PA-C  Hematology/Oncology  [facility]  [phone] (office)  [phone] (alt office)  Evenings and weekends please call MD on call or office

## 2022-11-01 NOTE — PROGRESS NOTE ADULT - PROBLEM SELECTOR PLAN 6
-currently undergoing chemo at [facility] [place]  -heme/onc following  -pain control

## 2022-11-01 NOTE — PROGRESS NOTE ADULT - PROBLEM SELECTOR PLAN 7
-Diet: Vegan  -DVT ppx: Lovenox qd    Patient on perry for previous retention. TOV tonight -Diet: Vegan  -DVT ppx: Lovenox qd    Patient on perry for previous retention. TOV today

## 2022-11-01 NOTE — PROGRESS NOTE ADULT - PROBLEM SELECTOR PLAN 1
Reported hypoxic to 80s at [facility].   CT chest showed small right and moderate left pleural effusions and compressive   atelectasis.  -satting well on 3L, trial on RA today  -likely from COVID-19  -continous pulse ox monitoring  -if she spikes a fever, can start empirically on abx pending BCx Reported hypoxic to 80s at [facility].   CT chest showed small right and moderate left pleural effusions and compressive   atelectasis.  -satting well on 1L, trial on RA today  -likely from COVID-19  -continous pulse ox monitoring  -if she spikes a fever, can start empirically on abx pending BCx

## 2022-11-01 NOTE — PROGRESS NOTE ADULT - SUBJECTIVE AND OBJECTIVE BOX
SUBJECTIVE  pt seen bedside this am. Feels tired as had trouble sleeping 2/2 productive cough; however, states that cough and chest congestion are much better this am. no other complaints. Denies abdominal pain, n/v fever, chills, sob.    OBJECTIVE:  ICU Vital Signs Last 24 Hrs  T(C): 36.7 (01 Nov 2022 05:53), Max: 36.8 (01 Nov 2022 00:05)  T(F): 98.1 (01 Nov 2022 05:53), Max: 98.3 (01 Nov 2022 00:05)  HR: 86 (01 Nov 2022 05:53) (86 - 111)  BP: 97/65 (01 Nov 2022 05:53) (97/65 - 147/79)  BP(mean): --  ABP: --  ABP(mean): --  RR: 18 (01 Nov 2022 05:53) (17 - 18)  SpO2: 98% (01 Nov 2022 05:53) (95% - 98%)    O2 Parameters below as of 01 Nov 2022 05:53  Patient On (Oxygen Delivery Method): nasal cannula              10-31 @ 07:01  -  11-01 @ 07:00  --------------------------------------------------------  IN: 120 mL / OUT: 650 mL / NET: -530 mL      CAPILLARY BLOOD GLUCOSE          PHYSICAL EXAM:  General: Well-groomed, NAD, laying in bed, on 1L O2 NC  HEENT: PERRLA, EOMI, non-icteric  Neck:  symmetric,  JVD absent  Respiratory: Limited to anterior ascultation Clear to ascultation bilaterally, no crackles/rales, no Resp distress; no accessory muscle use  Cardiovascular:  RRR, no murmurs/rubs/gallops  Abdomen: Soft, ND, non-tender in all quadrants  Extremities: No edema noted  Skin: No rashes or lesions noted  Neurological: Sensation grossly intact  Psychiatry: AOx3, appropriate insight/judgement, appropriate affect, recent/remote memory intact    PRN Meds:  ALBUTerol    90 MICROgram(s) HFA Inhaler 2 Puff(s) Inhalation every 4 hours PRN  aluminum hydroxide/magnesium hydroxide/simethicone Suspension 30 milliLiter(s) Oral every 4 hours PRN  benzonatate 100 milliGRAM(s) Oral every 8 hours PRN  bisacodyl 5 milliGRAM(s) Oral daily PRN  bisacodyl Suppository 10 milliGRAM(s) Rectal daily PRN  guaiFENesin Oral Liquid (Sugar-Free) 100 milliGRAM(s) Oral every 6 hours PRN  HYDROmorphone  Injectable 1 milliGRAM(s) IV Push every 4 hours PRN  HYDROmorphone  Injectable 2 milliGRAM(s) IV Push every 4 hours PRN  megestrol 40 milliGRAM(s) Oral daily PRN  melatonin 3 milliGRAM(s) Oral at bedtime PRN  ondansetron Injectable 4 milliGRAM(s) IV Push every 8 hours PRN      LABS:                        6.7    7.53  )-----------( 297      ( 01 Nov 2022 07:30 )             21.6     Hgb Trend: 6.7<--, 8.2<--, 6.9<--, 8.4<--, 9.0<--  11-01    140  |  105  |  19  ----------------------------<  82  4.6   |  27  |  0.76    Ca    8.5      01 Nov 2022 07:28  Phos  2.3     11-01  Mg     2.2     11-01    TPro  6.4  /  Alb  2.6<L>  /  TBili  0.2  /  DBili  x   /  AST  10  /  ALT  9<L>  /  AlkPhos  63  11-01    Creatinine Trend: 0.76<--, 0.79<--, 0.77<--, 0.82<--, 0.74<--, 0.71<--  PT/INR - ( 01 Nov 2022 07:31 )   PT: 13.4 sec;   INR: 1.16 ratio         PTT - ( 01 Nov 2022 07:31 )  PTT:30.6 sec          MICROBIOLOGY:       RADIOLOGY:  [ ] Reviewed and interpreted by me    EKG:

## 2022-11-01 NOTE — PROGRESS NOTE ADULT - PROBLEM SELECTOR PLAN 3
Unclear etiology, more likely malignant vs CHF  -Now resolving  -Per Pulm, no indication for tap at this time Unclear etiology, more likely malignant vs CHF  -Resolving  -Per Pulm, no indication for tap at this time

## 2022-11-01 NOTE — PROGRESS NOTE ADULT - SUBJECTIVE AND OBJECTIVE BOX
Patient seen and examined at bedside. pt feels well today. no longer coughing     MEDICATIONS  (STANDING):  acyclovir   Oral Tab/Cap 400 milliGRAM(s) Oral daily  amLODIPine   Tablet 10 milliGRAM(s) Oral daily  chlorhexidine 4% Liquid 1 Application(s) Topical daily  enoxaparin Injectable 40 milliGRAM(s) SubCutaneous every 12 hours  fentaNYL   Patch  50 MICROgram(s)/Hr 1 Patch Transdermal every 72 hours  naloxone Injectable 0.4 milliGRAM(s) IV Push once  pantoprazole    Tablet 40 milliGRAM(s) Oral before breakfast  polyethylene glycol 3350 17 Gram(s) Oral daily  potassium phosphate / sodium phosphate Powder (PHOS-NaK) 2 Packet(s) Oral two times a day  senna 2 Tablet(s) Oral at bedtime    MEDICATIONS  (PRN):  ALBUTerol    90 MICROgram(s) HFA Inhaler 2 Puff(s) Inhalation every 4 hours PRN Shortness of Breath and/or Wheezing  aluminum hydroxide/magnesium hydroxide/simethicone Suspension 30 milliLiter(s) Oral every 4 hours PRN Dyspepsia  benzonatate 100 milliGRAM(s) Oral every 8 hours PRN Cough  bisacodyl 5 milliGRAM(s) Oral daily PRN Constipation  bisacodyl Suppository 10 milliGRAM(s) Rectal daily PRN Constipation  guaiFENesin Oral Liquid (Sugar-Free) 100 milliGRAM(s) Oral every 6 hours PRN Cough  HYDROmorphone  Injectable 1 milliGRAM(s) IV Push every 4 hours PRN Moderate Pain (4 - 6)  HYDROmorphone  Injectable 2 milliGRAM(s) IV Push every 4 hours PRN Severe Pain (7 - 10)  megestrol 40 milliGRAM(s) Oral daily PRN appetite  melatonin 3 milliGRAM(s) Oral at bedtime PRN Insomnia  ondansetron Injectable 4 milliGRAM(s) IV Push every 8 hours PRN Nausea and/or Vomiting        Vital Signs Last 24 Hrs  T(C): 36.7 (01 Nov 2022 05:53), Max: 36.8 (01 Nov 2022 00:05)  T(F): 98.1 (01 Nov 2022 05:53), Max: 98.3 (01 Nov 2022 00:05)  HR: 86 (01 Nov 2022 05:53) (86 - 94)  BP: 97/65 (01 Nov 2022 05:53) (97/65 - 147/79)  BP(mean): --  RR: 18 (01 Nov 2022 05:53) (18 - 18)  SpO2: 98% (01 Nov 2022 05:53) (98% - 98%)    Parameters below as of 01 Nov 2022 05:53  Patient On (Oxygen Delivery Method): nasal cannula          PHYSICAL EXAM:     GENERAL:  Appears stated age, well-groomed  HEENT:  NC/AT,    ABDOMEN:  Soft, non-tender, non-distended  EXTEREMITIES:  no cyanosis,clubbing or edema  NEURO:  Alert, oriented, no asterixis                            7.2    6.82  )-----------( 294      ( 01 Nov 2022 09:54 )             23.1       11-01    140  |  105  |  19  ----------------------------<  82  4.6   |  27  |  0.76    Ca    8.5      01 Nov 2022 07:28  Phos  2.3     11-01  Mg     2.2     11-01    TPro  6.4  /  Alb  2.6<L>  /  TBili  0.2  /  DBili  x   /  AST  10  /  ALT  9<L>  /  AlkPhos  63  11-01

## 2022-11-01 NOTE — PROGRESS NOTE ADULT - ASSESSMENT
61F unvaccinated w/ PMH MM(dx'd 2018) currently on chemo(last session 10/13) p/w 1-day h/o chest pain and fever. Most history per daughter at baseline as pt in significant pain and unable to talk. Pt had initially presented to [facility] with a sharp mid-chest pain gradual in onset that progressively got worse associated with a fever as high as 102.2 and a productive cough with greenish sputum.     Hematology/Oncology called to see patient who is under care of Dr. 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 She was recently hospitalized for chemotherapy/pain management at [facility], discharged 10/15/2022.     IgG lambda multiple myeloma  --Under care at [facility] - Dr. Denise Fatima  --Chemotherapy on hold during hospitalization    COVID-19 Infection  --Previously unvaccinated  --Completed Remdesevir, Dexamethasone  -- Ongoing management per ID, Pulm, Primary Team        Chest pain. Exacerbated by coughing  -- no coughing today   --Underlying bone disease, COVID infection  --Cardiology, Pulmonary following    Pleural Effusion  --Most likely inflammatory from COVID  --Resolved on L and improved on R  --Unable to have thoracentesis at this time secondary to respiratory isolation  --Management per Pulmonary      Anemia  -- hg 7.2 today   --Secondary to disease, recent chemotherapy  --Please transfuse PRBC's if <7 grams            Rohith Velazco MD  HematologyOncology   O: [phone]

## 2022-11-01 NOTE — PROGRESS NOTE ADULT - PROBLEM SELECTOR PLAN 2
try to wean o2 down : seems better sao2: gonzález resident:    11/1: she feel s much better: passed TOV:

## 2022-11-01 NOTE — PROGRESS NOTE ADULT - ASSESSMENT
61F unvaccinated w/ PMH MM(dx'd 2018) currently on chemo(last session 10/13) p/w 1-day h/o chest pain and fever. Found to have hypoxia in s/o COVID-19  and small-moderate pleural effusions. 61F unvaccinated w/ PMH MM(dx'd 2018) currently on chemo(last session 10/13) p/w 1-day h/o chest pain and fever. Found to have hypoxia in s/o COVID-19  and small-moderate pleural effusions.    Patient oxygenation much improved. Try weaning to RA today, then obtaining ambulatory sats. Additionally, pt undergoing TOV today 61F unvaccinated w/ PMH MM(dx'd 2018) currently on chemo(last session 10/13) p/w 1-day h/o chest pain and fever. Found to have hypoxia in s/o COVID-19  and small-moderate pleural effusions.    Patient oxygenation much improved. Try weaning to RA today, then obtaining ambulatory sats. Additionally, pt undergoing TOV today.

## 2022-11-01 NOTE — PROGRESS NOTE ADULT - SUBJECTIVE AND OBJECTIVE BOX
Date of Service: 11-01-22 @ 13:16    Patient is a 61y old  Female who presents with a chief complaint of SOB, CP (01 Nov 2022 10:35)      Any change in ROS:  doing pretty good : on 1 L of oxygen  : no chest pain : no sob:       MEDICATIONS  (STANDING):  acyclovir   Oral Tab/Cap 400 milliGRAM(s) Oral daily  amLODIPine   Tablet 10 milliGRAM(s) Oral daily  chlorhexidine 4% Liquid 1 Application(s) Topical daily  enoxaparin Injectable 40 milliGRAM(s) SubCutaneous every 12 hours  fentaNYL   Patch  50 MICROgram(s)/Hr 1 Patch Transdermal every 72 hours  naloxone Injectable 0.4 milliGRAM(s) IV Push once  pantoprazole    Tablet 40 milliGRAM(s) Oral before breakfast  polyethylene glycol 3350 17 Gram(s) Oral daily  potassium phosphate / sodium phosphate Powder (PHOS-NaK) 2 Packet(s) Oral two times a day  senna 2 Tablet(s) Oral at bedtime    MEDICATIONS  (PRN):  ALBUTerol    90 MICROgram(s) HFA Inhaler 2 Puff(s) Inhalation every 4 hours PRN Shortness of Breath and/or Wheezing  aluminum hydroxide/magnesium hydroxide/simethicone Suspension 30 milliLiter(s) Oral every 4 hours PRN Dyspepsia  benzonatate 100 milliGRAM(s) Oral every 8 hours PRN Cough  bisacodyl 5 milliGRAM(s) Oral daily PRN Constipation  bisacodyl Suppository 10 milliGRAM(s) Rectal daily PRN Constipation  guaiFENesin Oral Liquid (Sugar-Free) 100 milliGRAM(s) Oral every 6 hours PRN Cough  HYDROmorphone  Injectable 1 milliGRAM(s) IV Push every 4 hours PRN Moderate Pain (4 - 6)  HYDROmorphone  Injectable 2 milliGRAM(s) IV Push every 4 hours PRN Severe Pain (7 - 10)  megestrol 40 milliGRAM(s) Oral daily PRN appetite  melatonin 3 milliGRAM(s) Oral at bedtime PRN Insomnia  ondansetron Injectable 4 milliGRAM(s) IV Push every 8 hours PRN Nausea and/or Vomiting    Vital Signs Last 24 Hrs  T(C): 36.7 (01 Nov 2022 05:53), Max: 36.8 (01 Nov 2022 00:05)  T(F): 98.1 (01 Nov 2022 05:53), Max: 98.3 (01 Nov 2022 00:05)  HR: 86 (01 Nov 2022 05:53) (86 - 94)  BP: 97/65 (01 Nov 2022 05:53) (97/65 - 147/79)  BP(mean): --  RR: 18 (01 Nov 2022 05:53) (18 - 18)  SpO2: 98% (01 Nov 2022 05:53) (98% - 98%)    Parameters below as of 01 Nov 2022 05:53  Patient On (Oxygen Delivery Method): nasal cannula        I&O's Summary    31 Oct 2022 07:01  -  01 Nov 2022 07:00  --------------------------------------------------------  IN: 120 mL / OUT: 650 mL / NET: -530 mL          Physical Exam:   GENERAL: NAD, well-groomed, well-developed  HEENT: CARLA/   Atraumatic, Normocephalic  ENMT: No tonsillar erythema, exudates, or enlargement; Moist mucous membranes, Good dentition, No lesions  NECK: Supple, No JVD, Normal thyroid  CHEST/LUNG: Clear to auscultaion  CVS: Regular rate and rhythm; No murmurs, rubs, or gallops  GI: : Soft, Nontender, Nondistended; Bowel sounds present  NERVOUS SYSTEM:  Alert & Oriented X3  EXTREMITIES:-edema  LYMPH: No lymphadenopathy noted  SKIN: No rashes or lesions  ENDOCRINOLOGY: No Thyromegaly  PSYCH: Appropriate    Labs:  28                            7.2    6.82  )-----------( 294      ( 01 Nov 2022 09:54 )             23.1                         6.7    7.53  )-----------( 297      ( 01 Nov 2022 07:30 )             21.6                         8.2    8.90  )-----------( 338      ( 31 Oct 2022 10:17 )             26.3                         6.9    8.44  )-----------( 319      ( 31 Oct 2022 06:36 )             22.1                         8.4    10.89 )-----------( 360      ( 30 Oct 2022 09:56 )             27.2                         9.0    14.59 )-----------( 425      ( 29 Oct 2022 07:17 )             27.9     11-01    140  |  105  |  19  ----------------------------<  82  4.6   |  27  |  0.76  10-31    140  |  106  |  21  ----------------------------<  84  4.3   |  27  |  0.79  10-30    139  |  103  |  24<H>  ----------------------------<  105<H>  3.8   |  27  |  0.77  10-29    138  |  101  |  27<H>  ----------------------------<  101<H>  4.0   |  25  |  0.82    Ca    8.5      01 Nov 2022 07:28  Ca    8.9      31 Oct 2022 06:35  Phos  2.3     11-01  Phos  2.2     10-31  Mg     2.2     11-01  Mg     2.3     10-31    TPro  6.4  /  Alb  2.6<L>  /  TBili  0.2  /  DBili  x   /  AST  10  /  ALT  9<L>  /  AlkPhos  63  11-01  TPro  6.6  /  Alb  2.6<L>  /  TBili  0.3  /  DBili  x   /  AST  13  /  ALT  12  /  AlkPhos  68  10-31  TPro  7.9  /  Alb  3.4  /  TBili  0.3  /  DBili  x   /  AST  12  /  ALT  17  /  AlkPhos  78  10-30  TPro  7.4  /  Alb  3.2<L>  /  TBili  0.3  /  DBili  x   /  AST  17  /  ALT  21  /  AlkPhos  76  10-29    CAPILLARY BLOOD GLUCOSE          LIVER FUNCTIONS - ( 01 Nov 2022 07:28 )  Alb: 2.6 g/dL / Pro: 6.4 g/dL / ALK PHOS: 63 U/L / ALT: 9 U/L / AST: 10 U/L / GGT: x           PT/INR - ( 01 Nov 2022 07:31 )   PT: 13.4 sec;   INR: 1.16 ratio         PTT - ( 01 Nov 2022 07:31 )  PTT:30.6 sec      rad< from: Xray Chest 1 View- PORTABLE-Routine (Xray Chest 1 View- PORTABLE-Routine in AM.) (10.31.22 @ 09:58) >    Heart size and the mediastinum cannot be accurately evaluated on this   projection.  There is new medial right lower opacity.  There is continued predominantly linear left upper lobe opacities.  New patchy small left midlung opacity.  There is continued left basilar/retrocardiac opacity with obscuration of   the left hemidiaphragm.  No pneumothorax is seen.  Multiple lytic bone lesions and compression deformities of the spine   again noted consistent with a history of multiple myeloma.      IMPRESSION:  New medial right lower opacity, possibly increased or   redistributed loculated pleural fluid with associated passive   atelectasis. Atelectasis of other cause, and/or pneumonia are possible.    Continued predominantly linear left upper lobe opacities and new patchy   small left midlung opacity possibly atelectasis although infection is not   excluded.    Continued left basilar and retrocardiac opacity which may be due to a   left pleural effusion with passive atelectasis, atelectasis of other   cause, and/or other pathology including, but not limited to, pneumonia.    --- End of Report ---            DENIA HERNANDEZ MD; Attending Radiologist  This document has been electronically signed. Oct 31 2022  3:38PM    < end of copied text >      RECENT CULTURES:        RESPIRATORY CULTURES:          Studies  Chest X-RAY  CT SCAN Chest   Venous Dopplers: LE:   CT Abdomen  Others

## 2022-11-01 NOTE — PROGRESS NOTE ADULT - PROBLEM SELECTOR PLAN 1
The types of intraocular lenses were reviewed with the patient along with a discussion of their various strengths and weaknesses. Patient has very minimal Macular Degeneration and is highly motivated for the Advanced vision. We will see home he does with the first eye. 10/31" her oxygenation is pretty good:  no sob:  no cough : no wheezing: :  on 3 L of o2:  today's chest x-ray to my eye: seems better then last time:  not much of pleural effusion:   however would wait for official report: if not much fluid can follow up as an outpt:    11/1: she has Improved: she is on 1 L of oxygen : repeat chest x-ray noted/: may have small effusions: no tapping : she can follow up [place]:

## 2022-11-01 NOTE — PROGRESS NOTE ADULT - SUBJECTIVE AND OBJECTIVE BOX
C A R D I O L O G Y  **********************************     DATE OF SERVICE: 11-01-22    Discussed with RN - patient weaned down to 1L NC. No reported chest pain or shortness of breath.   Review of symptoms otherwise negative.    MEDICATIONS:  acyclovir   Oral Tab/Cap 400 milliGRAM(s) Oral daily  ALBUTerol    90 MICROgram(s) HFA Inhaler 2 Puff(s) Inhalation every 4 hours PRN  aluminum hydroxide/magnesium hydroxide/simethicone Suspension 30 milliLiter(s) Oral every 4 hours PRN  amLODIPine   Tablet 10 milliGRAM(s) Oral daily  benzonatate 100 milliGRAM(s) Oral every 8 hours PRN  bisacodyl 5 milliGRAM(s) Oral daily PRN  bisacodyl Suppository 10 milliGRAM(s) Rectal daily PRN  chlorhexidine 4% Liquid 1 Application(s) Topical daily  enoxaparin Injectable 40 milliGRAM(s) SubCutaneous every 12 hours  fentaNYL   Patch  50 MICROgram(s)/Hr 1 Patch Transdermal every 72 hours  guaiFENesin Oral Liquid (Sugar-Free) 100 milliGRAM(s) Oral every 6 hours PRN  HYDROmorphone  Injectable 1 milliGRAM(s) IV Push every 4 hours PRN  HYDROmorphone  Injectable 2 milliGRAM(s) IV Push every 4 hours PRN  megestrol 40 milliGRAM(s) Oral daily PRN  melatonin 3 milliGRAM(s) Oral at bedtime PRN  naloxone Injectable 0.4 milliGRAM(s) IV Push once  ondansetron Injectable 4 milliGRAM(s) IV Push every 8 hours PRN  pantoprazole    Tablet 40 milliGRAM(s) Oral before breakfast  polyethylene glycol 3350 17 Gram(s) Oral daily  potassium phosphate / sodium phosphate Powder (PHOS-NaK) 2 Packet(s) Oral two times a day  senna 2 Tablet(s) Oral at bedtime      LABS:                        7.2    6.82  )-----------( 294      ( 01 Nov 2022 09:54 )             23.1       Hemoglobin: 7.2 g/dL (11-01 @ 09:54)  Hemoglobin: 6.7 g/dL (11-01 @ 07:30)  Hemoglobin: 8.2 g/dL (10-31 @ 10:17)  Hemoglobin: 6.9 g/dL (10-31 @ 06:36)  Hemoglobin: 8.4 g/dL (10-30 @ 09:56)      11-01    140  |  105  |  19  ----------------------------<  82  4.6   |  27  |  0.76    Ca    8.5      01 Nov 2022 07:28  Phos  2.3     11-01  Mg     2.2     11-01    TPro  6.4  /  Alb  2.6<L>  /  TBili  0.2  /  DBili  x   /  AST  10  /  ALT  9<L>  /  AlkPhos  63  11-01    Creatinine Trend: 0.76<--, 0.79<--, 0.77<--, 0.82<--, 0.74<--, 0.71<--    COAGS: PT/INR - ( 01 Nov 2022 07:31 )   PT: 13.4 sec;   INR: 1.16 ratio         PTT - ( 01 Nov 2022 07:31 )  PTT:30.6 sec        Per Chart  PHYSICAL EXAM:  T(C): 36.7 (11-01-22 @ 05:53), Max: 36.8 (11-01-22 @ 00:05)  HR: 86 (11-01-22 @ 05:53) (86 - 86)  BP: 97/65 (11-01-22 @ 05:53) (97/65 - 97/65)  RR: 18 (11-01-22 @ 05:53) (18 - 18)  SpO2: 98% (11-01-22 @ 05:53) (98% - 98%)  Wt(kg): --    I&O's Summary    31 Oct 2022 07:01  -  01 Nov 2022 07:00  --------------------------------------------------------  IN: 120 mL / OUT: 650 mL / NET: -530 mL          Gen: NAD  HEENT:  (-)icterus (-)pallor  CV: N S1 S2 1/6 CHARLY (+)2 Pulses B/l  Resp:  Clear to auscultation B/L, normal effort  GI: (+) BS Soft, NT, ND  Lymph:  (-)Edema, (-)obvious lymphadenopathy  Skin: Warm to touch, Normal turgor  Psych: Appropriate mood and affect      TELEMETRY: None	      ECG: Sinus Tachycardia, PVC    RADIOLOGY:  < from: CT Angio Chest PE Protocol w/ IV Cont (10.18.22 @ 20:03) >  IMPRESSION:    No pulmonary embolism.    Smallright and moderate left pleural effusions and compressive   atelectasis.    --- End of Report ---    < end of copied text >      ASSESSMENT/PLAN: Patient is a 60 y/o Female with PMH of Multiple Myeloma (dx 2018) currently on chemo who presented with chest pain, SOB, and fever admitted with +COVID and b/l pleural effusions. Cardiology consulted for further evaluation.     - Patient with nonanginal chest pain that is pleuritic and very tender to palpation - suspect MSK related likely due to costochondritis and multiple myeloma lytic lesions  - EKG with sinus tachycardia and no acute ischemic changes  - Cardiac enzymes unremarkable  - CTA chest negative for PE, small R and mod L pleural effusions and compressive atelectasis  - TTE with normal LV/RV function  - Pulm follow up - wean O2 as tolerated, repeat CXR noted - no plans for thoracentesis  - Supportive care/covid management per primary team  - No further inpatient cardiac w/u planned     Artur Cotres PA-C  Pager: [phone]

## 2022-11-02 LAB
ALBUMIN SERPL ELPH-MCNC: 2.8 G/DL — LOW (ref 3.3–5)
ALP SERPL-CCNC: 65 U/L — SIGNIFICANT CHANGE UP (ref 40–120)
ALT FLD-CCNC: 10 U/L — SIGNIFICANT CHANGE UP (ref 10–45)
ANION GAP SERPL CALC-SCNC: 8 MMOL/L — SIGNIFICANT CHANGE UP (ref 5–17)
APTT BLD: 30.1 SEC — SIGNIFICANT CHANGE UP (ref 27.5–35.5)
AST SERPL-CCNC: 11 U/L — SIGNIFICANT CHANGE UP (ref 10–40)
BASOPHILS # BLD AUTO: 0.01 K/UL — SIGNIFICANT CHANGE UP (ref 0–0.2)
BASOPHILS NFR BLD AUTO: 0.2 % — SIGNIFICANT CHANGE UP (ref 0–2)
BILIRUB SERPL-MCNC: 0.3 MG/DL — SIGNIFICANT CHANGE UP (ref 0.2–1.2)
BUN SERPL-MCNC: 14 MG/DL — SIGNIFICANT CHANGE UP (ref 7–23)
CALCIUM SERPL-MCNC: 8.9 MG/DL — SIGNIFICANT CHANGE UP (ref 8.4–10.5)
CHLORIDE SERPL-SCNC: 103 MMOL/L — SIGNIFICANT CHANGE UP (ref 96–108)
CO2 SERPL-SCNC: 26 MMOL/L — SIGNIFICANT CHANGE UP (ref 22–31)
CREAT SERPL-MCNC: 0.75 MG/DL — SIGNIFICANT CHANGE UP (ref 0.5–1.3)
EGFR: 91 ML/MIN/1.73M2 — SIGNIFICANT CHANGE UP
EOSINOPHIL # BLD AUTO: 0.01 K/UL — SIGNIFICANT CHANGE UP (ref 0–0.5)
EOSINOPHIL NFR BLD AUTO: 0.2 % — SIGNIFICANT CHANGE UP (ref 0–6)
GLUCOSE SERPL-MCNC: 72 MG/DL — SIGNIFICANT CHANGE UP (ref 70–99)
HCT VFR BLD CALC: 24 % — LOW (ref 34.5–45)
HGB BLD-MCNC: 7.6 G/DL — LOW (ref 11.5–15.5)
IMM GRANULOCYTES NFR BLD AUTO: 2.5 % — HIGH (ref 0–0.9)
INR BLD: 1.19 RATIO — HIGH (ref 0.88–1.16)
LYMPHOCYTES # BLD AUTO: 0.44 K/UL — LOW (ref 1–3.3)
LYMPHOCYTES # BLD AUTO: 7 % — LOW (ref 13–44)
MAGNESIUM SERPL-MCNC: 2.2 MG/DL — SIGNIFICANT CHANGE UP (ref 1.6–2.6)
MCHC RBC-ENTMCNC: 31.7 GM/DL — LOW (ref 32–36)
MCHC RBC-ENTMCNC: 31.8 PG — SIGNIFICANT CHANGE UP (ref 27–34)
MCV RBC AUTO: 100.4 FL — HIGH (ref 80–100)
MONOCYTES # BLD AUTO: 0.54 K/UL — SIGNIFICANT CHANGE UP (ref 0–0.9)
MONOCYTES NFR BLD AUTO: 8.6 % — SIGNIFICANT CHANGE UP (ref 2–14)
NEUTROPHILS # BLD AUTO: 5.14 K/UL — SIGNIFICANT CHANGE UP (ref 1.8–7.4)
NEUTROPHILS NFR BLD AUTO: 81.5 % — HIGH (ref 43–77)
NRBC # BLD: 0 /100 WBCS — SIGNIFICANT CHANGE UP (ref 0–0)
PHOSPHATE SERPL-MCNC: 3.9 MG/DL — SIGNIFICANT CHANGE UP (ref 2.5–4.5)
PLATELET # BLD AUTO: 287 K/UL — SIGNIFICANT CHANGE UP (ref 150–400)
POTASSIUM SERPL-MCNC: 5.3 MMOL/L — SIGNIFICANT CHANGE UP (ref 3.5–5.3)
POTASSIUM SERPL-SCNC: 5.3 MMOL/L — SIGNIFICANT CHANGE UP (ref 3.5–5.3)
PROT SERPL-MCNC: 6.9 G/DL — SIGNIFICANT CHANGE UP (ref 6–8.3)
PROTHROM AB SERPL-ACNC: 13.7 SEC — HIGH (ref 10.5–13.4)
RBC # BLD: 2.39 M/UL — LOW (ref 3.8–5.2)
RBC # FLD: 18.5 % — HIGH (ref 10.3–14.5)
SODIUM SERPL-SCNC: 137 MMOL/L — SIGNIFICANT CHANGE UP (ref 135–145)
WBC # BLD: 6.3 K/UL — SIGNIFICANT CHANGE UP (ref 3.8–10.5)
WBC # FLD AUTO: 6.3 K/UL — SIGNIFICANT CHANGE UP (ref 3.8–10.5)

## 2022-11-02 RX ORDER — POLYETHYLENE GLYCOL 3350 17 G/17G
17 POWDER, FOR SOLUTION ORAL DAILY
Refills: 0 | Status: DISCONTINUED | OUTPATIENT
Start: 2022-11-02 | End: 2022-11-02

## 2022-11-02 RX ADMIN — HYDROMORPHONE HYDROCHLORIDE 2 MILLIGRAM(S): 2 INJECTION INTRAMUSCULAR; INTRAVENOUS; SUBCUTANEOUS at 13:07

## 2022-11-02 RX ADMIN — ENOXAPARIN SODIUM 40 MILLIGRAM(S): 100 INJECTION SUBCUTANEOUS at 20:29

## 2022-11-02 RX ADMIN — Medication 100 MILLIGRAM(S): at 10:05

## 2022-11-02 RX ADMIN — ENOXAPARIN SODIUM 40 MILLIGRAM(S): 100 INJECTION SUBCUTANEOUS at 10:04

## 2022-11-02 RX ADMIN — PANTOPRAZOLE SODIUM 40 MILLIGRAM(S): 20 TABLET, DELAYED RELEASE ORAL at 05:41

## 2022-11-02 RX ADMIN — FENTANYL CITRATE 1 PATCH: 50 INJECTION INTRAVENOUS at 19:00

## 2022-11-02 RX ADMIN — Medication 100 MILLIGRAM(S): at 21:55

## 2022-11-02 RX ADMIN — Medication 400 MILLIGRAM(S): at 12:39

## 2022-11-02 RX ADMIN — CHLORHEXIDINE GLUCONATE 1 APPLICATION(S): 213 SOLUTION TOPICAL at 12:40

## 2022-11-02 RX ADMIN — AMLODIPINE BESYLATE 10 MILLIGRAM(S): 2.5 TABLET ORAL at 05:41

## 2022-11-02 RX ADMIN — FENTANYL CITRATE 1 PATCH: 50 INJECTION INTRAVENOUS at 06:34

## 2022-11-02 RX ADMIN — HYDROMORPHONE HYDROCHLORIDE 2 MILLIGRAM(S): 2 INJECTION INTRAMUSCULAR; INTRAVENOUS; SUBCUTANEOUS at 13:30

## 2022-11-02 NOTE — PROGRESS NOTE ADULT - PROBLEM SELECTOR PLAN 2
try to wean o2 down : seems better sao2: [facility] resident:    11/1: she feel s much better: passed TOV:    11/2: completed rx:

## 2022-11-02 NOTE — PROGRESS NOTE ADULT - PROBLEM SELECTOR PLAN 6
-currently undergoing chemo at [facility] [place]  -heme/onc following  -pain control

## 2022-11-02 NOTE — PROGRESS NOTE ADULT - SUBJECTIVE AND OBJECTIVE BOX
Date of Service: 11-02-22 @ 15:28    Patient is a 61y old  Female who presents with a chief complaint of SOB, CP (02 Nov 2022 10:31)      Any change in ROS:  on ericka tona today  : no pain :     MEDICATIONS  (STANDING):  acyclovir   Oral Tab/Cap 400 milliGRAM(s) Oral daily  amLODIPine   Tablet 10 milliGRAM(s) Oral daily  chlorhexidine 4% Liquid 1 Application(s) Topical daily  enoxaparin Injectable 40 milliGRAM(s) SubCutaneous every 12 hours  fentaNYL   Patch  50 MICROgram(s)/Hr 1 Patch Transdermal every 72 hours  naloxone Injectable 0.4 milliGRAM(s) IV Push once  pantoprazole    Tablet 40 milliGRAM(s) Oral before breakfast  senna 2 Tablet(s) Oral at bedtime    MEDICATIONS  (PRN):  ALBUTerol    90 MICROgram(s) HFA Inhaler 2 Puff(s) Inhalation every 4 hours PRN Shortness of Breath and/or Wheezing  aluminum hydroxide/magnesium hydroxide/simethicone Suspension 30 milliLiter(s) Oral every 4 hours PRN Dyspepsia  benzonatate 100 milliGRAM(s) Oral every 8 hours PRN Cough  bisacodyl Suppository 10 milliGRAM(s) Rectal daily PRN Constipation  guaiFENesin Oral Liquid (Sugar-Free) 100 milliGRAM(s) Oral every 6 hours PRN Cough  HYDROmorphone  Injectable 2 milliGRAM(s) IV Push every 4 hours PRN Severe Pain (7 - 10)  HYDROmorphone  Injectable 1 milliGRAM(s) IV Push every 4 hours PRN Moderate Pain (4 - 6)  megestrol 40 milliGRAM(s) Oral daily PRN appetite  melatonin 3 milliGRAM(s) Oral at bedtime PRN Insomnia  ondansetron Injectable 4 milliGRAM(s) IV Push every 8 hours PRN Nausea and/or Vomiting  polyethylene glycol 3350 17 Gram(s) Oral daily PRN Constipation    Vital Signs Last 24 Hrs  T(C): 37.3 (02 Nov 2022 12:48), Max: 37.3 (02 Nov 2022 12:48)  T(F): 99.2 (02 Nov 2022 12:48), Max: 99.2 (02 Nov 2022 12:48)  HR: 106 (02 Nov 2022 14:34) (90 - 111)  BP: 120/78 (02 Nov 2022 14:34) (101/69 - 120/78)  BP(mean): --  RR: 18 (02 Nov 2022 14:34) (18 - 19)  SpO2: 94% (02 Nov 2022 14:34) (93% - 98%)    Parameters below as of 02 Nov 2022 14:34  Patient On (Oxygen Delivery Method): room air        I&O's Summary    01 Nov 2022 07:01  -  02 Nov 2022 07:00  --------------------------------------------------------  IN: 300 mL / OUT: 0 mL / NET: 300 mL    02 Nov 2022 07:01  -  02 Nov 2022 15:28  --------------------------------------------------------  IN: 600 mL / OUT: 1000 mL / NET: -400 mL          Physical Exam:   GENERAL: NAD, well-groomed, well-developed  HEENT: CARLA/   Atraumatic, Normocephalic  ENMT: No tonsillar erythema, exudates, or enlargement; Moist mucous membranes, Good dentition, No lesions  NECK: Supple, No JVD, Normal thyroid  CHEST/LUNG: Clear to auscultaion  CVS: Regular rate and rhythm; No murmurs, rubs, or gallops  GI: : Soft, Nontender, Nondistended; Bowel sounds present  NERVOUS SYSTEM:  Alert & Oriented X3  EXTREMITIES:- edema  LYMPH: No lymphadenopathy noted  SKIN: No rashes or lesions  ENDOCRINOLOGY: No Thyromegaly  PSYCH: Appropriate    Labs:                              7.6    6.30  )-----------( 287      ( 02 Nov 2022 07:12 )             24.0                         7.2    6.82  )-----------( 294      ( 01 Nov 2022 09:54 )             23.1                         6.7    7.53  )-----------( 297      ( 01 Nov 2022 07:30 )             21.6                         8.2    8.90  )-----------( 338      ( 31 Oct 2022 10:17 )             26.3                         6.9    8.44  )-----------( 319      ( 31 Oct 2022 06:36 )             22.1                         8.4    10.89 )-----------( 360      ( 30 Oct 2022 09:56 )             27.2     11-02    137  |  103  |  14  ----------------------------<  72  5.3   |  26  |  0.75  11-01    140  |  105  |  19  ----------------------------<  82  4.6   |  27  |  0.76  10-31    140  |  106  |  21  ----------------------------<  84  4.3   |  27  |  0.79  10-30    139  |  103  |  24<H>  ----------------------------<  105<H>  3.8   |  27  |  0.77    Ca    8.9      02 Nov 2022 07:12  Ca    8.5      01 Nov 2022 07:28  Phos  3.9     11-02  Phos  2.3     11-01  Mg     2.2     11-02  Mg     2.2     11-01    TPro  6.9  /  Alb  2.8<L>  /  TBili  0.3  /  DBili  x   /  AST  11  /  ALT  10  /  AlkPhos  65  11-02  TPro  6.4  /  Alb  2.6<L>  /  TBili  0.2  /  DBili  x   /  AST  10  /  ALT  9<L>  /  AlkPhos  63  11-01  TPro  6.6  /  Alb  2.6<L>  /  TBili  0.3  /  DBili  x   /  AST  13  /  ALT  12  /  AlkPhos  68  10-31  TPro  7.9  /  Alb  3.4  /  TBili  0.3  /  DBili  x   /  AST  12  /  ALT  17  /  AlkPhos  78  10-30    CAPILLARY BLOOD GLUCOSE          LIVER FUNCTIONS - ( 02 Nov 2022 07:12 )  Alb: 2.8 g/dL / Pro: 6.9 g/dL / ALK PHOS: 65 U/L / ALT: 10 U/L / AST: 11 U/L / GGT: x           PT/INR - ( 02 Nov 2022 07:16 )   PT: 13.7 sec;   INR: 1.19 ratio         PTT - ( 02 Nov 2022 07:16 )  PTT:30.1 sec  rad< from: Xray Chest 1 View- PORTABLE-Routine (Xray Chest 1 View- PORTABLE-Routine in AM.) (10.31.22 @ 09:58) >  INTERPRETATION:  TIME OF EXAM: October 31, 2022 at 8:38 AM.    CLINICAL INFORMATION: Multiple myeloma. Covid pneumonia. Pleural effusion.    COMPARISON:  October 27, 2022.    TECHNIQUE:   AP Portable chest x-ray. Limited by rotation.    INTERPRETATION:    Heart size and the mediastinum cannot be accurately evaluated on this   projection.  There is new medial right lower opacity.  There is continued predominantly linear left upper lobe opacities.  New patchy small left midlung opacity.  There is continued left basilar/retrocardiac opacity with obscuration of   the left hemidiaphragm.  No pneumothorax is seen.  Multiple lytic bone lesions and compression deformities of the spine   again noted consistent with a history of multiple myeloma.      IMPRESSION:  New medial right lower opacity, possibly increased or   redistributed loculated pleural fluid with associated passive   atelectasis. Atelectasis of other cause, and/or pneumonia are possible.    Continued predominantly linear left upper lobe opacities and new patchy   small left midlung opacity possibly atelectasis although infection is not   excluded.    Continued left basilar and retrocardiac opacity which may be due to a   left pleural effusion with passive atelectasis, atelectasis of other   cause, and/or other pathology including, but not limited to, pneumonia.    --- End of Report ---            DENIA HERNANDEZ MD; Attending Radiologist  This document has been electronically signed. Oct 31 2022  3:38PM    < end of copied text >          RECENT CULTURES:        RESPIRATORY CULTURES:          Studies  Chest X-RAY  CT SCAN Chest   Venous Dopplers: LE:   CT Abdomen  Others

## 2022-11-02 NOTE — PROGRESS NOTE ADULT - SUBJECTIVE AND OBJECTIVE BOX
SUBJECTIVE  Pt seen bedside this AM. States that she finally slept well, as the cough has improved; however, still has some congestion today. States that she has been urinating fine, no dysuria/urgency. Had 3 formed BMs yesterday, and would like to pull back on laxatives. Chest pain is improving, and she only needed to 1mg Dialudid PRNs yesterday. no other complaints. Denies fevers, chills, SOB, abdominal pain, n/v.    OBJECTIVE:  ICU Vital Signs Last 24 Hrs  T(C): 36.7 (02 Nov 2022 05:27), Max: 36.8 (01 Nov 2022 14:00)  T(F): 98 (02 Nov 2022 05:27), Max: 98.2 (01 Nov 2022 14:00)  HR: 90 (02 Nov 2022 05:27) (90 - 92)  BP: 101/69 (02 Nov 2022 05:27) (101/69 - 106/71)  BP(mean): --  ABP: --  ABP(mean): --  RR: 18 (02 Nov 2022 05:27) (18 - 19)  SpO2: 97% (02 Nov 2022 05:27) (94% - 98%)    O2 Parameters below as of 02 Nov 2022 05:27  Patient On (Oxygen Delivery Method): nasal cannula              11-01 @ 07:01  -  11-02 @ 07:00  --------------------------------------------------------  IN: 300 mL / OUT: 0 mL / NET: 300 mL      CAPILLARY BLOOD GLUCOSE          PHYSICAL EXAM:  General: Well-groomed, NAD, laying in bed, on 1L O2 NC  HEENT: PERRLA, EOMI, non-icteric  Neck:  symmetric,  JVD absent  Respiratory: Clear to ascultation bilaterally, no crackles/rales, no Resp distress; no accessory muscle use     -Pt trialed on RA-> sats at 90-93% with rare drops to 88-89%. c/w 1L O2 for now.  Cardiovascular:  RRR, no murmurs/rubs/gallops  Abdomen: Soft, NT, ND  Extremities: No edema noted  Skin: No rashes or lesions noted  Neurological: Sensation grossly intact  Psychiatry: AOx3, appropriate insight/judgement, appropriate affect, recent/remote memory intact    PRN Meds:  ALBUTerol    90 MICROgram(s) HFA Inhaler 2 Puff(s) Inhalation every 4 hours PRN  aluminum hydroxide/magnesium hydroxide/simethicone Suspension 30 milliLiter(s) Oral every 4 hours PRN  benzonatate 100 milliGRAM(s) Oral every 8 hours PRN  bisacodyl 5 milliGRAM(s) Oral daily PRN  bisacodyl Suppository 10 milliGRAM(s) Rectal daily PRN  guaiFENesin Oral Liquid (Sugar-Free) 100 milliGRAM(s) Oral every 6 hours PRN  HYDROmorphone  Injectable 1 milliGRAM(s) IV Push every 4 hours PRN  HYDROmorphone  Injectable 2 milliGRAM(s) IV Push every 4 hours PRN  megestrol 40 milliGRAM(s) Oral daily PRN  melatonin 3 milliGRAM(s) Oral at bedtime PRN  ondansetron Injectable 4 milliGRAM(s) IV Push every 8 hours PRN      LABS:                        7.6    6.30  )-----------( 287      ( 02 Nov 2022 07:12 )             24.0     Hgb Trend: 7.6<--, 7.2<--, 6.7<--, 8.2<--, 6.9<--  11-02    137  |  103  |  14  ----------------------------<  72  5.3   |  26  |  0.75    Ca    8.9      02 Nov 2022 07:12  Phos  3.9     11-02  Mg     2.2     11-02    TPro  6.9  /  Alb  2.8<L>  /  TBili  0.3  /  DBili  x   /  AST  11  /  ALT  10  /  AlkPhos  65  11-02    Creatinine Trend: 0.75<--, 0.76<--, 0.79<--, 0.77<--, 0.82<--, 0.74<--  PT/INR - ( 02 Nov 2022 07:16 )   PT: 13.7 sec;   INR: 1.19 ratio         PTT - ( 02 Nov 2022 07:16 )  PTT:30.1 sec          MICROBIOLOGY:       RADIOLOGY:  [ ] Reviewed and interpreted by me    EKG:

## 2022-11-02 NOTE — PROGRESS NOTE ADULT - PROBLEM SELECTOR PLAN 3
Unclear etiology, more likely malignant vs CHF  -Resolving  -Per Pulm, no indication for tap at this time

## 2022-11-02 NOTE — PROGRESS NOTE ADULT - ASSESSMENT
61F unvaccinated w/ PMH MM(dx'd 2018) currently on chemo(last session 10/13) p/w 1-day h/o chest pain and fever. Found to have hypoxia in s/o COVID-19  and small-moderate pleural effusions.    Patient oxygenation much improved; will obtain ambulatory sats. Patient passed TOV, now urinating on own. Too many BMs, so pulling back on bowel regimen

## 2022-11-02 NOTE — PROGRESS NOTE ADULT - PROBLEM SELECTOR PLAN 5
Likely 2/2 high doses of opioid medications  -Now having too many BMs, likely 2/2 decreased utilization of opioids for pain control  -Standing senna 2 tabs qhs  -dc bisacodyl PO  -changed Miralax to PRN  -c/w Bisacodyl 10mg suppository qd PRN  -Rilastor q2d prn if using high amounts of opioids

## 2022-11-02 NOTE — PROGRESS NOTE ADULT - PROBLEM SELECTOR PLAN 7
-Diet: Vegan  -DVT ppx: Lovenox qd  Dispo: home PT vs [facility] -Diet: Vegan  -DVT ppx: Lovenox qd  Dispo: Home PT

## 2022-11-02 NOTE — PROGRESS NOTE ADULT - SUBJECTIVE AND OBJECTIVE BOX
C A R D I O L O G [facility]  **********************************     DATE OF SERVICE: 11-02-22    Discussed with RN - no events overnight, now monitoring on room air. No reported chest pain or shortness of breath.   Review of symptoms otherwise negative.    MEDICATIONS:  acyclovir   Oral Tab/Cap 400 milliGRAM(s) Oral daily  ALBUTerol    90 MICROgram(s) HFA Inhaler 2 Puff(s) Inhalation every 4 hours PRN  aluminum hydroxide/magnesium hydroxide/simethicone Suspension 30 milliLiter(s) Oral every 4 hours PRN  amLODIPine   Tablet 10 milliGRAM(s) Oral daily  benzonatate 100 milliGRAM(s) Oral every 8 hours PRN  bisacodyl Suppository 10 milliGRAM(s) Rectal daily PRN  chlorhexidine 4% Liquid 1 Application(s) Topical daily  enoxaparin Injectable 40 milliGRAM(s) SubCutaneous every 12 hours  fentaNYL   Patch  50 MICROgram(s)/Hr 1 Patch Transdermal every 72 hours  guaiFENesin Oral Liquid (Sugar-Free) 100 milliGRAM(s) Oral every 6 hours PRN  HYDROmorphone  Injectable 1 milliGRAM(s) IV Push every 4 hours PRN  HYDROmorphone  Injectable 2 milliGRAM(s) IV Push every 4 hours PRN  megestrol 40 milliGRAM(s) Oral daily PRN  melatonin 3 milliGRAM(s) Oral at bedtime PRN  naloxone Injectable 0.4 milliGRAM(s) IV Push once  ondansetron Injectable 4 milliGRAM(s) IV Push every 8 hours PRN  pantoprazole    Tablet 40 milliGRAM(s) Oral before breakfast  polyethylene glycol 3350 17 Gram(s) Oral daily PRN  senna 2 Tablet(s) Oral at bedtime      LABS:                        7.6    6.30  )-----------( 287      ( 02 Nov 2022 07:12 )             24.0       Hemoglobin: 7.6 g/dL (11-02 @ 07:12)  Hemoglobin: 7.2 g/dL (11-01 @ 09:54)  Hemoglobin: 6.7 g/dL (11-01 @ 07:30)  Hemoglobin: 8.2 g/dL (10-31 @ 10:17)  Hemoglobin: 6.9 g/dL (10-31 @ 06:36)      11-02    137  |  103  |  14  ----------------------------<  72  5.3   |  26  |  0.75    Ca    8.9      02 Nov 2022 07:12  Phos  3.9     11-02  Mg     2.2     11-02    TPro  6.9  /  Alb  2.8<L>  /  TBili  0.3  /  DBili  x   /  AST  11  /  ALT  10  /  AlkPhos  65  11-02    Creatinine Trend: 0.75<--, 0.76<--, 0.79<--, 0.77<--, 0.82<--, 0.74<--    COAGS: PT/INR - ( 02 Nov 2022 07:16 )   PT: 13.7 sec;   INR: 1.19 ratio         PTT - ( 02 Nov 2022 07:16 )  PTT:30.1 sec        Per Chart  PHYSICAL EXAM:  T(C): 36.7 (11-02-22 @ 05:27), Max: 36.8 (11-01-22 @ 14:00)  HR: 90 (11-02-22 @ 05:27) (90 - 92)  BP: 101/69 (11-02-22 @ 05:27) (101/69 - 106/71)  RR: 18 (11-02-22 @ 05:27) (18 - 19)  SpO2: 97% (11-02-22 @ 05:27) (94% - 98%)  Wt(kg): --    I&O's Summary    01 Nov 2022 07:01  -  02 Nov 2022 07:00  --------------------------------------------------------  IN: 300 mL / OUT: 0 mL / NET: 300 mL          Gen: NAD  HEENT:  (-)icterus (-)pallor  CV: N S1 S2 1/6 CHARLY (+)2 Pulses B/l  Resp:  Clear to auscultation B/L, normal effort  GI: (+) BS Soft, NT, ND  Lymph:  (-)Edema, (-)obvious lymphadenopathy  Skin: Warm to touch, Normal turgor  Psych: Appropriate mood and affect      TELEMETRY: None	      ECG: Sinus Tachycardia, PVC    RADIOLOGY:  < from: CT Angio Chest PE Protocol w/ IV Cont (10.18.22 @ 20:03) >  IMPRESSION:    No pulmonary embolism.    Smallright and moderate left pleural effusions and compressive   atelectasis.    --- End of Report ---    < end of copied text >      ASSESSMENT/PLAN: Patient is a 62 y/o Female with PMH of Multiple Myeloma (dx 2018) currently on chemo who presented with chest pain, SOB, and fever admitted with +COVID and b/l pleural effusions. Cardiology consulted for further evaluation.     - Patient with nonanginal chest pain that is pleuritic and very tender to palpation - suspect [facility] related likely due to costochondritis and multiple myeloma lytic lesions  - EKG with sinus tachycardia and no acute ischemic changes  - Cardiac enzymes unremarkable  - CTA chest negative for PE, small R and mod L pleural effusions and compressive atelectasis  - TTE with normal LV/RV function  - Pulm follow up - wean O2 as tolerated, repeat CXR noted - no plans for thoracentesis  - Supportive care/covid management per primary team  - No further inpatient cardiac w/u planned     Artur Cortes PA-C  Pager: [phone]   C A R [facility] I [facility]  **********************************     DATE OF SERVICE: 11-02-22    Discussed with RN - no events overnight, now monitoring on room air. No reported chest pain or shortness of breath.   Review of symptoms otherwise negative.    MEDICATIONS:  acyclovir   Oral Tab/Cap 400 milliGRAM(s) Oral daily  ALBUTerol    90 MICROgram(s) HFA Inhaler 2 Puff(s) Inhalation every 4 hours PRN  aluminum hydroxide/magnesium hydroxide/simethicone Suspension 30 milliLiter(s) Oral every 4 hours PRN  amLODIPine   Tablet 10 milliGRAM(s) Oral daily  benzonatate 100 milliGRAM(s) Oral every 8 hours PRN  bisacodyl Suppository 10 milliGRAM(s) Rectal daily PRN  chlorhexidine 4% Liquid 1 Application(s) Topical daily  enoxaparin Injectable 40 milliGRAM(s) SubCutaneous every 12 hours  fentaNYL   Patch  50 MICROgram(s)/Hr 1 Patch Transdermal every 72 hours  guaiFENesin Oral Liquid (Sugar-Free) 100 milliGRAM(s) Oral every 6 hours PRN  HYDROmorphone  Injectable 1 milliGRAM(s) IV Push every 4 hours PRN  HYDROmorphone  Injectable 2 milliGRAM(s) IV Push every 4 hours PRN  megestrol 40 milliGRAM(s) Oral daily PRN  melatonin 3 milliGRAM(s) Oral at bedtime PRN  naloxone Injectable 0.4 milliGRAM(s) IV Push once  ondansetron Injectable 4 milliGRAM(s) IV Push every 8 hours PRN  pantoprazole    Tablet 40 milliGRAM(s) Oral before breakfast  polyethylene glycol 3350 17 Gram(s) Oral daily PRN  senna 2 Tablet(s) Oral at bedtime      LABS:                        7.6    6.30  )-----------( 287      ( 02 Nov 2022 07:12 )             24.0       Hemoglobin: 7.6 g/dL (11-02 @ 07:12)  Hemoglobin: 7.2 g/dL (11-01 @ 09:54)  Hemoglobin: 6.7 g/dL (11-01 @ 07:30)  Hemoglobin: 8.2 g/dL (10-31 @ 10:17)  Hemoglobin: 6.9 g/dL (10-31 @ 06:36)      11-02    137  |  103  |  14  ----------------------------<  72  5.3   |  26  |  0.75    Ca    8.9      02 Nov 2022 07:12  Phos  3.9     11-02  Mg     2.2     11-02    TPro  6.9  /  Alb  2.8<L>  /  TBili  0.3  /  DBili  x   /  AST  11  /  ALT  10  /  AlkPhos  65  11-02    Creatinine Trend: 0.75<--, 0.76<--, 0.79<--, 0.77<--, 0.82<--, 0.74<--    COAGS: PT/INR - ( 02 Nov 2022 07:16 )   PT: 13.7 sec;   INR: 1.19 ratio         PTT - ( 02 Nov 2022 07:16 )  PTT:30.1 sec        Per Chart  PHYSICAL EXAM:  T(C): 36.7 (11-02-22 @ 05:27), Max: 36.8 (11-01-22 @ 14:00)  HR: 90 (11-02-22 @ 05:27) (90 - 92)  BP: 101/69 (11-02-22 @ 05:27) (101/69 - 106/71)  RR: 18 (11-02-22 @ 05:27) (18 - 19)  SpO2: 97% (11-02-22 @ 05:27) (94% - 98%)  Wt(kg): --    I&O's Summary    01 Nov 2022 07:01  -  02 Nov 2022 07:00  --------------------------------------------------------  IN: 300 mL / OUT: 0 mL / NET: 300 mL      exam deferred due to covid      TELEMETRY: None	      ECG: Sinus Tachycardia, PVC    RADIOLOGY:  < from: CT Angio Chest PE Protocol w/ IV Cont (10.18.22 @ 20:03) >  IMPRESSION:    No pulmonary embolism.    Smallright and moderate left pleural effusions and compressive   atelectasis.    --- End of Report ---    < end of copied text >      ASSESSMENT/PLAN: Patient is a 62 y/o Female with PMH of Multiple Myeloma (dx 2018) currently on chemo who presented with chest pain, SOB, and fever admitted with +COVID and b/l pleural effusions. Cardiology consulted for further evaluation.     - Patient with nonanginal chest pain that is pleuritic and very tender to palpation - suspect [facility] related likely due to costochondritis and multiple myeloma lytic lesions  - EKG with sinus tachycardia and no acute ischemic changes  - Cardiac enzymes unremarkable  - CTA chest negative for PE, small R and mod L pleural effusions and compressive atelectasis  - TTE with normal LV/RV function  - Pulm follow up - wean O2 as tolerated, repeat CXR noted - no plans for thoracentesis  - Supportive care/covid management per primary team  - No further inpatient cardiac w/u planned     Artur Cortes PA-C  Pager: [phone]

## 2022-11-02 NOTE — PROGRESS NOTE ADULT - PROBLEM SELECTOR PLAN 1
10/31" her oxygenation is pretty good:  no sob:  no cough : no wheezing: :  on 3 L of o2:  today's chest x-ray to my eye: seems better then last time:  not much of pleural effusion:   however would wait for official report: if not much fluid can follow up as an outpt:    11/1: she has Improved: she is on 1 L of oxygen : repeat chest x-ray noted/: may have small effusions: no tapping : she can follow up [place]:    11/2: she is on room air: at this time : afebrile ; 99.2:  need to check ambulatory o2 sao2:: Pleural effusions need to be followed  up as an outpt  l; she is still in isolation and needs long term isolation as she is immunocompromised:

## 2022-11-02 NOTE — PROGRESS NOTE ADULT - PROBLEM SELECTOR PLAN 1
Reported hypoxic to 80s at [facility].   CT chest showed small right and moderate left pleural effusions and compressive   atelectasis.  -satting well on 1L, trial on RA today  -likely from COVID-19  -continous pulse ox monitoring  -if she spikes a fever, can start empirically on abx pending BCx Reported hypoxic to 80s at [facility].   CT chest showed small right and moderate left pleural effusions and compressive   atelectasis.  -satting well on 1L, trial on RA today     -successfully weaned  -likely from COVID-19  -continous pulse ox monitoring  -if she spikes a fever, can start empirically on abx pending BCx

## 2022-11-02 NOTE — PROGRESS NOTE ADULT - ASSESSMENT
61F unvaccinated w/ PMH MM(dx'd 2018) currently on chemo(last session 10/13) p/w 1-day h/o chest pain and fever. Most history per daughter at baseline as pt in significant pain and unable to talk. Pt had initially presented to [facility] with a sharp mid-chest pain gradual in onset that progressively got worse associated with a fever as high as 102.2 and a productive cough with greenish sputum.     Hematology/Oncology called to see patient who is under care of Dr. 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 She was recently hospitalized for chemotherapy/pain management at [facility], discharged 10/15/2022.     IgG lambda multiple myeloma  --Under care at [facility] - Dr. Denise Fatima  --Chemotherapy on hold during hospitalization    COVID-19 Infection  --Previously unvaccinated  --Completed Remdesevir, Dexamethasone  -- Ongoing management per ID, Pulm, Primary Team        Chest pain. Exacerbated by coughing  -- appears to ahve resolved     Pleural Effusion  --Most likely inflammatory from COVID  --Resolved on L and improved on R  --Unable to have thoracentesis at this time secondary to respiratory isolation  --Management per Pulmonary      Anemia  -- hg 7.6 today   --Secondary to disease, recent chemotherapy  --Please transfuse PRBC's if <7 grams            Rohith Velazco MD  HematologyOncology   O: [phone]

## 2022-11-02 NOTE — PROGRESS NOTE ADULT - SUBJECTIVE AND OBJECTIVE BOX
Patient seen and examined at bedside. on room air . feels well     MEDICATIONS  (STANDING):  acyclovir   Oral Tab/Cap 400 milliGRAM(s) Oral daily  amLODIPine   Tablet 10 milliGRAM(s) Oral daily  chlorhexidine 4% Liquid 1 Application(s) Topical daily  enoxaparin Injectable 40 milliGRAM(s) SubCutaneous every 12 hours  fentaNYL   Patch  50 MICROgram(s)/Hr 1 Patch Transdermal every 72 hours  naloxone Injectable 0.4 milliGRAM(s) IV Push once  pantoprazole    Tablet 40 milliGRAM(s) Oral before breakfast  senna 2 Tablet(s) Oral at bedtime    MEDICATIONS  (PRN):  ALBUTerol    90 MICROgram(s) HFA Inhaler 2 Puff(s) Inhalation every 4 hours PRN Shortness of Breath and/or Wheezing  aluminum hydroxide/magnesium hydroxide/simethicone Suspension 30 milliLiter(s) Oral every 4 hours PRN Dyspepsia  benzonatate 100 milliGRAM(s) Oral every 8 hours PRN Cough  bisacodyl Suppository 10 milliGRAM(s) Rectal daily PRN Constipation  guaiFENesin Oral Liquid (Sugar-Free) 100 milliGRAM(s) Oral every 6 hours PRN Cough  HYDROmorphone  Injectable 1 milliGRAM(s) IV Push every 4 hours PRN Moderate Pain (4 - 6)  HYDROmorphone  Injectable 2 milliGRAM(s) IV Push every 4 hours PRN Severe Pain (7 - 10)  megestrol 40 milliGRAM(s) Oral daily PRN appetite  melatonin 3 milliGRAM(s) Oral at bedtime PRN Insomnia  ondansetron Injectable 4 milliGRAM(s) IV Push every 8 hours PRN Nausea and/or Vomiting  polyethylene glycol 3350 17 Gram(s) Oral daily PRN Constipation        Vital Signs Last 24 Hrs  T(C): 36.7 (02 Nov 2022 05:27), Max: 36.8 (01 Nov 2022 14:00)  T(F): 98 (02 Nov 2022 05:27), Max: 98.2 (01 Nov 2022 14:00)  HR: 90 (02 Nov 2022 05:27) (90 - 92)  BP: 101/69 (02 Nov 2022 05:27) (101/69 - 106/71)  BP(mean): --  RR: 18 (02 Nov 2022 05:27) (18 - 19)  SpO2: 97% (02 Nov 2022 05:27) (94% - 98%)    Parameters below as of 02 Nov 2022 05:27  Patient On (Oxygen Delivery Method): nasal cannula          PHYSICAL EXAM:     GENERAL:  Appears stated age, well-groomed  HEENT:  NC/AT  CHEST:  CTA b/l  HEART:  S1 s2+   ABDOMEN:  Soft, non-tender, non-distended  NEURO:  Alert, oriented, no asterixis                            7.6    6.30  )-----------( 287      ( 02 Nov 2022 07:12 )             24.0       11-02    137  |  103  |  14  ----------------------------<  72  5.3   |  26  |  0.75    Ca    8.9      02 Nov 2022 07:12  Phos  3.9     11-02  Mg     2.2     11-02    TPro  6.9  /  Alb  2.8<L>  /  TBili  0.3  /  DBili  x   /  AST  11  /  ALT  10  /  AlkPhos  65  11-02

## 2022-11-03 LAB
ANION GAP SERPL CALC-SCNC: 9 MMOL/L — SIGNIFICANT CHANGE UP (ref 5–17)
BUN SERPL-MCNC: 13 MG/DL — SIGNIFICANT CHANGE UP (ref 7–23)
CALCIUM SERPL-MCNC: 8.8 MG/DL — SIGNIFICANT CHANGE UP (ref 8.4–10.5)
CHLORIDE SERPL-SCNC: 102 MMOL/L — SIGNIFICANT CHANGE UP (ref 96–108)
CO2 SERPL-SCNC: 27 MMOL/L — SIGNIFICANT CHANGE UP (ref 22–31)
CREAT SERPL-MCNC: 0.7 MG/DL — SIGNIFICANT CHANGE UP (ref 0.5–1.3)
EGFR: 98 ML/MIN/1.73M2 — SIGNIFICANT CHANGE UP
GLUCOSE SERPL-MCNC: 89 MG/DL — SIGNIFICANT CHANGE UP (ref 70–99)
HCT VFR BLD CALC: 23.5 % — LOW (ref 34.5–45)
HGB BLD-MCNC: 7.5 G/DL — LOW (ref 11.5–15.5)
MAGNESIUM SERPL-MCNC: 2.2 MG/DL — SIGNIFICANT CHANGE UP (ref 1.6–2.6)
MCHC RBC-ENTMCNC: 31.5 PG — SIGNIFICANT CHANGE UP (ref 27–34)
MCHC RBC-ENTMCNC: 31.9 GM/DL — LOW (ref 32–36)
MCV RBC AUTO: 98.7 FL — SIGNIFICANT CHANGE UP (ref 80–100)
NRBC # BLD: 0 /100 WBCS — SIGNIFICANT CHANGE UP (ref 0–0)
PHOSPHATE SERPL-MCNC: 3 MG/DL — SIGNIFICANT CHANGE UP (ref 2.5–4.5)
PLATELET # BLD AUTO: 270 K/UL — SIGNIFICANT CHANGE UP (ref 150–400)
POTASSIUM SERPL-MCNC: 4.7 MMOL/L — SIGNIFICANT CHANGE UP (ref 3.5–5.3)
POTASSIUM SERPL-SCNC: 4.7 MMOL/L — SIGNIFICANT CHANGE UP (ref 3.5–5.3)
RBC # BLD: 2.38 M/UL — LOW (ref 3.8–5.2)
RBC # FLD: 18.3 % — HIGH (ref 10.3–14.5)
SARS-COV-2 RNA SPEC QL NAA+PROBE: DETECTED
SODIUM SERPL-SCNC: 138 MMOL/L — SIGNIFICANT CHANGE UP (ref 135–145)
WBC # BLD: 7.54 K/UL — SIGNIFICANT CHANGE UP (ref 3.8–10.5)
WBC # FLD AUTO: 7.54 K/UL — SIGNIFICANT CHANGE UP (ref 3.8–10.5)

## 2022-11-03 RX ADMIN — FENTANYL CITRATE 1 PATCH: 50 INJECTION INTRAVENOUS at 19:55

## 2022-11-03 RX ADMIN — ENOXAPARIN SODIUM 40 MILLIGRAM(S): 100 INJECTION SUBCUTANEOUS at 19:41

## 2022-11-03 RX ADMIN — ENOXAPARIN SODIUM 40 MILLIGRAM(S): 100 INJECTION SUBCUTANEOUS at 09:39

## 2022-11-03 RX ADMIN — HYDROMORPHONE HYDROCHLORIDE 2 MILLIGRAM(S): 2 INJECTION INTRAMUSCULAR; INTRAVENOUS; SUBCUTANEOUS at 03:14

## 2022-11-03 RX ADMIN — FENTANYL CITRATE 1 PATCH: 50 INJECTION INTRAVENOUS at 18:20

## 2022-11-03 RX ADMIN — HYDROMORPHONE HYDROCHLORIDE 2 MILLIGRAM(S): 2 INJECTION INTRAMUSCULAR; INTRAVENOUS; SUBCUTANEOUS at 17:11

## 2022-11-03 RX ADMIN — FENTANYL CITRATE 1 PATCH: 50 INJECTION INTRAVENOUS at 18:08

## 2022-11-03 RX ADMIN — CHLORHEXIDINE GLUCONATE 1 APPLICATION(S): 213 SOLUTION TOPICAL at 16:41

## 2022-11-03 RX ADMIN — Medication 400 MILLIGRAM(S): at 09:40

## 2022-11-03 RX ADMIN — Medication 100 MILLIGRAM(S): at 16:58

## 2022-11-03 RX ADMIN — PANTOPRAZOLE SODIUM 40 MILLIGRAM(S): 20 TABLET, DELAYED RELEASE ORAL at 05:50

## 2022-11-03 RX ADMIN — FENTANYL CITRATE 1 PATCH: 50 INJECTION INTRAVENOUS at 06:09

## 2022-11-03 RX ADMIN — AMLODIPINE BESYLATE 10 MILLIGRAM(S): 2.5 TABLET ORAL at 05:50

## 2022-11-03 RX ADMIN — HYDROMORPHONE HYDROCHLORIDE 2 MILLIGRAM(S): 2 INJECTION INTRAMUSCULAR; INTRAVENOUS; SUBCUTANEOUS at 03:45

## 2022-11-03 RX ADMIN — HYDROMORPHONE HYDROCHLORIDE 2 MILLIGRAM(S): 2 INJECTION INTRAMUSCULAR; INTRAVENOUS; SUBCUTANEOUS at 16:53

## 2022-11-03 NOTE — PROGRESS NOTE ADULT - PROBLEM SELECTOR PLAN 1
10/31" her oxygenation is pretty good:  no sob:  no cough : no wheezing: :  on 3 L of o2:  today's chest x-ray to my eye: seems better then last time:  not much of pleural effusion:   however would wait for official report: if not much fluid can follow up as an outpt:    11/1: she has Improved: she is on 1 L of oxygen : repeat chest x-ray noted/: may have small effusions: no tapping : she can follow up [place]:    11/2: she is on room air: at this time : afebrile ; 99.2:  need to check ambulatory o2 sao2:: Pleural effusions need to be followed  up as an outpt  l; she is still in isolation and needs long term isolation as she is immunocompromised:    11/3: seems better: on room air : not sob:  walks without oxygen  : 95% on room air:

## 2022-11-03 NOTE — PROGRESS NOTE ADULT - SUBJECTIVE AND OBJECTIVE BOX
C A R D I O L O G Y  **********************************     DATE OF SERVICE: 11-03-22    Discussed with RN - patient remains stable on room air. Currently with no reported chest pain or shortness of breath.   Review of systems otherwise negative.  	  MEDICATIONS:  MEDICATIONS  (STANDING):  acyclovir   Oral Tab/Cap 400 milliGRAM(s) Oral daily  amLODIPine   Tablet 10 milliGRAM(s) Oral daily  chlorhexidine 4% Liquid 1 Application(s) Topical daily  enoxaparin Injectable 40 milliGRAM(s) SubCutaneous every 12 hours  fentaNYL   Patch  50 MICROgram(s)/Hr 1 Patch Transdermal every 72 hours  naloxone Injectable 0.4 milliGRAM(s) IV Push once  pantoprazole    Tablet 40 milliGRAM(s) Oral before breakfast      LABS:	 	    CARDIAC MARKERS:               7.5    7.54  )-----------( 270      ( 03 Nov 2022 07:18 )             23.5     Hemoglobin: 7.5 g/dL (11-03 @ 07:18)  Hemoglobin: 7.6 g/dL (11-02 @ 07:12)  Hemoglobin: 7.2 g/dL (11-01 @ 09:54)  Hemoglobin: 6.7 g/dL (11-01 @ 07:30)  Hemoglobin: 8.2 g/dL (10-31 @ 10:17)      11-03    138  |  102  |  13  ----------------------------<  89  4.7   |  27  |  0.70    Ca    8.8      03 Nov 2022 07:14  Phos  3.0     11-03  Mg     2.2     11-03    TPro  6.9  /  Alb  2.8<L>  /  TBili  0.3  /  DBili  x   /  AST  11  /  ALT  10  /  AlkPhos  65  11-02    Creatinine Trend: 0.70<--, 0.75<--, 0.76<--, 0.79<--, 0.77<--, 0.82<--    COAGS:       proBNP:   Lipid Profile:   HgA1c:   TSH:     Per Chart  PHYSICAL EXAM:  T(C): 36.6 (11-03-22 @ 05:14), Max: 36.7 (11-02-22 @ 22:24)  HR: 99 (11-03-22 @ 05:14) (92 - 106)  BP: 111/71 (11-03-22 @ 05:14) (100/67 - 120/78)  RR: 18 (11-03-22 @ 05:14) (18 - 18)  SpO2: 94% (11-03-22 @ 05:14) (94% - 99%)  Wt(kg): --  I&O's Summary    02 Nov 2022 07:01  -  03 Nov 2022 07:00  --------------------------------------------------------  IN: 600 mL / OUT: 1400 mL / NET: -800 mL      Gen: Appears well in NAD  HEENT:  (-)icterus (-)pallor  CV: N S1 S2 1/6 CHARLY (+)2 Pulses B/l  Resp:  Clear to auscultation B/L, normal effort  GI: (+) BS Soft, NT, ND  Lymph:  (-)Edema, (-)obvious lymphadenopathy  Skin: Warm to touch, Normal turgor  Psych: Appropriate mood and affect    TELEMETRY: None	      ECG: Sinus Tachycardia, PVC    RADIOLOGY:  < from: CT Angio Chest PE Protocol w/ IV Cont (10.18.22 @ 20:03) >  IMPRESSION:    No pulmonary embolism.    Smallright and moderate left pleural effusions and compressive   atelectasis.    --- End of Report ---    < end of copied text >      ASSESSMENT/PLAN: Patient is a 62 y/o Female with PMH of Multiple Myeloma (dx 2018) currently on chemo who presented with chest pain, SOB, and fever admitted with +COVID and b/l pleural effusions. Cardiology consulted for further evaluation.     - Patient with nonanginal chest pain that is pleuritic and very tender to palpation - suspect [facility] related likely due to costochondritis and multiple myeloma lytic lesions  - EKG with sinus tachycardia and no acute ischemic changes  - Cardiac enzymes unremarkable  - CTA chest negative for PE, small R and mod L pleural effusions and compressive atelectasis  - TTE with normal LV/RV function  - Pulm follow up - weaned to room air, repeat CXR noted - no plans for thoracentesis  - Supportive care/covid management per primary team  - No further inpatient cardiac w/u planned  - D/C planning per primary team     Artur Cortes PA-C  Pager: [phone]

## 2022-11-03 NOTE — PROGRESS NOTE ADULT - SUBJECTIVE AND OBJECTIVE BOX
Patient is a 61y old  Female who presents with a chief complaint of SOB, CP (03 Nov 2022 13:28)    Patient seen and examined at bedside this morning. Patient noted resting comfortably, offering no complaints. Patient is looking forward to hospital discharge in upcoming days    MEDICATIONS  (STANDING):  acyclovir   Oral Tab/Cap 400 milliGRAM(s) Oral daily  amLODIPine   Tablet 10 milliGRAM(s) Oral daily  chlorhexidine 4% Liquid 1 Application(s) Topical daily  enoxaparin Injectable 40 milliGRAM(s) SubCutaneous every 12 hours  fentaNYL   Patch  50 MICROgram(s)/Hr 1 Patch Transdermal every 72 hours  naloxone Injectable 0.4 milliGRAM(s) IV Push once  pantoprazole    Tablet 40 milliGRAM(s) Oral before breakfast    MEDICATIONS  (PRN):  ALBUTerol    90 MICROgram(s) HFA Inhaler 2 Puff(s) Inhalation every 4 hours PRN Shortness of Breath and/or Wheezing  aluminum hydroxide/magnesium hydroxide/simethicone Suspension 30 milliLiter(s) Oral every 4 hours PRN Dyspepsia  benzonatate 100 milliGRAM(s) Oral every 8 hours PRN Cough  guaiFENesin Oral Liquid (Sugar-Free) 100 milliGRAM(s) Oral every 6 hours PRN Cough  HYDROmorphone  Injectable 1 milliGRAM(s) IV Push every 4 hours PRN Moderate Pain (4 - 6)  HYDROmorphone  Injectable 2 milliGRAM(s) IV Push every 4 hours PRN Severe Pain (7 - 10)  megestrol 40 milliGRAM(s) Oral daily PRN appetite  melatonin 3 milliGRAM(s) Oral at bedtime PRN Insomnia  ondansetron Injectable 4 milliGRAM(s) IV Push every 8 hours PRN Nausea and/or Vomiting      ROS  No fever, sweats, chills  No epistaxis, HA, sore throat  No CP, SOB, sputum  +cough  No n/v/d, abd pain, melena, hematochezia  No edema  No rash  No anxiety  No back pain, joint pain  No bleeding, bruising  No dysuria, hematuria    Vital Signs Last 24 Hrs  T(C): 37.2 (03 Nov 2022 13:35), Max: 37.2 (03 Nov 2022 13:35)  T(F): 99 (03 Nov 2022 13:35), Max: 99 (03 Nov 2022 13:35)  HR: 106 (03 Nov 2022 13:35) (92 - 106)  BP: 109/76 (03 Nov 2022 13:35) (100/67 - 120/78)  BP(mean): --  RR: 18 (03 Nov 2022 13:35) (18 - 18)  SpO2: 93% (03 Nov 2022 13:35) (93% - 99%)    Parameters below as of 03 Nov 2022 13:35  Patient On (Oxygen Delivery Method): room air        PE  NAD  Awake, alert  Anicteric, MMM  RRR  CTAB  Abd soft, NT, ND  No c/c/e  No rash grossly                            7.5    7.54  )-----------( 270      ( 03 Nov 2022 07:18 )             23.5       11-03    138  |  102  |  13  ----------------------------<  89  4.7   |  27  |  0.70    Ca    8.8      03 Nov 2022 07:14  Phos  3.0     11-03  Mg     2.2     11-03    TPro  6.9  /  Alb  2.8<L>  /  TBili  0.3  /  DBili  x   /  AST  11  /  ALT  10  /  AlkPhos  65  11-02

## 2022-11-03 NOTE — PROGRESS NOTE ADULT - ASSESSMENT
61F unvaccinated w/ PMH MM(dx'd 2018) currently on chemo(last session 10/13) p/w 1-day h/o chest pain and fever. Found to have hypoxia in s/o COVID-19  and small-moderate pleural effusions.    Patient passed TOV, now urinating on own. Diarrhea yesterday, so holding Bowel regimen. Able to ambulate without supplemental O2, but O2 sat 93% and tachycardic to 140s. Continue to monitor while evaluating home PT vs [facility]

## 2022-11-03 NOTE — PROGRESS NOTE ADULT - PROBLEM SELECTOR PLAN 2
-finished dexamethasone  -finished Remdesivir  -On RA  -BCx ngtd  -antitussive prn  -ID control states isolation is 20 days for immunosuppressed patients

## 2022-11-03 NOTE — PROGRESS NOTE ADULT - PROBLEM SELECTOR PLAN 1
Reported hypoxic to 80s at [facility].   CT chest showed small right and moderate left pleural effusions and compressive   atelectasis.  On RA  -likely from COVID-19  -continous pulse ox monitoring  -if she spikes a fever, can start empirically on abx pending BCx

## 2022-11-03 NOTE — PROGRESS NOTE ADULT - PROBLEM SELECTOR PLAN 6
-currently undergoing chemo at [facility]  -heme/onc following  -pain control

## 2022-11-03 NOTE — PROGRESS NOTE ADULT - SUBJECTIVE AND OBJECTIVE BOX
Date of Service: 11-03-22 @ 12:05    Patient is a 61y old  Female who presents with a chief complaint of SOB, CP (02 Nov 2022 15:28)      Any change in ROS: seems stable: no sob: no cough       MEDICATIONS  (STANDING):  acyclovir   Oral Tab/Cap 400 milliGRAM(s) Oral daily  amLODIPine   Tablet 10 milliGRAM(s) Oral daily  chlorhexidine 4% Liquid 1 Application(s) Topical daily  enoxaparin Injectable 40 milliGRAM(s) SubCutaneous every 12 hours  fentaNYL   Patch  50 MICROgram(s)/Hr 1 Patch Transdermal every 72 hours  naloxone Injectable 0.4 milliGRAM(s) IV Push once  pantoprazole    Tablet 40 milliGRAM(s) Oral before breakfast    MEDICATIONS  (PRN):  ALBUTerol    90 MICROgram(s) HFA Inhaler 2 Puff(s) Inhalation every 4 hours PRN Shortness of Breath and/or Wheezing  aluminum hydroxide/magnesium hydroxide/simethicone Suspension 30 milliLiter(s) Oral every 4 hours PRN Dyspepsia  benzonatate 100 milliGRAM(s) Oral every 8 hours PRN Cough  guaiFENesin Oral Liquid (Sugar-Free) 100 milliGRAM(s) Oral every 6 hours PRN Cough  HYDROmorphone  Injectable 1 milliGRAM(s) IV Push every 4 hours PRN Moderate Pain (4 - 6)  HYDROmorphone  Injectable 2 milliGRAM(s) IV Push every 4 hours PRN Severe Pain (7 - 10)  megestrol 40 milliGRAM(s) Oral daily PRN appetite  melatonin 3 milliGRAM(s) Oral at bedtime PRN Insomnia  ondansetron Injectable 4 milliGRAM(s) IV Push every 8 hours PRN Nausea and/or Vomiting    Vital Signs Last 24 Hrs  T(C): 36.6 (03 Nov 2022 05:14), Max: 37.3 (02 Nov 2022 12:48)  T(F): 97.8 (03 Nov 2022 05:14), Max: 99.2 (02 Nov 2022 12:48)  HR: 99 (03 Nov 2022 05:14) (92 - 111)  BP: 111/71 (03 Nov 2022 05:14) (100/67 - 120/78)  BP(mean): --  RR: 18 (03 Nov 2022 05:14) (18 - 18)  SpO2: 94% (03 Nov 2022 05:14) (93% - 99%)    Parameters below as of 03 Nov 2022 05:14  Patient On (Oxygen Delivery Method): room air        I&O's Summary    02 Nov 2022 07:01  -  03 Nov 2022 07:00  --------------------------------------------------------  IN: 600 mL / OUT: 1400 mL / NET: -800 mL          Physical Exam:   GENERAL: NAD, well-groomed, well-developed  HEENT: CARLA/   Atraumatic, Normocephalic  ENMT: No tonsillar erythema, exudates, or enlargement; Moist mucous membranes, Good dentition, No lesions  NECK: Supple, No JVD, Normal thyroid  CHEST/LUNG: scattered cracekls+  CVS: Regular rate and rhythm; No murmurs, rubs, or gallops  GI: : Soft, Nontender, Nondistended; Bowel sounds present  NERVOUS SYSTEM:  Alert & Oriented X3  EXTREMITIES:  - edema  LYMPH: No lymphadenopathy noted  SKIN: No rashes or lesions  ENDOCRINOLOGY: No Thyromegaly  PSYCH: Appropriate    Labs:                              7.5    7.54  )-----------( 270      ( 03 Nov 2022 07:18 )             23.5                         7.6    6.30  )-----------( 287      ( 02 Nov 2022 07:12 )             24.0                         7.2    6.82  )-----------( 294      ( 01 Nov 2022 09:54 )             23.1                         6.7    7.53  )-----------( 297      ( 01 Nov 2022 07:30 )             21.6                         8.2    8.90  )-----------( 338      ( 31 Oct 2022 10:17 )             26.3                         6.9    8.44  )-----------( 319      ( 31 Oct 2022 06:36 )             22.1     11-03    138  |  102  |  13  ----------------------------<  89  4.7   |  27  |  0.70  11-02    137  |  103  |  14  ----------------------------<  72  5.3   |  26  |  0.75  11-01    140  |  105  |  19  ----------------------------<  82  4.6   |  27  |  0.76  10-31    140  |  106  |  21  ----------------------------<  84  4.3   |  27  |  0.79    Ca    8.8      03 Nov 2022 07:14  Ca    8.9      02 Nov 2022 07:12  Phos  3.0     11-03  Phos  3.9     11-02  Mg     2.2     11-03  Mg     2.2     11-02    TPro  6.9  /  Alb  2.8<L>  /  TBili  0.3  /  DBili  x   /  AST  11  /  ALT  10  /  AlkPhos  65  11-02  TPro  6.4  /  Alb  2.6<L>  /  TBili  0.2  /  DBili  x   /  AST  10  /  ALT  9<L>  /  AlkPhos  63  11-01  TPro  6.6  /  Alb  2.6<L>  /  TBili  0.3  /  DBili  x   /  AST  13  /  ALT  12  /  AlkPhos  68  10-31    CAPILLARY BLOOD GLUCOSE          LIVER FUNCTIONS - ( 02 Nov 2022 07:12 )  Alb: 2.8 g/dL / Pro: 6.9 g/dL / ALK PHOS: 65 U/L / ALT: 10 U/L / AST: 11 U/L / GGT: x           PT/INR - ( 02 Nov 2022 07:16 )   PT: 13.7 sec;   INR: 1.19 ratio         PTT - ( 02 Nov 2022 07:16 )  PTT:30.1 sec      rad< from: Xray Chest 1 View- PORTABLE-Routine (Xray Chest 1 View- PORTABLE-Routine in AM.) (10.31.22 @ 09:58) >  Heart size and the mediastinum cannot be accurately evaluated on this   projection.  There is new medial right lower opacity.  There is continued predominantly linear left upper lobe opacities.  New patchy small left midlung opacity.  There is continued left basilar/retrocardiac opacity with obscuration of   the left hemidiaphragm.  No pneumothorax is seen.  Multiple lytic bone lesions and compression deformities of the spine   again noted consistent with a history of multiple myeloma.      IMPRESSION:  New medial right lower opacity, possibly increased or   redistributed loculated pleural fluid with associated passive   atelectasis. Atelectasis of other cause, and/or pneumonia are possible.    Continued predominantly linear left upper lobe opacities and new patchy   small left midlung opacity possibly atelectasis although infection is not   excluded.    Continued left basilar and retrocardiac opacity which may be due to a   left pleural effusion with passive atelectasis, atelectasis of other   cause, and/or other pathology including, but not limited to, pneumonia.    --- End of Report ---            DENIA HERNANDEZ MD; Attending Radiologist  This document has been electronically signed. Oct 31 2022  3:38PM    < end of copied text >      RECENT CULTURES:        RESPIRATORY CULTURES:          Studies  Chest X-RAY  CT SCAN Chest   Venous Dopplers: LE:   CT Abdomen  Others

## 2022-11-03 NOTE — PROGRESS NOTE ADULT - ASSESSMENT
61F unvaccinated w/ PMH MM(dx'd 2018) currently on chemo(last session 10/13) p/w 1-day h/o chest pain and fever. Most history per daughter at baseline as pt in significant pain and unable to talk. Pt had initially presented to [facility] with a sharp mid-chest pain gradual in onset that progressively got worse associated with a fever as high as 102.2 and a productive cough with greenish sputum.     Hematology/Oncology called to see patient who is under [place] of [place]. Denise Fatima [place] [facility] [place] the [place] of IgG lambda multiple myeloma with extensive bone metastases recently started on CyborD chemotherapy 10/7/2022 (LD 10/13/2022). She was recently hospitalized for chemotherapy/pain management [place] [facility], discharged 10/15/2022.     IgG lambda multiple myeloma  --Under [place] at [facility] [place]. Denise Fatima  --Chemotherapy on hold during hospitalization    COVID-19 Infection  --Previously unvaccinated  --Completed Remdesevir, Dexamethasone  --Ongoing management per ID, Pulm, Primary Team    Chest pain. Exacerbated by coughing  -- appears to ahve resolved     Pleural Effusion  --Most likely inflammatory from COVID  --Resolved on L and improved on R  --Unable to have thoracentesis at this time secondary to respiratory isolation  --Management per Pulmonary      Anemia  -- hg 7.5 today   --Secondary to disease, recent chemotherapy  --Please transfuse PRBC's if <7 grams    will continue to follow and coordinate care  Anne-Marie Arredondo NP  Hematology/ Oncology  [facility]  [phone] (office)  [phone] (alt office)  Evenings and weekends please call MD on call or office

## 2022-11-04 LAB
HCT VFR BLD CALC: 26.1 % — LOW (ref 34.5–45)
HGB BLD-MCNC: 8 G/DL — LOW (ref 11.5–15.5)
MCHC RBC-ENTMCNC: 30.7 GM/DL — LOW (ref 32–36)
MCHC RBC-ENTMCNC: 31 PG — SIGNIFICANT CHANGE UP (ref 27–34)
MCV RBC AUTO: 101.2 FL — HIGH (ref 80–100)
NRBC # BLD: 0 /100 WBCS — SIGNIFICANT CHANGE UP (ref 0–0)
PLATELET # BLD AUTO: 268 K/UL — SIGNIFICANT CHANGE UP (ref 150–400)
RBC # BLD: 2.58 M/UL — LOW (ref 3.8–5.2)
RBC # FLD: 18.1 % — HIGH (ref 10.3–14.5)
WBC # BLD: 5.51 K/UL — SIGNIFICANT CHANGE UP (ref 3.8–10.5)
WBC # FLD AUTO: 5.51 K/UL — SIGNIFICANT CHANGE UP (ref 3.8–10.5)

## 2022-11-04 RX ORDER — FENTANYL CITRATE 50 UG/ML
1 INJECTION INTRAVENOUS
Qty: 0 | Refills: 0 | DISCHARGE
Start: 2022-11-04

## 2022-11-04 RX ORDER — OXYCODONE HYDROCHLORIDE 5 MG/1
1 TABLET ORAL
Qty: 180 | Refills: 0
Start: 2022-11-04 | End: 2022-12-03

## 2022-11-04 RX ADMIN — Medication 100 MILLIGRAM(S): at 05:51

## 2022-11-04 RX ADMIN — FENTANYL CITRATE 1 PATCH: 50 INJECTION INTRAVENOUS at 17:43

## 2022-11-04 RX ADMIN — HYDROMORPHONE HYDROCHLORIDE 2 MILLIGRAM(S): 2 INJECTION INTRAMUSCULAR; INTRAVENOUS; SUBCUTANEOUS at 05:29

## 2022-11-04 RX ADMIN — PANTOPRAZOLE SODIUM 40 MILLIGRAM(S): 20 TABLET, DELAYED RELEASE ORAL at 05:28

## 2022-11-04 RX ADMIN — AMLODIPINE BESYLATE 10 MILLIGRAM(S): 2.5 TABLET ORAL at 05:28

## 2022-11-04 RX ADMIN — FENTANYL CITRATE 1 PATCH: 50 INJECTION INTRAVENOUS at 06:10

## 2022-11-04 RX ADMIN — ENOXAPARIN SODIUM 40 MILLIGRAM(S): 100 INJECTION SUBCUTANEOUS at 09:06

## 2022-11-04 RX ADMIN — Medication 400 MILLIGRAM(S): at 11:13

## 2022-11-04 RX ADMIN — HYDROMORPHONE HYDROCHLORIDE 2 MILLIGRAM(S): 2 INJECTION INTRAMUSCULAR; INTRAVENOUS; SUBCUTANEOUS at 05:59

## 2022-11-04 RX ADMIN — CHLORHEXIDINE GLUCONATE 1 APPLICATION(S): 213 SOLUTION TOPICAL at 11:14

## 2022-11-04 RX ADMIN — ENOXAPARIN SODIUM 40 MILLIGRAM(S): 100 INJECTION SUBCUTANEOUS at 22:47

## 2022-11-04 NOTE — PROGRESS NOTE ADULT - SUBJECTIVE AND OBJECTIVE BOX
Date of Service: 11-04-22 @ 15:13    Patient is a 61y old  Female who presents with a chief complaint of SOB, CP (04 Nov 2022 14:44)      Any change in ROS:  on room air since yesterday     MEDICATIONS  (STANDING):  acyclovir   Oral Tab/Cap 400 milliGRAM(s) Oral daily  amLODIPine   Tablet 10 milliGRAM(s) Oral daily  chlorhexidine 4% Liquid 1 Application(s) Topical daily  enoxaparin Injectable 40 milliGRAM(s) SubCutaneous every 12 hours  fentaNYL   Patch  50 MICROgram(s)/Hr 1 Patch Transdermal every 72 hours  naloxone Injectable 0.4 milliGRAM(s) IV Push once  pantoprazole    Tablet 40 milliGRAM(s) Oral before breakfast    MEDICATIONS  (PRN):  ALBUTerol    90 MICROgram(s) HFA Inhaler 2 Puff(s) Inhalation every 4 hours PRN Shortness of Breath and/or Wheezing  aluminum hydroxide/magnesium hydroxide/simethicone Suspension 30 milliLiter(s) Oral every 4 hours PRN Dyspepsia  benzonatate 100 milliGRAM(s) Oral every 8 hours PRN Cough  guaiFENesin Oral Liquid (Sugar-Free) 100 milliGRAM(s) Oral every 6 hours PRN Cough  HYDROmorphone  Injectable 1 milliGRAM(s) IV Push every 4 hours PRN Moderate Pain (4 - 6)  HYDROmorphone  Injectable 2 milliGRAM(s) IV Push every 4 hours PRN Severe Pain (7 - 10)  megestrol 40 milliGRAM(s) Oral daily PRN appetite  melatonin 3 milliGRAM(s) Oral at bedtime PRN Insomnia  ondansetron Injectable 4 milliGRAM(s) IV Push every 8 hours PRN Nausea and/or Vomiting    Vital Signs Last 24 Hrs  T(C): 37.2 (04 Nov 2022 12:19), Max: 37.3 (03 Nov 2022 16:38)  T(F): 99 (04 Nov 2022 12:19), Max: 99.1 (03 Nov 2022 16:38)  HR: 110 (04 Nov 2022 12:19) (93 - 140)  BP: 114/76 (04 Nov 2022 12:19) (101/69 - 119/81)  BP(mean): --  RR: 18 (04 Nov 2022 12:19) (18 - 20)  SpO2: 98% (04 Nov 2022 12:19) (86% - 98%)    Parameters below as of 04 Nov 2022 12:19  Patient On (Oxygen Delivery Method): room air        I&O's Summary    03 Nov 2022 07:01  -  04 Nov 2022 07:00  --------------------------------------------------------  IN: 960 mL / OUT: 450 mL / NET: 510 mL          Physical Exam:   GENERAL: NAD, well-groomed, well-developed  HEENT: CARLA/   Atraumatic, Normocephalic  ENMT: No tonsillar erythema, exudates, or enlargement; Moist mucous membranes, Good dentition, No lesions  NECK: Supple, No JVD, Normal thyroid  CHEST/LUNG: no wheezing:   CVS: Regular rate and rhythm; No murmurs, rubs, or gallops  GI: : Soft, Nontender, Nondistended; Bowel sounds present  NERVOUS SYSTEM:  Alert & Oriented X3  EXTREMITIES:  2+ Peripheral Pulses, No clubbing, cyanosis, or edema  LYMPH: No lymphadenopathy noted  SKIN: No rashes or lesions  ENDOCRINOLOGY: No Thyromegaly  PSYCH: Appropriate    Labs:                              8.0    5.51  )-----------( 268      ( 04 Nov 2022 06:28 )             26.1                         7.5    7.54  )-----------( 270      ( 03 Nov 2022 07:18 )             23.5                         7.6    6.30  )-----------( 287      ( 02 Nov 2022 07:12 )             24.0                         7.2    6.82  )-----------( 294      ( 01 Nov 2022 09:54 )             23.1                         6.7    7.53  )-----------( 297      ( 01 Nov 2022 07:30 )             21.6     11-03    138  |  102  |  13  ----------------------------<  89  4.7   |  27  |  0.70  11-02    137  |  103  |  14  ----------------------------<  72  5.3   |  26  |  0.75  11-01    140  |  105  |  19  ----------------------------<  82  4.6   |  27  |  0.76    Ca    8.8      03 Nov 2022 07:14  Phos  3.0     11-03  Mg     2.2     11-03    TPro  6.9  /  Alb  2.8<L>  /  TBili  0.3  /  DBili  x   /  AST  11  /  ALT  10  /  AlkPhos  65  11-02  TPro  6.4  /  Alb  2.6<L>  /  TBili  0.2  /  DBili  x   /  AST  10  /  ALT  9<L>  /  AlkPhos  63  11-01    CAPILLARY BLOOD GLUCOSE            rad< from: Xray Chest 1 View- PORTABLE-Routine (Xray Chest 1 View- PORTABLE-Routine in AM.) (10.31.22 @ 09:58) >    ACC: [id number] EXAM:  XR CHEST PORTABLE ROUTINE 1V                          PROCEDURE DATE:  10/31/2022          INTERPRETATION:  TIME OF EXAM: October 31, 2022 at 8:38 AM.    CLINICAL INFORMATION: Multiple myeloma. Covid pneumonia. Pleural effusion.    COMPARISON:  October 27, 2022.    TECHNIQUE:   AP Portable chest x-ray. Limited by rotation.    INTERPRETATION:    Heart size and the mediastinum cannot be accurately evaluated on this   projection.  There is new medial right lower opacity.  There is continued predominantly linear left upper lobe opacities.  New patchy small left midlung opacity.  There is continued left basilar/retrocardiac opacity with obscuration of   the left hemidiaphragm.  No pneumothorax is seen.  Multiple lytic bone lesions and compression deformities of the spine   again noted consistent with a history of multiple myeloma.      IMPRESSION:  New medial right lower opacity, possibly increased or   redistributed loculated pleural fluid with associated passive   atelectasis. Atelectasis of other cause, and/or pneumonia are possible.    Continued predominantly linear left upper lobe opacities and new patchy   small left midlung opacity possibly atelectasis although infection is not   excluded.    Continued left basilar and retrocardiac opacity which may be due to a   left pleural effusion with passive atelectasis, atelectasis of other   cause, and/or other pathology including, but not limited to, pneumonia.    --- End of Report ---            DENIA HERNANDEZ MD; Attending Radiologist  This document has been electronically signed. Oct 31 2022  3:38PM    < end of copied text >            RECENT CULTURES:        RESPIRATORY CULTURES:          Studies  Chest X-RAY  CT SCAN Chest   Venous Dopplers: LE:   CT Abdomen  Others

## 2022-11-04 NOTE — PROGRESS NOTE ADULT - PROBLEM SELECTOR PLAN 1
Reported hypoxic to 80s at [facility].   CT chest showed small right and moderate left pleural effusions and compressive   atelectasis.  On RA  -likely from COVID-19  -continous pulse ox monitoring  -if she spikes a fever, can start empirically on abx pending BCx Reported hypoxic to 80s at [facility].   CT chest showed small right and moderate left pleural effusions and compressive   atelectasis.  On RA, but occasionally desatting with ambulating  -likely from COVID-19  -continous pulse ox monitoring  -if she spikes a fever, can start empirically on abx pending BCx

## 2022-11-04 NOTE — PROGRESS NOTE ADULT - SUBJECTIVE AND OBJECTIVE BOX
Patient is a 61y old  Female who presents with a chief complaint of SOB, CP (04 Nov 2022 14:26)    Patient seen and examined at bedside this morning. Patient noted resting comfortably, offers no new complaints, and is looking forward to discharge     MEDICATIONS  (STANDING):  acyclovir   Oral Tab/Cap 400 milliGRAM(s) Oral daily  amLODIPine   Tablet 10 milliGRAM(s) Oral daily  chlorhexidine 4% Liquid 1 Application(s) Topical daily  enoxaparin Injectable 40 milliGRAM(s) SubCutaneous every 12 hours  fentaNYL   Patch  50 MICROgram(s)/Hr 1 Patch Transdermal every 72 hours  naloxone Injectable 0.4 milliGRAM(s) IV Push once  pantoprazole    Tablet 40 milliGRAM(s) Oral before breakfast    MEDICATIONS  (PRN):  ALBUTerol    90 MICROgram(s) HFA Inhaler 2 Puff(s) Inhalation every 4 hours PRN Shortness of Breath and/or Wheezing  aluminum hydroxide/magnesium hydroxide/simethicone Suspension 30 milliLiter(s) Oral every 4 hours PRN Dyspepsia  benzonatate 100 milliGRAM(s) Oral every 8 hours PRN Cough  guaiFENesin Oral Liquid (Sugar-Free) 100 milliGRAM(s) Oral every 6 hours PRN Cough  HYDROmorphone  Injectable 1 milliGRAM(s) IV Push every 4 hours PRN Moderate Pain (4 - 6)  HYDROmorphone  Injectable 2 milliGRAM(s) IV Push every 4 hours PRN Severe Pain (7 - 10)  megestrol 40 milliGRAM(s) Oral daily PRN appetite  melatonin 3 milliGRAM(s) Oral at bedtime PRN Insomnia  ondansetron Injectable 4 milliGRAM(s) IV Push every 8 hours PRN Nausea and/or Vomiting      ROS  No fever, sweats, chills  No epistaxis, HA, sore throat  No CP, SOB,   +cough, +sputum (clear/white)  No n/v/d, abd pain, melena, hematochezia  No edema  No rash  No anxiety  No back pain, joint pain  No bleeding, bruising  No dysuria, hematuria    Vital Signs Last 24 Hrs  T(C): 37.2 (04 Nov 2022 12:19), Max: 37.3 (03 Nov 2022 16:38)  T(F): 99 (04 Nov 2022 12:19), Max: 99.1 (03 Nov 2022 16:38)  HR: 110 (04 Nov 2022 12:19) (93 - 140)  BP: 114/76 (04 Nov 2022 12:19) (101/69 - 119/81)  BP(mean): --  RR: 18 (04 Nov 2022 12:19) (18 - 20)  SpO2: 98% (04 Nov 2022 12:19) (86% - 98%)    Parameters below as of 04 Nov 2022 12:19  Patient On (Oxygen Delivery Method): room air        PE  NAD  Awake, alert  Anicteric, MMM  RRR  CTAB  Abd soft, NT, ND  No c/c/e  No rash grossly                            8.0    5.51  )-----------( 268      ( 04 Nov 2022 06:28 )             26.1       11-03    138  |  102  |  13  ----------------------------<  89  4.7   |  27  |  0.70    Ca    8.8      03 Nov 2022 07:14  Phos  3.0     11-03  Mg     2.2     11-03

## 2022-11-04 NOTE — PROGRESS NOTE ADULT - SUBJECTIVE AND OBJECTIVE BOX
SUBJECTIVE    OBJECTIVE:  ICU Vital Signs Last 24 Hrs  T(C): 37.2 (04 Nov 2022 12:19), Max: 37.3 (03 Nov 2022 16:38)  T(F): 99 (04 Nov 2022 12:19), Max: 99.1 (03 Nov 2022 16:38)  HR: 110 (04 Nov 2022 12:19) (93 - 140)  BP: 114/76 (04 Nov 2022 12:19) (101/69 - 119/81)  BP(mean): --  ABP: --  ABP(mean): --  RR: 18 (04 Nov 2022 12:19) (18 - 20)  SpO2: 98% (04 Nov 2022 12:19) (86% - 98%)    O2 Parameters below as of 04 Nov 2022 12:19  Patient On (Oxygen Delivery Method): room air              11-03 @ 07:01  -  11-04 @ 07:00  --------------------------------------------------------  IN: 960 mL / OUT: 450 mL / NET: 510 mL      CAPILLARY BLOOD GLUCOSE          PHYSICAL EXAM:  General: Well-groomed, NAD, laying in bed, on ___O2  HEENT: PERRLA, EOMI, non-icteric  Neck:  symmetric,  JVD absent  Respiratory: Clear to ascultation bilaterally, no crackles/rales, no Resp distress; no accessory muscle use  Cardiovascular:  RRR, no murmurs/rubs/gallops  Abdomen: Soft, NT, ND  Extremities: No edema noted  Skin: No rashes or lesions noted  Neurological: Sensation grossly intact; strength 5/5 in all extremities.  Psychiatry: AOx3, appropriate insight/judgement, appropriate affect, recent/remote memory intact    PRN Meds:  ALBUTerol    90 MICROgram(s) HFA Inhaler 2 Puff(s) Inhalation every 4 hours PRN  aluminum hydroxide/magnesium hydroxide/simethicone Suspension 30 milliLiter(s) Oral every 4 hours PRN  benzonatate 100 milliGRAM(s) Oral every 8 hours PRN  guaiFENesin Oral Liquid (Sugar-Free) 100 milliGRAM(s) Oral every 6 hours PRN  HYDROmorphone  Injectable 1 milliGRAM(s) IV Push every 4 hours PRN  HYDROmorphone  Injectable 2 milliGRAM(s) IV Push every 4 hours PRN  megestrol 40 milliGRAM(s) Oral daily PRN  melatonin 3 milliGRAM(s) Oral at bedtime PRN  ondansetron Injectable 4 milliGRAM(s) IV Push every 8 hours PRN      LABS:                        8.0    5.51  )-----------( 268      ( 04 Nov 2022 06:28 )             26.1     Hgb Trend: 8.0<--, 7.5<--, 7.6<--, 7.2<--, 6.7<--  11-03    138  |  102  |  13  ----------------------------<  89  4.7   |  27  |  0.70    Ca    8.8      03 Nov 2022 07:14  Phos  3.0     11-03  Mg     2.2     11-03      Creatinine Trend: 0.70<--, 0.75<--, 0.76<--, 0.79<--, 0.77<--, 0.82<--            MICROBIOLOGY:       RADIOLOGY:  [ ] Reviewed and interpreted by me    EKG: SUBJECTIVE  Pt seen bedside this am. States that she feels well. Still has occasional cough, but much less frequently. Last BM was yesterday. No other complaints. denies fever, chills, abdominal pain, dizziness, n/v.    OBJECTIVE:  ICU Vital Signs Last 24 Hrs  T(C): 37.2 (04 Nov 2022 12:19), Max: 37.3 (03 Nov 2022 16:38)  T(F): 99 (04 Nov 2022 12:19), Max: 99.1 (03 Nov 2022 16:38)  HR: 110 (04 Nov 2022 12:19) (93 - 140)  BP: 114/76 (04 Nov 2022 12:19) (101/69 - 119/81)  BP(mean): --  ABP: --  ABP(mean): --  RR: 18 (04 Nov 2022 12:19) (18 - 20)  SpO2: 98% (04 Nov 2022 12:19) (86% - 98%)    O2 Parameters below as of 04 Nov 2022 12:19  Patient On (Oxygen Delivery Method): room air              11-03 @ 07:01  -  11-04 @ 07:00  --------------------------------------------------------  IN: 960 mL / OUT: 450 mL / NET: 510 mL      CAPILLARY BLOOD GLUCOSE          PHYSICAL EXAM:  General: Well-groomed, NAD, laying in bed, on RA  HEENT: PERRLA, EOMI, non-icteric  Neck:  symmetric,  JVD absent  Respiratory: Clear to ascultation bilaterally, no crackles/rales, no Resp distress; no accessory muscle use  Cardiovascular:  RRR, no murmurs/rubs/gallops  Abdomen: Soft, NT, ND  Extremities: No edema noted  Skin: No rashes or lesions noted  Neurological: Sensation grossly intact; strength 5/5 in all extremities.  Psychiatry: AOx3, appropriate insight/judgement, appropriate affect, recent/remote memory intact    PRN Meds:  ALBUTerol    90 MICROgram(s) HFA Inhaler 2 Puff(s) Inhalation every 4 hours PRN  aluminum hydroxide/magnesium hydroxide/simethicone Suspension 30 milliLiter(s) Oral every 4 hours PRN  benzonatate 100 milliGRAM(s) Oral every 8 hours PRN  guaiFENesin Oral Liquid (Sugar-Free) 100 milliGRAM(s) Oral every 6 hours PRN  HYDROmorphone  Injectable 1 milliGRAM(s) IV Push every 4 hours PRN  HYDROmorphone  Injectable 2 milliGRAM(s) IV Push every 4 hours PRN  megestrol 40 milliGRAM(s) Oral daily PRN  melatonin 3 milliGRAM(s) Oral at bedtime PRN  ondansetron Injectable 4 milliGRAM(s) IV Push every 8 hours PRN      LABS:                        8.0    5.51  )-----------( 268      ( 04 Nov 2022 06:28 )             26.1     Hgb Trend: 8.0<--, 7.5<--, 7.6<--, 7.2<--, 6.7<--  11-03    138  |  102  |  13  ----------------------------<  89  4.7   |  27  |  0.70    Ca    8.8      03 Nov 2022 07:14  Phos  3.0     11-03  Mg     2.2     11-03      Creatinine Trend: 0.70<--, 0.75<--, 0.76<--, 0.79<--, 0.77<--, 0.82<--            MICROBIOLOGY:       RADIOLOGY:  [ ] Reviewed and interpreted by me    EKG:

## 2022-11-04 NOTE — PROGRESS NOTE ADULT - ASSESSMENT
61F unvaccinated w/ PMH MM(dx'd 2018) currently on chemo(last session 10/13) p/w 1-day h/o chest pain and fever. Found to have hypoxia in s/o COVID-19  and small-moderate pleural effusions.    Patient passed TOV, now urinating on own. Diarrhea yesterday, so holding Bowel regimen. Able to ambulate without supplemental O2, but O2 sat 93% and tachycardic to 140s. Continue to monitor while evaluating home PT vs MILENA 61F unvaccinated w/ PMH MM(dx'd 2018) currently on chemo(last session 10/13) p/w 1-day h/o chest pain and fever. Found to have hypoxia in s/o COVID-19  and small-moderate pleural effusions.    Patient passed TOV, now urinating on own. Diarrhea yesterday, so holding Bowel regimen. Able to ambulate without supplemental O2, but O2 sat 93% and tachycardic to 140s. Repeat attempt with desaturation to 86% and persistent tachycardia in 140s. Patient likely to require MILENA.

## 2022-11-04 NOTE — PROGRESS NOTE ADULT - PROBLEM SELECTOR PLAN 2
try to wean o2 down : seems better sao2: [facility] resident:    11/1: she feel s much better: passed TOV:    11/2: completed rx:    11/4: resolved:

## 2022-11-04 NOTE — PROGRESS NOTE ADULT - PROBLEM SELECTOR PLAN 1
10/31" her oxygenation is pretty good:  no sob:  no cough : no wheezing: :  on 3 L of o2:  today's chest x-ray to my eye: seems better then last time:  not much of pleural effusion:   however would wait for official report: if not much fluid can follow up as an outpt:    11/1: she has Improved: she is on 1 L of oxygen : repeat chest x-ray noted/: may have small effusions: no tapping : she can follow up [place]:    11/2: she is on room air: at this time : afebrile ; 99.2:  need to check ambulatory o2 sao2:: Pleural effusions need to be followed  up as an outpt  l; she is still in isolation and needs long term isolation as she is immunocompromised:    11/3: seems better: on room air : not sob:  walks without oxygen  : 95% on room air:    11/4; 97% on room air : doing pretty good:   no sob:

## 2022-11-04 NOTE — PROGRESS NOTE ADULT - PROBLEM SELECTOR PLAN 6
-currently undergoing chemo at [facility]  -heme/onc following  -pain control

## 2022-11-04 NOTE — PROGRESS NOTE ADULT - SUBJECTIVE AND OBJECTIVE BOX
C A R D I O L O G Y  **********************************     DATE OF SERVICE: 11-04-22    Discussed with covering RN - patient remains stable on room air. No reported chest pain or shortness of breath.   Review of symptoms otherwise negative.    MEDICATIONS:  acyclovir   Oral Tab/Cap 400 milliGRAM(s) Oral daily  ALBUTerol    90 MICROgram(s) HFA Inhaler 2 Puff(s) Inhalation every 4 hours PRN  aluminum hydroxide/magnesium hydroxide/simethicone Suspension 30 milliLiter(s) Oral every 4 hours PRN  amLODIPine   Tablet 10 milliGRAM(s) Oral daily  benzonatate 100 milliGRAM(s) Oral every 8 hours PRN  chlorhexidine 4% Liquid 1 Application(s) Topical daily  enoxaparin Injectable 40 milliGRAM(s) SubCutaneous every 12 hours  fentaNYL   Patch  50 MICROgram(s)/Hr 1 Patch Transdermal every 72 hours  guaiFENesin Oral Liquid (Sugar-Free) 100 milliGRAM(s) Oral every 6 hours PRN  HYDROmorphone  Injectable 1 milliGRAM(s) IV Push every 4 hours PRN  HYDROmorphone  Injectable 2 milliGRAM(s) IV Push every 4 hours PRN  megestrol 40 milliGRAM(s) Oral daily PRN  melatonin 3 milliGRAM(s) Oral at bedtime PRN  naloxone Injectable 0.4 milliGRAM(s) IV Push once  ondansetron Injectable 4 milliGRAM(s) IV Push every 8 hours PRN  pantoprazole    Tablet 40 milliGRAM(s) Oral before breakfast      LABS:                        8.0    5.51  )-----------( 268      ( 04 Nov 2022 06:28 )             26.1       Hemoglobin: 8.0 g/dL (11-04 @ 06:28)  Hemoglobin: 7.5 g/dL (11-03 @ 07:18)  Hemoglobin: 7.6 g/dL (11-02 @ 07:12)  Hemoglobin: 7.2 g/dL (11-01 @ 09:54)  Hemoglobin: 6.7 g/dL (11-01 @ 07:30)      11-03    138  |  102  |  13  ----------------------------<  89  4.7   |  27  |  0.70    Ca    8.8      03 Nov 2022 07:14  Phos  3.0     11-03  Mg     2.2     11-03      Creatinine Trend: 0.70<--, 0.75<--, 0.76<--, 0.79<--, 0.77<--, 0.82<--    COAGS:         Per Chart  PHYSICAL EXAM:  T(C): 37.2 (11-04-22 @ 12:19), Max: 37.3 (11-03-22 @ 16:38)  HR: 110 (11-04-22 @ 12:19) (93 - 140)  BP: 114/76 (11-04-22 @ 12:19) (101/69 - 119/81)  RR: 18 (11-04-22 @ 12:19) (18 - 20)  SpO2: 98% (11-04-22 @ 12:19) (86% - 98%)  Wt(kg): --    I&O's Summary    03 Nov 2022 07:01  -  04 Nov 2022 07:00  --------------------------------------------------------  IN: 960 mL / OUT: 450 mL / NET: 510 mL        Gen: Appears well in NAD  HEENT:  (-)icterus (-)pallor  CV: N S1 S2 1/6 CHARLY (+)2 Pulses B/l  Resp:  Clear to auscultation B/L, normal effort  GI: (+) BS Soft, NT, ND  Lymph:  (-)Edema, (-)obvious lymphadenopathy  Skin: Warm to touch, Normal turgor  Psych: Appropriate mood and affect    TELEMETRY: None	      ECG: Sinus Tachycardia, PVC    RADIOLOGY:  < from: CT Angio Chest PE Protocol w/ IV Cont (10.18.22 @ 20:03) >  IMPRESSION:    No pulmonary embolism.    Smallright and moderate left pleural effusions and compressive   atelectasis.    --- End of Report ---    < end of copied text >      ASSESSMENT/PLAN: Patient is a 60 y/o Female with PMH of Multiple Myeloma (dx 2018) currently on chemo who presented with chest pain, SOB, and fever admitted with +COVID and b/l pleural effusions. Cardiology consulted for further evaluation.     - Patient with nonanginal chest pain that is pleuritic and very tender to palpation - suspect MSK related likely due to costochondritis and multiple myeloma lytic lesions  - EKG with sinus tachycardia and no acute ischemic changes  - Cardiac enzymes unremarkable  - CTA chest negative for PE, small R and mod L pleural effusions and compressive atelectasis  - TTE with normal LV/RV function  - Pulm follow up - weaned to room air, repeat CXR noted - no plans for thoracentesis  - Supportive care/covid management per primary team  - No further inpatient cardiac w/u planned  - D/C planning per primary team     Artur Cortes PA-C  Pager: [phone]

## 2022-11-04 NOTE — PROGRESS NOTE ADULT - ASSESSMENT
61F unvaccinated w/ PMH MM(dx'd 2018) currently on chemo(last session 10/13) p/w 1-day h/o chest pain and fever. Most history per daughter at baseline as pt in significant pain and unable to talk. Pt had initially presented to [facility] with a sharp mid-chest pain gradual in onset that progressively got worse associated with a fever as high as 102.2 and a productive cough with greenish sputum.     Hematology/Oncology called to see patient who is under care of Dr. Denise Fatima of [facility] for the treatment of IgG lambda multiple myeloma with extensive bone metastases recently started on CyborD chemotherapy 10/7/2022 (LD 10/13/2022). She was recently hospitalized for chemotherapy/pain management at [facility], discharged 10/15/2022.     IgG lambda multiple myeloma  --Under care at [facility] - Dr. Denise Fatima  --Chemotherapy on hold during hospitalization    COVID-19 Infection  --Previously unvaccinated  --Completed Remdesevir, Dexamethasone  --Ongoing management per ID, Pulm, Primary Team    Chest pain. Exacerbated by coughing  -- resolved     Pleural Effusion  --Most likely inflammatory from COVID  --Resolved on L and improved on R  --Unable to have thoracentesis at this time secondary to respiratory isolation  --Management per Pulmonary      Anemia  -- hgb 8 today   --Secondary to disease, recent chemotherapy  --Please transfuse PRBC's if <7 grams    will continue to follow and coordinate care  Anne-Marie Arredondo NP  Hematology/ Oncology  [facility]  [phone] (office)  [phone] (alt office)  Evenings and weekends please call MD on call or office

## 2022-11-05 RX ORDER — SENNA PLUS 8.6 MG/1
1 TABLET ORAL ONCE
Refills: 0 | Status: COMPLETED | OUTPATIENT
Start: 2022-11-05 | End: 2022-11-05

## 2022-11-05 RX ORDER — MORPHINE SULFATE 50 MG/1
1 CAPSULE, EXTENDED RELEASE ORAL
Qty: 0 | Refills: 0 | DISCHARGE

## 2022-11-05 RX ORDER — POLYETHYLENE GLYCOL 3350 17 G/17G
17 POWDER, FOR SOLUTION ORAL ONCE
Refills: 0 | Status: COMPLETED | OUTPATIENT
Start: 2022-11-05 | End: 2022-11-05

## 2022-11-05 RX ADMIN — HYDROMORPHONE HYDROCHLORIDE 2 MILLIGRAM(S): 2 INJECTION INTRAMUSCULAR; INTRAVENOUS; SUBCUTANEOUS at 23:15

## 2022-11-05 RX ADMIN — SENNA PLUS 1 TABLET(S): 8.6 TABLET ORAL at 10:29

## 2022-11-05 RX ADMIN — ENOXAPARIN SODIUM 40 MILLIGRAM(S): 100 INJECTION SUBCUTANEOUS at 08:55

## 2022-11-05 RX ADMIN — Medication 400 MILLIGRAM(S): at 12:24

## 2022-11-05 RX ADMIN — HYDROMORPHONE HYDROCHLORIDE 2 MILLIGRAM(S): 2 INJECTION INTRAMUSCULAR; INTRAVENOUS; SUBCUTANEOUS at 22:07

## 2022-11-05 RX ADMIN — Medication 100 MILLIGRAM(S): at 08:55

## 2022-11-05 RX ADMIN — FENTANYL CITRATE 1 PATCH: 50 INJECTION INTRAVENOUS at 08:14

## 2022-11-05 RX ADMIN — POLYETHYLENE GLYCOL 3350 17 GRAM(S): 17 POWDER, FOR SOLUTION ORAL at 10:29

## 2022-11-05 RX ADMIN — HYDROMORPHONE HYDROCHLORIDE 2 MILLIGRAM(S): 2 INJECTION INTRAMUSCULAR; INTRAVENOUS; SUBCUTANEOUS at 09:47

## 2022-11-05 RX ADMIN — CHLORHEXIDINE GLUCONATE 1 APPLICATION(S): 213 SOLUTION TOPICAL at 12:24

## 2022-11-05 RX ADMIN — Medication 100 MILLIGRAM(S): at 20:38

## 2022-11-05 RX ADMIN — AMLODIPINE BESYLATE 10 MILLIGRAM(S): 2.5 TABLET ORAL at 06:06

## 2022-11-05 RX ADMIN — FENTANYL CITRATE 1 PATCH: 50 INJECTION INTRAVENOUS at 20:20

## 2022-11-05 RX ADMIN — HYDROMORPHONE HYDROCHLORIDE 2 MILLIGRAM(S): 2 INJECTION INTRAMUSCULAR; INTRAVENOUS; SUBCUTANEOUS at 08:59

## 2022-11-05 RX ADMIN — ENOXAPARIN SODIUM 40 MILLIGRAM(S): 100 INJECTION SUBCUTANEOUS at 20:13

## 2022-11-05 RX ADMIN — PANTOPRAZOLE SODIUM 40 MILLIGRAM(S): 20 TABLET, DELAYED RELEASE ORAL at 06:06

## 2022-11-05 NOTE — PROGRESS NOTE ADULT - SUBJECTIVE AND OBJECTIVE BOX
C A R D I O L O G Y  **********************************     DATE OF SERVICE: 11-05-22    pt seen and examined, no complaints, ROS - .          acyclovir   Oral Tab/Cap 400 milliGRAM(s) Oral daily  ALBUTerol    90 MICROgram(s) HFA Inhaler 2 Puff(s) Inhalation every 4 hours PRN  aluminum hydroxide/magnesium hydroxide/simethicone Suspension 30 milliLiter(s) Oral every 4 hours PRN  amLODIPine   Tablet 10 milliGRAM(s) Oral daily  benzonatate 100 milliGRAM(s) Oral every 8 hours PRN  chlorhexidine 4% Liquid 1 Application(s) Topical daily  enoxaparin Injectable 40 milliGRAM(s) SubCutaneous every 12 hours  fentaNYL   Patch  50 MICROgram(s)/Hr 1 Patch Transdermal every 72 hours  guaiFENesin Oral Liquid (Sugar-Free) 100 milliGRAM(s) Oral every 6 hours PRN  HYDROmorphone  Injectable 1 milliGRAM(s) IV Push every 4 hours PRN  HYDROmorphone  Injectable 2 milliGRAM(s) IV Push every 4 hours PRN  megestrol 40 milliGRAM(s) Oral daily PRN  melatonin 3 milliGRAM(s) Oral at bedtime PRN  naloxone Injectable 0.4 milliGRAM(s) IV Push once  ondansetron Injectable 4 milliGRAM(s) IV Push every 8 hours PRN  pantoprazole    Tablet 40 milliGRAM(s) Oral before breakfast                            8.0    5.51  )-----------( 268      ( 04 Nov 2022 06:28 )             26.1       Hemoglobin: 8.0 g/dL (11-04 @ 06:28)  Hemoglobin: 7.5 g/dL (11-03 @ 07:18)  Hemoglobin: 7.6 g/dL (11-02 @ 07:12)  Hemoglobin: 7.2 g/dL (11-01 @ 09:54)  Hemoglobin: 6.7 g/dL (11-01 @ 07:30)            Creatinine Trend: 0.70<--, 0.75<--, 0.76<--, 0.79<--, 0.77<--, 0.82<--    COAGS:           T(C): 36.6 (11-05-22 @ 05:44), Max: 98.7 (11-04-22 @ 21:55)  HR: 84 (11-05-22 @ 08:59) (84 - 110)  BP: 105/71 (11-05-22 @ 08:59) (105/71 - 114/76)  RR: 18 (11-05-22 @ 05:44) (18 - 18)  SpO2: 98% (11-05-22 @ 05:44) (98% - 98%)  Wt(kg): --    I&O's Summary    04 Nov 2022 07:01  -  05 Nov 2022 07:00  --------------------------------------------------------  IN: 240 mL / OUT: 200 mL / NET: 40 mL      Gen: Appears well in NAD  HEENT:  (-)icterus (-)pallor  CV: N S1 S2 1/6 CHARLY (+)2 Pulses B/l  Resp:  Clear to auscultation B/L, normal effort  GI: (+) BS Soft, NT, ND  Lymph:  (-)Edema, (-)obvious lymphadenopathy  Skin: Warm to touch, Normal turgor  Psych: Appropriate mood and affect    TELEMETRY: None	      ECG: Sinus Tachycardia, PVC    RADIOLOGY:  < from: CT Angio Chest PE Protocol w/ IV Cont (10.18.22 @ 20:03) >  IMPRESSION:    No pulmonary embolism.    Smallright and moderate left pleural effusions and compressive   atelectasis.    --- End of Report ---    < end of copied text >      ASSESSMENT/PLAN: Patient is a 60 y/o Female with PMH of Multiple Myeloma (dx 2018) currently on chemo who presented with chest pain, SOB, and fever admitted with +COVID and b/l pleural effusions. Cardiology consulted for further evaluation.     - Patient with nonanginal chest pain that is pleuritic and very tender to palpation - suspect MSK related likely due to costochondritis and multiple myeloma lytic lesions  - EKG with sinus tachycardia and no acute ischemic changes  - Cardiac enzymes unremarkable  - CTA chest negative for PE, small R and mod L pleural effusions and compressive atelectasis  - TTE with normal LV/RV function  - Pulm follow up - weaned to room air, repeat CXR noted - no plans for thoracentesis  - Supportive care/covid management per primary team  - No further inpatient cardiac w/u planned  - D/C planning per primary team

## 2022-11-05 NOTE — PROGRESS NOTE ADULT - PROBLEM SELECTOR PLAN 1
Reported hypoxic to 80s at [facility].   CT chest showed small right and moderate left pleural effusions and compressive   atelectasis.  On RA, but occasionally desatting with ambulating  -likely from COVID-19  -continous pulse ox monitoring  -if she spikes a fever, can start empirically on abx pending BCx

## 2022-11-05 NOTE — PROGRESS NOTE ADULT - SUBJECTIVE AND OBJECTIVE BOX
SUBJECTIVE    OBJECTIVE:  ICU Vital Signs Last 24 Hrs  T(C): 36.6 (05 Nov 2022 05:44), Max: 98.7 (04 Nov 2022 21:55)  T(F): 97.8 (05 Nov 2022 05:44), Max: 209.6 (04 Nov 2022 21:55)  HR: 91 (05 Nov 2022 05:44) (91 - 110)  BP: 110/78 (05 Nov 2022 05:44) (110/77 - 114/76)  BP(mean): --  ABP: --  ABP(mean): --  RR: 18 (05 Nov 2022 05:44) (18 - 18)  SpO2: 98% (05 Nov 2022 05:44) (98% - 98%)    O2 Parameters below as of 05 Nov 2022 05:44  Patient On (Oxygen Delivery Method): room air              11-04 @ 07:01  -  11-05 @ 07:00  --------------------------------------------------------  IN: 240 mL / OUT: 200 mL / NET: 40 mL      CAPILLARY BLOOD GLUCOSE          PHYSICAL EXAM:  General: Well-groomed, NAD, laying in bed, on ___O2  HEENT: PERRLA, EOMI, non-icteric  Neck:  symmetric,  JVD absent  Respiratory: Clear to ascultation bilaterally, no crackles/rales, no Resp distress; no accessory muscle use  Cardiovascular:  RRR, no murmurs/rubs/gallops  Abdomen: Soft, NT, ND  Extremities: No edema noted  Skin: No rashes or lesions noted  Neurological: Sensation grossly intact; strength 5/5 in all extremities.  Psychiatry: AOx3, appropriate insight/judgement, appropriate affect, recent/remote memory intact    PRN Meds:  ALBUTerol    90 MICROgram(s) HFA Inhaler 2 Puff(s) Inhalation every 4 hours PRN  aluminum hydroxide/magnesium hydroxide/simethicone Suspension 30 milliLiter(s) Oral every 4 hours PRN  benzonatate 100 milliGRAM(s) Oral every 8 hours PRN  guaiFENesin Oral Liquid (Sugar-Free) 100 milliGRAM(s) Oral every 6 hours PRN  HYDROmorphone  Injectable 1 milliGRAM(s) IV Push every 4 hours PRN  HYDROmorphone  Injectable 2 milliGRAM(s) IV Push every 4 hours PRN  megestrol 40 milliGRAM(s) Oral daily PRN  melatonin 3 milliGRAM(s) Oral at bedtime PRN  ondansetron Injectable 4 milliGRAM(s) IV Push every 8 hours PRN      LABS:                        8.0    5.51  )-----------( 268      ( 04 Nov 2022 06:28 )             26.1     Hgb Trend: 8.0<--, 7.5<--, 7.6<--, 7.2<--, 6.7<--        Creatinine Trend: 0.70<--, 0.75<--, 0.76<--, 0.79<--, 0.77<--, 0.82<--            MICROBIOLOGY:       RADIOLOGY:  [ ] Reviewed and interpreted by me    EKG: SUBJECTIVE  pt seen bedside. States she is feeling well, was able to sleep through the entire night. Now having some chest pain, but has been controlled without too many prns. Still uncertain about going to [facility], but understands it may be the fastest way to get her strength back. Waiting for daughter to come in today to make a final decision. Last BM was 2 days ago. No other complaints. Denies fever, chills, sob, abdominal pain, changes in urination.    OBJECTIVE:  ICU Vital Signs Last 24 Hrs  T(C): 36.6 (05 Nov 2022 05:44), Max: 98.7 (04 Nov 2022 21:55)  T(F): 97.8 (05 Nov 2022 05:44), Max: 209.6 (04 Nov 2022 21:55)  HR: 91 (05 Nov 2022 05:44) (91 - 110)  BP: 110/78 (05 Nov 2022 05:44) (110/77 - 114/76)  BP(mean): --  ABP: --  ABP(mean): --  RR: 18 (05 Nov 2022 05:44) (18 - 18)  SpO2: 98% (05 Nov 2022 05:44) (98% - 98%)    O2 Parameters below as of 05 Nov 2022 05:44  Patient On (Oxygen Delivery Method): room air              11-04 @ 07:01  -  11-05 @ 07:00  --------------------------------------------------------  IN: 240 mL / OUT: 200 mL / NET: 40 mL      CAPILLARY BLOOD GLUCOSE          PHYSICAL EXAM:  General: Well-groomed, NAD, laying in bed, on RA  HEENT: PERRLA, EOMI, non-icteric  Neck:  symmetric,  JVD absent  Respiratory: Clear to ascultation bilaterally, no crackles/rales, no Resp distress; no accessory muscle use  Cardiovascular:  RRR, no murmurs/rubs/gallops  Abdomen: Soft, NT, ND  Extremities: No edema noted  Skin: No rashes or lesions noted  Neurological: Sensation grossly intact  Psychiatry: AOx3, appropriate insight/judgement, appropriate affect, recent/remote memory intact    PRN Meds:  ALBUTerol    90 MICROgram(s) HFA Inhaler 2 Puff(s) Inhalation every 4 hours PRN  aluminum hydroxide/magnesium hydroxide/simethicone Suspension 30 milliLiter(s) Oral every 4 hours PRN  benzonatate 100 milliGRAM(s) Oral every 8 hours PRN  guaiFENesin Oral Liquid (Sugar-Free) 100 milliGRAM(s) Oral every 6 hours PRN  HYDROmorphone  Injectable 1 milliGRAM(s) IV Push every 4 hours PRN  HYDROmorphone  Injectable 2 milliGRAM(s) IV Push every 4 hours PRN  megestrol 40 milliGRAM(s) Oral daily PRN  melatonin 3 milliGRAM(s) Oral at bedtime PRN  ondansetron Injectable 4 milliGRAM(s) IV Push every 8 hours PRN      LABS:                        8.0    5.51  )-----------( 268      ( 04 Nov 2022 06:28 )             26.1     Hgb Trend: 8.0<--, 7.5<--, 7.6<--, 7.2<--, 6.7<--        Creatinine Trend: 0.70<--, 0.75<--, 0.76<--, 0.79<--, 0.77<--, 0.82<--            MICROBIOLOGY:       RADIOLOGY:  [ ] Reviewed and interpreted by me    EKG:

## 2022-11-05 NOTE — PROGRESS NOTE ADULT - PROBLEM SELECTOR PLAN 5
Likely 2/2 high doses of opioid medications  -Now having too many BMs, likely 2/2 decreased utilization of opioids for pain control  -Standing senna 2 tabs qhs  -dc bisacodyl PO  -changed Miralax to PRN  -c/w Bisacodyl 10mg suppository qd PRN  -Rilastor q2d prn if using high amounts of opioids Likely 2/2 high doses of opioid medications  -No BM in 2 days, restarting bowel regimen     -senna 1 tab + miralax now  -Standing senna 2 tabs qhs  -Miralax daily  -c/w Bisacodyl 10mg suppository qd PRN  -Rilastor q2d prn if using high amounts of opioids

## 2022-11-05 NOTE — PROGRESS NOTE ADULT - PROBLEM SELECTOR PLAN 3
patient admitted for SOB, r/o ACS/ PE- PMH- HTN, HLD Unclear etiology, more likely malignant vs CHF  -Resolving  -Per Pulm, no indication for tap at this time

## 2022-11-05 NOTE — PROGRESS NOTE ADULT - SUBJECTIVE AND OBJECTIVE BOX
CHIEF COMPLAINT  Dyspnea    HISTORY OF PRESENT ILLNESS  CECILIA DUNAWAY is a 61y Female who presents with a chief complaint of dyspnea.    No acute events. No complaints.    REVIEW OF SYSTEMS  A complete review of systems was performed; negative except per HPI    PHYSICAL EXAM  T(C): 36.6 (11-05-22 @ 05:44), Max: 98.7 (11-04-22 @ 21:55)  HR: 84 (11-05-22 @ 08:59) (84 - 101)  BP: 105/71 (11-05-22 @ 08:59) (105/71 - 110/78)  RR: 18 (11-05-22 @ 05:44) (18 - 18)  SpO2: 98% (11-05-22 @ 05:44) (98% - 98%)  Constitutional: alert, awake, in no acute distress  Eyes: PERRL, EOMI  HEENT: normocephalic, atraumatic  Neck: supple, non-tender  Cardiovascular: normal perfusion, no peripheral edema  Respiratory: normal respiratory efforts; no increased use of accessory muscles  Gastrointestinal: soft, non-tender  Musculoskeletal: normal range of motion, no deformities noted  Neurological: alert, CN II to XI grossly intact  Skin: warm, dry    LABORATORY DATA                        8.0    5.51  )-----------( 268      ( 04 Nov 2022 06:28 )             26.1

## 2022-11-05 NOTE — PROGRESS NOTE ADULT - PROBLEM SELECTOR PLAN 6
-currently undergoing chemo at [facility]  -heme/onc following  -pain control

## 2022-11-05 NOTE — PROGRESS NOTE ADULT - ASSESSMENT
61F unvaccinated w/ PMH MM(dx'd 2018) currently on chemo(last session 10/13) p/w 1-day h/o chest pain and fever. Found to have hypoxia in s/o COVID-19  and small-moderate pleural effusions.    Patient passed TOV, now urinating on own. Diarrhea yesterday, so holding Bowel regimen. Able to ambulate without supplemental O2, but O2 sat 93% and tachycardic to 140s. Repeat attempt with desaturation to 86% and persistent tachycardia in 140s. Patient likely to require MILENA.

## 2022-11-05 NOTE — PROGRESS NOTE ADULT - ASSESSMENT
CECILIA DUNAWAY is a 61y Female who presents with a chief complaint of dyspnea.    Multiple Myeloma  - Patient follows with Dr. Fatima, [facility].  - No treatment while inpatient or during rehabilitation.    COVID-19   - Patient has completed remdesivir and dexamethasone.  - Remains on room air at this time.  - Continue to monitor for symptoms.  - Continue to monitor pleural effusion; likely due to COVID-19.    Anemia  - In the setting of acute illness, chemotherapy, and malignancy.  - Monitor and transfuse to maintain hemoglobin > 7.    Will continue to follow.    Antoni Hull MD  Hematology/Oncology  O: [phone]  CECILIA DUNAWAY is a 61y Female who presents with a chief complaint of dyspnea.    Multiple Myeloma  - Patient follows with Dr. Fatima, [facility].  - No treatment while inpatient or during rehabilitation.    COVID-19   - Patient has completed remdesivir and dexamethasone.  - Remains on room air at this time.  - Continue to monitor for symptoms.  - Continue to monitor pleural effusion; likely due to COVID-19.    Anemia  - In the setting of acute illness, chemotherapy, and malignancy.  - Monitor and transfuse to maintain hemoglobin > 7.    Pending PT, dispo.     Will continue to follow.    Antoni Hull MD  Hematology/Oncology  O: [phone]

## 2022-11-06 PROCEDURE — 93010 ELECTROCARDIOGRAM REPORT: CPT

## 2022-11-06 RX ADMIN — FENTANYL CITRATE 1 PATCH: 50 INJECTION INTRAVENOUS at 17:40

## 2022-11-06 RX ADMIN — FENTANYL CITRATE 1 PATCH: 50 INJECTION INTRAVENOUS at 07:22

## 2022-11-06 RX ADMIN — FENTANYL CITRATE 1 PATCH: 50 INJECTION INTRAVENOUS at 18:00

## 2022-11-06 RX ADMIN — Medication 100 MILLIGRAM(S): at 10:54

## 2022-11-06 RX ADMIN — HYDROMORPHONE HYDROCHLORIDE 2 MILLIGRAM(S): 2 INJECTION INTRAMUSCULAR; INTRAVENOUS; SUBCUTANEOUS at 11:31

## 2022-11-06 RX ADMIN — HYDROMORPHONE HYDROCHLORIDE 2 MILLIGRAM(S): 2 INJECTION INTRAMUSCULAR; INTRAVENOUS; SUBCUTANEOUS at 10:52

## 2022-11-06 RX ADMIN — CHLORHEXIDINE GLUCONATE 1 APPLICATION(S): 213 SOLUTION TOPICAL at 15:41

## 2022-11-06 RX ADMIN — Medication 400 MILLIGRAM(S): at 11:56

## 2022-11-06 RX ADMIN — ENOXAPARIN SODIUM 40 MILLIGRAM(S): 100 INJECTION SUBCUTANEOUS at 08:52

## 2022-11-06 RX ADMIN — FENTANYL CITRATE 1 PATCH: 50 INJECTION INTRAVENOUS at 19:50

## 2022-11-06 RX ADMIN — PANTOPRAZOLE SODIUM 40 MILLIGRAM(S): 20 TABLET, DELAYED RELEASE ORAL at 05:21

## 2022-11-06 RX ADMIN — Medication 100 MILLIGRAM(S): at 21:25

## 2022-11-06 RX ADMIN — HYDROMORPHONE HYDROCHLORIDE 2 MILLIGRAM(S): 2 INJECTION INTRAMUSCULAR; INTRAVENOUS; SUBCUTANEOUS at 17:40

## 2022-11-06 RX ADMIN — ENOXAPARIN SODIUM 40 MILLIGRAM(S): 100 INJECTION SUBCUTANEOUS at 21:25

## 2022-11-06 NOTE — PROGRESS NOTE ADULT - SUBJECTIVE AND OBJECTIVE BOX
Patient seen and examined at bedside. feels well. eating breakfast     MEDICATIONS  (STANDING):  acyclovir   Oral Tab/Cap 400 milliGRAM(s) Oral daily  amLODIPine   Tablet 10 milliGRAM(s) Oral daily  chlorhexidine 4% Liquid 1 Application(s) Topical daily  enoxaparin Injectable 40 milliGRAM(s) SubCutaneous every 12 hours  fentaNYL   Patch  50 MICROgram(s)/Hr 1 Patch Transdermal every 72 hours  naloxone Injectable 0.4 milliGRAM(s) IV Push once  pantoprazole    Tablet 40 milliGRAM(s) Oral before breakfast    MEDICATIONS  (PRN):  ALBUTerol    90 MICROgram(s) HFA Inhaler 2 Puff(s) Inhalation every 4 hours PRN Shortness of Breath and/or Wheezing  aluminum hydroxide/magnesium hydroxide/simethicone Suspension 30 milliLiter(s) Oral every 4 hours PRN Dyspepsia  benzonatate 100 milliGRAM(s) Oral every 8 hours PRN Cough  guaiFENesin Oral Liquid (Sugar-Free) 100 milliGRAM(s) Oral every 6 hours PRN Cough  HYDROmorphone  Injectable 2 milliGRAM(s) IV Push every 4 hours PRN Severe Pain (7 - 10)  HYDROmorphone  Injectable 1 milliGRAM(s) IV Push every 4 hours PRN Moderate Pain (4 - 6)  megestrol 40 milliGRAM(s) Oral daily PRN appetite  melatonin 3 milliGRAM(s) Oral at bedtime PRN Insomnia  ondansetron Injectable 4 milliGRAM(s) IV Push every 8 hours PRN Nausea and/or Vomiting        Vital Signs Last 24 Hrs  T(C): 37 (06 Nov 2022 05:22), Max: 37.1 (05 Nov 2022 14:53)  T(F): 98.6 (06 Nov 2022 05:22), Max: 98.8 (05 Nov 2022 14:53)  HR: 97 (06 Nov 2022 05:22) (90 - 97)  BP: 100/66 (06 Nov 2022 05:22) (99/69 - 110/73)  BP(mean): --  RR: 19 (06 Nov 2022 05:22) (18 - 20)  SpO2: 97% (06 Nov 2022 05:22) (97% - 98%)    Parameters below as of 06 Nov 2022 05:22  Patient On (Oxygen Delivery Method): room air          PHYSICAL EXAM:     GENERAL:  Appears stated age, well-groomed  HEENT:  NC/AT,  conjunctivae clear and pink  NEURO:  Alert, oriented, no asterixis

## 2022-11-06 NOTE — PROGRESS NOTE ADULT - SUBJECTIVE AND OBJECTIVE BOX
SUBJECTIVE / OVERNIGHT EVENTS:    acyclovir   Oral Tab/Cap   400 milliGRAM(s) Oral (11-05-22 @ 12:24)    benzonatate   100 milliGRAM(s) Oral (11-05-22 @ 20:38)   100 milliGRAM(s) Oral (11-05-22 @ 08:55)    chlorhexidine 4% Liquid   1 Application(s) Topical (11-05-22 @ 12:24)    enoxaparin Injectable   40 milliGRAM(s) SubCutaneous (11-05-22 @ 20:13)   40 milliGRAM(s) SubCutaneous (11-05-22 @ 08:55)    HYDROmorphone  Injectable   2 milliGRAM(s) IV Push (11-05-22 @ 22:07)   2 milliGRAM(s) IV Push (11-05-22 @ 08:59)    pantoprazole    Tablet   40 milliGRAM(s) Oral (11-06-22 @ 05:21)    polyethylene glycol 3350   17 Gram(s) Oral (11-05-22 @ 10:29)    senna   1 Tablet(s) Oral (11-05-22 @ 10:29)    MEDICATIONS  (STANDING):  acyclovir   Oral Tab/Cap 400 milliGRAM(s) Oral daily  amLODIPine   Tablet 10 milliGRAM(s) Oral daily  chlorhexidine 4% Liquid 1 Application(s) Topical daily  enoxaparin Injectable 40 milliGRAM(s) SubCutaneous every 12 hours  fentaNYL   Patch  50 MICROgram(s)/Hr 1 Patch Transdermal every 72 hours  naloxone Injectable 0.4 milliGRAM(s) IV Push once  pantoprazole    Tablet 40 milliGRAM(s) Oral before breakfast    MEDICATIONS  (PRN):  ALBUTerol    90 MICROgram(s) HFA Inhaler 2 Puff(s) Inhalation every 4 hours PRN Shortness of Breath and/or Wheezing  aluminum hydroxide/magnesium hydroxide/simethicone Suspension 30 milliLiter(s) Oral every 4 hours PRN Dyspepsia  benzonatate 100 milliGRAM(s) Oral every 8 hours PRN Cough  guaiFENesin Oral Liquid (Sugar-Free) 100 milliGRAM(s) Oral every 6 hours PRN Cough  HYDROmorphone  Injectable 1 milliGRAM(s) IV Push every 4 hours PRN Moderate Pain (4 - 6)  HYDROmorphone  Injectable 2 milliGRAM(s) IV Push every 4 hours PRN Severe Pain (7 - 10)  megestrol 40 milliGRAM(s) Oral daily PRN appetite  melatonin 3 milliGRAM(s) Oral at bedtime PRN Insomnia  ondansetron Injectable 4 milliGRAM(s) IV Push every 8 hours PRN Nausea and/or Vomiting    PHYSICAL EXAM:  Vital Signs Last 24 Hrs  T(C): 37 (06 Nov 2022 05:22), Max: 37.1 (05 Nov 2022 14:53)  T(F): 98.6 (06 Nov 2022 05:22), Max: 98.8 (05 Nov 2022 14:53)  HR: 97 (06 Nov 2022 05:22) (84 - 97)  BP: 100/66 (06 Nov 2022 05:22) (99/69 - 110/73)  BP(mean): --  RR: 19 (06 Nov 2022 05:22) (18 - 20)  SpO2: 97% (06 Nov 2022 05:22) (97% - 98%)    Parameters below as of 06 Nov 2022 05:22  Patient On (Oxygen Delivery Method): room air    GENERAL: No acute distress, well-developed  HEAD:  Atraumatic, Normocephalic  EYES: EOMI, PERRLA, conjunctiva and sclera clear  NECK: Supple, no lymphadenopathy, no JVD  CHEST/LUNG: CTAB; No wheezes, rales, or rhonchi  HEART: Regular rate and rhythm; No murmurs, rubs, or gallops  ABDOMEN: Soft, non-tender, non-distended; normal bowel sounds, no organomegaly  EXTREMITIES:  2+ peripheral pulses b/l, No clubbing, cyanosis, or edema  NEUROLOGY: A&O x 3, no focal deficits  SKIN: No rashes or lesions   SUBJECTIVE / OVERNIGHT EVENTS: NAEON. THis am pt lying in bed, NAD. Satting well on RA. Denies HA, cp, SOB, abd pain Notes her daughter is going to look at rehab places on Monday and decide which one to go to.     acyclovir   Oral Tab/Cap   400 milliGRAM(s) Oral (11-05-22 @ 12:24)    benzonatate   100 milliGRAM(s) Oral (11-05-22 @ 20:38)   100 milliGRAM(s) Oral (11-05-22 @ 08:55)    chlorhexidine 4% Liquid   1 Application(s) Topical (11-05-22 @ 12:24)    enoxaparin Injectable   40 milliGRAM(s) SubCutaneous (11-05-22 @ 20:13)   40 milliGRAM(s) SubCutaneous (11-05-22 @ 08:55)    HYDROmorphone  Injectable   2 milliGRAM(s) IV Push (11-05-22 @ 22:07)   2 milliGRAM(s) IV Push (11-05-22 @ 08:59)    pantoprazole    Tablet   40 milliGRAM(s) Oral (11-06-22 @ 05:21)    polyethylene glycol 3350   17 Gram(s) Oral (11-05-22 @ 10:29)    senna   1 Tablet(s) Oral (11-05-22 @ 10:29)    MEDICATIONS  (STANDING):  acyclovir   Oral Tab/Cap 400 milliGRAM(s) Oral daily  amLODIPine   Tablet 10 milliGRAM(s) Oral daily  chlorhexidine 4% Liquid 1 Application(s) Topical daily  enoxaparin Injectable 40 milliGRAM(s) SubCutaneous every 12 hours  fentaNYL   Patch  50 MICROgram(s)/Hr 1 Patch Transdermal every 72 hours  naloxone Injectable 0.4 milliGRAM(s) IV Push once  pantoprazole    Tablet 40 milliGRAM(s) Oral before breakfast    MEDICATIONS  (PRN):  ALBUTerol    90 MICROgram(s) HFA Inhaler 2 Puff(s) Inhalation every 4 hours PRN Shortness of Breath and/or Wheezing  aluminum hydroxide/magnesium hydroxide/simethicone Suspension 30 milliLiter(s) Oral every 4 hours PRN Dyspepsia  benzonatate 100 milliGRAM(s) Oral every 8 hours PRN Cough  guaiFENesin Oral Liquid (Sugar-Free) 100 milliGRAM(s) Oral every 6 hours PRN Cough  HYDROmorphone  Injectable 1 milliGRAM(s) IV Push every 4 hours PRN Moderate Pain (4 - 6)  HYDROmorphone  Injectable 2 milliGRAM(s) IV Push every 4 hours PRN Severe Pain (7 - 10)  megestrol 40 milliGRAM(s) Oral daily PRN appetite  melatonin 3 milliGRAM(s) Oral at bedtime PRN Insomnia  ondansetron Injectable 4 milliGRAM(s) IV Push every 8 hours PRN Nausea and/or Vomiting    PHYSICAL EXAM:  Vital Signs Last 24 Hrs  T(C): 37 (06 Nov 2022 05:22), Max: 37.1 (05 Nov 2022 14:53)  T(F): 98.6 (06 Nov 2022 05:22), Max: 98.8 (05 Nov 2022 14:53)  HR: 97 (06 Nov 2022 05:22) (84 - 97)  BP: 100/66 (06 Nov 2022 05:22) (99/69 - 110/73)  BP(mean): --  RR: 19 (06 Nov 2022 05:22) (18 - 20)  SpO2: 97% (06 Nov 2022 05:22) (97% - 98%)    Parameters below as of 06 Nov 2022 05:22  Patient On (Oxygen Delivery Method): room air    GENERAL: No acute distress  HEAD:  Atraumatic  EYES: EOMI  NECK: Supple  CHEST/LUNG: CTAB; No wheezes, rales, or rhonchi  HEART: Regular rate and rhythm; No murmurs, rubs, or gallops  ABDOMEN: Soft, non-tender, non-distended; normal bowel sounds, no organomegaly  EXTREMITIES:  2+ peripheral pulses b/l, No clubbing, cyanosis, or edema  NEUROLOGY: A&O x 3, no focal deficits  SKIN: No rashes or lesions

## 2022-11-06 NOTE — PROGRESS NOTE ADULT - SUBJECTIVE AND OBJECTIVE BOX
C A R D I O L O G Y  **********************************     DATE OF SERVICE: 11-06-22    pt seen and examined, no events overnight,   no chest pain or sob          acyclovir   Oral Tab/Cap 400 milliGRAM(s) Oral daily  ALBUTerol    90 MICROgram(s) HFA Inhaler 2 Puff(s) Inhalation every 4 hours PRN  aluminum hydroxide/magnesium hydroxide/simethicone Suspension 30 milliLiter(s) Oral every 4 hours PRN  amLODIPine   Tablet 10 milliGRAM(s) Oral daily  benzonatate 100 milliGRAM(s) Oral every 8 hours PRN  chlorhexidine 4% Liquid 1 Application(s) Topical daily  enoxaparin Injectable 40 milliGRAM(s) SubCutaneous every 12 hours  fentaNYL   Patch  50 MICROgram(s)/Hr 1 Patch Transdermal every 72 hours  guaiFENesin Oral Liquid (Sugar-Free) 100 milliGRAM(s) Oral every 6 hours PRN  HYDROmorphone  Injectable 1 milliGRAM(s) IV Push every 4 hours PRN  HYDROmorphone  Injectable 2 milliGRAM(s) IV Push every 4 hours PRN  megestrol 40 milliGRAM(s) Oral daily PRN  melatonin 3 milliGRAM(s) Oral at bedtime PRN  naloxone Injectable 0.4 milliGRAM(s) IV Push once  ondansetron Injectable 4 milliGRAM(s) IV Push every 8 hours PRN  pantoprazole    Tablet 40 milliGRAM(s) Oral before breakfast          Hemoglobin: 8.0 g/dL (11-04 @ 06:28)  Hemoglobin: 7.5 g/dL (11-03 @ 07:18)  Hemoglobin: 7.6 g/dL (11-02 @ 07:12)  Hemoglobin: 7.2 g/dL (11-01 @ 09:54)            Creatinine Trend: 0.70<--, 0.75<--, 0.76<--, 0.79<--, 0.77<--, 0.82<--    COAGS:           T(C): 37 (11-06-22 @ 05:22), Max: 37.1 (11-05-22 @ 14:53)  HR: 97 (11-06-22 @ 05:22) (84 - 97)  BP: 100/66 (11-06-22 @ 05:22) (99/69 - 110/73)  RR: 19 (11-06-22 @ 05:22) (18 - 20)  SpO2: 97% (11-06-22 @ 05:22) (97% - 98%)  Wt(kg): --    I&O's Summary    Gen: Appears well in NAD  HEENT:  (-)icterus (-)pallor  CV: N S1 S2 1/6 CHARLY (+)2 Pulses B/l  Resp:  Clear to auscultation B/L, normal effort  GI: (+) BS Soft, NT, ND  Lymph:  (-)Edema, (-)obvious lymphadenopathy  Skin: Warm to touch, Normal turgor  Psych: Appropriate mood and affect    TELEMETRY: None	      ECG: Sinus Tachycardia, PVC    RADIOLOGY:  < from: CT Angio Chest PE Protocol w/ IV Cont (10.18.22 @ 20:03) >  IMPRESSION:    No pulmonary embolism.    Smallright and moderate left pleural effusions and compressive   atelectasis.    --- End of Report ---    < end of copied text >      ASSESSMENT/PLAN: Patient is a 62 y/o Female with PMH of Multiple Myeloma (dx 2018) currently on chemo who presented with chest pain, SOB, and fever admitted with +COVID and b/l pleural effusions. Cardiology consulted for further evaluation.     - Patient with nonanginal chest pain that is pleuritic and very tender to palpation - suspect MSK related likely due to costochondritis and multiple myeloma lytic lesions  - EKG with sinus tachycardia and no acute ischemic changes  - Cardiac enzymes unremarkable  - CTA chest negative for PE, small R and mod L pleural effusions and compressive atelectasis  - TTE with normal LV/RV function  - Pulm follow up    - Supportive care/covid management per primary team  - No further inpatient cardiac w/u planned  - D/C planning per primary team

## 2022-11-06 NOTE — PROGRESS NOTE ADULT - SUBJECTIVE AND OBJECTIVE BOX
Date of Service: 11-06-22 @ 11:54    Patient is a 61y old  Female who presents with a chief complaint of SOB, CP (06 Nov 2022 10:00)      Any change in ROS: seems OK:  tachycardic though today  : on room  air : no sob:     MEDICATIONS  (STANDING):  acyclovir   Oral Tab/Cap 400 milliGRAM(s) Oral daily  amLODIPine   Tablet 10 milliGRAM(s) Oral daily  chlorhexidine 4% Liquid 1 Application(s) Topical daily  enoxaparin Injectable 40 milliGRAM(s) SubCutaneous every 12 hours  fentaNYL   Patch  50 MICROgram(s)/Hr 1 Patch Transdermal every 72 hours  naloxone Injectable 0.4 milliGRAM(s) IV Push once  pantoprazole    Tablet 40 milliGRAM(s) Oral before breakfast    MEDICATIONS  (PRN):  ALBUTerol    90 MICROgram(s) HFA Inhaler 2 Puff(s) Inhalation every 4 hours PRN Shortness of Breath and/or Wheezing  aluminum hydroxide/magnesium hydroxide/simethicone Suspension 30 milliLiter(s) Oral every 4 hours PRN Dyspepsia  benzonatate 100 milliGRAM(s) Oral every 8 hours PRN Cough  guaiFENesin Oral Liquid (Sugar-Free) 100 milliGRAM(s) Oral every 6 hours PRN Cough  HYDROmorphone  Injectable 1 milliGRAM(s) IV Push every 4 hours PRN Moderate Pain (4 - 6)  HYDROmorphone  Injectable 2 milliGRAM(s) IV Push every 4 hours PRN Severe Pain (7 - 10)  megestrol 40 milliGRAM(s) Oral daily PRN appetite  melatonin 3 milliGRAM(s) Oral at bedtime PRN Insomnia  ondansetron Injectable 4 milliGRAM(s) IV Push every 8 hours PRN Nausea and/or Vomiting    Vital Signs Last 24 Hrs  T(C): 37 (06 Nov 2022 05:22), Max: 37.1 (05 Nov 2022 14:53)  T(F): 98.6 (06 Nov 2022 05:22), Max: 98.8 (05 Nov 2022 14:53)  HR: 98 (06 Nov 2022 10:52) (90 - 98)  BP: 105/71 (06 Nov 2022 10:52) (99/69 - 110/73)  BP(mean): --  RR: 19 (06 Nov 2022 05:22) (18 - 20)  SpO2: 97% (06 Nov 2022 05:22) (97% - 98%)    Parameters below as of 06 Nov 2022 05:22  Patient On (Oxygen Delivery Method): room air        I&O's Summary    06 Nov 2022 07:01  -  06 Nov 2022 11:54  --------------------------------------------------------  IN: 240 mL / OUT: 0 mL / NET: 240 mL          Physical Exam:   GENERAL: NAD, well-groomed, well-developed  HEENT: CARLA/   Atraumatic, Normocephalic  ENMT: No tonsillar erythema, exudates, or enlargement; Moist mucous membranes, Good dentition, No lesions  NECK: Supple, No JVD, Normal thyroid  CHEST/LUNG: Clear to auscultaion  CVS: Regular rate and rhythm; No murmurs, rubs, or gallops  GI: : Soft, Nontender, Nondistended; Bowel sounds present  NERVOUS SYSTEM:  Alert & Oriented X3  EXTREMITIES: edema  LYMPH: No lymphadenopathy noted  SKIN: No rashes or lesions  ENDOCRINOLOGY: No Thyromegaly  PSYCH: Appropriate    Labs:                              8.0    5.51  )-----------( 268      ( 04 Nov 2022 06:28 )             26.1                         7.5    7.54  )-----------( 270      ( 03 Nov 2022 07:18 )             23.5     11-03    138  |  102  |  13  ----------------------------<  89  4.7   |  27  |  0.70        CAPILLARY BLOOD GLUCOSE      < from: Xray Chest 1 View- PORTABLE-Routine (Xray Chest 1 View- PORTABLE-Routine in AM.) (10.31.22 @ 09:58) >  There is new medial right lower opacity.  There is continued predominantly linear left upper lobe opacities.  New patchy small left midlung opacity.  There is continued left basilar/retrocardiac opacity with obscuration of   the left hemidiaphragm.  No pneumothorax is seen.  Multiple lytic bone lesions and compression deformities of the spine   again noted consistent with a history of multiple myeloma.      IMPRESSION:  New medial right lower opacity, possibly increased or   redistributed loculated pleural fluid with associated passive   atelectasis. Atelectasis of other cause, and/or pneumonia are possible.    Continued predominantly linear left upper lobe opacities and new patchy   small left midlung opacity possibly atelectasis although infection is not   excluded.    Continued left basilar and retrocardiac opacity which may be due to a   left pleural effusion with passive atelectasis, atelectasis of other   cause, and/or other pathology including, but not limited to, pneumonia.    --- End of Report ---            DENIA HERNANDEZ MD; Attending Radiologist  This document has been electronically signed. Oct 31 2022  3:38PM    < end of copied text >                  RECENT CULTURES:        RESPIRATORY CULTURES:          Studies  Chest X-RAY  CT SCAN Chest   Venous Dopplers: LE:   CT Abdomen  Others

## 2022-11-06 NOTE — PROGRESS NOTE ADULT - PROBLEM SELECTOR PLAN 2
try to wean o2 down : seems better sao2: gonzález resident:    11/1: she feel s much better: passed TOV:    11/2: completed rx:    11/4: resolved:    11/6: resolved

## 2022-11-06 NOTE — PROGRESS NOTE ADULT - ASSESSMENT
CECILIA DUNAWAY is a 61y Female who presents with a chief complaint of dyspnea.    Multiple Myeloma  - Patient follows with Dr. Fatima, [facility].  - No treatment while inpatient or during rehabilitation.    COVID-19   - Patient has completed remdesivir and dexamethasone.  - Remains on room air at this time.  - Continue to monitor for symptoms.  - occasional cough   - awaiting rehab placement     Anemia  - In the setting of acute illness, chemotherapy, and malignancy.  - Monitor and transfuse to maintain hemoglobin > 7.    Rohith Velazco MD  HematologyOncology   O: [phone]

## 2022-11-06 NOTE — PROGRESS NOTE ADULT - PROBLEM SELECTOR PLAN 5
Likely 2/2 high doses of opioid medications  -No BM in 2 days, restarting bowel regimen     -senna 1 tab + miralax now  -Standing senna 2 tabs qhs  -Miralax daily  -c/w Bisacodyl 10mg suppository qd PRN  -Rilastor q2d prn if using high amounts of opioids

## 2022-11-06 NOTE — PROGRESS NOTE ADULT - PROBLEM SELECTOR PLAN 6
-currently undergoing chemo at [facility] [place]  -heme/onc following  -pain control

## 2022-11-06 NOTE — PROGRESS NOTE ADULT - PROBLEM SELECTOR PLAN 1
10/31" her oxygenation is pretty good:  no sob:  no cough : no wheezing: :  on 3 L of o2:  today's chest x-ray to my eye: seems better then last time:  not much of pleural effusion:   however would wait for official report: if not much fluid can follow up as an outpt:    11/1: she has Improved: she is on 1 L of oxygen : repeat chest x-ray noted/: may have small effusions: no tapping : she can follow up [place]:    11/2: she is on room air: at this time : afebrile ; 99.2:  need to check ambulatory o2 sao2:: Pleural effusions need to be followed  up as an outpt  l; she is still in isolation and needs long term isolation as she is immunocompromised:    11/3: seems better: on room air : not sob:  walks without oxygen  : 95% on room air:    11/4; 97% on room air : doing pretty good:   no sob:    11/6: seems OK : tachycardic:  ? do ekg : dw resident

## 2022-11-07 RX ORDER — HYDROMORPHONE HYDROCHLORIDE 2 MG/ML
2 INJECTION INTRAMUSCULAR; INTRAVENOUS; SUBCUTANEOUS EVERY 4 HOURS
Refills: 0 | Status: DISCONTINUED | OUTPATIENT
Start: 2022-11-07 | End: 2022-11-09

## 2022-11-07 RX ORDER — FENTANYL CITRATE 50 UG/ML
1 INJECTION INTRAVENOUS
Refills: 0 | Status: DISCONTINUED | OUTPATIENT
Start: 2022-11-07 | End: 2022-11-09

## 2022-11-07 RX ORDER — HYDROMORPHONE HYDROCHLORIDE 2 MG/ML
1 INJECTION INTRAMUSCULAR; INTRAVENOUS; SUBCUTANEOUS EVERY 4 HOURS
Refills: 0 | Status: DISCONTINUED | OUTPATIENT
Start: 2022-11-07 | End: 2022-11-09

## 2022-11-07 RX ADMIN — HYDROMORPHONE HYDROCHLORIDE 2 MILLIGRAM(S): 2 INJECTION INTRAMUSCULAR; INTRAVENOUS; SUBCUTANEOUS at 11:45

## 2022-11-07 RX ADMIN — PANTOPRAZOLE SODIUM 40 MILLIGRAM(S): 20 TABLET, DELAYED RELEASE ORAL at 06:03

## 2022-11-07 RX ADMIN — AMLODIPINE BESYLATE 10 MILLIGRAM(S): 2.5 TABLET ORAL at 06:03

## 2022-11-07 RX ADMIN — ENOXAPARIN SODIUM 40 MILLIGRAM(S): 100 INJECTION SUBCUTANEOUS at 08:43

## 2022-11-07 RX ADMIN — HYDROMORPHONE HYDROCHLORIDE 2 MILLIGRAM(S): 2 INJECTION INTRAMUSCULAR; INTRAVENOUS; SUBCUTANEOUS at 03:12

## 2022-11-07 RX ADMIN — HYDROMORPHONE HYDROCHLORIDE 2 MILLIGRAM(S): 2 INJECTION INTRAMUSCULAR; INTRAVENOUS; SUBCUTANEOUS at 03:30

## 2022-11-07 RX ADMIN — HYDROMORPHONE HYDROCHLORIDE 2 MILLIGRAM(S): 2 INJECTION INTRAMUSCULAR; INTRAVENOUS; SUBCUTANEOUS at 11:29

## 2022-11-07 RX ADMIN — ENOXAPARIN SODIUM 40 MILLIGRAM(S): 100 INJECTION SUBCUTANEOUS at 20:01

## 2022-11-07 RX ADMIN — FENTANYL CITRATE 1 PATCH: 50 INJECTION INTRAVENOUS at 19:06

## 2022-11-07 RX ADMIN — FENTANYL CITRATE 1 PATCH: 50 INJECTION INTRAVENOUS at 09:59

## 2022-11-07 RX ADMIN — Medication 400 MILLIGRAM(S): at 11:30

## 2022-11-07 RX ADMIN — CHLORHEXIDINE GLUCONATE 1 APPLICATION(S): 213 SOLUTION TOPICAL at 11:30

## 2022-11-07 RX ADMIN — Medication 100 MILLIGRAM(S): at 10:07

## 2022-11-07 NOTE — PROGRESS NOTE ADULT - SUBJECTIVE AND OBJECTIVE BOX
PROGRESS NOTE:     Patient is a 61y old  Female who presents with a chief complaint of SOB, CP (07 Nov 2022 14:08)      SUBJECTIVE / OVERNIGHT EVENTS:   No acute events overnight. Pt resting comfortably, on RA. Denies SOB, CP, n/v, diarrhea, constipation. Reporting 2 BMs per day.    REVIEW OF SYSTEMS:    CONSTITUTIONAL: No weakness, fevers or chills  EYES/ENT: No visual changes;  No vertigo or throat pain   NECK: No pain or stiffness  RESPIRATORY: + cough, no wheezing or hemoptysis; No shortness of breath  CARDIOVASCULAR: No chest pain or palpitations  GASTROINTESTINAL: No abdominal or epigastric pain. No nausea, vomiting, or hematemesis; No diarrhea or constipation. No melena or hematochezia.  GENITOURINARY: No dysuria, frequency or hematuria  NEUROLOGICAL: No numbness or weakness  SKIN: No itching, rashes    MEDICATIONS  (STANDING):  acyclovir   Oral Tab/Cap 400 milliGRAM(s) Oral daily  amLODIPine   Tablet 10 milliGRAM(s) Oral daily  chlorhexidine 4% Liquid 1 Application(s) Topical daily  enoxaparin Injectable 40 milliGRAM(s) SubCutaneous every 12 hours  fentaNYL   Patch  50 MICROgram(s)/Hr 1 Patch Transdermal every 72 hours  naloxone Injectable 0.4 milliGRAM(s) IV Push once  pantoprazole    Tablet 40 milliGRAM(s) Oral before breakfast    MEDICATIONS  (PRN):  ALBUTerol    90 MICROgram(s) HFA Inhaler 2 Puff(s) Inhalation every 4 hours PRN Shortness of Breath and/or Wheezing  aluminum hydroxide/magnesium hydroxide/simethicone Suspension 30 milliLiter(s) Oral every 4 hours PRN Dyspepsia  benzonatate 100 milliGRAM(s) Oral every 8 hours PRN Cough  guaiFENesin Oral Liquid (Sugar-Free) 100 milliGRAM(s) Oral every 6 hours PRN Cough  HYDROmorphone  Injectable 1 milliGRAM(s) IV Push every 4 hours PRN Moderate Pain (4 - 6)  HYDROmorphone  Injectable 2 milliGRAM(s) IV Push every 4 hours PRN Severe Pain (7 - 10)  megestrol 40 milliGRAM(s) Oral daily PRN appetite  melatonin 3 milliGRAM(s) Oral at bedtime PRN Insomnia  ondansetron Injectable 4 milliGRAM(s) IV Push every 8 hours PRN Nausea and/or Vomiting      CAPILLARY BLOOD GLUCOSE        I&O's Summary    06 Nov 2022 07:01  -  07 Nov 2022 07:00  --------------------------------------------------------  IN: 480 mL / OUT: 400 mL / NET: 80 mL        PHYSICAL EXAM:  Vital Signs Last 24 Hrs  T(C): 36.8 (07 Nov 2022 11:30), Max: 36.8 (06 Nov 2022 21:47)  T(F): 98.2 (07 Nov 2022 11:30), Max: 98.3 (07 Nov 2022 05:54)  HR: 107 (07 Nov 2022 11:30) (99 - 109)  BP: 107/70 (07 Nov 2022 11:30) (104/69 - 124/79)  BP(mean): --  RR: 18 (07 Nov 2022 11:30) (18 - 18)  SpO2: 97% (07 Nov 2022 11:30) (95% - 97%)    Parameters below as of 07 Nov 2022 11:30  Patient On (Oxygen Delivery Method): room air        GENERAL: No acute distress  HEAD:  Atraumatic  EYES: EOMI  NECK: Supple  CHEST/LUNG: CTAB; No wheezes, rales, or rhonchi  HEART: Regular rate and rhythm; No murmurs, rubs, or gallops  ABDOMEN: Soft, non-tender, non-distended; normal bowel sounds, no organomegaly  EXTREMITIES:  2+ peripheral pulses b/l, No clubbing, cyanosis, or edema  NEUROLOGY: A&O x 3, no focal deficits  SKIN: No rashes or lesions                    RADIOLOGY & ADDITIONAL TESTS:  No new imaging or tests    COORDINATION OF CARE:  Care Discussed with Consultants/Other Providers [Y/N]:  Prior or Outpatient Records Reviewed [Y/N]:

## 2022-11-07 NOTE — PROGRESS NOTE ADULT - NS ATTEND OPT1A GEN_ALL_CORE
Medical decision making
History/Exam/Medical decision making
Medical decision making

## 2022-11-07 NOTE — PROGRESS NOTE ADULT - ASSESSMENT
61F unvaccinated w/ PMH MM(dx'd 2018) currently on chemo(last session 10/13) p/w 1-day h/o chest pain and fever. Found to have hypoxia in s/o COVID-19  and small-moderate pleural effusions.    Patient passed TOV, now urinating on own. Able to ambulate without supplemental O2. Pt pending [facility] placement, multiple facilities willing to accept however daughter still deciding on facility.

## 2022-11-07 NOTE — PROGRESS NOTE ADULT - PROBLEM SELECTOR PLAN 2
try to wean o2 down : seems better sao2: gonzález resident:    11/1: she feel s much better: passed TOV:    11/2: completed rx:    11/4: resolved:    11/6: resolved    11/7: resolved

## 2022-11-07 NOTE — PROGRESS NOTE ADULT - ASSESSMENT
CECILIA DUNAWAY is a 61y Female who presents with a chief complaint of dyspnea.    Multiple Myeloma  - Patient follows with Dr. Fatima, [facility].  - No treatment while inpatient or during rehabilitation.    COVID-19   - Patient has completed remdesivir and dexamethasone.  - Remains on room air at this time.  - Continue to monitor for symptoms.  - occasional cough   - awaiting rehab placement     Anemia  - In the setting of acute illness, chemotherapy, and malignancy.  - Monitor and transfuse to maintain hemoglobin > 7.  - H/H remains stable    Anne-Marie Arredondo NP  Hematology/ Oncology  [facility]  [phone] (office)  [phone] (alt office)  Evenings and weekends please call MD on call or office

## 2022-11-07 NOTE — PROGRESS NOTE ADULT - PROBLEM SELECTOR PLAN 1
10/31" her oxygenation is pretty good:  no sob:  no cough : no wheezing: :  on 3 L of o2:  today's chest x-ray to my eye: seems better then last time:  not much of pleural effusion:   however would wait for official report: if not much fluid can follow up as an outpt:    11/1: she has Improved: she is on 1 L of oxygen : repeat chest x-ray noted/: may have small effusions: no tapping : she can follow up [place]:    11/2: she is on room air: at this time : afebrile ; 99.2:  need to check ambulatory o2 sao2:: Pleural effusions need to be followed  up as an outpt  l; she is still in isolation and needs long term isolation as she is immunocompromised:    11/3: seems better: on room air : not sob:  walks without oxygen  : 95% on room air:    11/4; 97% on room air : doing pretty good:   no sob:    11/6: seems OK : tachycardic:  ? do ekg : dw resident    11/7: seems stable  : pulm wise: on room air:

## 2022-11-07 NOTE — PROGRESS NOTE ADULT - PROBLEM SELECTOR PLAN 6
-currently undergoing chemo at [facility] [place]  -heme/onc following  -pain control

## 2022-11-07 NOTE — PROGRESS NOTE ADULT - SUBJECTIVE AND OBJECTIVE BOX
Patient is a 61y old  Female who presents with a chief complaint of SOB, CP (07 Nov 2022 15:35)    Patient seen and examined at bedside. Patient doing well, offers no complaints.    MEDICATIONS  (STANDING):  acyclovir   Oral Tab/Cap 400 milliGRAM(s) Oral daily  amLODIPine   Tablet 10 milliGRAM(s) Oral daily  chlorhexidine 4% Liquid 1 Application(s) Topical daily  enoxaparin Injectable 40 milliGRAM(s) SubCutaneous every 12 hours  fentaNYL   Patch  50 MICROgram(s)/Hr 1 Patch Transdermal every 72 hours  naloxone Injectable 0.4 milliGRAM(s) IV Push once  pantoprazole    Tablet 40 milliGRAM(s) Oral before breakfast    MEDICATIONS  (PRN):  ALBUTerol    90 MICROgram(s) HFA Inhaler 2 Puff(s) Inhalation every 4 hours PRN Shortness of Breath and/or Wheezing  aluminum hydroxide/magnesium hydroxide/simethicone Suspension 30 milliLiter(s) Oral every 4 hours PRN Dyspepsia  benzonatate 100 milliGRAM(s) Oral every 8 hours PRN Cough  guaiFENesin Oral Liquid (Sugar-Free) 100 milliGRAM(s) Oral every 6 hours PRN Cough  HYDROmorphone  Injectable 1 milliGRAM(s) IV Push every 4 hours PRN Moderate Pain (4 - 6)  HYDROmorphone  Injectable 2 milliGRAM(s) IV Push every 4 hours PRN Severe Pain (7 - 10)  megestrol 40 milliGRAM(s) Oral daily PRN appetite  melatonin 3 milliGRAM(s) Oral at bedtime PRN Insomnia  ondansetron Injectable 4 milliGRAM(s) IV Push every 8 hours PRN Nausea and/or Vomiting      ROS  No fever, sweats, chills  No epistaxis, HA, sore throat  No CP, SOB, cough, sputum  No n/v/d, abd pain, melena, hematochezia  No edema  No rash  No anxiety  No back pain, joint pain  No bleeding, bruising  No dysuria, hematuria    Vital Signs Last 24 Hrs  T(C): 36.8 (07 Nov 2022 11:30), Max: 36.8 (06 Nov 2022 21:47)  T(F): 98.2 (07 Nov 2022 11:30), Max: 98.3 (07 Nov 2022 05:54)  HR: 99 (07 Nov 2022 14:35) (99 - 109)  BP: 123/65 (07 Nov 2022 14:35) (104/69 - 124/79)  BP(mean): --  RR: 18 (07 Nov 2022 14:35) (18 - 18)  SpO2: 97% (07 Nov 2022 14:35) (95% - 97%)    Parameters below as of 07 Nov 2022 14:35  Patient On (Oxygen Delivery Method): room air        PE  NAD  Awake, alert  Anicteric, MMM  RRR  CTAB  Abd soft, NT, ND  No c/c/e  No rash grossly  FROM

## 2022-11-07 NOTE — PROGRESS NOTE ADULT - PROBLEM SELECTOR PLAN 5
Resolved  Likely 2/2 high doses of opioid medications    holding bowel regimen due to diarrhea  -Standing senna 2 tabs qhs  -Miralax daily  -c/w Bisacodyl 10mg suppository qd PRN  -Rilastor q2d prn if using high amounts of opioids

## 2022-11-07 NOTE — PROGRESS NOTE ADULT - SUBJECTIVE AND OBJECTIVE BOX
C A R D I O L O G Y  **********************************     DATE OF SERVICE: 11-07-22    Discussed with covering RN - no reported chest pain or shortness of breath.   Review of symptoms otherwise negative.    MEDICATIONS:  acyclovir   Oral Tab/Cap 400 milliGRAM(s) Oral daily  ALBUTerol    90 MICROgram(s) HFA Inhaler 2 Puff(s) Inhalation every 4 hours PRN  aluminum hydroxide/magnesium hydroxide/simethicone Suspension 30 milliLiter(s) Oral every 4 hours PRN  amLODIPine   Tablet 10 milliGRAM(s) Oral daily  benzonatate 100 milliGRAM(s) Oral every 8 hours PRN  chlorhexidine 4% Liquid 1 Application(s) Topical daily  enoxaparin Injectable 40 milliGRAM(s) SubCutaneous every 12 hours  fentaNYL   Patch  50 MICROgram(s)/Hr 1 Patch Transdermal every 72 hours  guaiFENesin Oral Liquid (Sugar-Free) 100 milliGRAM(s) Oral every 6 hours PRN  HYDROmorphone  Injectable 1 milliGRAM(s) IV Push every 4 hours PRN  HYDROmorphone  Injectable 2 milliGRAM(s) IV Push every 4 hours PRN  megestrol 40 milliGRAM(s) Oral daily PRN  melatonin 3 milliGRAM(s) Oral at bedtime PRN  naloxone Injectable 0.4 milliGRAM(s) IV Push once  ondansetron Injectable 4 milliGRAM(s) IV Push every 8 hours PRN  pantoprazole    Tablet 40 milliGRAM(s) Oral before breakfast      LABS:      Hemoglobin: 8.0 g/dL (11-04 @ 06:28)  Hemoglobin: 7.5 g/dL (11-03 @ 07:18)            Creatinine Trend: 0.70<--, 0.75<--, 0.76<--, 0.79<--, 0.77<--, 0.82<--    COAGS:         Per Chart  PHYSICAL EXAM:  T(C): 36.8 (11-07-22 @ 11:30), Max: 36.8 (11-06-22 @ 21:47)  HR: 107 (11-07-22 @ 11:30) (99 - 109)  BP: 107/70 (11-07-22 @ 11:30) (104/69 - 124/79)  RR: 18 (11-07-22 @ 11:30) (18 - 18)  SpO2: 97% (11-07-22 @ 11:30) (95% - 97%)  Wt(kg): --    I&O's Summary    06 Nov 2022 07:01  -  07 Nov 2022 07:00  --------------------------------------------------------  IN: 480 mL / OUT: 400 mL / NET: 80 mL        Gen: Appears well in NAD  HEENT:  (-)icterus (-)pallor  CV: N S1 S2 1/6 CHARLY (+)2 Pulses B/l  Resp:  Clear to auscultation B/L, normal effort  GI: (+) BS Soft, NT, ND  Lymph:  (-)Edema, (-)obvious lymphadenopathy  Skin: Warm to touch, Normal turgor  Psych: Appropriate mood and affect    TELEMETRY: None	      ECG: Sinus Tachycardia, PVC    RADIOLOGY:  < from: CT Angio Chest PE Protocol w/ IV Cont (10.18.22 @ 20:03) >  IMPRESSION:    No pulmonary embolism.    Smallright and moderate left pleural effusions and compressive   atelectasis.    --- End of Report ---    < end of copied text >      ASSESSMENT/PLAN: Patient is a 62 y/o Female with PMH of Multiple Myeloma (dx 2018) currently on chemo who presented with chest pain, SOB, and fever admitted with +COVID and b/l pleural effusions. Cardiology consulted for further evaluation.     - Patient with nonanginal chest pain that is pleuritic and very tender to palpation - suspect MSK related likely due to costochondritis and multiple myeloma lytic lesions  - EKGs with sinus rhythm/sinus tachycardia and no acute ischemic changes  - Cardiac enzymes unremarkable  - CTA chest negative for PE, small R and mod L pleural effusions and compressive atelectasis  - TTE with normal LV/RV function  - Pulm follow up - stable on room air  - Supportive care/covid management per primary team  - No further inpatient cardiac w/u planned  - D/C planning per primary team    Artur Cortes PA-C  Pager: [phone]

## 2022-11-07 NOTE — PROGRESS NOTE ADULT - SUBJECTIVE AND OBJECTIVE BOX
Date of Service: 11-07-22 @ 10:33    Patient is a 61y old  Female who presents with a chief complaint of SOB, CP (07 Nov 2022 07:28)      Any change in ROS: seems OK : no sob:  no cough       MEDICATIONS  (STANDING):  acyclovir   Oral Tab/Cap 400 milliGRAM(s) Oral daily  amLODIPine   Tablet 10 milliGRAM(s) Oral daily  chlorhexidine 4% Liquid 1 Application(s) Topical daily  enoxaparin Injectable 40 milliGRAM(s) SubCutaneous every 12 hours  fentaNYL   Patch  50 MICROgram(s)/Hr 1 Patch Transdermal every 72 hours  naloxone Injectable 0.4 milliGRAM(s) IV Push once  pantoprazole    Tablet 40 milliGRAM(s) Oral before breakfast    MEDICATIONS  (PRN):  ALBUTerol    90 MICROgram(s) HFA Inhaler 2 Puff(s) Inhalation every 4 hours PRN Shortness of Breath and/or Wheezing  aluminum hydroxide/magnesium hydroxide/simethicone Suspension 30 milliLiter(s) Oral every 4 hours PRN Dyspepsia  benzonatate 100 milliGRAM(s) Oral every 8 hours PRN Cough  guaiFENesin Oral Liquid (Sugar-Free) 100 milliGRAM(s) Oral every 6 hours PRN Cough  HYDROmorphone  Injectable 1 milliGRAM(s) IV Push every 4 hours PRN Moderate Pain (4 - 6)  HYDROmorphone  Injectable 2 milliGRAM(s) IV Push every 4 hours PRN Severe Pain (7 - 10)  megestrol 40 milliGRAM(s) Oral daily PRN appetite  melatonin 3 milliGRAM(s) Oral at bedtime PRN Insomnia  ondansetron Injectable 4 milliGRAM(s) IV Push every 8 hours PRN Nausea and/or Vomiting    Vital Signs Last 24 Hrs  T(C): 36.8 (07 Nov 2022 05:54), Max: 36.8 (06 Nov 2022 21:47)  T(F): 98.3 (07 Nov 2022 05:54), Max: 98.3 (07 Nov 2022 05:54)  HR: 109 (07 Nov 2022 05:54) (98 - 109)  BP: 109/74 (07 Nov 2022 05:54) (104/69 - 124/79)  BP(mean): --  RR: 18 (07 Nov 2022 05:54) (18 - 18)  SpO2: 97% (07 Nov 2022 05:54) (95% - 97%)    Parameters below as of 07 Nov 2022 05:54  Patient On (Oxygen Delivery Method): room air        I&O's Summary    06 Nov 2022 07:01  -  07 Nov 2022 07:00  --------------------------------------------------------  IN: 480 mL / OUT: 400 mL / NET: 80 mL          Physical Exam:   GENERAL: NAD, well-groomed, well-developed  HEENT: CARLA/   Atraumatic, Normocephalic  ENMT: No tonsillar erythema, exudates, or enlargement; Moist mucous membranes, Good dentition, No lesions  NECK: Supple, No JVD, Normal thyroid  CHEST/LUNG: Clear to auscultaion  CVS: Regular rate and rhythm; No murmurs, rubs, or gallops  GI: : Soft, Nontender, Nondistended; Bowel sounds present  NERVOUS SYSTEM:  Alert & Oriented X3  EXTREMITIES: - edema  LYMPH: No lymphadenopathy noted  SKIN: No rashes or lesions  ENDOCRINOLOGY: No Thyromegaly  PSYCH: Appropriate    Labs:                              8.0    5.51  )-----------( 268      ( 04 Nov 2022 06:28 )             26.1           CAPILLARY BLOOD GLUCOSE            < from: Xray Chest 1 View- PORTABLE-Routine (Xray Chest 1 View- PORTABLE-Routine in AM.) (10.31.22 @ 09:58) >  mall left midlung opacity.  There is continued left basilar/retrocardiac opacity with obscuration of   the left hemidiaphragm.  No pneumothorax is seen.  Multiple lytic bone lesions and compression deformities of the spine   again noted consistent with a history of multiple myeloma.      IMPRESSION:  New medial right lower opacity, possibly increased or   redistributed loculated pleural fluid with associated passive   atelectasis. Atelectasis of other cause, and/or pneumonia are possible.    Continued predominantly linear left upper lobe opacities and new patchy   small left midlung opacity possibly atelectasis although infection is not   excluded.    Continued left basilar and retrocardiac opacity which may be due to a   left pleural effusion with passive atelectasis, atelectasis of other   cause, and/or other pathology including, but not limited to, pneumonia.    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  The heart size is not accurately assessed on this projection.  There is a streaky opacity at the left upper lung field.  There appears to be resolution of the left-sided pleural effusion.   Improving right-sided pleural effusion.  There is no pneumothorax .  There is generalized osteopenia and innumerable lytic lucencies   throughout the visualized osseous structures, compatible with known   myeloma.  IMPRESSION:  Resolved left-sided pleural effusion with trace right-sided pleural   effusion, however evaluation is limited due to patient malrotation.  New streaky opacity at the left upper lobe likely represents area of   linear atelectasis, however infectious etiology cannot be excluded.    --- End of Report ---           HAMLET TOSCANO MD; Resident Radiologist  This document has been electronically signed.  CDAEN ONEILL MD; Attending Radiologist  This document has been electronically signed. Oct 28 2022 10:24AM    < end of copied text >            RECENT CULTURES:        RESPIRATORY CULTURES:          Studies  Chest X-RAY  CT SCAN Chest   Venous Dopplers: LE:   CT Abdomen  Others    r< from: Xray Chest 1 View- PORTABLE-Routine (Xray Chest 1 View- PORTABLE-Routine in AM.) (10.31.22 @ 09:58) >    ACC: [id number] EXAM:  XR CHEST PORTABLE ROUTINE 1V                          PROCEDURE DATE:  10/31/2022          INTERPRETATION:  TIME OF EXAM: October 31, 2022 at 8:38 AM.    CLINICAL INFORMATION: Multiple myeloma. Covid pneumonia. Pleural effusion.    COMPARISON:  October 27, 2022.    TECHNIQUE:   AP Portable chest x-ray. Limited by rotation.    INTERPRETATION:    Heart size and the mediastinum cannot be accurately evaluated on this   projection.  There is new medial right lower opacity.  There is continued predominantly linear left upper lobe opacities.  New patchy small left midlung opacity.  There is continued left basilar/retrocardiac opacity with obscuration of   the left hemidiaphragm.  No pneumothorax is seen.  Multiple lytic bone lesions and compression deformities of the spine   again noted consistent with a history of multiple myeloma.      IMPRESSION:  New medial right lower opacity, possibly increased or   redistributed loculated pleural fluid with associated passive   atelectasis. Atelectasis of other cause, and/or pneumonia are possible.    Continued predominantly linear left upper lobe opacities and new patchy   small left midlung opacity possibly atelectasis although infection is not   excluded.    Continued left basilar and retrocardiac opacity which may be due to a   left pleural effusion with passive atelectasis, atelectasis of other   cause, and/or other pathology including, but not limited to, pneumonia.    --- End of Report ---            DENIA HERNANDEZ MD; Attending Radiologist  This document has been electronically signed. Oct 31 2022  3:38PM    < end of copied text >

## 2022-11-08 LAB
ANION GAP SERPL CALC-SCNC: 9 MMOL/L — SIGNIFICANT CHANGE UP (ref 5–17)
APTT BLD: 30.6 SEC — SIGNIFICANT CHANGE UP (ref 27.5–35.5)
BUN SERPL-MCNC: 11 MG/DL — SIGNIFICANT CHANGE UP (ref 7–23)
CALCIUM SERPL-MCNC: 8.9 MG/DL — SIGNIFICANT CHANGE UP (ref 8.4–10.5)
CHLORIDE SERPL-SCNC: 101 MMOL/L — SIGNIFICANT CHANGE UP (ref 96–108)
CO2 SERPL-SCNC: 24 MMOL/L — SIGNIFICANT CHANGE UP (ref 22–31)
CREAT SERPL-MCNC: 0.63 MG/DL — SIGNIFICANT CHANGE UP (ref 0.5–1.3)
EGFR: 101 ML/MIN/1.73M2 — SIGNIFICANT CHANGE UP
GLUCOSE SERPL-MCNC: 86 MG/DL — SIGNIFICANT CHANGE UP (ref 70–99)
HCT VFR BLD CALC: 22.8 % — LOW (ref 34.5–45)
HGB BLD-MCNC: 7.3 G/DL — LOW (ref 11.5–15.5)
INR BLD: 1.09 RATIO — SIGNIFICANT CHANGE UP (ref 0.88–1.16)
MAGNESIUM SERPL-MCNC: 2.1 MG/DL — SIGNIFICANT CHANGE UP (ref 1.6–2.6)
MCHC RBC-ENTMCNC: 31.2 PG — SIGNIFICANT CHANGE UP (ref 27–34)
MCHC RBC-ENTMCNC: 32 GM/DL — SIGNIFICANT CHANGE UP (ref 32–36)
MCV RBC AUTO: 97.4 FL — SIGNIFICANT CHANGE UP (ref 80–100)
NRBC # BLD: 0 /100 WBCS — SIGNIFICANT CHANGE UP (ref 0–0)
PHOSPHATE SERPL-MCNC: 2.8 MG/DL — SIGNIFICANT CHANGE UP (ref 2.5–4.5)
PLATELET # BLD AUTO: 212 K/UL — SIGNIFICANT CHANGE UP (ref 150–400)
POTASSIUM SERPL-MCNC: 4.4 MMOL/L — SIGNIFICANT CHANGE UP (ref 3.5–5.3)
POTASSIUM SERPL-SCNC: 4.4 MMOL/L — SIGNIFICANT CHANGE UP (ref 3.5–5.3)
PROTHROM AB SERPL-ACNC: 12.7 SEC — SIGNIFICANT CHANGE UP (ref 10.5–13.4)
RBC # BLD: 2.34 M/UL — LOW (ref 3.8–5.2)
RBC # FLD: 18.3 % — HIGH (ref 10.3–14.5)
SARS-COV-2 RNA SPEC QL NAA+PROBE: SIGNIFICANT CHANGE UP
SODIUM SERPL-SCNC: 134 MMOL/L — LOW (ref 135–145)
WBC # BLD: 5.26 K/UL — SIGNIFICANT CHANGE UP (ref 3.8–10.5)
WBC # FLD AUTO: 5.26 K/UL — SIGNIFICANT CHANGE UP (ref 3.8–10.5)

## 2022-11-08 RX ADMIN — Medication 100 MILLIGRAM(S): at 07:59

## 2022-11-08 RX ADMIN — AMLODIPINE BESYLATE 10 MILLIGRAM(S): 2.5 TABLET ORAL at 06:51

## 2022-11-08 RX ADMIN — HYDROMORPHONE HYDROCHLORIDE 2 MILLIGRAM(S): 2 INJECTION INTRAMUSCULAR; INTRAVENOUS; SUBCUTANEOUS at 07:04

## 2022-11-08 RX ADMIN — ENOXAPARIN SODIUM 40 MILLIGRAM(S): 100 INJECTION SUBCUTANEOUS at 07:54

## 2022-11-08 RX ADMIN — FENTANYL CITRATE 1 PATCH: 50 INJECTION INTRAVENOUS at 20:00

## 2022-11-08 RX ADMIN — PANTOPRAZOLE SODIUM 40 MILLIGRAM(S): 20 TABLET, DELAYED RELEASE ORAL at 06:50

## 2022-11-08 RX ADMIN — ENOXAPARIN SODIUM 40 MILLIGRAM(S): 100 INJECTION SUBCUTANEOUS at 21:34

## 2022-11-08 RX ADMIN — FENTANYL CITRATE 1 PATCH: 50 INJECTION INTRAVENOUS at 07:10

## 2022-11-08 RX ADMIN — HYDROMORPHONE HYDROCHLORIDE 2 MILLIGRAM(S): 2 INJECTION INTRAMUSCULAR; INTRAVENOUS; SUBCUTANEOUS at 07:30

## 2022-11-08 NOTE — PROGRESS NOTE ADULT - SUBJECTIVE AND OBJECTIVE BOX
C A R D I O L O G Y  **********************************     DATE OF SERVICE: 11-08-22    Patient denies chest pain or shortness of breath.   Review of symptoms otherwise negative.    MEDICATIONS:  acyclovir   Oral Tab/Cap 400 milliGRAM(s) Oral daily  ALBUTerol    90 MICROgram(s) HFA Inhaler 2 Puff(s) Inhalation every 4 hours PRN  aluminum hydroxide/magnesium hydroxide/simethicone Suspension 30 milliLiter(s) Oral every 4 hours PRN  amLODIPine   Tablet 10 milliGRAM(s) Oral daily  benzonatate 100 milliGRAM(s) Oral every 8 hours PRN  chlorhexidine 4% Liquid 1 Application(s) Topical daily  enoxaparin Injectable 40 milliGRAM(s) SubCutaneous every 12 hours  fentaNYL   Patch  50 MICROgram(s)/Hr 1 Patch Transdermal every 72 hours  guaiFENesin Oral Liquid (Sugar-Free) 100 milliGRAM(s) Oral every 6 hours PRN  HYDROmorphone  Injectable 1 milliGRAM(s) IV Push every 4 hours PRN  HYDROmorphone  Injectable 2 milliGRAM(s) IV Push every 4 hours PRN  megestrol 40 milliGRAM(s) Oral daily PRN  melatonin 3 milliGRAM(s) Oral at bedtime PRN  naloxone Injectable 0.4 milliGRAM(s) IV Push once  ondansetron Injectable 4 milliGRAM(s) IV Push every 8 hours PRN  pantoprazole    Tablet 40 milliGRAM(s) Oral before breakfast      LABS:                        7.3    5.26  )-----------( 212      ( 08 Nov 2022 06:55 )             22.8       Hemoglobin: 7.3 g/dL (11-08 @ 06:55)  Hemoglobin: 8.0 g/dL (11-04 @ 06:28)      11-08    134<L>  |  101  |  11  ----------------------------<  86  4.4   |  24  |  0.63    Ca    8.9      08 Nov 2022 06:57  Phos  2.8     11-08  Mg     2.1     11-08      Creatinine Trend: 0.63<--, 0.70<--, 0.75<--, 0.76<--, 0.79<--, 0.77<--    COAGS: PT/INR - ( 08 Nov 2022 06:55 )   PT: 12.7 sec;   INR: 1.09 ratio         PTT - ( 08 Nov 2022 06:55 )  PTT:30.6 sec          PHYSICAL EXAM:  T(C): 36.5 (11-08-22 @ 11:58), Max: 36.5 (11-08-22 @ 11:58)  HR: 90 (11-08-22 @ 11:58) (90 - 91)  BP: 116/77 (11-08-22 @ 11:58) (110/75 - 117/77)  RR: 18 (11-08-22 @ 11:58) (18 - 18)  SpO2: 99% (11-08-22 @ 11:58) (98% - 99%)  Wt(kg): --    I&O's Summary    07 Nov 2022 07:01  -  08 Nov 2022 07:00  --------------------------------------------------------  IN: 430 mL / OUT: 450 mL / NET: -20 mL        Gen: Appears well in NAD  HEENT:  (-)icterus (-)pallor  CV: N S1 S2 1/6 CHARLY (+)2 Pulses B/l  Resp:  Clear to auscultation B/L, normal effort  GI: (+) BS Soft, NT, ND  Lymph:  (-)Edema, (-)obvious lymphadenopathy  Skin: Warm to touch, Normal turgor  Psych: Appropriate mood and affect    TELEMETRY: None	      ECG: Sinus Tachycardia, PVC    RADIOLOGY:  < from: CT Angio Chest PE Protocol w/ IV Cont (10.18.22 @ 20:03) >  IMPRESSION:    No pulmonary embolism.    Smallright and moderate left pleural effusions and compressive   atelectasis.    --- End of Report ---    < end of copied text >      ASSESSMENT/PLAN: Patient is a 60 y/o Female with PMH of Multiple Myeloma (dx 2018) currently on chemo who presented with chest pain, SOB, and fever admitted with +COVID and b/l pleural effusions. Cardiology consulted for further evaluation.     - Patient with nonanginal chest pain that is pleuritic and very tender to palpation now much improved - suspect MSK related likely due to costochondritis and multiple myeloma lytic lesions  - EKGs with sinus rhythm/sinus tachycardia and no acute ischemic changes  - Cardiac enzymes unremarkable  - CTA chest negative for PE, small R and mod L pleural effusions and compressive atelectasis  - TTE with normal LV/RV function  - Pulm follow up - stable on room air  - Supportive care/covid management per primary team  - No further inpatient cardiac w/u planned  - D/C planning per primary team - pending rehab    Artur Cortes PA-C  Pager: [phone]

## 2022-11-08 NOTE — PROGRESS NOTE ADULT - PROBLEM SELECTOR PLAN 2
try to wean o2 down : seems better sao2: [facility] resident:    11/1: she feel s much better: passed TOV:    11/2: completed rx:    11/4: resolved:    11/6: resolved    11/7: resolved    11/08: resolved

## 2022-11-08 NOTE — PROGRESS NOTE ADULT - PROBLEM SELECTOR PLAN 1
10/31" her oxygenation is pretty good:  no sob:  no cough : no wheezing: :  on 3 L of o2:  today's chest x-ray to my eye: seems better then last time:  not much of pleural effusion:   however would wait for official report: if not much fluid can follow up as an outpt:    11/1: she has Improved: she is on 1 L of oxygen : repeat chest x-ray noted/: may have small effusions: no tapping : she can follow up [place]:    11/2: she is on room air: at this time : afebrile ; 99.2:  need to check ambulatory o2 sao2:: Pleural effusions need to be followed  up as an outpt  l; she is still in isolation and needs long term isolation as she is immunocompromised:    11/3: seems better: on room air : not sob:  walks without oxygen  : 95% on room air:    11/4; 97% on room air : doing pretty good:   no sob:    11/6: seems OK : tachycardic:  ? do ekg : dw resident    11/7: seems stable  : pulm wise: on room air:    11/08: seems OK : no sob: no cough : on room air : anemic today :

## 2022-11-08 NOTE — PROGRESS NOTE ADULT - ASSESSMENT
CECILIA DUNAWAY is a 61y Female who presents with a chief complaint of dyspnea.    Multiple Myeloma  - Patient follows with Dr. Fatima, [facility].  - No treatment while inpatient or during rehabilitation.    COVID-19   - Patient has completed remdesivir and dexamethasone.  - Remains on room air at this time.  - Continue to monitor for symptoms.  - occasional cough  - awaiting rehab placement     Anemia  - In the setting of acute illness, chemotherapy, and malignancy.  - Monitor and transfuse to maintain hemoglobin > 7.  - H/H remains stable    Anne-Marie Arredondo NP  Hematology/ Oncology  [facility]  [phone] (office)  [phone] (alt office)  Evenings and weekends please call MD on call or office

## 2022-11-08 NOTE — PROGRESS NOTE ADULT - NSPROGADDITIONALINFOA_GEN_ALL_CORE
[facility] team resident:    10*/30:  [facility] resident:    10/31: ava killian : resident    11/1: [facility] resident    11/2: gonz[facility]lez team 4 resident
gonzález team resident:    10*/30:  g[facility]lez resident:    10/31: ava killian : resident
gnozález team resident:    10*/30:  g[facility]ález resident:    10/31: ava killian : resident    11/1: [facility] resident    11/2: gonz[facility]lez team 4 resident    11/3: pleural effusions needs to be followed up as anoutpt
gonz[facility]lez team resident:
gonz[facility]lez team resident:
gonzález team resident:    10*/30:  g[facility]lez resident:
gonzález team resident:
gonzález team resident:
gonzález team resident:    10*/30:  g[facility]lez resident:    10/31: ava killian : resident    11/1: g[facility] resident
gonzález team resident:
gonzález team resident:    10*/30:  gonzález resident:    10/31: ava killian : resident    11/1: [facility] resident    11/2: gonz[facility]lez team 4 resident    11/3: pleural effusions needs to be followed up as anoutpt    11/4: gonzález pt /
gonzález team resident:    10*/30:  g[facility]lez resident:    10/31: ava killian : resident    11/1: [facility] resident    11/2: gonzález team 4 resident    11/3: pleural effusions needs to be followed up as anoutpt    11/4: gonzález pt /    11/6: gonzález resident:  tachycardic to-day  : gonzález resident:  do ekg and closely follow    11/7:? dc planning
gonzález team resident:
gonzález team resident:    10*/30:  gonzález resident:    10/31: ava killian : resident    11/1: g[facility]lez resident    11/2: gonzález team 4 resident    11/3: pleural effusions needs to be followed up as anoutpt    11/4: gonzález pt /    11/6: gonzález resident:  tachycardic to-day  : gonzález resident:  do ekg and closely follow    11/7:? dc planning    11/08: pulm wise stable : anemic today  ? frequent blood draws : defer to hem onc : stable : pulm wise
gonzález team resident:
gonzález team resident:
gonzález team resident:    10*/30:  g[facility]ález resident:    10/31: ava killian : resident    11/1: g[facility]lez resident    11/2: gonzález team 4 resident    11/3: pleural effusions needs to be followed up as anoutpt    11/4: gonzález pt /    11/6: g[facility]ález resident:  tachycardic to-day  : gonzález resident:  do ekg and closely follow

## 2022-11-08 NOTE — PROVIDER CONTACT NOTE (OTHER) - SITUATION
Chest pain
Pt complaining of "chest pain due to coughing"
pt has a productive cough that has a coffee brown color

## 2022-11-08 NOTE — PROGRESS NOTE ADULT - PROBLEM SELECTOR PLAN 6
-currently undergoing chemo at [facility]  -heme/onc following  -pain control

## 2022-11-08 NOTE — PROVIDER CONTACT NOTE (OTHER) - ACTION/TREATMENT ORDERED:
Hold Lovenox for tonight. Monitor pt status for any changes
Hydromorphone 2mg and benzonatate
MD called to bedside. EKG ordered. 1mg dilaudid to be given.

## 2022-11-08 NOTE — PROGRESS NOTE ADULT - SUBJECTIVE AND OBJECTIVE BOX
Patient is a 61y old  Female who presents with a chief complaint of SOB, CP (08 Nov 2022 11:11)    Patient seen and examined this morning. Patient noted resting comfortably in bed, offers no complaints.    MEDICATIONS  (STANDING):  acyclovir   Oral Tab/Cap 400 milliGRAM(s) Oral daily  amLODIPine   Tablet 10 milliGRAM(s) Oral daily  chlorhexidine 4% Liquid 1 Application(s) Topical daily  enoxaparin Injectable 40 milliGRAM(s) SubCutaneous every 12 hours  fentaNYL   Patch  50 MICROgram(s)/Hr 1 Patch Transdermal every 72 hours  naloxone Injectable 0.4 milliGRAM(s) IV Push once  pantoprazole    Tablet 40 milliGRAM(s) Oral before breakfast    MEDICATIONS  (PRN):  ALBUTerol    90 MICROgram(s) HFA Inhaler 2 Puff(s) Inhalation every 4 hours PRN Shortness of Breath and/or Wheezing  aluminum hydroxide/magnesium hydroxide/simethicone Suspension 30 milliLiter(s) Oral every 4 hours PRN Dyspepsia  benzonatate 100 milliGRAM(s) Oral every 8 hours PRN Cough  guaiFENesin Oral Liquid (Sugar-Free) 100 milliGRAM(s) Oral every 6 hours PRN Cough  HYDROmorphone  Injectable 1 milliGRAM(s) IV Push every 4 hours PRN Moderate Pain (4 - 6)  HYDROmorphone  Injectable 2 milliGRAM(s) IV Push every 4 hours PRN Severe Pain (7 - 10)  megestrol 40 milliGRAM(s) Oral daily PRN appetite  melatonin 3 milliGRAM(s) Oral at bedtime PRN Insomnia  ondansetron Injectable 4 milliGRAM(s) IV Push every 8 hours PRN Nausea and/or Vomiting      ROS  No fever, sweats, chills  No epistaxis, HA, sore throat  No CP, SOB, cough, sputum  No n/v/d, abd pain, melena, hematochezia  No edema  No rash  No anxiety  No back pain, joint pain  No bleeding, bruising  No dysuria, hematuria    Vital Signs Last 24 Hrs  T(C): 36.5 (08 Nov 2022 11:58), Max: 36.5 (08 Nov 2022 11:58)  T(F): 97.7 (08 Nov 2022 11:58), Max: 97.7 (08 Nov 2022 11:58)  HR: 90 (08 Nov 2022 11:58) (90 - 99)  BP: 116/77 (08 Nov 2022 11:58) (110/75 - 123/65)  BP(mean): --  RR: 18 (08 Nov 2022 11:58) (18 - 18)  SpO2: 99% (08 Nov 2022 11:58) (97% - 99%)    Parameters below as of 08 Nov 2022 11:58  Patient On (Oxygen Delivery Method): room air        PE  NAD  Awake, alert  Anicteric, MMM  RRR  CTAB  Abd soft, NT, ND  No c/c/e  No rash grossly  FROM                          7.3    5.26  )-----------( 212      ( 08 Nov 2022 06:55 )             22.8       11-08    134<L>  |  101  |  11  ----------------------------<  86  4.4   |  24  |  0.63    Ca    8.9      08 Nov 2022 06:57  Phos  2.8     11-08  Mg     2.1     11-08

## 2022-11-08 NOTE — PROVIDER CONTACT NOTE (OTHER) - ASSESSMENT
Chest pain. Patient stated I have the chest pain when I cough   No radiation
PT BP 99/68, . 10/10 chest pain.
pt has a infrequent productive cough, produced a moderate amount of thick coffee brown sputum

## 2022-11-08 NOTE — PROGRESS NOTE ADULT - SUBJECTIVE AND OBJECTIVE BOX
Date of Service: 11-08-22 @ 11:11    Patient is a 61y old  Female who presents with a chief complaint of SOB, CP (08 Nov 2022 09:03)      Any change in ROS:  doing pretty good    MEDICATIONS  (STANDING):  acyclovir   Oral Tab/Cap 400 milliGRAM(s) Oral daily  amLODIPine   Tablet 10 milliGRAM(s) Oral daily  chlorhexidine 4% Liquid 1 Application(s) Topical daily  enoxaparin Injectable 40 milliGRAM(s) SubCutaneous every 12 hours  fentaNYL   Patch  50 MICROgram(s)/Hr 1 Patch Transdermal every 72 hours  naloxone Injectable 0.4 milliGRAM(s) IV Push once  pantoprazole    Tablet 40 milliGRAM(s) Oral before breakfast    MEDICATIONS  (PRN):  ALBUTerol    90 MICROgram(s) HFA Inhaler 2 Puff(s) Inhalation every 4 hours PRN Shortness of Breath and/or Wheezing  aluminum hydroxide/magnesium hydroxide/simethicone Suspension 30 milliLiter(s) Oral every 4 hours PRN Dyspepsia  benzonatate 100 milliGRAM(s) Oral every 8 hours PRN Cough  guaiFENesin Oral Liquid (Sugar-Free) 100 milliGRAM(s) Oral every 6 hours PRN Cough  HYDROmorphone  Injectable 1 milliGRAM(s) IV Push every 4 hours PRN Moderate Pain (4 - 6)  HYDROmorphone  Injectable 2 milliGRAM(s) IV Push every 4 hours PRN Severe Pain (7 - 10)  megestrol 40 milliGRAM(s) Oral daily PRN appetite  melatonin 3 milliGRAM(s) Oral at bedtime PRN Insomnia  ondansetron Injectable 4 milliGRAM(s) IV Push every 8 hours PRN Nausea and/or Vomiting    Vital Signs Last 24 Hrs  T(C): 36.4 (08 Nov 2022 05:37), Max: 36.8 (07 Nov 2022 11:30)  T(F): 97.5 (08 Nov 2022 05:37), Max: 98.2 (07 Nov 2022 11:30)  HR: 91 (08 Nov 2022 05:37) (91 - 107)  BP: 110/75 (08 Nov 2022 05:37) (107/70 - 123/65)  BP(mean): --  RR: 18 (08 Nov 2022 05:37) (18 - 18)  SpO2: 99% (08 Nov 2022 05:37) (97% - 99%)    Parameters below as of 08 Nov 2022 05:37  Patient On (Oxygen Delivery Method): room air        I&O's Summary    07 Nov 2022 07:01  -  08 Nov 2022 07:00  --------------------------------------------------------  IN: 430 mL / OUT: 450 mL / NET: -20 mL          Physical Exam:   GENERAL: NAD, well-groomed, well-developed  HEENT: CARLA/   Atraumatic, Normocephalic  ENMT: No tonsillar erythema, exudates, or enlargement; Moist mucous membranes, Good dentition, No lesions  NECK: Supple, No JVD, Normal thyroid  CHEST/LUNG: Clear to auscultaion  CVS: Regular rate and rhythm; No murmurs, rubs, or gallops  GI: : Soft, Nontender, Nondistended; Bowel sounds present  NERVOUS SYSTEM:  Alert & Oriented X3,  EXTREMITIES:  2+ Peripheral Pulses, No clubbing, cyanosis, or edema  LYMPH: No lymphadenopathy noted  SKIN: No rashes or lesions  ENDOCRINOLOGY: No Thyromegaly  PSYCH: Appropriate    Labs:                              7.3    5.26  )-----------( 212      ( 08 Nov 2022 06:55 )             22.8     11-08    134<L>  |  101  |  11  ----------------------------<  86  4.4   |  24  |  0.63    Ca    8.9      08 Nov 2022 06:57  Phos  2.8     11-08  Mg     2.1     11-08      CAPILLARY BLOOD GLUCOSE            PT/INR - ( 08 Nov 2022 06:55 )   PT: 12.7 sec;   INR: 1.09 ratio         PTT - ( 08 Nov 2022 06:55 )  PTT:30.6 sec          RECENT CULTURES:        RESPIRATORY CULTURES:    rad< from: Xray Chest 1 View- PORTABLE-Routine (Xray Chest 1 View- PORTABLE-Routine in AM.) (10.31.22 @ 09:58) >  INTERPRETATION:    Heart size and the mediastinum cannot be accurately evaluated on this   projection.  There is new medial right lower opacity.  There is continued predominantly linear left upper lobe opacities.  New patchy small left midlung opacity.  There is continued left basilar/retrocardiac opacity with obscuration of   the left hemidiaphragm.  No pneumothorax is seen.  Multiple lytic bone lesions and compression deformities of the spine   again noted consistent with a history of multiple myeloma.      IMPRESSION:  New medial right lower opacity, possibly increased or   redistributed loculated pleural fluid with associated passive   atelectasis. Atelectasis of other cause, and/or pneumonia are possible.    Continued predominantly linear left upper lobe opacities and new patchy   small left midlung opacity possibly atelectasis although infection is not   excluded.    Continued left basilar and retrocardiac opacity which may be due to a   left pleural effusion with passive atelectasis, atelectasis of other   cause, and/or other pathology including, but not limited to, pneumonia.    --- End of Report ---        < end of copied text >        Studies  Chest X-RAY  CT SCAN Chest   Venous Dopplers: LE:   CT Abdomen  Others

## 2022-11-08 NOTE — PROGRESS NOTE ADULT - ATTENDING COMMENTS
Agree with house staff a and p
agree with house staff A and P    Acute hypoxic resp failure sec to COVID 19  - improved  - off decadron and remdesivir  - PT and dc planning   - dw house staff and pulmonary
Acute hypoxic respiratory failure sec to COVID 19  - cw high flow  - cw dexamethasone and remdisivir  - pulmonary fu
agree with house staff a and p   PT and dc planning to rehab
agree with house staff a and p   dc planning to rehab
agree with house staff a and p   dc planning to rehab
agree with house staff and p   wean off oxygen  dc planning
agree with house staff a and p  cw oxygen support  wean as tolerated  check CXR   pulmonary fu
Agree with house staff a and p
Agree with house staff and p
agree with house staff A and P
agree with house staff a and p     Acute hypoxic respiratory failure sec to COVID 19  - improving  - finished remdesivir and decadron  - pulmonary following     MM   - onc fu
Agree with house staff A and P
Acute hypoxic resp failure sec to COVID 19  - cw remdesivir and decadron  - will monitor   - pulmonary following    Chest pain  - likely sec to bone mets  - pain control    MM  - heme following

## 2022-11-08 NOTE — PROGRESS NOTE ADULT - SUBJECTIVE AND OBJECTIVE BOX
PROGRESS NOTE:     Patient is a 61y old  Female who presents with a chief complaint of SOB, CP (07 Nov 2022 16:39)      SUBJECTIVE / OVERNIGHT EVENTS:   No acute events overnight. Per documentation, pt had cough productive of brown sputum. However, pt denies this and states her cough has been productive of only clear sputum. Denies SOB, CP, n/v. States she is having 2 BMs per day. Feels she is regaining strength.     REVIEW OF SYSTEMS:    CONSTITUTIONAL: No weakness, fevers or chills  EYES/ENT: No visual changes;  No vertigo or throat pain   NECK: No pain or stiffness  RESPIRATORY: + cough, no wheezing, no hemoptysis; No shortness of breath  CARDIOVASCULAR: No chest pain or palpitations  GASTROINTESTINAL: No abdominal or epigastric pain. No nausea, vomiting, or hematemesis; No diarrhea or constipation. No melena or hematochezia.  GENITOURINARY: No dysuria, frequency or hematuria  NEUROLOGICAL: No numbness or weakness  SKIN: No itching, rashes    MEDICATIONS  (STANDING):  acyclovir   Oral Tab/Cap 400 milliGRAM(s) Oral daily  amLODIPine   Tablet 10 milliGRAM(s) Oral daily  chlorhexidine 4% Liquid 1 Application(s) Topical daily  enoxaparin Injectable 40 milliGRAM(s) SubCutaneous every 12 hours  fentaNYL   Patch  50 MICROgram(s)/Hr 1 Patch Transdermal every 72 hours  naloxone Injectable 0.4 milliGRAM(s) IV Push once  pantoprazole    Tablet 40 milliGRAM(s) Oral before breakfast    MEDICATIONS  (PRN):  ALBUTerol    90 MICROgram(s) HFA Inhaler 2 Puff(s) Inhalation every 4 hours PRN Shortness of Breath and/or Wheezing  aluminum hydroxide/magnesium hydroxide/simethicone Suspension 30 milliLiter(s) Oral every 4 hours PRN Dyspepsia  benzonatate 100 milliGRAM(s) Oral every 8 hours PRN Cough  guaiFENesin Oral Liquid (Sugar-Free) 100 milliGRAM(s) Oral every 6 hours PRN Cough  HYDROmorphone  Injectable 1 milliGRAM(s) IV Push every 4 hours PRN Moderate Pain (4 - 6)  HYDROmorphone  Injectable 2 milliGRAM(s) IV Push every 4 hours PRN Severe Pain (7 - 10)  megestrol 40 milliGRAM(s) Oral daily PRN appetite  melatonin 3 milliGRAM(s) Oral at bedtime PRN Insomnia  ondansetron Injectable 4 milliGRAM(s) IV Push every 8 hours PRN Nausea and/or Vomiting      CAPILLARY BLOOD GLUCOSE        I&O's Summary    07 Nov 2022 07:01  -  08 Nov 2022 07:00  --------------------------------------------------------  IN: 430 mL / OUT: 450 mL / NET: -20 mL        PHYSICAL EXAM:  Vital Signs Last 24 Hrs  T(C): 36.4 (08 Nov 2022 05:37), Max: 36.8 (07 Nov 2022 11:30)  T(F): 97.5 (08 Nov 2022 05:37), Max: 98.2 (07 Nov 2022 11:30)  HR: 91 (08 Nov 2022 05:37) (91 - 107)  BP: 110/75 (08 Nov 2022 05:37) (107/70 - 123/65)  BP(mean): --  RR: 18 (08 Nov 2022 05:37) (18 - 18)  SpO2: 99% (08 Nov 2022 05:37) (97% - 99%)    Parameters below as of 08 Nov 2022 05:37  Patient On (Oxygen Delivery Method): room air        CONSTITUTIONAL: NAD, well-developed  RESPIRATORY: Normal respiratory effort; lungs are clear to auscultation bilaterally  CARDIOVASCULAR: Regular rate and rhythm, normal S1 and S2, no murmur/rub/gallop; No lower extremity edema; Peripheral pulses are 2+ bilaterally  ABDOMEN: Nontender to palpation, normoactive bowel sounds, no rebound/guarding; No hepatosplenomegaly  MUSCLOSKELETAL: no clubbing or cyanosis of digits; no joint swelling or tenderness to palpation  PSYCH: A+O to person, place, and time; affect appropriate  NEURO: Non-focal, no tremors  SKIN: No rashes    LABS:                        7.3    5.26  )-----------( 212      ( 08 Nov 2022 06:55 )             22.8     11-08    134<L>  |  101  |  11  ----------------------------<  86  4.4   |  24  |  0.63    Ca    8.9      08 Nov 2022 06:57  Phos  2.8     11-08  Mg     2.1     11-08      PT/INR - ( 08 Nov 2022 06:55 )   PT: 12.7 sec;   INR: 1.09 ratio         PTT - ( 08 Nov 2022 06:55 )  PTT:30.6 sec            RADIOLOGY & ADDITIONAL TESTS:  No new imaging or tests    COORDINATION OF CARE:  Care Discussed with Consultants/Other Providers [Y/N]:  Prior or Outpatient Records Reviewed [Y/N]:

## 2022-11-08 NOTE — PROVIDER CONTACT NOTE (OTHER) - BACKGROUND
PT admitted for COVID 19. 3.5 L NC.
Multiple myeloma  Partial hysterectomy
pt admitted due to pneumonia secondary to covid-19 infection

## 2022-11-09 ENCOUNTER — TRANSCRIPTION ENCOUNTER (OUTPATIENT)
Age: 62
End: 2022-11-09

## 2022-11-09 VITALS
TEMPERATURE: 98 F | OXYGEN SATURATION: 97 % | SYSTOLIC BLOOD PRESSURE: 111 MMHG | RESPIRATION RATE: 18 BRPM | DIASTOLIC BLOOD PRESSURE: 76 MMHG | HEART RATE: 109 BPM

## 2022-11-09 LAB
ANION GAP SERPL CALC-SCNC: 8 MMOL/L — SIGNIFICANT CHANGE UP (ref 5–17)
BUN SERPL-MCNC: 11 MG/DL — SIGNIFICANT CHANGE UP (ref 7–23)
CALCIUM SERPL-MCNC: 9 MG/DL — SIGNIFICANT CHANGE UP (ref 8.4–10.5)
CHLORIDE SERPL-SCNC: 100 MMOL/L — SIGNIFICANT CHANGE UP (ref 96–108)
CO2 SERPL-SCNC: 25 MMOL/L — SIGNIFICANT CHANGE UP (ref 22–31)
CREAT SERPL-MCNC: 0.67 MG/DL — SIGNIFICANT CHANGE UP (ref 0.5–1.3)
EGFR: 99 ML/MIN/1.73M2 — SIGNIFICANT CHANGE UP
GLUCOSE SERPL-MCNC: 71 MG/DL — SIGNIFICANT CHANGE UP (ref 70–99)
HCT VFR BLD CALC: 25.6 % — LOW (ref 34.5–45)
HGB BLD-MCNC: 8 G/DL — LOW (ref 11.5–15.5)
MAGNESIUM SERPL-MCNC: 2.3 MG/DL — SIGNIFICANT CHANGE UP (ref 1.6–2.6)
MCHC RBC-ENTMCNC: 31.3 GM/DL — LOW (ref 32–36)
MCHC RBC-ENTMCNC: 31.3 PG — SIGNIFICANT CHANGE UP (ref 27–34)
MCV RBC AUTO: 100 FL — SIGNIFICANT CHANGE UP (ref 80–100)
NRBC # BLD: 0 /100 WBCS — SIGNIFICANT CHANGE UP (ref 0–0)
PHOSPHATE SERPL-MCNC: 3 MG/DL — SIGNIFICANT CHANGE UP (ref 2.5–4.5)
PLATELET # BLD AUTO: 217 K/UL — SIGNIFICANT CHANGE UP (ref 150–400)
POTASSIUM SERPL-MCNC: 4.3 MMOL/L — SIGNIFICANT CHANGE UP (ref 3.5–5.3)
POTASSIUM SERPL-SCNC: 4.3 MMOL/L — SIGNIFICANT CHANGE UP (ref 3.5–5.3)
RBC # BLD: 2.56 M/UL — LOW (ref 3.8–5.2)
RBC # FLD: 18.5 % — HIGH (ref 10.3–14.5)
SODIUM SERPL-SCNC: 133 MMOL/L — LOW (ref 135–145)
WBC # BLD: 5.17 K/UL — SIGNIFICANT CHANGE UP (ref 3.8–10.5)
WBC # FLD AUTO: 5.17 K/UL — SIGNIFICANT CHANGE UP (ref 3.8–10.5)

## 2022-11-09 PROCEDURE — 82728 ASSAY OF FERRITIN: CPT

## 2022-11-09 PROCEDURE — 82435 ASSAY OF BLOOD CHLORIDE: CPT

## 2022-11-09 PROCEDURE — 80053 COMPREHEN METABOLIC PANEL: CPT

## 2022-11-09 PROCEDURE — 84484 ASSAY OF TROPONIN QUANT: CPT

## 2022-11-09 PROCEDURE — 83880 ASSAY OF NATRIURETIC PEPTIDE: CPT

## 2022-11-09 PROCEDURE — 81003 URINALYSIS AUTO W/O SCOPE: CPT

## 2022-11-09 PROCEDURE — 87899 AGENT NOS ASSAY W/OPTIC: CPT

## 2022-11-09 PROCEDURE — 82553 CREATINE MB FRACTION: CPT

## 2022-11-09 PROCEDURE — 87040 BLOOD CULTURE FOR BACTERIA: CPT

## 2022-11-09 PROCEDURE — 85379 FIBRIN DEGRADATION QUANT: CPT

## 2022-11-09 PROCEDURE — 86850 RBC ANTIBODY SCREEN: CPT

## 2022-11-09 PROCEDURE — 96375 TX/PRO/DX INJ NEW DRUG ADDON: CPT

## 2022-11-09 PROCEDURE — 87086 URINE CULTURE/COLONY COUNT: CPT

## 2022-11-09 PROCEDURE — 85652 RBC SED RATE AUTOMATED: CPT

## 2022-11-09 PROCEDURE — 80048 BASIC METABOLIC PNL TOTAL CA: CPT

## 2022-11-09 PROCEDURE — 71045 X-RAY EXAM CHEST 1 VIEW: CPT

## 2022-11-09 PROCEDURE — 82550 ASSAY OF CK (CPK): CPT

## 2022-11-09 PROCEDURE — 99285 EMERGENCY DEPT VISIT HI MDM: CPT

## 2022-11-09 PROCEDURE — 87449 NOS EACH ORGANISM AG IA: CPT

## 2022-11-09 PROCEDURE — 71275 CT ANGIOGRAPHY CHEST: CPT | Mod: MA

## 2022-11-09 PROCEDURE — 86900 BLOOD TYPING SEROLOGIC ABO: CPT

## 2022-11-09 PROCEDURE — 97116 GAIT TRAINING THERAPY: CPT

## 2022-11-09 PROCEDURE — 83036 HEMOGLOBIN GLYCOSYLATED A1C: CPT

## 2022-11-09 PROCEDURE — 82803 BLOOD GASES ANY COMBINATION: CPT

## 2022-11-09 PROCEDURE — 94640 AIRWAY INHALATION TREATMENT: CPT

## 2022-11-09 PROCEDURE — 84132 ASSAY OF SERUM POTASSIUM: CPT

## 2022-11-09 PROCEDURE — 93306 TTE W/DOPPLER COMPLETE: CPT

## 2022-11-09 PROCEDURE — 97162 PT EVAL MOD COMPLEX 30 MIN: CPT

## 2022-11-09 PROCEDURE — 84295 ASSAY OF SERUM SODIUM: CPT

## 2022-11-09 PROCEDURE — 87641 MR-STAPH DNA AMP PROBE: CPT

## 2022-11-09 PROCEDURE — 85610 PROTHROMBIN TIME: CPT

## 2022-11-09 PROCEDURE — 85018 HEMOGLOBIN: CPT

## 2022-11-09 PROCEDURE — 96374 THER/PROPH/DIAG INJ IV PUSH: CPT

## 2022-11-09 PROCEDURE — 84100 ASSAY OF PHOSPHORUS: CPT

## 2022-11-09 PROCEDURE — 85730 THROMBOPLASTIN TIME PARTIAL: CPT

## 2022-11-09 PROCEDURE — 85025 COMPLETE CBC W/AUTO DIFF WBC: CPT

## 2022-11-09 PROCEDURE — 82330 ASSAY OF CALCIUM: CPT

## 2022-11-09 PROCEDURE — 84145 PROCALCITONIN (PCT): CPT

## 2022-11-09 PROCEDURE — 0225U NFCT DS DNA&RNA 21 SARSCOV2: CPT

## 2022-11-09 PROCEDURE — 86140 C-REACTIVE PROTEIN: CPT

## 2022-11-09 PROCEDURE — 83735 ASSAY OF MAGNESIUM: CPT

## 2022-11-09 PROCEDURE — 83605 ASSAY OF LACTIC ACID: CPT

## 2022-11-09 PROCEDURE — 83615 LACTATE (LD) (LDH) ENZYME: CPT

## 2022-11-09 PROCEDURE — 36415 COLL VENOUS BLD VENIPUNCTURE: CPT

## 2022-11-09 PROCEDURE — 86901 BLOOD TYPING SEROLOGIC RH(D): CPT

## 2022-11-09 PROCEDURE — 86803 HEPATITIS C AB TEST: CPT

## 2022-11-09 PROCEDURE — 97110 THERAPEUTIC EXERCISES: CPT

## 2022-11-09 PROCEDURE — 85014 HEMATOCRIT: CPT

## 2022-11-09 PROCEDURE — U0003: CPT

## 2022-11-09 PROCEDURE — 87640 STAPH A DNA AMP PROBE: CPT

## 2022-11-09 PROCEDURE — 97530 THERAPEUTIC ACTIVITIES: CPT

## 2022-11-09 PROCEDURE — 85027 COMPLETE CBC AUTOMATED: CPT

## 2022-11-09 PROCEDURE — 82947 ASSAY GLUCOSE BLOOD QUANT: CPT

## 2022-11-09 PROCEDURE — 96376 TX/PRO/DX INJ SAME DRUG ADON: CPT

## 2022-11-09 PROCEDURE — U0005: CPT

## 2022-11-09 PROCEDURE — 93005 ELECTROCARDIOGRAM TRACING: CPT

## 2022-11-09 RX ORDER — HYDROMORPHONE HYDROCHLORIDE 2 MG/ML
1 INJECTION INTRAMUSCULAR; INTRAVENOUS; SUBCUTANEOUS
Qty: 30 | Refills: 0
Start: 2022-11-09 | End: 2022-11-13

## 2022-11-09 RX ORDER — OXYCODONE HYDROCHLORIDE 5 MG/1
1 TABLET ORAL
Qty: 180 | Refills: 0
Start: 2022-11-09 | End: 2022-12-08

## 2022-11-09 RX ORDER — HYDROMORPHONE HYDROCHLORIDE 2 MG/ML
2 INJECTION INTRAMUSCULAR; INTRAVENOUS; SUBCUTANEOUS ONCE
Refills: 0 | Status: DISCONTINUED | OUTPATIENT
Start: 2022-11-09 | End: 2022-11-09

## 2022-11-09 RX ORDER — HYDROMORPHONE HYDROCHLORIDE 2 MG/ML
1 INJECTION INTRAMUSCULAR; INTRAVENOUS; SUBCUTANEOUS
Qty: 0 | Refills: 0 | DISCHARGE
Start: 2022-11-09

## 2022-11-09 RX ADMIN — FENTANYL CITRATE 1 PATCH: 50 INJECTION INTRAVENOUS at 08:45

## 2022-11-09 RX ADMIN — ENOXAPARIN SODIUM 40 MILLIGRAM(S): 100 INJECTION SUBCUTANEOUS at 19:50

## 2022-11-09 RX ADMIN — HYDROMORPHONE HYDROCHLORIDE 2 MILLIGRAM(S): 2 INJECTION INTRAMUSCULAR; INTRAVENOUS; SUBCUTANEOUS at 21:52

## 2022-11-09 RX ADMIN — Medication 100 MILLIGRAM(S): at 21:51

## 2022-11-09 RX ADMIN — FENTANYL CITRATE 1 PATCH: 50 INJECTION INTRAVENOUS at 13:22

## 2022-11-09 RX ADMIN — CHLORHEXIDINE GLUCONATE 1 APPLICATION(S): 213 SOLUTION TOPICAL at 12:59

## 2022-11-09 RX ADMIN — HYDROMORPHONE HYDROCHLORIDE 1 MILLIGRAM(S): 2 INJECTION INTRAMUSCULAR; INTRAVENOUS; SUBCUTANEOUS at 14:43

## 2022-11-09 RX ADMIN — AMLODIPINE BESYLATE 10 MILLIGRAM(S): 2.5 TABLET ORAL at 05:49

## 2022-11-09 RX ADMIN — FENTANYL CITRATE 1 PATCH: 50 INJECTION INTRAVENOUS at 17:00

## 2022-11-09 RX ADMIN — HYDROMORPHONE HYDROCHLORIDE 2 MILLIGRAM(S): 2 INJECTION INTRAMUSCULAR; INTRAVENOUS; SUBCUTANEOUS at 07:08

## 2022-11-09 RX ADMIN — PANTOPRAZOLE SODIUM 40 MILLIGRAM(S): 20 TABLET, DELAYED RELEASE ORAL at 07:06

## 2022-11-09 RX ADMIN — ENOXAPARIN SODIUM 40 MILLIGRAM(S): 100 INJECTION SUBCUTANEOUS at 08:56

## 2022-11-09 RX ADMIN — FENTANYL CITRATE 1 PATCH: 50 INJECTION INTRAVENOUS at 19:45

## 2022-11-09 RX ADMIN — Medication 400 MILLIGRAM(S): at 13:23

## 2022-11-09 RX ADMIN — HYDROMORPHONE HYDROCHLORIDE 2 MILLIGRAM(S): 2 INJECTION INTRAMUSCULAR; INTRAVENOUS; SUBCUTANEOUS at 05:49

## 2022-11-09 NOTE — PROGRESS NOTE ADULT - PROBLEM SELECTOR PROBLEM 2
2019 novel coronavirus disease (COVID-19)

## 2022-11-09 NOTE — PROGRESS NOTE ADULT - SUBJECTIVE AND OBJECTIVE BOX
C A R D I O L O G Y  **********************************     DATE OF SERVICE: 11-09-22    Patient denies chest pain currently or shortness of breath. Only reports chest pain with coughing.  Review of symptoms otherwise negative.    MEDICATIONS:  acyclovir   Oral Tab/Cap 400 milliGRAM(s) Oral daily  ALBUTerol    90 MICROgram(s) HFA Inhaler 2 Puff(s) Inhalation every 4 hours PRN  aluminum hydroxide/magnesium hydroxide/simethicone Suspension 30 milliLiter(s) Oral every 4 hours PRN  amLODIPine   Tablet 10 milliGRAM(s) Oral daily  benzonatate 100 milliGRAM(s) Oral every 8 hours PRN  chlorhexidine 4% Liquid 1 Application(s) Topical daily  enoxaparin Injectable 40 milliGRAM(s) SubCutaneous every 12 hours  fentaNYL   Patch  50 MICROgram(s)/Hr 1 Patch Transdermal every 72 hours  guaiFENesin Oral Liquid (Sugar-Free) 100 milliGRAM(s) Oral every 6 hours PRN  HYDROmorphone  Injectable 1 milliGRAM(s) IV Push every 4 hours PRN  HYDROmorphone  Injectable 2 milliGRAM(s) IV Push every 4 hours PRN  megestrol 40 milliGRAM(s) Oral daily PRN  melatonin 3 milliGRAM(s) Oral at bedtime PRN  naloxone Injectable 0.4 milliGRAM(s) IV Push once  ondansetron Injectable 4 milliGRAM(s) IV Push every 8 hours PRN  pantoprazole    Tablet 40 milliGRAM(s) Oral before breakfast      LABS:                        8.0    5.17  )-----------( 217      ( 09 Nov 2022 07:53 )             25.6       Hemoglobin: 8.0 g/dL (11-09 @ 07:53)  Hemoglobin: 7.3 g/dL (11-08 @ 06:55)      11-09    133<L>  |  100  |  11  ----------------------------<  71  4.3   |  25  |  0.67    Ca    9.0      09 Nov 2022 07:54  Phos  3.0     11-09  Mg     2.3     11-09      Creatinine Trend: 0.67<--, 0.63<--, 0.70<--, 0.75<--, 0.76<--, 0.79<--    COAGS:           PHYSICAL EXAM:  T(C): 37 (11-09-22 @ 05:19), Max: 37 (11-09-22 @ 05:19)  HR: 89 (11-09-22 @ 05:19) (89 - 90)  BP: 107/73 (11-09-22 @ 05:19) (100/68 - 116/77)  RR: 18 (11-09-22 @ 05:19) (18 - 18)  SpO2: 98% (11-09-22 @ 05:19) (98% - 99%)  Wt(kg): --    I&O's Summary    08 Nov 2022 07:01  -  09 Nov 2022 07:00  --------------------------------------------------------  IN: 240 mL / OUT: 350 mL / NET: -110 mL        Gen: Appears well in NAD  HEENT:  (-)icterus (-)pallor  CV: N S1 S2 1/6 CHARLY (+)2 Pulses B/l  Resp:  Clear to auscultation B/L, normal effort  GI: (+) BS Soft, NT, ND  Lymph:  (-)Edema, (-)obvious lymphadenopathy  Skin: Warm to touch, Normal turgor  Psych: Appropriate mood and affect    TELEMETRY: None	      ECG: Sinus Tachycardia, PVC    RADIOLOGY:  < from: CT Angio Chest PE Protocol w/ IV Cont (10.18.22 @ 20:03) >  IMPRESSION:    No pulmonary embolism.    Smallright and moderate left pleural effusions and compressive   atelectasis.    --- End of Report ---    < end of copied text >      ASSESSMENT/PLAN: Patient is a 60 y/o Female with PMH of Multiple Myeloma (dx 2018) currently on chemo who presented with chest pain, SOB, and fever admitted with +COVID and b/l pleural effusions. Cardiology consulted for further evaluation.     - Patient with nonanginal chest pain that is pleuritic and very tender to palpation now much improved - suspect MSK related likely due to costochondritis and multiple myeloma lytic lesions  - EKGs with sinus rhythm/sinus tachycardia and no acute ischemic changes  - Cardiac enzymes unremarkable  - CTA chest negative for PE, small R and mod L pleural effusions and compressive atelectasis  - TTE with normal LV/RV function  - Pulm follow up - stable on room air  - Supportive care/covid management per primary team  - No further inpatient cardiac w/u planned  - D/C planning per primary team - pending rehab    Artur Corets PA-C  Pager: [phone]

## 2022-11-09 NOTE — DISCHARGE NOTE NURSING/CASE MANAGEMENT/SOCIAL WORK - NSDCFUADDAPPT_GEN_ALL_CORE_FT
APPTS ARE READY TO BE MADE: [ X ] YES    Best Family or Patient Contact (if needed): Avani Mercer (daughter), [phone]    Additional Information about above appointments (if needed):    1: Oncology for management of Multiple Myeloma with Dr. Fatima   2: PCP for general health care with Dr. Arreola  3: Pulmonology for management of pleural effusions with Dr. Valerio    Other comments or requests:     Patient is being discharged to rehab. Caregiver will arrange follow up.

## 2022-11-09 NOTE — PROGRESS NOTE ADULT - PROBLEM SELECTOR PLAN 6
-currently undergoing chemo at [facility] [place] [facility]  -heme/onc following  -pain control

## 2022-11-09 NOTE — DISCHARGE NOTE NURSING/CASE MANAGEMENT/SOCIAL WORK - PATIENT PORTAL LINK FT
You can access the FollowMyHealth Patient Portal offered by [facility] by registering at the following website: [email or web address]. By joining [facility]’s FollowMyHealth portal, you will also be able to view your health information using other applications (apps) compatible with our system.

## 2022-11-09 NOTE — PROGRESS NOTE ADULT - SUBJECTIVE AND OBJECTIVE BOX
Patient is a 61y old  Female who presents with a chief complaint of SOB, CP (09 Nov 2022 11:54)    Patient seen and examined this morning. Patient continues to feel well, offers no complaints.    MEDICATIONS  (STANDING):  acyclovir   Oral Tab/Cap 400 milliGRAM(s) Oral daily  amLODIPine   Tablet 10 milliGRAM(s) Oral daily  chlorhexidine 4% Liquid 1 Application(s) Topical daily  enoxaparin Injectable 40 milliGRAM(s) SubCutaneous every 12 hours  fentaNYL   Patch  50 MICROgram(s)/Hr 1 Patch Transdermal every 72 hours  naloxone Injectable 0.4 milliGRAM(s) IV Push once  pantoprazole    Tablet 40 milliGRAM(s) Oral before breakfast    MEDICATIONS  (PRN):  ALBUTerol    90 MICROgram(s) HFA Inhaler 2 Puff(s) Inhalation every 4 hours PRN Shortness of Breath and/or Wheezing  aluminum hydroxide/magnesium hydroxide/simethicone Suspension 30 milliLiter(s) Oral every 4 hours PRN Dyspepsia  benzonatate 100 milliGRAM(s) Oral every 8 hours PRN Cough  guaiFENesin Oral Liquid (Sugar-Free) 100 milliGRAM(s) Oral every 6 hours PRN Cough  HYDROmorphone  Injectable 1 milliGRAM(s) IV Push every 4 hours PRN Moderate Pain (4 - 6)  HYDROmorphone  Injectable 2 milliGRAM(s) IV Push every 4 hours PRN Severe Pain (7 - 10)  megestrol 40 milliGRAM(s) Oral daily PRN appetite  melatonin 3 milliGRAM(s) Oral at bedtime PRN Insomnia  ondansetron Injectable 4 milliGRAM(s) IV Push every 8 hours PRN Nausea and/or Vomiting      ROS  No fever, sweats, chills  No epistaxis, HA, sore throat  No CP, SOB, cough, sputum  No n/v/d, abd pain, melena, hematochezia  No edema  No rash  No anxiety  No back pain, joint pain  No bleeding, bruising  No dysuria, hematuria    Vital Signs Last 24 Hrs  T(C): 37 (09 Nov 2022 05:19), Max: 37 (09 Nov 2022 05:19)  T(F): 98.6 (09 Nov 2022 05:19), Max: 98.6 (09 Nov 2022 05:19)  HR: 89 (09 Nov 2022 05:19) (89 - 89)  BP: 107/73 (09 Nov 2022 05:19) (100/68 - 107/73)  BP(mean): --  RR: 18 (09 Nov 2022 05:19) (18 - 18)  SpO2: 98% (09 Nov 2022 05:19) (98% - 98%)    Parameters below as of 09 Nov 2022 05:19  Patient On (Oxygen Delivery Method): room air      PE  NAD  Awake, alert  Anicteric, MMM  RRR  CTAB  Abd soft, NT, ND  No c/c/e  No rash grossly  FROM                          8.0    5.17  )-----------( 217      ( 09 Nov 2022 07:53 )             25.6       11-09    133<L>  |  100  |  11  ----------------------------<  71  4.3   |  25  |  0.67    Ca    9.0      09 Nov 2022 07:54  Phos  3.0     11-09  Mg     2.3     11-09

## 2022-11-09 NOTE — PROGRESS NOTE ADULT - PROBLEM SELECTOR PLAN 2
-finished dexamethasone  -finished Remdesivir  -On RA  -BCx ngtd  -antitussive prn  -ID control states isolation is 20 days for immunosuppressed patients\  - pt is now COVID negative 11/9

## 2022-11-09 NOTE — PROGRESS NOTE ADULT - PROVIDER SPECIALTY LIST ADULT
Cardiology
Cardiology
Heme/Onc
Infectious Disease
Internal Medicine
Cardiology
Heme/Onc
Infectious Disease
Internal Medicine
Internal Medicine
Cardiology
Heme/Onc
Pulmonology
Pulmonology
Cardiology
Cardiology
Heme/Onc
Pulmonology
Internal Medicine
Pulmonology
Heme/Onc
Pulmonology
Pulmonology
Internal Medicine
Internal Medicine
Pulmonology
Internal Medicine
Pulmonology
Internal Medicine
Pulmonology
Pulmonology
Internal Medicine
Pulmonology
Internal Medicine
Pulmonology
Internal Medicine

## 2022-11-09 NOTE — PROGRESS NOTE ADULT - NS ATTEND AMEND GEN_ALL_CORE FT
60 y/o female with multiple myeloma, admitted with COVID-19 infection. Now on room air. Continue to monitor respiratory functions. Continue physical therapy. Discharge planning.
62 y/o female with multiple myeloma admitted with chest pain, dyspnea fever.     - Wean oxygen as tolerated.  - On remdesivir, dexamethasone, for COVID-19.  - No systemic treatment while inpatient.
62 y/o female with multiple myeloma, admitted with hypoxic respiratory failure; found to have COVID-19. She has completed remdesviir, dexamethasone. Now on nasal canula, weaning as tolerates.   CXR with improved pleural effusions.  Continue to hold systemic therapy for malignancy.
62 y/o female with multiple myeloma, admitted with hypoxic respiratory failure; found to have COVID-19. She has completed remdesviir, dexamethasone. Now on nasal canula. Continue to wean as tolerated and monitor respiratory symptoms. Continue to hold systemic therapy for malignancy.
Clinically improving, tapering hi flow oxygen.  Remains on dexamethasone.  Her pain is controlled on fentanyl patch and dilaudid prn.
IgG lambda multiple myeloma s/p 1 cycle of VCD, last was on 10/15 admitted with respiratory failure due to COVID19 infection/pneumonia.   Significantly improved, off supplemental oxygen.  Pain is controlled.  Would repeat her myeloma labs.  No plan for inpatient treatment.  Awaiting discharge to [facility].
awaiting rehab
Patient with standard risk IgG MM, [place] by [place]. Denise Fatima [place] [facility]  She was diagnosed in 2018 but decided to forgo conventional treatment, just recently returned and was started on CyborD this month.  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    She is now admitted with COVID19 infection, on remdesivir/steroids, requiring high flow oxygen.  Not vaccinated.   Suspect her significantly elevated ferritin is due to her severe myeloma as well COVID infection.  Doubt HLH with normal WBC/plt, no organomegaly.    Fever related to infection that has resolved.  Can check her triglycerides/fibrinogen as well as soluble IL2.  Her pain is better controlled.  Opioid induced constipation, would give a dose of relistor.
agree with above
awaiting rehab placement
Patient with standard risk IgG MM, [place] [facility]  [place] was diagnosed in 2018 but decided to forgo conventional treatment, just recently returned and was started on CyborD this month.  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    She is now admitted with COVID19 infection, on remdesivir/steroids, requiring high flow oxygen.  Not vaccinated.   Suspect her significantly elevated ferritin is due to her severe myeloma as well COVID infection.  Doubt HLH with normal WBC/plt, no organomegaly.    Fever related to infection that has resolved.  Can check her triglycerides/fibrinogen as well as soluble IL2.  Her pain is better controlled.  Opioid induced constipation, would give a dose of relistor.
agree with above.
in good spirits, working toward dischrge.  happy to be progressing with physical therapy.  SOB is stable. no angina or palpitations.

## 2022-11-09 NOTE — PROGRESS NOTE ADULT - SUBJECTIVE AND OBJECTIVE BOX
PROGRESS NOTE:     Patient is a 61y old  Female who presents with a chief complaint of SOB, CP (09 Nov 2022 12:36)      SUBJECTIVE / OVERNIGHT EVENTS:   MARIO overnight. Pt denying SOB or CP. Continues to have mild intermittent cough.     REVIEW OF SYSTEMS:    CONSTITUTIONAL: No weakness, fevers or chills  EYES/ENT: No visual changes;  No vertigo or throat pain   NECK: No pain or stiffness  RESPIRATORY: + cough, no wheezing, hemoptysis; No shortness of breath  CARDIOVASCULAR: No chest pain or palpitations  GASTROINTESTINAL: No abdominal or epigastric pain. No nausea, vomiting, or hematemesis; No diarrhea or constipation. No melena or hematochezia.  GENITOURINARY: No dysuria, frequency or hematuria  NEUROLOGICAL: No numbness or weakness  SKIN: No itching, rashes    MEDICATIONS  (STANDING):  acyclovir   Oral Tab/Cap 400 milliGRAM(s) Oral daily  amLODIPine   Tablet 10 milliGRAM(s) Oral daily  chlorhexidine 4% Liquid 1 Application(s) Topical daily  enoxaparin Injectable 40 milliGRAM(s) SubCutaneous every 12 hours  fentaNYL   Patch  50 MICROgram(s)/Hr 1 Patch Transdermal every 72 hours  naloxone Injectable 0.4 milliGRAM(s) IV Push once  pantoprazole    Tablet 40 milliGRAM(s) Oral before breakfast    MEDICATIONS  (PRN):  ALBUTerol    90 MICROgram(s) HFA Inhaler 2 Puff(s) Inhalation every 4 hours PRN Shortness of Breath and/or Wheezing  aluminum hydroxide/magnesium hydroxide/simethicone Suspension 30 milliLiter(s) Oral every 4 hours PRN Dyspepsia  benzonatate 100 milliGRAM(s) Oral every 8 hours PRN Cough  guaiFENesin Oral Liquid (Sugar-Free) 100 milliGRAM(s) Oral every 6 hours PRN Cough  HYDROmorphone  Injectable 1 milliGRAM(s) IV Push every 4 hours PRN Moderate Pain (4 - 6)  HYDROmorphone  Injectable 2 milliGRAM(s) IV Push every 4 hours PRN Severe Pain (7 - 10)  megestrol 40 milliGRAM(s) Oral daily PRN appetite  melatonin 3 milliGRAM(s) Oral at bedtime PRN Insomnia  ondansetron Injectable 4 milliGRAM(s) IV Push every 8 hours PRN Nausea and/or Vomiting      CAPILLARY BLOOD GLUCOSE        I&O's Summary    08 Nov 2022 07:01  -  09 Nov 2022 07:00  --------------------------------------------------------  IN: 240 mL / OUT: 350 mL / NET: -110 mL        PHYSICAL EXAM:  Vital Signs Last 24 Hrs  T(C): 36.9 (09 Nov 2022 13:18), Max: 37 (09 Nov 2022 05:19)  T(F): 98.5 (09 Nov 2022 13:18), Max: 98.6 (09 Nov 2022 05:19)  HR: 90 (09 Nov 2022 13:18) (89 - 90)  BP: 110/73 (09 Nov 2022 13:18) (100/68 - 110/73)  BP(mean): --  RR: 18 (09 Nov 2022 13:18) (18 - 18)  SpO2: 98% (09 Nov 2022 13:18) (98% - 98%)    Parameters below as of 09 Nov 2022 13:18  Patient On (Oxygen Delivery Method): room air        CONSTITUTIONAL: NAD, well-developed  RESPIRATORY: Normal respiratory effort; lungs are clear to auscultation bilaterally  CARDIOVASCULAR: Regular rate and rhythm, normal S1 and S2, no murmur/rub/gallop; No lower extremity edema; Peripheral pulses are 2+ bilaterally  ABDOMEN: Nontender to palpation, normoactive bowel sounds, no rebound/guarding; No hepatosplenomegaly  MUSCLOSKELETAL: no clubbing or cyanosis of digits; no joint swelling or tenderness to palpation  PSYCH: A+O to person, place, and time; affect appropriate  NEURO: Non-focal, no tremors  SKIN: No rashes    LABS:                        8.0    5.17  )-----------( 217      ( 09 Nov 2022 07:53 )             25.6     11-09    133<L>  |  100  |  11  ----------------------------<  71  4.3   |  25  |  0.67    Ca    9.0      09 Nov 2022 07:54  Phos  3.0     11-09  Mg     2.3     11-09      PT/INR - ( 08 Nov 2022 06:55 )   PT: 12.7 sec;   INR: 1.09 ratio         PTT - ( 08 Nov 2022 06:55 )  PTT:30.6 sec            RADIOLOGY & ADDITIONAL TESTS:  No new imaging or tests    COORDINATION OF CARE:  Care Discussed with Consultants/Other Providers [Y/N]:  Prior or Outpatient Records Reviewed [Y/N]:

## 2022-11-09 NOTE — PROGRESS NOTE ADULT - ASSESSMENT
CECILIA DUNAWAY is a 61y Female who presents with a chief complaint of dyspnea.    Multiple Myeloma  - Patient follows with Dr. Fatima, [facility].  - No treatment while inpatient or during rehabilitation.    COVID-19   - Patient has completed remdesivir and dexamethasone.  - Remains on room air at this time.  - Continue to monitor for symptoms.  - occasional cough  - awaiting rehab placement     Anemia  - In the setting of acute illness, chemotherapy, and malignancy.  - Monitor and transfuse to maintain hemoglobin > 7.  - H/H remains stable    Anne-Marie Arredondo NP  Hematology/ Oncology  [facility]  [phone] (office)  [phone] (alt office)  Evenings and weekends please call MD on call or office

## 2022-11-09 NOTE — PROGRESS NOTE ADULT - NUTRITIONAL ASSESSMENT
This patient has been assessed with a concern for Malnutrition and has been determined to have a diagnosis/diagnoses of Severe protein-calorie malnutrition.    This patient is being managed with:   Diet Regular-  Vegan {Accepts Vegetable Products Only}  Entered: Oct 28 2022 10:26AM    
This patient has been assessed with a concern for Malnutrition and has been determined to have a diagnosis/diagnoses of Severe protein-calorie malnutrition.    This patient is being managed with:   Diet Regular-  Vegan {Accepts Vegetable Products Only}  Entered: Oct 19 2022  1:42AM    
This patient has been assessed with a concern for Malnutrition and has been determined to have a diagnosis/diagnoses of Severe protein-calorie malnutrition.    This patient is being managed with:   Diet Regular-  Vegan {Accepts Vegetable Products Only}  Entered: Oct 19 2022  1:42AM    
This patient has been assessed with a concern for Malnutrition and has been determined to have a diagnosis/diagnoses of Severe protein-calorie malnutrition.    This patient is being managed with:   Diet Regular-  Vegan {Accepts Vegetable Products Only}  Entered: Oct 28 2022 10:26AM    
This patient has been assessed with a concern for Malnutrition and has been determined to have a diagnosis/diagnoses of Severe protein-calorie malnutrition.    This patient is being managed with:   Diet Regular-  Vegan {Accepts Vegetable Products Only}  Entered: Oct 19 2022  1:42AM    
This patient has been assessed with a concern for Malnutrition and has been determined to have a diagnosis/diagnoses of Severe protein-calorie malnutrition.    This patient is being managed with:   Diet Regular-  Vegan {Accepts Vegetable Products Only}  Entered: Oct 19 2022  1:42AM    
This patient has been assessed with a concern for Malnutrition and has been determined to have a diagnosis/diagnoses of Severe protein-calorie malnutrition.    This patient is being managed with:   Diet Regular-  Vegan {Accepts Vegetable Products Only}  Entered: Oct 28 2022 10:26AM    
Walk in
This patient has been assessed with a concern for Malnutrition and has been determined to have a diagnosis/diagnoses of Severe protein-calorie malnutrition.    This patient is being managed with:   Diet Regular-  Vegan {Accepts Vegetable Products Only}  Entered: Oct 28 2022 10:26AM    
This patient has been assessed with a concern for Malnutrition and has been determined to have a diagnosis/diagnoses of Severe protein-calorie malnutrition.    This patient is being managed with:   Diet Regular-  Vegan {Accepts Vegetable Products Only}  Entered: Oct 19 2022  1:42AM    
This patient has been assessed with a concern for Malnutrition and has been determined to have a diagnosis/diagnoses of Severe protein-calorie malnutrition.    This patient is being managed with:   Diet NPO after Midnight-     NPO Start Date: 27-Oct-2022   NPO Start Time: 23:59  Except Medications  With Ice Chips/Sips of Water  Entered: Oct 27 2022  4:05PM    Diet Regular-  Vegan {Accepts Vegetable Products Only}  Entered: Oct 19 2022  1:42AM    
This patient has been assessed with a concern for Malnutrition and has been determined to have a diagnosis/diagnoses of Severe protein-calorie malnutrition.    This patient is being managed with:   Diet Regular-  Vegan {Accepts Vegetable Products Only}  Entered: Oct 28 2022 10:26AM    
This patient has been assessed with a concern for Malnutrition and has been determined to have a diagnosis/diagnoses of Severe protein-calorie malnutrition.    This patient is being managed with:   Diet Regular-  Vegan {Accepts Vegetable Products Only}  Entered: Oct 28 2022 10:26AM    
This patient has been assessed with a concern for Malnutrition and has been determined to have a diagnosis/diagnoses of Severe protein-calorie malnutrition.    This patient is being managed with:   Diet Regular-  Vegan {Accepts Vegetable Products Only}  Entered: Oct 19 2022  1:42AM    
This patient has been assessed with a concern for Malnutrition and has been determined to have a diagnosis/diagnoses of Severe protein-calorie malnutrition.    This patient is being managed with:   Diet Regular-  Vegan {Accepts Vegetable Products Only}  Entered: Oct 28 2022 10:26AM    
This patient has been assessed with a concern for Malnutrition and has been determined to have a diagnosis/diagnoses of Severe protein-calorie malnutrition.    This patient is being managed with:   Diet Regular-  Vegan {Accepts Vegetable Products Only}  Entered: Oct 19 2022  1:42AM    
This patient has been assessed with a concern for Malnutrition and has been determined to have a diagnosis/diagnoses of Severe protein-calorie malnutrition.    This patient is being managed with:   Diet Regular-  Vegan {Accepts Vegetable Products Only}  Entered: Oct 28 2022 10:26AM    
This patient has been assessed with a concern for Malnutrition and has been determined to have a diagnosis/diagnoses of Severe protein-calorie malnutrition.    This patient is being managed with:   Diet Regular-  Vegan {Accepts Vegetable Products Only}  Entered: Oct 19 2022  1:42AM    
This patient has been assessed with a concern for Malnutrition and has been determined to have a diagnosis/diagnoses of Severe protein-calorie malnutrition.    This patient is being managed with:   Diet Regular-  Vegan {Accepts Vegetable Products Only}  Entered: Oct 28 2022 10:26AM    

## 2022-11-09 NOTE — PROGRESS NOTE ADULT - PROBLEM SELECTOR PROBLEM 1
Acute respiratory failure with hypoxia

## 2022-11-09 NOTE — PROGRESS NOTE ADULT - NS ATTEND OPT1 GEN_ALL_CORE
I independently performed the documented:
I attest my time as attending is greater than 50% of the total combined time spent on qualifying patient care activities by the PA/NP and attending.
I independently performed the documented:
I attest my time as attending is greater than 50% of the total combined time spent on qualifying patient care activities by the PA/NP and attending.
I independently performed the documented:

## 2022-11-09 NOTE — PROGRESS NOTE ADULT - ASSESSMENT
61F unvaccinated w/ PMH MM(dx'd 2018) currently on chemo(last session 10/13) p/w 1-day h/o chest pain and fever. Found to have hypoxia in s/o COVID-19  and small-moderate pleural effusions.    Patient passed TOV, now urinating on own. Able to ambulate without supplemental O2.     Pt planned for discharge to [facility] today.

## 2022-11-15 NOTE — H&P PST ADULT - MARITAL STATUS
I signed the referral but they may want to have pain management evaluate the MRI etc. first   Single/3 children A & W

## 2022-12-28 NOTE — ED PROVIDER NOTE - CHIEF COMPLAINT
Spoke with patients nurse Toma Dupont ([phone]) 
The patient is a 61y Female complaining of chest pain.

## 2022-12-29 NOTE — DIETITIAN INITIAL EVALUATION ADULT - SIGNS/SYMPTOMS
as evidenced by >22% weight loss x 1 month; <75% intake x >/1 month  as evidenced by MM on chemo  28-Dec-2022 22:00

## 2023-01-19 NOTE — PROGRESS NOTE ADULT - SUBJECTIVE AND OBJECTIVE BOX
Discharge Recommendation: HHPT.    Evaluation completed today. PT goals appropriate.    Patient is safe to perform bed <> chair transfer and in room ambulation with CGA and HHA with nursing staff.    Please continue Progressive Mobility Protocol as appropriate.    Jacinta Lai, PT, DPT  2023  Pager: [phone]      Problem: Physical Therapy  Goal: Physical Therapy Goal  Description: Goals to be met by: 2023    Patient will increase functional independence with mobility by performin. Sit to stand transfer with Modified [place]  2. Bed to chair transfer with Supervision using LRAD  3. Gait  x 250 feet with Supervision using LRAD.   4. Ascend/descend 6 stair with bilateral Handrails Stand-by Assistance using no AD.   5. Lower extremity exercise program x30 reps per handout, with independence    Outcome: Ongoing, Progressing      SUBJECTIVE  pt seen at bedside. States that she feels well. She has been on RA for over 24 hours. Has some chest pain overnight, but was well controlled with prn meds. Still has productive cough, but much improved. No other complaints elicited. Denies chest/abdominal pain, sob, n/v, changes in urination/BM.    OBJECTIVE:  ICU Vital Signs Last 24 Hrs  T(C): 36.6 (03 Nov 2022 05:14), Max: 37.3 (02 Nov 2022 12:48)  T(F): 97.8 (03 Nov 2022 05:14), Max: 99.2 (02 Nov 2022 12:48)  HR: 99 (03 Nov 2022 05:14) (92 - 111)  BP: 111/71 (03 Nov 2022 05:14) (100/67 - 120/78)  BP(mean): --  ABP: --  ABP(mean): --  RR: 18 (03 Nov 2022 05:14) (18 - 18)  SpO2: 94% (03 Nov 2022 05:14) (93% - 99%)    O2 Parameters below as of 03 Nov 2022 05:14  Patient On (Oxygen Delivery Method): room air              11-02 @ 07:01  -  11-03 @ 07:00  --------------------------------------------------------  IN: 600 mL / OUT: 1400 mL / NET: -800 mL      CAPILLARY BLOOD GLUCOSE          PHYSICAL EXAM:  General: Well-groomed, NAD, laying in bed, on RA  HEENT: PERRLA, EOMI, non-icteric  Neck:  symmetric,  JVD absent  Respiratory: Clear to ascultation bilaterally, no crackles/rales, no Resp distress; no accessory muscle use  Cardiovascular:  RRR, no murmurs/rubs/gallops  Abdomen: Soft, NT, ND  Extremities: No edema noted  Skin: No rashes or lesions noted  Neurological: Sensation grossly intact  Psychiatry: AOx3, appropriate insight/judgement, appropriate affect, recent/remote memory intact    PRN Meds:  ALBUTerol    90 MICROgram(s) HFA Inhaler 2 Puff(s) Inhalation every 4 hours PRN  aluminum hydroxide/magnesium hydroxide/simethicone Suspension 30 milliLiter(s) Oral every 4 hours PRN  benzonatate 100 milliGRAM(s) Oral every 8 hours PRN  guaiFENesin Oral Liquid (Sugar-Free) 100 milliGRAM(s) Oral every 6 hours PRN  HYDROmorphone  Injectable 1 milliGRAM(s) IV Push every 4 hours PRN  HYDROmorphone  Injectable 2 milliGRAM(s) IV Push every 4 hours PRN  megestrol 40 milliGRAM(s) Oral daily PRN  melatonin 3 milliGRAM(s) Oral at bedtime PRN  ondansetron Injectable 4 milliGRAM(s) IV Push every 8 hours PRN      LABS:                        7.5    7.54  )-----------( 270      ( 03 Nov 2022 07:18 )             23.5     Hgb Trend: 7.5<--, 7.6<--, 7.2<--, 6.7<--, 8.2<--  11-03    138  |  102  |  13  ----------------------------<  89  4.7   |  27  |  0.70    Ca    8.8      03 Nov 2022 07:14  Phos  3.0     11-03  Mg     2.2     11-03    TPro  6.9  /  Alb  2.8<L>  /  TBili  0.3  /  DBili  x   /  AST  11  /  ALT  10  /  AlkPhos  65  11-02    Creatinine Trend: 0.70<--, 0.75<--, 0.76<--, 0.79<--, 0.77<--, 0.82<--  PT/INR - ( 02 Nov 2022 07:16 )   PT: 13.7 sec;   INR: 1.19 ratio         PTT - ( 02 Nov 2022 07:16 )  PTT:30.1 sec          MICROBIOLOGY:       RADIOLOGY:  [ ] Reviewed and interpreted by me    EKG:

## 2023-02-24 ENCOUNTER — INPATIENT (INPATIENT)
Facility: HOSPITAL | Age: 63
LOS: 3 days | Discharge: HOME HEALTH | DRG: 57 | End: 2023-02-28
Attending: INTERNAL MEDICINE | Admitting: STUDENT IN AN ORGANIZED HEALTH CARE EDUCATION/TRAINING PROGRAM
Payer: MEDICARE

## 2023-02-24 VITALS
TEMPERATURE: 98 F | HEART RATE: 94 BPM | SYSTOLIC BLOOD PRESSURE: 131 MMHG | HEIGHT: 60 IN | RESPIRATION RATE: 18 BRPM | DIASTOLIC BLOOD PRESSURE: 82 MMHG | WEIGHT: 85.1 LBS | OXYGEN SATURATION: 99 %

## 2023-02-24 DIAGNOSIS — Z98.890 OTHER SPECIFIED POSTPROCEDURAL STATES: Chronic | ICD-10-CM

## 2023-02-24 DIAGNOSIS — Z98.891 HISTORY OF UTERINE SCAR FROM PREVIOUS SURGERY: Chronic | ICD-10-CM

## 2023-02-24 DIAGNOSIS — Z90.711 ACQUIRED ABSENCE OF UTERUS WITH REMAINING CERVICAL STUMP: Chronic | ICD-10-CM

## 2023-02-24 LAB
BASOPHILS # BLD AUTO: 0 K/UL — SIGNIFICANT CHANGE UP (ref 0–0.2)
BASOPHILS NFR BLD AUTO: 0 % — SIGNIFICANT CHANGE UP (ref 0–2)
EOSINOPHIL # BLD AUTO: 0 K/UL — SIGNIFICANT CHANGE UP (ref 0–0.5)
EOSINOPHIL NFR BLD AUTO: 0 % — SIGNIFICANT CHANGE UP (ref 0–6)
HCT VFR BLD CALC: 29.6 % — LOW (ref 34.5–45)
HGB BLD-MCNC: 9.8 G/DL — LOW (ref 11.5–15.5)
IMM GRANULOCYTES NFR BLD AUTO: 0.5 % — SIGNIFICANT CHANGE UP (ref 0–0.9)
LYMPHOCYTES # BLD AUTO: 0.96 K/UL — LOW (ref 1–3.3)
LYMPHOCYTES # BLD AUTO: 14.9 % — SIGNIFICANT CHANGE UP (ref 13–44)
MCHC RBC-ENTMCNC: 33.1 GM/DL — SIGNIFICANT CHANGE UP (ref 32–36)
MCHC RBC-ENTMCNC: 33.3 PG — SIGNIFICANT CHANGE UP (ref 27–34)
MCV RBC AUTO: 100.7 FL — HIGH (ref 80–100)
MONOCYTES # BLD AUTO: 0.64 K/UL — SIGNIFICANT CHANGE UP (ref 0–0.9)
MONOCYTES NFR BLD AUTO: 9.9 % — SIGNIFICANT CHANGE UP (ref 2–14)
NEUTROPHILS # BLD AUTO: 4.81 K/UL — SIGNIFICANT CHANGE UP (ref 1.8–7.4)
NEUTROPHILS NFR BLD AUTO: 74.7 % — SIGNIFICANT CHANGE UP (ref 43–77)
NRBC # BLD: 0 /100 WBCS — SIGNIFICANT CHANGE UP (ref 0–0)
PLATELET # BLD AUTO: 191 K/UL — SIGNIFICANT CHANGE UP (ref 150–400)
RBC # BLD: 2.94 M/UL — LOW (ref 3.8–5.2)
RBC # FLD: 16 % — HIGH (ref 10.3–14.5)
WBC # BLD: 6.44 K/UL — SIGNIFICANT CHANGE UP (ref 3.8–10.5)
WBC # FLD AUTO: 6.44 K/UL — SIGNIFICANT CHANGE UP (ref 3.8–10.5)

## 2023-02-24 PROCEDURE — 99285 EMERGENCY DEPT VISIT HI MDM: CPT

## 2023-02-24 NOTE — ED PROVIDER NOTE - CLINICAL SUMMARY MEDICAL DECISION MAKING FREE TEXT BOX
Mena Hopper MD  62y (1960) woman with a PMHx significant for seizure, GERD, IgG lambda multiple myeloma (dx 2018), chronic pain, who presents as transfer from [facility]. Symptom onset at 10:30 this AM. Patient was speaking gibberish and slurring speech for 30 minutes, resolved on own. Given home AEDs (Keppra and Vimpat). However, symptoms recurred 20 minutes later and daughter called EMS. 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 Transferred to [facility] for monitoring and repeat imaging (CT, CTA, CTP).  She takes Vimpat 100mg BID and Keppra 500mg BID. Patient denies weakness, numbness, headaches, dizziness, or blurred vision. on exam, normal exam, plan: neuro consult, repeat CTs

## 2023-02-24 NOTE — ED ADULT NURSE NOTE - NSIMPLEMENTINTERV_GEN_ALL_ED
Implemented All Fall Risk Interventions:  [place] to call system. Call bell, personal items and telephone within reach. Instruct patient to call for assistance. Room bathroom lighting operational. Non-slip footwear when patient is off stretcher. Physically safe environment: no spills, clutter or unnecessary equipment. Stretcher in lowest position, wheels locked, appropriate side rails in place. Provide visual cue, wrist band, yellow gown, etc. Monitor gait and stability. Monitor for mental status changes and reorient to person, place, and time. Review medications for side effects contributing to fall risk. Reinforce activity limits and safety measures with patient and family.

## 2023-02-24 NOTE — ED PROVIDER NOTE - ATTENDING CONTRIBUTION TO CARE
I performed a history and physical exam of the patient and discussed their management with the resident. I reviewed the resident's note and agree with the documented findings and plan of care.  Mena Hopper MD

## 2023-02-24 NOTE — ED PROVIDER NOTE - PROGRESS NOTE DETAILS
Received signout on 60-year-old female who was transferred for slurred speech with a report of CT angiography showing an M1 occlusion artery ultrasound was placed and patient is repeating CTs per neurology's recommendation. DJ Hazel Saenz- spoke to neuro who recs increasing vimpat and starting plavix, cleared to dc from their standpoint. spoke to daughter about plan, she is concerned about persistent symptoms. Pt still having intermittent symptoms. Spoke to neuro about concerns, possible EEG. They do not think EEG is indicated at this time. Will admit for monitoring.

## 2023-02-24 NOTE — ED ADULT NURSE NOTE - OBJECTIVE STATEMENT
Pt transfer from [facility] for R MCA occlusion, to be seen by Neuro. Pts initial complaint was aphasia, slurring of speech, and tremors. Pts daughter states she was speaking "gibberish", 1st episode lasting about 30 min around 1030am yesterday and 2nd episode lasting 10 min prompting her to call EMS. Pt has PMH of seizures and CVA that occurred years ago with no residual deficits. At this time pt is AAOx3, disoriented to time, VSS, resp even and unlabored, GCS 15, with mild aphasia, pt having a hard time forming certain words but is eventually able to articulate. Denies weakness, tingling, pain, and no slurred speech or facial droop noted. NIH score of 1 at this time. Pt in NAD, daughter remains at bedside.

## 2023-02-24 NOTE — CONSULT NOTE ADULT - SUBJECTIVE AND OBJECTIVE BOX
Neurology - Consult Note    -  Spectra: [phone] ([facility]), [phone] ([facility])  -    HPI: Patient CECILIA DUNAWAY is a 62y (1960) woman with a PMHx significant for seizure, GERD, IgG lambda multiple myeloma (dx 2018), chronic pain, who presents as transfer from [facility]. Symptom onset at 10:30 this AM. Patient was speaking gibberish and slurring speech for 30 minutes, resolved on own. Given home AEDs (Keppra and Vimpat). However, symptoms recurred 20 minutes later and daughter called EMS. At [facility], NIHSS 0 and CT showing R M1 occlusion (possibly chronic). Transferred to [facility] for monitoring and repeat imaging (CT, CTA, CTP).    Per daughter, patient had first seizure in January 2023. Lasted 1 minute. She was staring, unresponsive, unilateral weakness, and subsequent amnesia. She was supposed to follow-up w/ neurologist at [facility], however decided to see neurologist at [facility] (Dr. Asad Doshi [phone]) instead. Her first appointment w/ Dr. Doshi is scheduled for next week. Medications reconciled. No rescue medications prescribed. She takes Vimpat 100mg BID and Keppra 500mg BID. Patient denies weakness, numbness, headaches, dizziness, or blurred vision.      Review of Systems:  CONSTITUTIONAL: No fevers or chills  EYES AND ENT: No visual changes or no throat pain   NECK: No pain or stiffness  RESPIRATORY: No hemoptysis or shortness of breath  CARDIOVASCULAR: No chest pain or palpitations  GASTROINTESTINAL: No melena or hematochezia  GENITOURINARY: No dysuria or hematuria  NEUROLOGICAL: +As stated in HPI above  SKIN: No itching, burning, rashes, or lesions   All other review of systems is negative unless indicated above.    Allergies:  acetaminophen (Hives)  aspirin (Rash)  Benadryl (Rash)  shellfish (Hives)      PMHx/PSHx/Family Hx: As above, otherwise see below   Multiple myeloma        Social Hx:  has daughter    Medications:  MEDICATIONS  (STANDING):    MEDICATIONS  (PRN):      Vitals:  T(C): 36.9 (02-24-23 @ 20:59), Max: 36.9 (02-24-23 @ 20:59)  HR: 94 (02-24-23 @ 20:59) (94 - 94)  BP: 131/82 (02-24-23 @ 20:59) (131/82 - 131/82)  RR: 18 (02-24-23 @ 20:59) (18 - 18)  SpO2: 99% (02-24-23 @ 20:59) (99% - 99%)    Physical Examination:   General - NAD, thin female  Cardiovascular - Peripheral pulses palpable, no edema  Eyes - Fundoscopy not performed due to safety precautions in the setting of the COVID-19 pandemic    Neurologic Exam:  Mental status - Awake, Alert, Oriented to person, place, and time. Speech fluent. Follows simple and complex commands. Intact attention/concentration.    Cranial nerves - PERRLA, VFF, EOMI, face sensation (V1-V3) intact b/l, facial strength intact without asymmetry b/l, hearing intact b/l, palate with symmetric elevation, trapezius 5/5 strength b/l, tongue midline on protrusion with full lateral movement    Motor - Normal bulk and tone throughout. No pronator drift.  Strength testing            Deltoid      Biceps      Triceps     Wrist Extension    Wrist Flexion     Interossei         R            5                 5               5                     5                              5                        5                 5  L             5                 5               5                     5                              5                        5                 5              Hip Flexion    Hip Extension    Knee Flexion    Knee Extension    Dorsiflexion    Plantar Flexion  R              5                           5                       5                           5                            5                          5  L              5                           5                        5                           5                            5                          5    Sensation - Light touch/temperature intact throughout    DTR's -             Biceps      Triceps     Brachioradialis      Patellar    Ankle    Toes/plantar response  R             2+             2+                  2+                       2+            2+                 Down  L              2+             2+                 2+                        2+           2+                 Down    Coordination - Finger to Nose intact b/l. No tremors appreciated    Gait and station - did not assess due to fall risk    Labs:          CAPILLARY BLOOD GLUCOSE                    Radiology:  CTH/A/P pending   Neurology - Consult Note    -  Spectra: [phone] ([facility]), [phone] ([facility])  -    HPI: Patient CECILIA DUNAWAY is a 62y (1960) woman with a PMHx significant for seizure, GERD, IgG lambda multiple myeloma (dx 2018), chronic pain, who presents as transfer from [facility]. Symptom onset at 10:30 this AM. Patient was speaking gibberish and slurring speech for 30 minutes, resolved on own. Given home AEDs (Keppra and Vimpat). However, symptoms recurred 20 minutes later and daughter called EMS. At [facility], NIHSS 0 and CT showing R M1 occlusion (possibly chronic). Transferred to [facility] for monitoring and repeat imaging (CT, CTA, CTP).    Per daughter, patient had first seizure in January 2023. Lasted 1 minute. She was staring, unresponsive, unilateral weakness, and subsequent amnesia. She was supposed to follow-up w/ neurologist at [facility], however decided to see neurologist at [facility] (Dr. Asad Doshi [phone]) instead. Her first appointment w/ Dr. Doshi is scheduled for next week. Medications reconciled. No rescue medications prescribed. She takes Vimpat 100mg BID and Keppra 500mg BID. Patient denies weakness, numbness, headaches, dizziness, or blurred vision.      Review of Systems:  CONSTITUTIONAL: No fevers or chills  EYES AND ENT: No visual changes or no throat pain   NECK: No pain or stiffness  RESPIRATORY: No hemoptysis or shortness of breath  CARDIOVASCULAR: No chest pain or palpitations  GASTROINTESTINAL: No melena or hematochezia  GENITOURINARY: No dysuria or hematuria  NEUROLOGICAL: +As stated in HPI above  SKIN: No itching, burning, rashes, or lesions   All other review of systems is negative unless indicated above.    Allergies:  acetaminophen (Hives)  aspirin (Rash)  Benadryl (Rash)  shellfish (Hives)      PMHx/PSHx/Family Hx: As above, otherwise see below   Multiple myeloma        Social Hx:  has daughter    Medications:  MEDICATIONS  (STANDING):    MEDICATIONS  (PRN):      Vitals:  T(C): 36.9 (02-24-23 @ 20:59), Max: 36.9 (02-24-23 @ 20:59)  HR: 94 (02-24-23 @ 20:59) (94 - 94)  BP: 131/82 (02-24-23 @ 20:59) (131/82 - 131/82)  RR: 18 (02-24-23 @ 20:59) (18 - 18)  SpO2: 99% (02-24-23 @ 20:59) (99% - 99%)    Physical Examination:   General - NAD, thin female  Cardiovascular - Peripheral pulses palpable, no edema  Eyes - Fundoscopy not performed due to safety precautions in the setting of the COVID-19 pandemic    Neurologic Exam:  Mental status - Awake, Alert, Oriented to person, place, and time. Speech fluent. Follows simple and complex commands. Intact attention/concentration.    Cranial nerves - PERRLA, VFF, EOMI, face sensation (V1-V3) intact b/l, facial strength intact without asymmetry b/l, hearing intact b/l, palate with symmetric elevation, trapezius 5/5 strength b/l, tongue midline on protrusion with full lateral movement    Motor - Normal bulk and tone throughout. No pronator drift.  Strength testing            Deltoid      Biceps      Triceps     Wrist Extension    Wrist Flexion     Interossei         R            5                 5               5                     5                              5                        5                 5  L             5                 5               5                     5                              5                        5                 5              Hip Flexion    Hip Extension    Knee Flexion    Knee Extension    Dorsiflexion    Plantar Flexion  R              5                           5                       5                           5                            5                          5  L              5                           5                        5                           5                            5                          5    Sensation - Light touch/temperature intact throughout    DTR's -             Biceps      Triceps     Brachioradialis      Patellar    Ankle    Toes/plantar response  R             2+             2+                  2+                       2+            2+                 Down  L              2+             2+                 2+                        2+           2+                 Down    Coordination - Finger to Nose intact b/l. No tremors appreciated    Gait and station - did not assess due to fall risk    Labs:          CAPILLARY BLOOD GLUCOSE                    Radiology:  CTA HEAD/NECK:  Right MCA occlusion, from its origin, with significant paucity of the right M2/M3 subdivisions. Leptomeningeal collateralization is present within the distal peripheral right MCA territory. 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 No evidence of dissection or aneurysm.    Chronic osseous changes throughout the visualized skeletal structures compatible with known history of multiple myeloma.    Chronic compression fractures at T3 and T6 with near complete loss of body height at those levels. Soft tissue myelomatous mass suspected along the left C4 pedicle.    CT PERFUSION:  No convincing evidence of acute core infarct or ischemic penumbra.

## 2023-02-24 NOTE — CONSULT NOTE ADULT - ASSESSMENT
62y F with Hx seizure, GERD, multiple myeloma, who presents w/ CC of transient dysarthria/aphasia.    LKW: 10:30am 2/24  MRS: 3  NIHSS: 0  Not tenecteplase candidate due to NIHSS 0, outside of time window  Not thrombectomy due to []    CTH: pending  CTA: pending    Impression: 1st seizure consisted of impaired awareness, unilateral weakness, suggestive of focal origin to generalized spread (possibly temporal lobe). DDx epilepsy, ischemic stroke, TIA, toxic/metabolic/infectious.    Recommendations:  [] c/w home Keppra 500mg BID  [] c/w home Vimpat 100mg BID  [] rescue medications: 1) Ativan 1mg 1x for GTC > 3 min, 2) Briviact 100mg 1x  [] c/w home aspirin 81mg daily  [] c/w home Lipitor 80mg daily  [] fu CTH/A/P    Case discussed w/ stroke fellow Dr. Kincaid. Recommendations preliminary until attested by attending. 62y F with Hx seizure, GERD, multiple myeloma, who presents w/ CC of transient dysarthria/aphasia.    LKW: 10:30am 2/24  MRS: 3  NIHSS: 0  Not tenecteplase candidate due to NIHSS 0, outside of time window  Not thrombectomy due to lack of acute LVO    CTH: pending  CTA: pending    Impression: 1st seizure consisted of impaired awareness, unilateral weakness, suggestive of focal origin to generalized spread (possibly temporal lobe). DDx epilepsy, ischemic stroke, TIA, toxic/metabolic/infectious.    Recommendations:  [] c/w home Keppra 500mg BID  [] increase Vimpat to 150mg BID  [] rescue medications: 1) Ativan 1mg 1x for GTC > 3 min, 2) Briviact 100mg 1x  [] c/w home aspirin 81mg daily  [] c/w home Lipitor 80mg daily  [] start Plavix 75mg daily for 3 weeks per [facility] protocol. Can give 1x Plavix 300mg stat  [] follow-up w/ [place]. [place]oshi [place] [facility]    Case discussed w/ stroke fellow Dr. Kincaid and epilepsy attending Dr. Rose. Recommendations preliminary until attested by attending. 62y F with Hx seizure, GERD, multiple myeloma, who presents w/ CC of transient dysarthria/aphasia.    LKW: 10:30am 2/24  MRS: 3  NIHSS: 0  Not tenecteplase candidate due to NIHSS 0, outside of time window  Not thrombectomy due to lack of acute LVO    CTH: pending  CTA: pending    Impression: 1st seizure consisted of impaired awareness, unilateral weakness, suggestive of focal origin to generalized spread (possibly temporal lobe). Now presenting w/ transient episodes of Wernicke's aphasia and dysarthria. DDx epilepsy, ischemic stroke, TIA, toxic/metabolic/infectious.    Recommendations:  [] c/w home Keppra 500mg BID  [] increase Vimpat to 150mg BID  [] rescue medications: 1) Ativan 1mg 1x for GTC > 3 min, 2) Briviact 100mg 1x  [] c/w home aspirin 81mg daily  [] c/w home Lipitor 80mg daily  [] start Plavix 75mg daily for 3 weeks per [facility] protocol. Can give 1x Plavix 300mg stat  [] follow-up w/ Dr. Doshi [place] [facility]    Case discussed w/ stroke fellow Dr. Kincaid and epilepsy attending Dr. Rose. Recommendations preliminary until attested by attending. 62y F with Hx seizure, GERD, multiple myeloma, who presents w/ CC of transient dysarthria/aphasia.    LKW: 10:30am 2/24  MRS: 3  NIHSS: 0  Not tenecteplase candidate due to NIHSS 0, outside of time window  Not thrombectomy due to lack of acute LVO    CTH: pending  CTA: pending    Impression: 1st seizure consisted of impaired awareness, unilateral weakness, suggestive of focal origin to generalized spread (possibly temporal lobe). Now presenting w/ transient episodes of Wernicke's aphasia and dysarthria. DDx epilepsy, ischemic stroke, TIA, toxic/metabolic/infectious.    Recommendations:  [] c/w home Keppra 500mg BID  [] increase Vimpat to 150mg BID  [] rescue medications: 1) Ativan 1mg 1x for GTC > 3 min, 2) Briviact 100mg 1x  [] c/w home aspirin 81mg daily  [] c/w home Lipitor 80mg daily  [] start Plavix 75mg daily for 3 weeks per [facility] protocol. Can give 1x Plavix 300mg stat  [] follow-up w/ [place]. [place] [facility] [place]  [] follow-up w/ Dr. Bautista for stroke neurology ([phone]). Can get MRI brain w/o and TTE as outpatient.    Case discussed w/ stroke fellow Dr. Kincaid and epilepsy attending Dr. Rose. Recommendations preliminary until attested by attending.

## 2023-02-24 NOTE — ED PROVIDER NOTE - OBJECTIVE STATEMENT
Mena Hopper MD  62y (1960) woman with a PMHx significant for seizure, GERD, IgG lambda multiple myeloma (dx 2018), chronic pain, who presents as transfer from [facility]. Symptom onset at 10:30 this AM. Patient was speaking gibberish and slurring speech for 30 minutes, resolved on own. Given home AEDs (Keppra and Vimpat). However, symptoms recurred 20 minutes later and daughter called EMS. At [facility], NIHSS 0 and CT showing R M1 occlusion (possibly chronic). Transferred to [facility] for monitoring and repeat imaging (CT, CTA, CTP).  She takes Vimpat 100mg BID and Keppra 500mg BID. Patient denies weakness, numbness, headaches, dizziness, or blurred vision.

## 2023-02-25 DIAGNOSIS — C90.00 MULTIPLE MYELOMA NOT HAVING ACHIEVED REMISSION: ICD-10-CM

## 2023-02-25 DIAGNOSIS — K59.09 OTHER CONSTIPATION: ICD-10-CM

## 2023-02-25 DIAGNOSIS — M81.0 AGE-RELATED OSTEOPOROSIS WITHOUT CURRENT PATHOLOGICAL FRACTURE: ICD-10-CM

## 2023-02-25 DIAGNOSIS — I63.9 CEREBRAL INFARCTION, UNSPECIFIED: ICD-10-CM

## 2023-02-25 DIAGNOSIS — I10 ESSENTIAL (PRIMARY) HYPERTENSION: ICD-10-CM

## 2023-02-25 DIAGNOSIS — R56.9 UNSPECIFIED CONVULSIONS: ICD-10-CM

## 2023-02-25 DIAGNOSIS — K21.9 GASTRO-ESOPHAGEAL REFLUX DISEASE WITHOUT ESOPHAGITIS: ICD-10-CM

## 2023-02-25 LAB
ALBUMIN SERPL ELPH-MCNC: 4.6 G/DL — SIGNIFICANT CHANGE UP (ref 3.3–5)
ALP SERPL-CCNC: 71 U/L — SIGNIFICANT CHANGE UP (ref 40–120)
ALT FLD-CCNC: 15 U/L — SIGNIFICANT CHANGE UP (ref 10–45)
ANION GAP SERPL CALC-SCNC: 11 MMOL/L — SIGNIFICANT CHANGE UP (ref 5–17)
APTT BLD: 20.5 SEC — LOW (ref 27.5–35.5)
AST SERPL-CCNC: 25 U/L — SIGNIFICANT CHANGE UP (ref 10–40)
BILIRUB SERPL-MCNC: 0.2 MG/DL — SIGNIFICANT CHANGE UP (ref 0.2–1.2)
BUN SERPL-MCNC: 11 MG/DL — SIGNIFICANT CHANGE UP (ref 7–23)
CALCIUM SERPL-MCNC: 10 MG/DL — SIGNIFICANT CHANGE UP (ref 8.4–10.5)
CHLORIDE SERPL-SCNC: 106 MMOL/L — SIGNIFICANT CHANGE UP (ref 96–108)
CO2 SERPL-SCNC: 24 MMOL/L — SIGNIFICANT CHANGE UP (ref 22–31)
CREAT SERPL-MCNC: 0.85 MG/DL — SIGNIFICANT CHANGE UP (ref 0.5–1.3)
EGFR: 77 ML/MIN/1.73M2 — SIGNIFICANT CHANGE UP
FLUAV AG NPH QL: SIGNIFICANT CHANGE UP
FLUBV AG NPH QL: SIGNIFICANT CHANGE UP
GLUCOSE SERPL-MCNC: 98 MG/DL — SIGNIFICANT CHANGE UP (ref 70–99)
INR BLD: 1.04 RATIO — SIGNIFICANT CHANGE UP (ref 0.88–1.16)
POTASSIUM SERPL-MCNC: 4.6 MMOL/L — SIGNIFICANT CHANGE UP (ref 3.5–5.3)
POTASSIUM SERPL-SCNC: 4.6 MMOL/L — SIGNIFICANT CHANGE UP (ref 3.5–5.3)
PROT SERPL-MCNC: 7.1 G/DL — SIGNIFICANT CHANGE UP (ref 6–8.3)
PROTHROM AB SERPL-ACNC: 12 SEC — SIGNIFICANT CHANGE UP (ref 10.5–13.4)
RSV RNA NPH QL NAA+NON-PROBE: SIGNIFICANT CHANGE UP
SARS-COV-2 RNA SPEC QL NAA+PROBE: SIGNIFICANT CHANGE UP
SODIUM SERPL-SCNC: 141 MMOL/L — SIGNIFICANT CHANGE UP (ref 135–145)
TROPONIN T, HIGH SENSITIVITY RESULT: 42 NG/L — SIGNIFICANT CHANGE UP (ref 0–51)
TROPONIN T, HIGH SENSITIVITY RESULT: 54 NG/L — HIGH (ref 0–51)

## 2023-02-25 PROCEDURE — 70498 CT ANGIOGRAPHY NECK: CPT | Mod: 26,MA

## 2023-02-25 PROCEDURE — 70496 CT ANGIOGRAPHY HEAD: CPT | Mod: 26,MA

## 2023-02-25 PROCEDURE — 0042T: CPT | Mod: MA

## 2023-02-25 PROCEDURE — 99223 1ST HOSP IP/OBS HIGH 75: CPT

## 2023-02-25 RX ORDER — LACOSAMIDE 50 MG/1
150 TABLET ORAL
Refills: 0 | Status: DISCONTINUED | OUTPATIENT
Start: 2023-02-25 | End: 2023-02-28

## 2023-02-25 RX ORDER — ATORVASTATIN CALCIUM 80 MG/1
80 TABLET, FILM COATED ORAL AT BEDTIME
Refills: 0 | Status: DISCONTINUED | OUTPATIENT
Start: 2023-02-25 | End: 2023-02-28

## 2023-02-25 RX ORDER — FENTANYL CITRATE 50 UG/ML
1 INJECTION INTRAVENOUS
Refills: 0 | Status: DISCONTINUED | OUTPATIENT
Start: 2023-02-25 | End: 2023-02-28

## 2023-02-25 RX ORDER — PANTOPRAZOLE SODIUM 20 MG/1
40 TABLET, DELAYED RELEASE ORAL
Refills: 0 | Status: DISCONTINUED | OUTPATIENT
Start: 2023-02-25 | End: 2023-02-28

## 2023-02-25 RX ORDER — MEGESTROL ACETATE 40 MG/ML
40 SUSPENSION ORAL
Refills: 0 | Status: DISCONTINUED | OUTPATIENT
Start: 2023-02-25 | End: 2023-02-28

## 2023-02-25 RX ORDER — POLYETHYLENE GLYCOL 3350 17 G/17G
17 POWDER, FOR SOLUTION ORAL DAILY
Refills: 0 | Status: DISCONTINUED | OUTPATIENT
Start: 2023-02-25 | End: 2023-02-28

## 2023-02-25 RX ORDER — AMLODIPINE BESYLATE 2.5 MG/1
10 TABLET ORAL DAILY
Refills: 0 | Status: DISCONTINUED | OUTPATIENT
Start: 2023-02-25 | End: 2023-02-25

## 2023-02-25 RX ORDER — CLOPIDOGREL BISULFATE 75 MG/1
300 TABLET, FILM COATED ORAL ONCE
Refills: 0 | Status: COMPLETED | OUTPATIENT
Start: 2023-02-25 | End: 2023-02-25

## 2023-02-25 RX ORDER — ACYCLOVIR SODIUM 500 MG
400 VIAL (EA) INTRAVENOUS
Refills: 0 | Status: DISCONTINUED | OUTPATIENT
Start: 2023-02-25 | End: 2023-02-28

## 2023-02-25 RX ORDER — SENNA PLUS 8.6 MG/1
2 TABLET ORAL AT BEDTIME
Refills: 0 | Status: DISCONTINUED | OUTPATIENT
Start: 2023-02-25 | End: 2023-02-28

## 2023-02-25 RX ORDER — HYDROMORPHONE HYDROCHLORIDE 2 MG/ML
4 INJECTION INTRAMUSCULAR; INTRAVENOUS; SUBCUTANEOUS EVERY 6 HOURS
Refills: 0 | Status: DISCONTINUED | OUTPATIENT
Start: 2023-02-25 | End: 2023-02-28

## 2023-02-25 RX ORDER — LEVETIRACETAM 250 MG/1
500 TABLET, FILM COATED ORAL
Refills: 0 | Status: DISCONTINUED | OUTPATIENT
Start: 2023-02-25 | End: 2023-02-25

## 2023-02-25 RX ORDER — CLOPIDOGREL BISULFATE 75 MG/1
75 TABLET, FILM COATED ORAL DAILY
Refills: 0 | Status: DISCONTINUED | OUTPATIENT
Start: 2023-02-26 | End: 2023-02-28

## 2023-02-25 RX ORDER — LEVETIRACETAM 250 MG/1
1000 TABLET, FILM COATED ORAL
Refills: 0 | Status: DISCONTINUED | OUTPATIENT
Start: 2023-02-25 | End: 2023-02-28

## 2023-02-25 RX ORDER — ONDANSETRON 8 MG/1
4 TABLET, FILM COATED ORAL EVERY 8 HOURS
Refills: 0 | Status: DISCONTINUED | OUTPATIENT
Start: 2023-02-25 | End: 2023-02-28

## 2023-02-25 RX ADMIN — LEVETIRACETAM 500 MILLIGRAM(S): 250 TABLET, FILM COATED ORAL at 06:35

## 2023-02-25 RX ADMIN — Medication 400 MILLIGRAM(S): at 17:51

## 2023-02-25 RX ADMIN — ATORVASTATIN CALCIUM 80 MILLIGRAM(S): 80 TABLET, FILM COATED ORAL at 22:13

## 2023-02-25 RX ADMIN — SENNA PLUS 2 TABLET(S): 8.6 TABLET ORAL at 22:13

## 2023-02-25 RX ADMIN — MEGESTROL ACETATE 40 MILLIGRAM(S): 40 SUSPENSION ORAL at 17:51

## 2023-02-25 RX ADMIN — LACOSAMIDE 150 MILLIGRAM(S): 50 TABLET ORAL at 06:36

## 2023-02-25 RX ADMIN — LACOSAMIDE 150 MILLIGRAM(S): 50 TABLET ORAL at 22:14

## 2023-02-25 RX ADMIN — HYDROMORPHONE HYDROCHLORIDE 4 MILLIGRAM(S): 2 INJECTION INTRAMUSCULAR; INTRAVENOUS; SUBCUTANEOUS at 17:50

## 2023-02-25 RX ADMIN — LEVETIRACETAM 1000 MILLIGRAM(S): 250 TABLET, FILM COATED ORAL at 17:52

## 2023-02-25 RX ADMIN — CLOPIDOGREL BISULFATE 300 MILLIGRAM(S): 75 TABLET, FILM COATED ORAL at 06:36

## 2023-02-25 NOTE — H&P ADULT - NSHPLABSRESULTS_GEN_ALL_CORE
Chronic anemia  Troponin 54 to 42  CTA R MCA occlusion consistent with Jama Jama  Chronic T3 and T4 compression fx

## 2023-02-25 NOTE — H&P ADULT - NSICDXPASTMEDICALHX_GEN_ALL_CORE_FT
PAST MEDICAL HISTORY:  Multiple myeloma dx 2018 - initially refused chemotherapy, treating with "natural herbal remedies" now undergoing chemo at [facility]

## 2023-02-25 NOTE — H&P ADULT - HISTORY OF PRESENT ILLNESS
CECILIA DUNAWAY is a 62y (1960) woman with a PMHx significant for seizure, GERD, IgG lambda multiple myeloma (dx 2018), chronic pain, who presents as transfer from [facility]. Symptom onset at 10:30 this AM. Patient was speaking gibberish and slurring speech for 30 minutes, resolved on own. Given home AEDs (Keppra and Vimpat). However, symptoms recurred 20 minutes later and daughter called EMS. At [facility], NIHSS 0 and CT showing R M1 occlusion (possibly chronic). Transferred to [facility] for monitoring and repeat imaging (CT, CTA, CTP).    Per daughter, patient had first seizure in January 2023. Lasted 1 minute. She was staring, unresponsive, unilateral weakness, and subsequent amnesia. She was supposed to follow-up w/ neurologist at [facility], however decided to see neurologist at [facility] (Dr. Asad Doshi [phone]) instead. Her first appointment w/ Dr. Doshi is scheduled for next week. Medications reconciled. No rescue medications prescribed. She takes Vimpat 100mg BID and Keppra 500mg BID. Patient denies weakness, numbness, headaches, dizziness, or blurred vision.   CECILIA DUNAWAY is a 62y (1960) woman with a PMHx significant for seizure, GERD, IgG lambda multiple myeloma (dx 2018), chronic pain, who presents as transfer from [facility]. Symptom onset at 10:30 this AM. Patient was speaking gibberish and slurring speech for 30 minutes, resolved on own. Given home AEDs (Keppra and Vimpat). However, symptoms recurred 20 minutes later and daughter called EMS. At [facility], NIHSS 0 and CT showing R M1 occlusion (possibly chronic). Transferred to [facility] for monitoring and repeat imaging (CT, CTA, CTP).    Per daughter, patient had first seizure in January 2023. Lasted 1 minute. She was staring, unresponsive, unilateral weakness, and subsequent amnesia. She was supposed to follow-up w/ neurologist at [facility], however decided to see neurologist at [facility] (Dr. Asad Doshi [phone]) instead. Her first appointment w/ Dr. Doshi is scheduled for next week. She takes Vimpat 100mg BID and Keppra 1000mg BID. Patient denies weakness, numbness, headaches, dizziness, or blurred vision. Daughter thinks seizures were brought on by increased stress.

## 2023-02-25 NOTE — H&P ADULT - RESPIRATORY
clear to auscultation bilaterally/no respiratory distress/no use of accessory muscles/airway patent/breath sounds equal/good air movement/respirations non-labored

## 2023-02-25 NOTE — H&P ADULT - ASSESSMENT
63 y/o F w/ hx as above presenting with expressive aphasia transferred from [facility] with evidence of R MCA chronic occlusion

## 2023-02-25 NOTE — H&P ADULT - PROBLEM SELECTOR PLAN 1
Neurology input appreciated  aspirin 81mg daily  Lipitor 80mg qhs  Started on Plavix 300mg x 1 then 75mg daily x 3 weeks  MRI Brain (will order while in-patient but does not need to hold up discharge and can be done as outpatient)  TTE Neurology input appreciated  aspirin 81mg daily  Lipitor 80mg qhs  Started on Plavix 300mg x 1 then 75mg daily x 3 weeks  Hospitalized at [facility] bet 1/24-2/6/23 w HA and slurred speech, L sided weakness. 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  -- New onset Focal Seizures on vEEG from R Anteriotemporal region  -- LP performed: neg cytology and flow. Neg paraneoplastic panel. 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 1 mos f/u MRI recommended  -- TTE w EF 25-30% susp for Takotsubo cardiomyopathy Cardiac MRI showed EF 55% w/o scar or infarction   -Can hold off on TTE and MRI for now.

## 2023-02-25 NOTE — H&P ADULT - PROBLEM SELECTOR PLAN 7
Outpatient treatment recommended  Optimize vitamin D level Outpatient treatment recommended  Optimize vitamin D level  Pain control for compression fx with dilaudid and fentanyl as prescribed by her pain management specialist. Not a candidate for kyphoplasty.

## 2023-02-26 ENCOUNTER — TRANSCRIPTION ENCOUNTER (OUTPATIENT)
Age: 63
End: 2023-02-26

## 2023-02-26 DIAGNOSIS — G89.3 NEOPLASM RELATED PAIN (ACUTE) (CHRONIC): ICD-10-CM

## 2023-02-26 DIAGNOSIS — Z29.9 ENCOUNTER FOR PROPHYLACTIC MEASURES, UNSPECIFIED: ICD-10-CM

## 2023-02-26 LAB
24R-OH-CALCIDIOL SERPL-MCNC: 33.8 NG/ML — SIGNIFICANT CHANGE UP (ref 30–80)
A1C WITH ESTIMATED AVERAGE GLUCOSE RESULT: 5.2 % — SIGNIFICANT CHANGE UP (ref 4–5.6)
ANION GAP SERPL CALC-SCNC: 8 MMOL/L — SIGNIFICANT CHANGE UP (ref 5–17)
BUN SERPL-MCNC: 10 MG/DL — SIGNIFICANT CHANGE UP (ref 7–23)
CALCIUM SERPL-MCNC: 9.8 MG/DL — SIGNIFICANT CHANGE UP (ref 8.4–10.5)
CHLORIDE SERPL-SCNC: 106 MMOL/L — SIGNIFICANT CHANGE UP (ref 96–108)
CO2 SERPL-SCNC: 24 MMOL/L — SIGNIFICANT CHANGE UP (ref 22–31)
CREAT SERPL-MCNC: 0.93 MG/DL — SIGNIFICANT CHANGE UP (ref 0.5–1.3)
EGFR: 69 ML/MIN/1.73M2 — SIGNIFICANT CHANGE UP
ESTIMATED AVERAGE GLUCOSE: 103 MG/DL — SIGNIFICANT CHANGE UP (ref 68–114)
FOLATE SERPL-MCNC: >20 NG/ML — SIGNIFICANT CHANGE UP
GLUCOSE SERPL-MCNC: 82 MG/DL — SIGNIFICANT CHANGE UP (ref 70–99)
HCT VFR BLD CALC: 27.4 % — LOW (ref 34.5–45)
HGB BLD-MCNC: 9 G/DL — LOW (ref 11.5–15.5)
MAGNESIUM SERPL-MCNC: 2.2 MG/DL — SIGNIFICANT CHANGE UP (ref 1.6–2.6)
MCHC RBC-ENTMCNC: 32.8 GM/DL — SIGNIFICANT CHANGE UP (ref 32–36)
MCHC RBC-ENTMCNC: 33.3 PG — SIGNIFICANT CHANGE UP (ref 27–34)
MCV RBC AUTO: 101.5 FL — HIGH (ref 80–100)
NRBC # BLD: 0 /100 WBCS — SIGNIFICANT CHANGE UP (ref 0–0)
PHOSPHATE SERPL-MCNC: 3 MG/DL — SIGNIFICANT CHANGE UP (ref 2.5–4.5)
PLATELET # BLD AUTO: 162 K/UL — SIGNIFICANT CHANGE UP (ref 150–400)
POTASSIUM SERPL-MCNC: 4.4 MMOL/L — SIGNIFICANT CHANGE UP (ref 3.5–5.3)
POTASSIUM SERPL-SCNC: 4.4 MMOL/L — SIGNIFICANT CHANGE UP (ref 3.5–5.3)
RBC # BLD: 2.7 M/UL — LOW (ref 3.8–5.2)
RBC # FLD: 15.9 % — HIGH (ref 10.3–14.5)
SODIUM SERPL-SCNC: 138 MMOL/L — SIGNIFICANT CHANGE UP (ref 135–145)
T4 FREE SERPL-MCNC: 1.6 NG/DL — SIGNIFICANT CHANGE UP (ref 0.9–1.8)
TSH SERPL-MCNC: 1.5 UIU/ML — SIGNIFICANT CHANGE UP (ref 0.27–4.2)
VIT B12 SERPL-MCNC: 402 PG/ML — SIGNIFICANT CHANGE UP (ref 232–1245)
WBC # BLD: 5.01 K/UL — SIGNIFICANT CHANGE UP (ref 3.8–10.5)
WBC # FLD AUTO: 5.01 K/UL — SIGNIFICANT CHANGE UP (ref 3.8–10.5)

## 2023-02-26 PROCEDURE — 99233 SBSQ HOSP IP/OBS HIGH 50: CPT

## 2023-02-26 RX ORDER — ASPIRIN/CALCIUM CARB/MAGNESIUM 324 MG
81 TABLET ORAL DAILY
Refills: 0 | Status: DISCONTINUED | OUTPATIENT
Start: 2023-02-26 | End: 2023-02-28

## 2023-02-26 RX ADMIN — HYDROMORPHONE HYDROCHLORIDE 4 MILLIGRAM(S): 2 INJECTION INTRAMUSCULAR; INTRAVENOUS; SUBCUTANEOUS at 17:24

## 2023-02-26 RX ADMIN — HYDROMORPHONE HYDROCHLORIDE 4 MILLIGRAM(S): 2 INJECTION INTRAMUSCULAR; INTRAVENOUS; SUBCUTANEOUS at 00:47

## 2023-02-26 RX ADMIN — MEGESTROL ACETATE 40 MILLIGRAM(S): 40 SUSPENSION ORAL at 16:25

## 2023-02-26 RX ADMIN — MEGESTROL ACETATE 40 MILLIGRAM(S): 40 SUSPENSION ORAL at 05:52

## 2023-02-26 RX ADMIN — Medication 400 MILLIGRAM(S): at 05:52

## 2023-02-26 RX ADMIN — LEVETIRACETAM 1000 MILLIGRAM(S): 250 TABLET, FILM COATED ORAL at 16:25

## 2023-02-26 RX ADMIN — LACOSAMIDE 150 MILLIGRAM(S): 50 TABLET ORAL at 16:28

## 2023-02-26 RX ADMIN — LEVETIRACETAM 1000 MILLIGRAM(S): 250 TABLET, FILM COATED ORAL at 05:52

## 2023-02-26 RX ADMIN — HYDROMORPHONE HYDROCHLORIDE 4 MILLIGRAM(S): 2 INJECTION INTRAMUSCULAR; INTRAVENOUS; SUBCUTANEOUS at 23:35

## 2023-02-26 RX ADMIN — HYDROMORPHONE HYDROCHLORIDE 4 MILLIGRAM(S): 2 INJECTION INTRAMUSCULAR; INTRAVENOUS; SUBCUTANEOUS at 22:45

## 2023-02-26 RX ADMIN — HYDROMORPHONE HYDROCHLORIDE 4 MILLIGRAM(S): 2 INJECTION INTRAMUSCULAR; INTRAVENOUS; SUBCUTANEOUS at 16:24

## 2023-02-26 RX ADMIN — SENNA PLUS 2 TABLET(S): 8.6 TABLET ORAL at 21:17

## 2023-02-26 RX ADMIN — Medication 81 MILLIGRAM(S): at 16:28

## 2023-02-26 RX ADMIN — Medication 400 MILLIGRAM(S): at 16:25

## 2023-02-26 RX ADMIN — FENTANYL CITRATE 1 PATCH: 50 INJECTION INTRAVENOUS at 21:00

## 2023-02-26 RX ADMIN — HYDROMORPHONE HYDROCHLORIDE 4 MILLIGRAM(S): 2 INJECTION INTRAMUSCULAR; INTRAVENOUS; SUBCUTANEOUS at 00:17

## 2023-02-26 RX ADMIN — CLOPIDOGREL BISULFATE 75 MILLIGRAM(S): 75 TABLET, FILM COATED ORAL at 11:45

## 2023-02-26 RX ADMIN — ATORVASTATIN CALCIUM 80 MILLIGRAM(S): 80 TABLET, FILM COATED ORAL at 21:17

## 2023-02-26 RX ADMIN — HYDROMORPHONE HYDROCHLORIDE 4 MILLIGRAM(S): 2 INJECTION INTRAMUSCULAR; INTRAVENOUS; SUBCUTANEOUS at 07:36

## 2023-02-26 RX ADMIN — PANTOPRAZOLE SODIUM 40 MILLIGRAM(S): 20 TABLET, DELAYED RELEASE ORAL at 07:22

## 2023-02-26 RX ADMIN — FENTANYL CITRATE 1 PATCH: 50 INJECTION INTRAVENOUS at 16:24

## 2023-02-26 RX ADMIN — LACOSAMIDE 150 MILLIGRAM(S): 50 TABLET ORAL at 05:52

## 2023-02-26 RX ADMIN — HYDROMORPHONE HYDROCHLORIDE 4 MILLIGRAM(S): 2 INJECTION INTRAMUSCULAR; INTRAVENOUS; SUBCUTANEOUS at 08:36

## 2023-02-26 NOTE — DISCHARGE NOTE PROVIDER - HOSPITAL COURSE
63 y/o F w/ hx as above presenting with expressive aphasia transferred from [facility] with evidence of R MCA chronic occlusion     Problem/Plan - 1:  ·  Problem: Cerebrovascular accident (CVA).   ·  Plan: Neurology input appreciated  aspirin 81mg daily  Lipitor 80mg qhs  Started on Plavix 300mg x 1 then 75mg daily x 3 weeks  Hospitalized at [facility]) bet 1/24-2/6/23 w HA and slurred speech, L sided weakness. 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  -- New onset Focal Seizures on vEEG from R Anteriotemporal region  -- LP performed: neg cytology and flow. Neg paraneoplastic panel. 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 1 mos f/u MRI recommended  -- TTE w EF 25-30% susp for Takotsubo cardiomyopathy Cardiac MRI showed EF 55% w/o scar or infarction   -Can hold off on TTE and MRI for now.       Problem/Plan - 2:  ·  Problem: Seizures.   ·  Plan: c/w keppra and vimpat  Outpatient follow-up with epilepsy specialist.     Problem/Plan - 3:  ·  Problem: Essential hypertension.   ·  Plan: BP at goal continue to monitor.     Problem/Plan - 4:  ·  Problem: Multiple myeloma.   ·  Plan: -- follows at [facility] on Daratumumab, Lenalidomide, Bortezomib and Dexamethasone  -- VGPR on last paraproteins  -- initially dx 2018 but declined tx  -- Received CyBorD on 10/7+10/13/2022  -- MR w extensive spine involvement w compression fx and cord compressions, multiple collapse deformities. not a candidate for kyphoplasty. Follows at [facility] [place] w Dr Barroso      Hospitalized at [facility] bet 1/24-2/6/23 w HA and slurred speech, L sided weakness. 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  -- New onset Focal Seizures on vEEG from R Anteriotemporal region  -- LP performed: neg cytology and flow. Neg paraneoplastic panel. 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 1 mos f/u MRI recommended  -- TTE w EF 25-30% susp for Takotsubo cardiomyopathy Cardiac MRI showed EF 55% w/o scar or infarction.     Problem/Plan - 5:  ·  Problem: GERD (gastroesophageal reflux disease).   ·  Plan: c/w PPI.     Problem/Plan - 6:  ·  Problem: Chronic constipation.   ·  Plan: c/w prn Miralax and Senna especially while on chronic pain meds.     Problem/Plan - 7:  ·  Problem: Osteoporosis.   ·  Plan: Outpatient treatment recommended  Optimize vitamin D level  Pain control for compression fx with dilaudid and fentanyl as prescribed by her pain management specialist. Not a candidate for kyphoplasty.   61 y/o F w/ hx as above presenting with expressive aphasia transferred from [facility] with evidence of R MCA chronic occlusion     Problem/Plan - 1:  ·  Problem: Cerebrovascular accident (CVA).   ·  Plan: Neurology input appreciated  aspirin 81mg daily  Lipitor 80mg qhs  Started on Plavix 300mg x 1 then 75mg daily x 3 weeks  Hospitalized at [facility]) bet 1/24-2/6/23 w HA and slurred speech, L sided weakness. 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  -- New onset Focal Seizures on vEEG from R Anteriotemporal region  -- LP performed: neg cytology and flow. Neg paraneoplastic panel. 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 1 mos f/u MRI recommended  -- TTE w EF 25-30% susp for Takotsubo cardiomyopathy Cardiac MRI showed EF 55% w/o scar or infarction   -Can hold off on TTE and MRI for now.       Problem/Plan - 2:  ·  Problem: Seizures.   ·  Plan: c/w keppra and vimpat  Outpatient follow-up with epilepsy specialist.     Problem/Plan - 3:  ·  Problem: Essential hypertension.   ·  Plan: BP at goal continue to monitor.     Problem/Plan - 4:  ·  Problem: Multiple myeloma.   ·  Plan: -- follows at [facility] on Daratumumab, Lenalidomide, Bortezomib and Dexamethasone  -- VGPR on last paraproteins  -- initially dx 2018 but declined tx  -- Received CyBorD on 10/7+10/13/2022  -- MR w extensive spine involvement w compression fx and cord compressions, multiple collapse deformities. not a candidate for kyphoplasty. [place]s [place] [facility] [place] Barroso      Hospitalized at [facility]) bet 1/24-2/6/23 w HA and slurred speech, L sided weakness. 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  -- New onset Focal Seizures on vEEG from R Anteriotemporal region  -- LP performed: neg cytology and flow. Neg paraneoplastic panel. 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 1 mos f/u MRI recommended  -- TTE w EF 25-30% susp for Takotsubo cardiomyopathy Cardiac MRI showed EF 55% w/o scar or infarction.     Problem/Plan - 5:  ·  Problem: GERD (gastroesophageal reflux disease).   ·  Plan: c/w PPI.     Problem/Plan - 6:  ·  Problem: Chronic constipation.   ·  Plan: c/w prn Miralax and Senna especially while on chronic pain meds.     Problem/Plan - 7:  ·  Problem: Osteoporosis.   ·  Plan: Outpatient treatment recommended  Optimize vitamin D level  Pain control for compression fx with dilaudid and fentanyl as prescribed by her pain management specialist. Not a candidate for kyphoplasty.   63 y/o F w/ hx as above presenting with expressive aphasia transferred from [facility] with evidence of R MCA chronic occlusion     Problem/Plan - 1:  ·  Problem: Cerebrovascular accident (CVA).   ·  Plan: Neurology input appreciated  aspirin 81mg daily  Lipitor 80mg qhs  Started on Plavix 300mg x 1 then 75mg daily x 3 weeks  Hospitalized at [facility]) bet 1/24-2/6/23 w HA and slurred speech, L sided weakness. 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  -- New onset Focal Seizures on vEEG from R Anteriotemporal region  -- LP performed: neg cytology and flow. Neg paraneoplastic panel. 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 1 mos f/u MRI recommended  -- TTE w EF 25-30% susp for Takotsubo cardiomyopathy Cardiac MRI showed EF 55% w/o scar or infarction   -Can hold off on TTE and MRI for now.       Problem/Plan - 2:  ·  Problem: Seizures.   ·  Plan: c/w keppra and vimpat  Outpatient follow-up with epilepsy specialist.     Problem/Plan - 3:  ·  Problem: Essential hypertension.   ·  Plan: BP at goal continue to monitor.     Problem/Plan - 4:  ·  Problem: Multiple myeloma.   ·  Plan: -- follows at [facility] on Daratumumab, Lenalidomide, Bortezomib and Dexamethasone  -- VGPR on last paraproteins  -- initially dx 2018 but declined tx  -- Received CyBorD on 10/7+10/13/2022  -- MR w extensive spine involvement w compression fx and cord compressions, multiple collapse deformities. not a candidate for kyphoplasty. Follows at [facility] w Dr Barroso    Hospitalized at [facility] ([place]) bet 1/24-2/6/23 w HA and slurred speech, L sided weakness. 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  -- New onset Focal Seizures on vEEG from R Anteriotemporal region  -- LP performed: neg cytology and flow. Neg paraneoplastic panel. 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 1 mos f/u MRI recommended  -- TTE w EF 25-30% susp for Takotsubo cardiomyopathy Cardiac MRI showed EF 55% w/o scar or infarction.     Problem/Plan - 5:  ·  Problem: GERD (gastroesophageal reflux disease).   ·  Plan: c/w PPI.     Problem/Plan - 6:  ·  Problem: Chronic constipation.   ·  Plan: c/w prn Miralax and Senna especially while on chronic pain meds.     Problem/Plan - 7:  ·  Problem: Osteoporosis.   ·  Plan: Outpatient treatment recommended  Optimize vitamin D level  Pain control for compression fx with dilaudid and fentanyl as prescribed by her pain management specialist. Not a candidate for kyphoplasty.    Discharge planning discussed with attending Dr. Moody. Patient is medically cleared and stable for discharge. Medication Reconciliation reviewed with attending. Follow up outpatient with your PCP.

## 2023-02-26 NOTE — DISCHARGE NOTE PROVIDER - NSDCMRMEDTOKEN_GEN_ALL_CORE_FT
acyclovir 400 mg oral tablet: 1 tab(s) orally once a day  benzonatate 100 mg oral capsule: 1 cap(s) orally every 8 hours, As needed, Cough  cannabidiol 100 mg/mL oral liquid: 0.4 milliliter(s) orally every 4 hours  fentaNYL 75 mcg/hr transdermal film, extended release: 1 patch transdermal every 72 hours  megestrol 40 mg oral tablet: 1 tab(s) orally once a day, As Needed for appetite  MiraLax oral powder for reconstitution: 17 gram(s) orally once a day, As Needed for constipation  Norvasc 10 mg oral tablet: 1 tab(s) orally once a day  omeprazole 20 mg oral delayed release tablet: 1 tab(s) orally once a day  ondansetron 4 mg oral tablet: 1 tab(s) orally every 8 hours, As Needed for nausea  Senna 8.6 mg oral tablet: 1 tab(s) orally once a day (at bedtime), As Needed for constipation   acyclovir 400 mg oral tablet: 1 tab(s) orally once a day  aspirin 81 mg oral tablet, chewable: 1 tab(s) orally once a day  atorvastatin 80 mg oral tablet: 1 tab(s) orally once a day (at bedtime)  benzonatate 100 mg oral capsule: 1 cap(s) orally every 8 hours, As needed, Cough  cannabidiol 100 mg/mL oral liquid: 0.4 milliliter(s) orally every 4 hours  clopidogrel 75 mg oral tablet: 1 tab(s) orally once a day  fentaNYL 75 mcg/hr transdermal film, extended release: 1 patch transdermal every 72 hours  HYDROmorphone 4 mg oral tablet: 1 tab(s) orally every 6 hours, As needed, Moderate Pain (4 - 6) MDD: 4 tabs  lacosamide 150 mg oral tablet: 1 tab(s) orally 2 times a day MDD:2 tabs  levETIRAcetam 1000 mg oral tablet: 1 tab(s) orally 2 times a day  megestrol 40 mg oral tablet: 1 tab(s) orally once a day, As Needed for appetite  MiraLax oral powder for reconstitution: 17 gram(s) orally once a day, As Needed for constipation  Norvasc 10 mg oral tablet: 1 tab(s) orally once a day  ondansetron 4 mg oral tablet: 1 tab(s) orally every 8 hours, As Needed for nausea  pantoprazole 40 mg oral delayed release tablet: 1 tab(s) orally once a day (before a meal)  Senna 8.6 mg oral tablet: 1 tab(s) orally once a day (at bedtime), As Needed for constipation

## 2023-02-26 NOTE — PATIENT PROFILE ADULT - FUNCTIONAL ASSESSMENT - BASIC MOBILITY 6.
2-calculated by average/Not able to assess (calculate score using [facility] averaging method)

## 2023-02-26 NOTE — PATIENT PROFILE ADULT - FALL HARM RISK - HARM RISK INTERVENTIONS

## 2023-02-26 NOTE — DISCHARGE NOTE PROVIDER - POSTFACE STATEMENT FOR MINUTES SPENT
Refill done according to [facility] policy  Patient last seen 11/14/17    
minutes on the discharge service.

## 2023-02-26 NOTE — PROGRESS NOTE ADULT - PROBLEM SELECTOR PLAN 8
Outpatient treatment recommended  Optimize vitamin D level  Pain control for compression fx with Dilaudid and fentanyl as prescribed by her pain management specialist. Not a candidate for kyphoplasty.

## 2023-02-26 NOTE — DISCHARGE NOTE PROVIDER - PROVIDER TOKENS
PROVIDER:[TOKEN:[[id number]],FOLLOWUP:[1 week]],PROVIDER:[TOKEN:[[id number]],FOLLOWUP:[1 week]]

## 2023-02-26 NOTE — DISCHARGE NOTE PROVIDER - NSDCCPCAREPLAN_GEN_ALL_CORE_FT
PRINCIPAL DISCHARGE DIAGNOSIS  Diagnosis: Seizure  Assessment and Plan of Treatment:        PRINCIPAL DISCHARGE DIAGNOSIS  Diagnosis: Seizure  Assessment and Plan of Treatment: stable.   Continue Keppra 1000 mg oral twice a day.  Vimpat increase from 100 mg to 150 mg Oral twice a day per neuro recommendations  Patient to see Dr. Doshi (neurology) at [facility] [place] tomorrow at 1:30 pm for follow up and further management.      SECONDARY DISCHARGE DIAGNOSES  Diagnosis: Multiple myeloma  Assessment and Plan of Treatment: stable.   [place] [facility] [place], Lenalidomide, Bortezomib and Dexamethasone.    Diagnosis: CVA (cerebrovascular accident)  Assessment and Plan of Treatment: Right MCA chronic occlusion.  Continue aspirin 81 mg daily, Lipitor 80 mg at night.   Continue Plavix 75 mg daily x 3 weeks.   Outpatient neuro appointment as above.      Diagnosis: GERD (gastroesophageal reflux disease)  Assessment and Plan of Treatment: stable.   continue with protonix 40mg daily.   SEEK IMMEDIATE MEDICAL CARE IF:  You have pain in your arms, neck, jaw, teeth, or back.   Your pain increases or changes in intensity or duration.   You develop nausea, vomiting, or sweating (diaphoresis).  You develop shortness of breath, or you faint.  Your vomit is green, yellow, black, or looks like coffee grounds or blood.  Your stool is red, bloody, or black.  These symptoms could be signs of other problems, such as heart disease, gastric bleeding, or esophageal bleeding.      Diagnosis: Cancer related pain  Assessment and Plan of Treatment: Continue fentanyl patch 75 mcg every 72hr, Dilaudid 4 mg oral every 6hr asw needed.   continue with Bowel regimen.        PRINCIPAL DISCHARGE DIAGNOSIS  Diagnosis: Seizure  Assessment and Plan of Treatment: stable.   Continue Keppra 1000 mg oral twice a day.  Vimpat increase from 100 mg to 150 mg Oral twice a day per neuro recommendations  Patient to see Dr. Doshi (neurology[place] [facility] [place]/23 at 1:30 pm for follow up and further management.      SECONDARY DISCHARGE DIAGNOSES  Diagnosis: Multiple myeloma  Assessment and Plan of Treatment: stable.   Follows at [facility] on Daratumumab, Lenalidomide, Bortezomib and Dexamethasone.    Diagnosis: CVA (cerebrovascular accident)  Assessment and Plan of Treatment: Right MCA chronic occlusion.  Continue aspirin 81 mg daily, Lipitor 80 mg at night.   Continue Plavix 75 mg daily x 3 weeks.   Outpatient neuro appointment as above.      Diagnosis: GERD (gastroesophageal reflux disease)  Assessment and Plan of Treatment: stable.   continue with protonix 40mg daily.   SEEK IMMEDIATE MEDICAL CARE IF:  You have pain in your arms, neck, jaw, teeth, or back.   Your pain increases or changes in intensity or duration.   You develop nausea, vomiting, or sweating (diaphoresis).  You develop shortness of breath, or you faint.  Your vomit is green, yellow, black, or looks like coffee grounds or blood.  Your stool is red, bloody, or black.  These symptoms could be signs of other problems, such as heart disease, gastric bleeding, or esophageal bleeding.      Diagnosis: Cancer related pain  Assessment and Plan of Treatment: Continue fentanyl patch 75 mcg every 72hr, Dilaudid 4 mg oral every 6hr asw needed.   continue with Bowel regimen.

## 2023-02-26 NOTE — DISCHARGE NOTE PROVIDER - ATTENDING DISCHARGE PHYSICAL EXAMINATION:
GENERAL: NAD, well-developed  HEAD:  Atraumatic, normocephalic  EYES: EOMI, PERRLA, conjunctiva and sclera clear  CHEST/LUNG: Clear to auscultation bilaterally; no wheezes  HEART: +S1+S2, regular rate and rhythm  ABDOMEN: Soft, nontender, nondistended; bowel sounds present  EXTREMITIES:  2+ peripheral pulses; no clubbing, cyanosis, or edema  PSYCH: AAOx3

## 2023-02-26 NOTE — PATIENT PROFILE ADULT - NSPRONUTRITIONRISK_GEN_A_NUR

## 2023-02-26 NOTE — DISCHARGE NOTE PROVIDER - CARE PROVIDER_API CALL
Silvio Brown)  Internal Medicine  [address]  [place] [address]  Phone: [phone]  Fax: [phone]  Follow Up Time: 1 week    Bereket Bautista)  Neurology; Vascular Neurology  [address]  [place] [address]  Phone: [phone]  Fax: [phone]  Follow Up Time: 1 week

## 2023-02-27 DIAGNOSIS — Z02.9 ENCOUNTER FOR ADMINISTRATIVE EXAMINATIONS, UNSPECIFIED: ICD-10-CM

## 2023-02-27 PROCEDURE — 99232 SBSQ HOSP IP/OBS MODERATE 35: CPT

## 2023-02-27 RX ADMIN — POLYETHYLENE GLYCOL 3350 17 GRAM(S): 17 POWDER, FOR SOLUTION ORAL at 09:14

## 2023-02-27 RX ADMIN — SENNA PLUS 2 TABLET(S): 8.6 TABLET ORAL at 22:57

## 2023-02-27 RX ADMIN — Medication 400 MILLIGRAM(S): at 18:16

## 2023-02-27 RX ADMIN — MEGESTROL ACETATE 40 MILLIGRAM(S): 40 SUSPENSION ORAL at 18:15

## 2023-02-27 RX ADMIN — LEVETIRACETAM 1000 MILLIGRAM(S): 250 TABLET, FILM COATED ORAL at 05:19

## 2023-02-27 RX ADMIN — MEGESTROL ACETATE 40 MILLIGRAM(S): 40 SUSPENSION ORAL at 05:18

## 2023-02-27 RX ADMIN — CLOPIDOGREL BISULFATE 75 MILLIGRAM(S): 75 TABLET, FILM COATED ORAL at 12:27

## 2023-02-27 RX ADMIN — PANTOPRAZOLE SODIUM 40 MILLIGRAM(S): 20 TABLET, DELAYED RELEASE ORAL at 05:19

## 2023-02-27 RX ADMIN — LACOSAMIDE 150 MILLIGRAM(S): 50 TABLET ORAL at 18:37

## 2023-02-27 RX ADMIN — Medication 81 MILLIGRAM(S): at 12:27

## 2023-02-27 RX ADMIN — LACOSAMIDE 150 MILLIGRAM(S): 50 TABLET ORAL at 05:18

## 2023-02-27 RX ADMIN — LEVETIRACETAM 1000 MILLIGRAM(S): 250 TABLET, FILM COATED ORAL at 18:15

## 2023-02-27 RX ADMIN — Medication 400 MILLIGRAM(S): at 05:18

## 2023-02-27 RX ADMIN — ATORVASTATIN CALCIUM 80 MILLIGRAM(S): 80 TABLET, FILM COATED ORAL at 22:57

## 2023-02-27 RX ADMIN — FENTANYL CITRATE 1 PATCH: 50 INJECTION INTRAVENOUS at 21:00

## 2023-02-27 RX ADMIN — HYDROMORPHONE HYDROCHLORIDE 4 MILLIGRAM(S): 2 INJECTION INTRAMUSCULAR; INTRAVENOUS; SUBCUTANEOUS at 18:46

## 2023-02-27 NOTE — PHYSICAL THERAPY INITIAL EVALUATION ADULT - PERTINENT HX OF CURRENT PROBLEM, REHAB EVAL
63 y/o F w/ hx as above presenting with expressive aphasia transferred from [facility] with evidence of R MCA chronic occlusion

## 2023-02-27 NOTE — CHART NOTE - NSCHARTNOTEFT_GEN_A_CORE
Neurology    Called by medicine attending Dr Moody that patient's daughter wanted to discuss keppra and vimpat. Called daughter Avani () and answered her questions regarding keppra and vimpat both of which patient was on prior to current admission. Patient was on keppra 1000mg BID, which the medicine attending was aware of. CO interval 2/24/23 138. Renal function intact Cr 0.93. Patient is due to meet with outpatient [facility] neurologist tomorrow and we recommended to review potential options/ alternatives with her neurologist given that patient will need follow up with outpatient neurology and can consider transition/ changes with her neurologist to monitor long term rather than make abrupt changes while here that may be changed again in outpatient setting. Answered questions to her satisfaction. Supervised by Epilepsy attending Dr Bonnie Rose. Neurology    Called by medicine attending Dr Moody that patient's daughter wanted to discuss keppra and vimpat. Called daughter Avani () and answered her questions regarding keppra and vimpat both of which patient was on prior to current admission. Patient was on keppra 1000mg BID, which the medicine attending was aware of. ME interval 2/24/23 138. Renal function intact Cr 0.93. Patient is due to meet with outpatient [facility] neurologist tomorrow and we recommended to review potential options/ alternatives with her neurologist given that patient will need follow up with outpatient neurology and can consider transition/ changes with her neurologist to monitor long term rather than make abrupt changes while here that may be changed again in outpatient setting. Answered questions to her satisfaction. Supervised by Attending Dr Parminder Ortega

## 2023-02-28 ENCOUNTER — TRANSCRIPTION ENCOUNTER (OUTPATIENT)
Age: 63
End: 2023-02-28

## 2023-02-28 VITALS — WEIGHT: 100.53 LBS

## 2023-02-28 PROCEDURE — 83735 ASSAY OF MAGNESIUM: CPT

## 2023-02-28 PROCEDURE — 82435 ASSAY OF BLOOD CHLORIDE: CPT

## 2023-02-28 PROCEDURE — 80053 COMPREHEN METABOLIC PANEL: CPT

## 2023-02-28 PROCEDURE — 82607 VITAMIN B-12: CPT

## 2023-02-28 PROCEDURE — 82746 ASSAY OF FOLIC ACID SERUM: CPT

## 2023-02-28 PROCEDURE — 82565 ASSAY OF CREATININE: CPT

## 2023-02-28 PROCEDURE — 87637 SARSCOV2&INF A&B&RSV AMP PRB: CPT

## 2023-02-28 PROCEDURE — 82306 VITAMIN D 25 HYDROXY: CPT

## 2023-02-28 PROCEDURE — 84484 ASSAY OF TROPONIN QUANT: CPT

## 2023-02-28 PROCEDURE — 70496 CT ANGIOGRAPHY HEAD: CPT | Mod: MA

## 2023-02-28 PROCEDURE — 84443 ASSAY THYROID STIM HORMONE: CPT

## 2023-02-28 PROCEDURE — 82803 BLOOD GASES ANY COMBINATION: CPT

## 2023-02-28 PROCEDURE — 99239 HOSP IP/OBS DSCHRG MGMT >30: CPT

## 2023-02-28 PROCEDURE — 99285 EMERGENCY DEPT VISIT HI MDM: CPT

## 2023-02-28 PROCEDURE — 85610 PROTHROMBIN TIME: CPT

## 2023-02-28 PROCEDURE — 84295 ASSAY OF SERUM SODIUM: CPT

## 2023-02-28 PROCEDURE — 85025 COMPLETE CBC W/AUTO DIFF WBC: CPT

## 2023-02-28 PROCEDURE — 83036 HEMOGLOBIN GLYCOSYLATED A1C: CPT

## 2023-02-28 PROCEDURE — 82947 ASSAY GLUCOSE BLOOD QUANT: CPT

## 2023-02-28 PROCEDURE — 0042T: CPT | Mod: MA

## 2023-02-28 PROCEDURE — 83605 ASSAY OF LACTIC ACID: CPT

## 2023-02-28 PROCEDURE — 84439 ASSAY OF FREE THYROXINE: CPT

## 2023-02-28 PROCEDURE — 84132 ASSAY OF SERUM POTASSIUM: CPT

## 2023-02-28 PROCEDURE — 84100 ASSAY OF PHOSPHORUS: CPT

## 2023-02-28 PROCEDURE — 70450 CT HEAD/BRAIN W/O DYE: CPT | Mod: MA

## 2023-02-28 PROCEDURE — 85014 HEMATOCRIT: CPT

## 2023-02-28 PROCEDURE — 82330 ASSAY OF CALCIUM: CPT

## 2023-02-28 PROCEDURE — 82962 GLUCOSE BLOOD TEST: CPT

## 2023-02-28 PROCEDURE — 85730 THROMBOPLASTIN TIME PARTIAL: CPT

## 2023-02-28 PROCEDURE — 97162 PT EVAL MOD COMPLEX 30 MIN: CPT

## 2023-02-28 PROCEDURE — 85018 HEMOGLOBIN: CPT

## 2023-02-28 PROCEDURE — 70498 CT ANGIOGRAPHY NECK: CPT | Mod: MA

## 2023-02-28 PROCEDURE — 36415 COLL VENOUS BLD VENIPUNCTURE: CPT

## 2023-02-28 PROCEDURE — 80048 BASIC METABOLIC PNL TOTAL CA: CPT

## 2023-02-28 PROCEDURE — 85027 COMPLETE CBC AUTOMATED: CPT

## 2023-02-28 RX ORDER — ATORVASTATIN CALCIUM 80 MG/1
1 TABLET, FILM COATED ORAL
Qty: 30 | Refills: 0
Start: 2023-02-28 | End: 2023-03-29

## 2023-02-28 RX ORDER — OMEPRAZOLE 10 MG/1
1 CAPSULE, DELAYED RELEASE ORAL
Qty: 0 | Refills: 0 | DISCHARGE

## 2023-02-28 RX ORDER — PANTOPRAZOLE SODIUM 20 MG/1
1 TABLET, DELAYED RELEASE ORAL
Qty: 30 | Refills: 0
Start: 2023-02-28 | End: 2023-03-29

## 2023-02-28 RX ORDER — CLOPIDOGREL BISULFATE 75 MG/1
1 TABLET, FILM COATED ORAL
Qty: 21 | Refills: 0
Start: 2023-02-28 | End: 2023-03-20

## 2023-02-28 RX ORDER — LACOSAMIDE 50 MG/1
1 TABLET ORAL
Qty: 60 | Refills: 0
Start: 2023-02-28 | End: 2023-03-29

## 2023-02-28 RX ORDER — LEVETIRACETAM 250 MG/1
1 TABLET, FILM COATED ORAL
Qty: 0 | Refills: 0 | DISCHARGE
Start: 2023-02-28

## 2023-02-28 RX ORDER — ASPIRIN/CALCIUM CARB/MAGNESIUM 324 MG
1 TABLET ORAL
Qty: 30 | Refills: 0
Start: 2023-02-28 | End: 2023-03-29

## 2023-02-28 RX ORDER — HYDROMORPHONE HYDROCHLORIDE 2 MG/ML
1 INJECTION INTRAMUSCULAR; INTRAVENOUS; SUBCUTANEOUS
Qty: 0 | Refills: 0 | DISCHARGE
Start: 2023-02-28

## 2023-02-28 RX ADMIN — HYDROMORPHONE HYDROCHLORIDE 4 MILLIGRAM(S): 2 INJECTION INTRAMUSCULAR; INTRAVENOUS; SUBCUTANEOUS at 06:23

## 2023-02-28 RX ADMIN — LEVETIRACETAM 1000 MILLIGRAM(S): 250 TABLET, FILM COATED ORAL at 06:17

## 2023-02-28 RX ADMIN — Medication 400 MILLIGRAM(S): at 06:16

## 2023-02-28 RX ADMIN — LACOSAMIDE 150 MILLIGRAM(S): 50 TABLET ORAL at 06:16

## 2023-02-28 RX ADMIN — HYDROMORPHONE HYDROCHLORIDE 4 MILLIGRAM(S): 2 INJECTION INTRAMUSCULAR; INTRAVENOUS; SUBCUTANEOUS at 07:07

## 2023-02-28 RX ADMIN — PANTOPRAZOLE SODIUM 40 MILLIGRAM(S): 20 TABLET, DELAYED RELEASE ORAL at 06:16

## 2023-02-28 RX ADMIN — CLOPIDOGREL BISULFATE 75 MILLIGRAM(S): 75 TABLET, FILM COATED ORAL at 11:35

## 2023-02-28 RX ADMIN — MEGESTROL ACETATE 40 MILLIGRAM(S): 40 SUSPENSION ORAL at 06:16

## 2023-02-28 RX ADMIN — Medication 81 MILLIGRAM(S): at 11:35

## 2023-02-28 NOTE — PROGRESS NOTE ADULT - PROBLEM SELECTOR PLAN 6
Continue fentanyl patch 75 mcg q72hr, Dilaudid 4 mg oral q6hr PRN   Bowel regimen as above
Continue fentanyl patch 75 mcg q72hr, Dilaudid 4 mg oral q6hr PRN   Bowel regimen as above
c/w PRN miralax and senna

## 2023-02-28 NOTE — PROGRESS NOTE ADULT - NS ATTEND AMEND GEN_ALL_CORE FT
63 y/o female with multiple myeloma, admitted with seizures, dysarthria.    No systemic chemotherapy for multiple myeloma while she is admitted inpatient.    Neurology recommend increasing lacosamide dose to 150 mg BID. She will continue on aspirin and clopidogrel.     Thyroid function and vitamin B-12 levels noted to be normal.
plan dc

## 2023-02-28 NOTE — PROGRESS NOTE ADULT - PROBLEM SELECTOR PLAN 2
c/w keppra and vimpat  Outpatient follow-up with epilepsy specialist.
R MCA chronic occlusion  Continue aspirin 81 mg daily, Lipitor 80 mg qhs  Continue Plavix 75 mg daily x 3 weeks  Outpatient neuro appointment as above
R MCA chronic occlusion  Continue aspirin 81 mg daily, Lipitor 80 mg qhs  Continue Plavix 75 mg daily x 3 weeks  Outpatient neuro appointment as above

## 2023-02-28 NOTE — PROGRESS NOTE ADULT - ASSESSMENT
1. IgG Lambda Mult Myeloma    -- follows at [facility] [place] on Daratumumab, Lenalidomide, Bortezomib and Dexamethasone  -- VGPR on last paraproteins  -- initially dx 2018 but declined tx  -- Received CyBorD on 10/7+10/13/2022  -- MR w extensive spine involvement w compression fx and cord compressions, multiple collapse deformities. not a candidate for kyphoplasty. Follows at [facility] w Dr Doshi    2. Neuro    Hospitalized at [facility]) [facility] 1/24-2/6/23 w HA and slurred speech, L sided weakness. 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  -- New onset Focal Seizures on vEEG from R Anteriotemporal region  -- LP performed: neg cytology and flow. Neg paraneoplastic panel. 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 1 mos f/u MRI recommended  -- TTE w EF 25-30% susp for Takotsubo cardiomyopathy Cardiac MRI showed EF 55% w/o scar or infarction     3. Transient Dysarthria    -- Neuro following  -- Vinpat increased to 150mg BID  -- cont management per Neuro  -- MRI on hold for now   - symptoms resolved     Rohith Velazco MD  HematologyOncology   O: [phone]       
1. IgG Lambda Mult Myeloma    -- [place] [facility] [place], Lenalidomide, Bortezomib and Dexamethasone  -- VGPR on last paraproteins  -- initially dx 2018 but declined tx  -- Received CyBorD on 10/7+10/13/2022  -- MR w extensive spine involvement w compression fx and cord compressions, multiple collapse deformities. not a candidate for kyphoplasty. [place] [facility] [place]    2. Neuro    Hospitalized at [facility] 1/24-2/6/23 w HA and slurred speech, L sided weakness. 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  -- New onset Focal Seizures on vEEG from R Anteriotemporal region  -- LP performed: neg cytology and flow. Neg paraneoplastic panel. 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 1 mos f/u MRI recommended  -- TTE w EF 25-30% susp for Takotsubo cardiomyopathy Cardiac MRI showed EF 55% w/o scar or infarction     3. Transient Dysarthria    -- Neuro following  -- Vinpat increased to 150mg BID  -- cont management per Neuro  -- MRI on hold for now   - symptoms resolved     ok for discharge from a heme/onc perspective      Anne-Marie Arredondo NP  Hematology/ Oncology  [facility]  [phone] (office)  [phone] (alt office)  Evenings and weekends please call MD on call or office  
1. IgG Lambda Mult Myeloma    -- follows at [facility] [place] on Daratumumab, Lenalidomide, Bortezomib and Dexamethasone  -- VGPR on last paraproteins  -- initially dx 2018 but declined tx  -- Received CyBorD on 10/7+10/13/2022  -- MR w extensive spine involvement w compression fx and cord compressions, multiple collapse deformities. not a candidate for kyphoplasty. [place] [facility] [place] Barroso    2. Neuro    Hospitalized at [facility] 1/24-2/6/23 w HA and slurred speech, L sided weakness. 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  -- New onset Focal Seizures on vEEG from R Anteriotemporal region  -- LP performed: neg cytology and flow. Neg paraneoplastic panel. 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 1 mos f/u MRI recommended  -- TTE w EF 25-30% susp for Takotsubo cardiomyopathy Cardiac MRI showed EF 55% w/o scar or infarction     3. Transient Dysarthria    -- Neuro following  -- Vinpat increased to 150mg BID  -- cont management per Neuro    Micha Winters MD      
1. IgG Lambda Mult Myeloma    -- follows at [facility] [place] on Daratumumab, Lenalidomide, Bortezomib and Dexamethasone  -- VGPR on last paraproteins  -- initially dx 2018 but declined tx  -- Received CyBorD on 10/7+10/13/2022  -- MR w extensive spine involvement w compression fx and cord compressions, multiple collapse deformities. not a candidate for kyphoplasty. [place] [facility] [place]    2. Neuro    Hospitalized at [facility] 1/24-2/6/23 w HA and slurred speech, L sided weakness. 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  -- New onset Focal Seizures on vEEG from R Anteriotemporal region  -- LP performed: neg cytology and flow. Neg paraneoplastic panel. 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 1 mos f/u MRI recommended  -- TTE w EF 25-30% susp for Takotsubo cardiomyopathy Cardiac MRI showed EF 55% w/o scar or infarction     3. Transient Dysarthria    -- Neuro following  -- Vinpat increased to 150mg BID  -- cont management per Neuro  -- MRI on hold for now   - symptoms resolved     discharge planning in progress      Anne-Marie Arredondo NP  Hematology/ Oncology  [facility]  [phone] (office)  [phone] (alt office)  Evenings and weekends please call MD on call or office  
62F hx seizures (dx 1/2023), GERD, IgG lambda multiple myeloma (dx 2018), chronic pain, transferred from [facility] with recurrent seizure, imaging with evidence of R MCA chronic occlusion  
62F hx seizures (dx 1/2023), GERD, IgG lambda multiple myeloma (dx 2018), chronic pain, transferred from [facility] with recurrent seizure, imaging with evidence of R MCA chronic occlusion  
61 y/o F w/ hx as above presenting with expressive aphasia transferred from [facility] with evidence of R MCA chronic occlusion

## 2023-02-28 NOTE — PROGRESS NOTE ADULT - PROBLEM SELECTOR PLAN 3
[place] [facility] [place]atumumab, Lenalidomide, Bortezomib and Dexamethasone
[place] [facility] [place] Lenalidomide, Bortezomib and Dexamethasone
BP at goal continue to monitor

## 2023-02-28 NOTE — PROGRESS NOTE ADULT - PROBLEM SELECTOR PLAN 4
Continue PPI
Continue PPI
follows at [facility] [place] Daratumumab, Lenalidomide, Bortezomib and Dexamethasone  -- VGPR on last paraproteins  -- initially dx 2018 but declined tx  -- Received CyBorD on 10/7+10/13/2022  -- MR w extensive spine involvement w compression fx and cord compressions, multiple collapse deformities. not a candidate for kyphoplasty. Follows at [facility] [place] Barroso

## 2023-02-28 NOTE — PROGRESS NOTE ADULT - SUBJECTIVE AND OBJECTIVE BOX
HPI:    63 yo woman initially dx in 2018 w IgG Lambda Multiple Myeloma, standard risk cytogenetics. She initially declined tx. Now follows at [facility] on Daratumumab+Lenalidomide+Bortezomib and Dexamethasonelast tx on 23    Pt now transfer from [facility]. She was speaking gibberish and slurring speech for 30 minutes, resolved on own. Given home AEDs (Keppra and Vimpat). However, symptoms recurred 20 minutes later and daughter called EMS. At [facility], NIHSS 0 and CT showing R M1 occlusion (possibly chronic). Transferred to [facility] for monitoring and repeat imaging (CT, CTA, CTP).    Per daughter, patient had first seizure in 2023. Lasted 1 minute. She was staring, unresponsive, unilateral weakness, and subsequent amnesia. She was supposed to follow-up w/ neurologist at [facility], however decided to see neurologist at [facility] (Dr. Asad Doshi [phone]) instead. Her first appointment w/ Dr. Doshi is scheduled for next week. Medications reconciled. No rescue medications prescribed. She takes Vimpat 100mg BID and Keppra 500mg BID. Patient denies weakness, numbness, headaches, dizziness, or blurred vision.               PAST MEDICAL & SURGICAL HISTORY:  Multiple myeloma  dx 2018 - initially refused chemotherapy, treating with &quot;natural herbal remedies&quot; now undergoing chemo at [facility]      H/O  section  8/15/1985      H/O colonoscopy  2018- polyp found and removed in 2018      S/P partial hysterectomy   d/t fibroids          ROS:  Negative except for:    MEDICATIONS  (STANDING):  atorvastatin 80 milliGRAM(s) Oral at bedtime  lacosamide 150 milliGRAM(s) Oral two times a day  levETIRAcetam 500 milliGRAM(s) Oral two times a day    MEDICATIONS  (PRN):      Allergies    acetaminophen (Hives)  aspirin (Rash)  Benadryl (Rash)  shellfish (Hives)    Intolerances        Vital Signs Last 24 Hrs  T(C): 36.6 (2023 11:44), Max: 36.9 (2023 20:59)  T(F): 97.9 (2023 11:44), Max: 98.4 (2023 20:59)  HR: 94 (2023 11:44) (76 - 94)  BP: 111/77 (2023 11:44) (111/77 - 158/82)  BP(mean): --  RR: 17 (2023 11:44) (15 - 18)  SpO2: 100% (2023 11:44) (98% - 100%)    Parameters below as of 2023 11:44  Patient On (Oxygen Delivery Method): room air        PHYSICAL EXAM    NC/AT In NAD  Chest Clear Bilat  CVS S1,S2+ RRR  Abd Soft, NT/ND, + BS  Ext No edema    LABS:                          9.8    6.44  )-----------( 191      ( 2023 23:16 )             29.6     Serial CBC's   @ 23:16  Hct-29.6 / Hgb-9.8 / Plat-191 / RBC-2.94 / WBC-6.44                141  |  106  |  11  ----------------------------<  98  4.6   |  24  |  0.85    Ca    10.0      2023 23:16    TPro  7.1  /  Alb  4.6  /  TBili  0.2  /  DBili  x   /  AST  25  /  ALT  15  /  AlkPhos  71        PT/INR - ( 2023 23:16 )   PT: 12.0 sec;   INR: 1.04 ratio         PTT - ( 2023 23:16 )  PTT:20.5 sec    WBC Count: 6.44 K/uL ( @ 23:16)  Hemoglobin: 9.8 g/dL ( @ 23:16)            RADIOLOGY & ADDITIONAL STUDIES:    
[facility] Division of Hospital Medicine  Uche Santa MD  Available via MS Teams      SUBJECTIVE / OVERNIGHT EVENTS: No acute overnight events, patient comfortable at the bedside. Provided updates to daughter.     ADDITIONAL REVIEW OF SYSTEMS:    CONSTITUTIONAL: No fevers or chills  EYES:  No visual changes or eye pain  ENMT: No hearing loss or sore throat  RESPIRATORY: Cough; No wheezing, hemoptysis; No shortness of breath  CARDIOVASCULAR: No chest pain or palpitations  GASTROINTESTINAL: No abdominal pain; No nausea, vomiting, or hematemesis; No diarrhea; No melena or hematochezia  GENITOURINARY: No dysuria or hematuria  MUSCULOSKELETAL: No joint pain or swelling; No back pain  NEUROLOGICAL: No headache; No numbness or loss of sensation; No loss of strength in extremities  PSYCHIATRIC: No anxiety or depression  SKIN:  No burning or lesions     MEDICATIONS  (STANDING):  acyclovir   Oral Tab/Cap 400 milliGRAM(s) Oral two times a day  aspirin  chewable 81 milliGRAM(s) Oral daily  atorvastatin 80 milliGRAM(s) Oral at bedtime  clopidogrel Tablet 75 milliGRAM(s) Oral daily  fentaNYL   Patch  75 MICROgram(s)/Hr 1 Patch Transdermal every 72 hours  lacosamide 150 milliGRAM(s) Oral two times a day  levETIRAcetam 1000 milliGRAM(s) Oral two times a day  megestrol 40 milliGRAM(s) Oral two times a day  pantoprazole    Tablet 40 milliGRAM(s) Oral before breakfast  senna 2 Tablet(s) Oral at bedtime    MEDICATIONS  (PRN):  HYDROmorphone   Tablet 4 milliGRAM(s) Oral every 6 hours PRN Moderate Pain (4 - 6)  ondansetron    Tablet 4 milliGRAM(s) Oral every 8 hours PRN Nausea and/or Vomiting  polyethylene glycol 3350 17 Gram(s) Oral daily PRN Constipation      I&O's Summary    25 Feb 2023 07:01  -  26 Feb 2023 07:00  --------------------------------------------------------  IN: 683 mL / OUT: 1700 mL / NET: -1017 mL    26 Feb 2023 07:01  -  26 Feb 2023 14:57  --------------------------------------------------------  IN: 118 mL / OUT: 600 mL / NET: -482 mL        PHYSICAL EXAM:  Vital Signs Last 24 Hrs  T(C): 37.1 (26 Feb 2023 12:00), Max: 37.1 (26 Feb 2023 12:00)  T(F): 98.7 (26 Feb 2023 12:00), Max: 98.7 (26 Feb 2023 12:00)  HR: 94 (26 Feb 2023 12:00) (85 - 94)  BP: 111/74 (26 Feb 2023 12:00) (98/62 - 118/72)  BP(mean): --  RR: 18 (26 Feb 2023 12:00) (16 - 18)  SpO2: 99% (26 Feb 2023 12:00) (99% - 100%)    Parameters below as of 26 Feb 2023 12:00  Patient On (Oxygen Delivery Method): room air      CONSTITUTIONAL: NAD, well-developed, well-groomed  EYES: PERRLA; conjunctiva and sclera clear  ENMT: Moist oral mucosa, no pharyngeal injection or exudates; normal dentition  NECK: Supple, no palpable masses; no thyromegaly  RESPIRATORY: Normal respiratory effort; lungs are clear to auscultation bilaterally  CARDIOVASCULAR: Regular rate and rhythm, normal S1 and S2, no murmur/rub/gallop; No lower extremity edema; Peripheral pulses are 2+ bilaterally  ABDOMEN: Nontender to palpation, normoactive bowel sounds, no rebound/guarding; No hepatosplenomegaly  MUSCULOSKELETAL:  Unable to assess gait  PSYCH: A+O to person, place, and time; affect appropriate  NEUROLOGY: CN 2-12 are intact and symmetric; no gross sensory deficits   SKIN: No rashes; no palpable lesions    LABS:                        9.0    5.01  )-----------( 162      ( 26 Feb 2023 06:51 )             27.4     02-26    138  |  106  |  10  ----------------------------<  82  4.4   |  24  |  0.93    Ca    9.8      26 Feb 2023 06:50  Phos  3.0     02-26  Mg     2.2     02-26    TPro  7.1  /  Alb  4.6  /  TBili  0.2  /  DBili  x   /  AST  25  /  ALT  15  /  AlkPhos  71  02-24    PT/INR - ( 24 Feb 2023 23:16 )   PT: 12.0 sec;   INR: 1.04 ratio         PTT - ( 24 Feb 2023 23:16 )  PTT:20.5 sec        COVID-19 PCR: NotDetec (08 Nov 2022 14:02)  COVID-19 PCR: Detected (03 Nov 2022 15:25)  SARS-CoV-2: Detected (18 Oct 2022 18:46)      RADIOLOGY & ADDITIONAL TESTS:  New Imaging Personally Reviewed Today:  New Electrocardiogram Personally Reviewed Today:  Other Results Reviewed Today:   Prior or Outpatient Records Reviewed Today with Summary:    COORDINATION OF CARE:  Consultant Communication and Details of Discussion (where applicable):    
Patient is a 62y old  Female who presents with a chief complaint of CVA (27 Feb 2023 13:52)    No events overnight. Patient seen and examined this morning at bedside. Patient reports ongoing back pain but is otherwise feeling "well"    MEDICATIONS  (STANDING):  acyclovir   Oral Tab/Cap 400 milliGRAM(s) Oral two times a day  aspirin  chewable 81 milliGRAM(s) Oral daily  atorvastatin 80 milliGRAM(s) Oral at bedtime  clopidogrel Tablet 75 milliGRAM(s) Oral daily  fentaNYL   Patch  75 MICROgram(s)/Hr 1 Patch Transdermal every 72 hours  lacosamide 150 milliGRAM(s) Oral two times a day  levETIRAcetam 1000 milliGRAM(s) Oral two times a day  megestrol 40 milliGRAM(s) Oral two times a day  pantoprazole    Tablet 40 milliGRAM(s) Oral before breakfast  senna 2 Tablet(s) Oral at bedtime    MEDICATIONS  (PRN):  HYDROmorphone   Tablet 4 milliGRAM(s) Oral every 6 hours PRN Moderate Pain (4 - 6)  ondansetron    Tablet 4 milliGRAM(s) Oral every 8 hours PRN Nausea and/or Vomiting  polyethylene glycol 3350 17 Gram(s) Oral daily PRN Constipation      ROS  No fever, sweats, chills  No epistaxis, HA, sore throat  No CP, SOB, cough, sputum  No n/v/d, abd pain, melena, hematochezia  No edema  No rash  No anxiety  No  joint pain  +back pain,  No bleeding, bruising  No dysuria, hematuria    Vital Signs Last 24 Hrs  T(C): 37.1 (27 Feb 2023 12:21), Max: 37.1 (27 Feb 2023 12:21)  T(F): 98.8 (27 Feb 2023 12:21), Max: 98.8 (27 Feb 2023 12:21)  HR: 94 (27 Feb 2023 12:21) (88 - 94)  BP: 105/69 (27 Feb 2023 12:21) (100/65 - 137/80)  BP(mean): --  RR: 18 (27 Feb 2023 12:21) (16 - 18)  SpO2: 97% (27 Feb 2023 12:21) (97% - 100%)    Parameters below as of 27 Feb 2023 12:21  Patient On (Oxygen Delivery Method): room air        PE  NAD  Awake, alert  Anicteric, MMM  RRR  CTAB  Abd soft, NT, ND  No c/c/e  No rash grossly  FROM                          9.0    5.01  )-----------( 162      ( 26 Feb 2023 06:51 )             27.4       02-26    138  |  106  |  10  ----------------------------<  82  4.4   |  24  |  0.93    Ca    9.8      26 Feb 2023 06:50  Phos  3.0     02-26  Mg     2.2     02-26        
Patient is a 62y old  Female who presents with a chief complaint of CVA (27 Feb 2023 14:19)    Patient seen and examined this morning at bedside. Patient appears comfortable in bed, offers no complaints, looking forward to hospital discharge.    MEDICATIONS  (STANDING):  acyclovir   Oral Tab/Cap 400 milliGRAM(s) Oral two times a day  aspirin  chewable 81 milliGRAM(s) Oral daily  atorvastatin 80 milliGRAM(s) Oral at bedtime  clopidogrel Tablet 75 milliGRAM(s) Oral daily  fentaNYL   Patch  75 MICROgram(s)/Hr 1 Patch Transdermal every 72 hours  lacosamide 150 milliGRAM(s) Oral two times a day  levETIRAcetam 1000 milliGRAM(s) Oral two times a day  megestrol 40 milliGRAM(s) Oral two times a day  pantoprazole    Tablet 40 milliGRAM(s) Oral before breakfast  senna 2 Tablet(s) Oral at bedtime    MEDICATIONS  (PRN):  HYDROmorphone   Tablet 4 milliGRAM(s) Oral every 6 hours PRN Moderate Pain (4 - 6)  ondansetron    Tablet 4 milliGRAM(s) Oral every 8 hours PRN Nausea and/or Vomiting  polyethylene glycol 3350 17 Gram(s) Oral daily PRN Constipation      Vital Signs Last 24 Hrs  T(C): 36.6 (28 Feb 2023 04:40), Max: 37.1 (27 Feb 2023 12:21)  T(F): 97.9 (28 Feb 2023 04:40), Max: 98.8 (27 Feb 2023 12:21)  HR: 87 (28 Feb 2023 06:19) (75 - 94)  BP: 128/78 (28 Feb 2023 06:19) (99/64 - 128/78)  BP(mean): --  RR: 18 (28 Feb 2023 04:40) (18 - 18)  SpO2: 98% (28 Feb 2023 04:40) (97% - 98%)    Parameters below as of 28 Feb 2023 04:40  Patient On (Oxygen Delivery Method): room air        PE  NAD  Awake, alert  Anicteric, MMM  RRR  CTAB  Abd soft, NT, ND  No c/c/e  No rash grossly                
Patient seen and examined at bedside. pt feels well. no complaints     MEDICATIONS  (STANDING):  acyclovir   Oral Tab/Cap 400 milliGRAM(s) Oral two times a day  atorvastatin 80 milliGRAM(s) Oral at bedtime  clopidogrel Tablet 75 milliGRAM(s) Oral daily  fentaNYL   Patch  75 MICROgram(s)/Hr 1 Patch Transdermal every 72 hours  lacosamide 150 milliGRAM(s) Oral two times a day  levETIRAcetam 1000 milliGRAM(s) Oral two times a day  megestrol 40 milliGRAM(s) Oral two times a day  pantoprazole    Tablet 40 milliGRAM(s) Oral before breakfast  senna 2 Tablet(s) Oral at bedtime    MEDICATIONS  (PRN):  HYDROmorphone   Tablet 4 milliGRAM(s) Oral every 6 hours PRN Moderate Pain (4 - 6)  ondansetron    Tablet 4 milliGRAM(s) Oral every 8 hours PRN Nausea and/or Vomiting  polyethylene glycol 3350 17 Gram(s) Oral daily PRN Constipation        Vital Signs Last 24 Hrs  T(C): 36.6 (26 Feb 2023 04:43), Max: 36.7 (25 Feb 2023 21:01)  T(F): 97.9 (26 Feb 2023 04:43), Max: 98.1 (25 Feb 2023 21:01)  HR: 92 (26 Feb 2023 07:35) (85 - 94)  BP: 118/72 (26 Feb 2023 07:35) (98/62 - 118/72)  BP(mean): --  RR: 17 (26 Feb 2023 07:35) (16 - 17)  SpO2: 99% (26 Feb 2023 07:35) (99% - 100%)    Parameters below as of 26 Feb 2023 07:35  Patient On (Oxygen Delivery Method): room air          PHYSICAL EXAM:     GENERAL:  Appears stated age, well-groomed  CHEST:  CTA b/l  HEART:  S1 s2+   ABDOMEN:  Soft, non-tender, non-distended  EXTEREMITIES:  no cyanosis,clubbing or edema  NEURO:  Alert, oriented, no asterixis                            9.0    5.01  )-----------( 162      ( 26 Feb 2023 06:51 )             27.4       02-26    138  |  106  |  10  ----------------------------<  82  4.4   |  24  |  0.93    Ca    9.8      26 Feb 2023 06:50  Phos  3.0     02-26  Mg     2.2     02-26    TPro  7.1  /  Alb  4.6  /  TBili  0.2  /  DBili  x   /  AST  25  /  ALT  15  /  AlkPhos  71  02-24      
Sin Moody MD    Patient is a 62y old  Female who presents with a chief complaint of CVA (28 Feb 2023 11:31)        SUBJECTIVE / OVERNIGHT EVENTS: Feels well- no complaints  TELEMETRY: SR 80-90's      MEDICATIONS  (STANDING):  acyclovir   Oral Tab/Cap 400 milliGRAM(s) Oral two times a day  aspirin  chewable 81 milliGRAM(s) Oral daily  atorvastatin 80 milliGRAM(s) Oral at bedtime  clopidogrel Tablet 75 milliGRAM(s) Oral daily  fentaNYL   Patch  75 MICROgram(s)/Hr 1 Patch Transdermal every 72 hours  lacosamide 150 milliGRAM(s) Oral two times a day  levETIRAcetam 1000 milliGRAM(s) Oral two times a day  megestrol 40 milliGRAM(s) Oral two times a day  pantoprazole    Tablet 40 milliGRAM(s) Oral before breakfast  senna 2 Tablet(s) Oral at bedtime    MEDICATIONS  (PRN):  HYDROmorphone   Tablet 4 milliGRAM(s) Oral every 6 hours PRN Moderate Pain (4 - 6)  ondansetron    Tablet 4 milliGRAM(s) Oral every 8 hours PRN Nausea and/or Vomiting  polyethylene glycol 3350 17 Gram(s) Oral daily PRN Constipation      Vital Signs Last 24 Hrs  T(C): 36.6 (28 Feb 2023 04:40), Max: 37.1 (27 Feb 2023 12:21)  T(F): 97.9 (28 Feb 2023 04:40), Max: 98.8 (27 Feb 2023 12:21)  HR: 87 (28 Feb 2023 06:19) (75 - 94)  BP: 128/78 (28 Feb 2023 06:19) (99/64 - 128/78)  BP(mean): --  RR: 18 (28 Feb 2023 04:40) (18 - 18)  SpO2: 98% (28 Feb 2023 04:40) (97% - 98%)    Parameters below as of 28 Feb 2023 04:40  Patient On (Oxygen Delivery Method): room air      CAPILLARY BLOOD GLUCOSE        I&O's Summary    27 Feb 2023 07:01  -  28 Feb 2023 07:00  --------------------------------------------------------  IN: 720 mL / OUT: 500 mL / NET: 220 mL          PHYSICAL EXAM  GENERAL: NAD, well-developed  HEAD:  Atraumatic, normocephalic  EYES: EOMI, PERRLA, conjunctiva and sclera clear  CHEST/LUNG: Clear to auscultation bilaterally; no wheezes  HEART: +S1+S2, regular rate and rhythm  ABDOMEN: Soft, nontender, nondistended; bowel sounds present  EXTREMITIES:  2+ peripheral pulses; no clubbing, cyanosis, or edema  PSYCH: AAOx3      LABS:                      RADIOLOGY & ADDITIONAL TESTS:    Imaging Personally Reviewed:  Consultant(s) Notes Reviewed:    Care Discussed with Consultants/Other Providers:  
Sin Moody MD    Patient is a 62y old  Female who presents with a chief complaint of CVA (26 Feb 2023 14:57)        SUBJECTIVE / OVERNIGHT EVENTS: No new events/complaints  TELEMETRY: SR 's      MEDICATIONS  (STANDING):  acyclovir   Oral Tab/Cap 400 milliGRAM(s) Oral two times a day  aspirin  chewable 81 milliGRAM(s) Oral daily  atorvastatin 80 milliGRAM(s) Oral at bedtime  clopidogrel Tablet 75 milliGRAM(s) Oral daily  fentaNYL   Patch  75 MICROgram(s)/Hr 1 Patch Transdermal every 72 hours  lacosamide 150 milliGRAM(s) Oral two times a day  levETIRAcetam 1000 milliGRAM(s) Oral two times a day  megestrol 40 milliGRAM(s) Oral two times a day  pantoprazole    Tablet 40 milliGRAM(s) Oral before breakfast  senna 2 Tablet(s) Oral at bedtime    MEDICATIONS  (PRN):  HYDROmorphone   Tablet 4 milliGRAM(s) Oral every 6 hours PRN Moderate Pain (4 - 6)  ondansetron    Tablet 4 milliGRAM(s) Oral every 8 hours PRN Nausea and/or Vomiting  polyethylene glycol 3350 17 Gram(s) Oral daily PRN Constipation      Vital Signs Last 24 Hrs  T(C): 37.1 (27 Feb 2023 12:21), Max: 37.1 (27 Feb 2023 12:21)  T(F): 98.8 (27 Feb 2023 12:21), Max: 98.8 (27 Feb 2023 12:21)  HR: 94 (27 Feb 2023 12:21) (88 - 94)  BP: 105/69 (27 Feb 2023 12:21) (100/65 - 137/80)  BP(mean): --  RR: 18 (27 Feb 2023 12:21) (16 - 18)  SpO2: 97% (27 Feb 2023 12:21) (97% - 100%)    Parameters below as of 27 Feb 2023 12:21  Patient On (Oxygen Delivery Method): room air      CAPILLARY BLOOD GLUCOSE        I&O's Summary    26 Feb 2023 07:01  -  27 Feb 2023 07:00  --------------------------------------------------------  IN: 418 mL / OUT: 600 mL / NET: -182 mL    27 Feb 2023 07:01  -  27 Feb 2023 13:53  --------------------------------------------------------  IN: 240 mL / OUT: 500 mL / NET: -260 mL          PHYSICAL EXAM  GENERAL: NAD, well-developed  HEAD:  Atraumatic, normocephalic  EYES: EOMI, PERRLA, conjunctiva and sclera clear  CHEST/LUNG: Clear to auscultation bilaterally; no wheezes  HEART: +S1+S2, regular rate and rhythm  ABDOMEN: Soft, nontender, nondistended; bowel sounds present  EXTREMITIES:  2+ peripheral pulses; no clubbing, cyanosis, or edema  PSYCH: AAOx3      LABS:                        9.0    5.01  )-----------( 162      ( 26 Feb 2023 06:51 )             27.4     02-26    138  |  106  |  10  ----------------------------<  82  4.4   |  24  |  0.93    Ca    9.8      26 Feb 2023 06:50  Phos  3.0     02-26  Mg     2.2     02-26                  RADIOLOGY & ADDITIONAL TESTS:    Imaging Personally Reviewed:  Consultant(s) Notes Reviewed:    Care Discussed with Consultants/Other Providers:

## 2023-02-28 NOTE — DISCHARGE NOTE NURSING/CASE MANAGEMENT/SOCIAL WORK - NSSCTYPOFSERV_GEN_ALL_CORE
CDPAP HHA  visiting nurse / PT.  Home care will call and arrange visit time.  Visiting nurse / PT.  Home care agency will call and arrange visit time.

## 2023-02-28 NOTE — DISCHARGE NOTE NURSING/CASE MANAGEMENT/SOCIAL WORK - NSDCPEFALRISK_GEN_ALL_CORE
For information on Fall & Injury Prevention, visit: [email or web address] OR  [email or web address] OR  https://www.cdc.gov/steadi/patient.html

## 2023-02-28 NOTE — PROGRESS NOTE ADULT - PROBLEM SELECTOR PLAN 1
Continue Keppra 1000 mg PO BID (dose patient was taking at home prior to admission)  Vimpat increase from 100 mg to 150 mg PO BID per neuro recommendations  Patient to see Dr. Doshi (neurology) at [facility] tomorrow at 1:30 pm for follow up and further management
Neurology input appreciated, signed off   -c/w aspirin 81mg daily (restarted)- patient confirms not allergic   -c/w Lipitor 80mg qhs  Started on Plavix 300mg x 1 then 75mg daily x 3 weeks  Hospitalized at [facility] bet 1/24-2/6/23 w HA and slurred speech, L sided weakness. 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  -- New onset Focal Seizures on vEEG from R Anteriotemporal region  -- LP performed: neg cytology and flow. Neg paraneoplastic panel. 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 1 mos f/u MRI recommended  -- TTE w EF 25-30% susp for Takotsubo cardiomyopathy Cardiac MRI showed EF 55% w/o scar or infarction   -Can hold off on TTE and MRI for now.
Continue Keppra 1000 mg PO BID (dose patient was taking at home prior to admission)  Vimpat increase from 100 mg to 150 mg PO BID per neuro recommendations  Patient to see Dr. Doshi (neurology) at [facility] today 1:30 pm for follow up and further management

## 2023-02-28 NOTE — DISCHARGE NOTE NURSING/CASE MANAGEMENT/SOCIAL WORK - PATIENT PORTAL LINK FT
You can access the FollowMyHealth Patient Portal offered by [facility] by registering at the following website: [email or web address]. By joining [employer]’s FollowMyHealth portal, you will also be able to view your health information using other applications (apps) compatible with our system.

## 2023-02-28 NOTE — PROGRESS NOTE ADULT - PROBLEM SELECTOR PLAN 7
-c/w fentanyl patch 75 mcg q72hr   -c/w Dilaudid 4 mg oral q6hr PRN   -Bowel regimen while on opiates
PT recommending HPT  D/C home today- d/w daughter JIGAR Varma, and neurology on 2/27  45 minutes spent discharging the patient
PT recommending HPT  D/C home today- d/w daughter JIGAR Varma, and neurology  45 minutes spent discharging the patient

## 2023-04-02 ENCOUNTER — INPATIENT (INPATIENT)
Facility: HOSPITAL | Age: 63
LOS: 5 days | Discharge: NURSING, REHAB OR LONG-TERM CARE | DRG: 841 | End: 2023-04-08
Attending: HOSPITALIST | Admitting: HOSPITALIST
Payer: MEDICARE

## 2023-04-02 VITALS
SYSTOLIC BLOOD PRESSURE: 129 MMHG | WEIGHT: 119.93 LBS | HEART RATE: 89 BPM | HEIGHT: 65 IN | DIASTOLIC BLOOD PRESSURE: 85 MMHG | OXYGEN SATURATION: 99 % | RESPIRATION RATE: 18 BRPM | TEMPERATURE: 98 F

## 2023-04-02 DIAGNOSIS — Z98.890 OTHER SPECIFIED POSTPROCEDURAL STATES: Chronic | ICD-10-CM

## 2023-04-02 DIAGNOSIS — Z90.711 ACQUIRED ABSENCE OF UTERUS WITH REMAINING CERVICAL STUMP: Chronic | ICD-10-CM

## 2023-04-02 DIAGNOSIS — Z98.891 HISTORY OF UTERINE SCAR FROM PREVIOUS SURGERY: Chronic | ICD-10-CM

## 2023-04-02 PROCEDURE — 74176 CT ABD & PELVIS W/O CONTRAST: CPT | Mod: 26,MA

## 2023-04-02 PROCEDURE — 71250 CT THORAX DX C-: CPT | Mod: 26,MA

## 2023-04-02 PROCEDURE — 72131 CT LUMBAR SPINE W/O DYE: CPT | Mod: 26,MA

## 2023-04-02 PROCEDURE — 70450 CT HEAD/BRAIN W/O DYE: CPT | Mod: 26,MA

## 2023-04-02 PROCEDURE — 72125 CT NECK SPINE W/O DYE: CPT | Mod: 26,MA

## 2023-04-02 PROCEDURE — 99285 EMERGENCY DEPT VISIT HI MDM: CPT

## 2023-04-02 PROCEDURE — 73564 X-RAY EXAM KNEE 4 OR MORE: CPT | Mod: 26,50

## 2023-04-02 RX ORDER — HYDROMORPHONE HYDROCHLORIDE 2 MG/ML
4 INJECTION INTRAMUSCULAR; INTRAVENOUS; SUBCUTANEOUS ONCE
Refills: 0 | Status: DISCONTINUED | OUTPATIENT
Start: 2023-04-02 | End: 2023-04-02

## 2023-04-02 RX ADMIN — HYDROMORPHONE HYDROCHLORIDE 4 MILLIGRAM(S): 2 INJECTION INTRAMUSCULAR; INTRAVENOUS; SUBCUTANEOUS at 23:44

## 2023-04-02 RX ADMIN — HYDROMORPHONE HYDROCHLORIDE 4 MILLIGRAM(S): 2 INJECTION INTRAMUSCULAR; INTRAVENOUS; SUBCUTANEOUS at 21:26

## 2023-04-02 NOTE — ED ADULT NURSE NOTE - OBJECTIVE STATEMENT
Pt is a 62y female BIBEMS from nursing home after having an unwitnessed fall. As per EMS report, pt was sitting in her wheelchair, when she began to stand up and fell on wooden robles. Pt endorses hitting the L side of her face, both knees, denies any LOC. PMH of multiple myeloma and seizure disorder no significant PSH. Pt is A&Ox32 oriented to self and situation, not oriented to location or time, pt speaking in full coherent sentences, breathing is spontaneous and unlabored, Pt is a 62y female BIBEMS from nursing home after having an unwitnessed fall. As per EMS report, pt was sitting in her wheelchair, when she began to stand up and fell on wooden robles. Pt endorses hitting the L side of her face, both knees, denies any LOC. PMH of multiple myeloma and seizure disorder no significant PSH. Pt is A&Ox32 oriented to self and situation, not oriented to location or time, pt speaking in full coherent sentences, breathing is spontaneous and unlabored.... Pt takes Aspirin and Lovenox. Pt is a 62y female BIBEMS from nursing home after having an unwitnessed fall. As per EMS report, pt was sitting in her wheelchair, when she began to stand up and fell on wooden robles. Pt endorses hitting the L side of her face, both knees, denies any LOC. PMH of multiple myeloma and seizure disorder no significant PSH. Pt is A&Ox2 oriented to self and situation, not oriented to location or time, pt speaking in full coherent sentences, breathing is spontaneous and unlabored, currently endorsing 7/10 lower back pain. As per family member at bedside, pt ambulates using a walker/cane at baseline. Pt currently denying any HA, visual changes, SOB, cough, CP, palpitations, abd pain, urinary symptoms, fever/chills. Pt on Aspirin and Lovenox. Pt arrived to the ER with a fentanyl patch on the R upper arm, as per daughter at bedside pt has it changed at the nursing facility every 3 days.

## 2023-04-02 NOTE — ED PROVIDER NOTE - CLINICAL SUMMARY MEDICAL DECISION MAKING FREE TEXT BOX
Reba att-year-old female history of multiple myeloma (with multiple compression fractures/lesions lumbar spine) hypertension presenting to the ED from facility for unwitnessed fall.  Patient reports that she was walking when she tripped and fell forward hitting her knees and the left side of her head.  With complaints of pain in the knees as well as the left side of her eyebrow and temporal region.  On Lovenox prophylactically at facility.  Complaining of back pain, bilateral knee pain and mild pain in the left side of the head.  No ecchymosis noted on the head.  Ecchymosis to bilateral knees mild tenderness pain with flexion no laxity on exam no large effusions noted.  No tenderness of the calves, thighs.  Mild tenderness to the left ASIS.  No pelvic instability.  Midline tenderness throughout the lumbar spine no step-offs.  Abdomen is soft nontender no rebound or guarding.  No anterior chest wall tenderness.  No tenderness to the upper extremities bilaterally.  No midline cervical spinal tenderness.  Will obtain CT head, cervical spine, chest to evaluate for rib fractures, abdomen pelvis to evaluate for pelvic injury with recons of the spine.  X-rays bilateral knees.  Reassess following    Patient's doctor at facility called requesting CT and x-ray imaging, if no acute fractures or dislocations or [facility] happy to accept patient back and will perform further pain control and blood work at that site.

## 2023-04-02 NOTE — ED PROVIDER NOTE - PHYSICAL EXAMINATION
.  No ecchymosis noted on the head.  Ecchymosis to bilateral knees mild tenderness pain with flexion no laxity on exam no large effusions noted.  No tenderness of the calves, thighs.  Mild tenderness to the left ASIS.  No pelvic instability.  Midline tenderness throughout the lumbar spine no step-offs.  Abdomen is soft nontender no rebound or guarding.  No anterior chest wall tenderness.  No tenderness to the upper extremities bilaterally.  No midline cervical spinal tenderness.

## 2023-04-02 NOTE — ED PROVIDER NOTE - OBJECTIVE STATEMENT
62y Female with PMhx multiple myeloma, HTN. Reba att-year-old female history of multiple myeloma (with multiple compression fractures/lesions lumbar spine) hypertension presenting to the ED from facility for unwitnessed fall.  Patient reports that she was walking when she tripped and fell forward hitting her knees and the left side of her head.  With complaints of pain in the knees as well as the left side of her eyebrow and temporal region.  On Lovenox prophylactically at facility. .  Complaining of back pain, bilateral knee pain and mild pain in the left side of the head.

## 2023-04-02 NOTE — ED PROVIDER NOTE - ATTENDING CONTRIBUTION TO CARE
I, Johana Britton, performed a history and physical exam of the patient and discussed their management with the resident and /or advanced care provider. I reviewed the resident and /or ACP's note and agree with the documented findings and plan of care. I was present and available for all procedures.   see MDM

## 2023-04-03 DIAGNOSIS — Z29.9 ENCOUNTER FOR PROPHYLACTIC MEASURES, UNSPECIFIED: ICD-10-CM

## 2023-04-03 DIAGNOSIS — D64.9 ANEMIA, UNSPECIFIED: ICD-10-CM

## 2023-04-03 DIAGNOSIS — I10 ESSENTIAL (PRIMARY) HYPERTENSION: ICD-10-CM

## 2023-04-03 DIAGNOSIS — C90.00 MULTIPLE MYELOMA NOT HAVING ACHIEVED REMISSION: ICD-10-CM

## 2023-04-03 DIAGNOSIS — G40.909 EPILEPSY, UNSPECIFIED, NOT INTRACTABLE, WITHOUT STATUS EPILEPTICUS: ICD-10-CM

## 2023-04-03 DIAGNOSIS — R33.9 RETENTION OF URINE, UNSPECIFIED: ICD-10-CM

## 2023-04-03 DIAGNOSIS — E78.5 HYPERLIPIDEMIA, UNSPECIFIED: ICD-10-CM

## 2023-04-03 DIAGNOSIS — S12.200A UNSPECIFIED DISPLACED FRACTURE OF THIRD CERVICAL VERTEBRA, INITIAL ENCOUNTER FOR CLOSED FRACTURE: ICD-10-CM
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
## 2023-04-03 DIAGNOSIS — S12.9XXA FRACTURE OF NECK, UNSPECIFIED, INITIAL ENCOUNTER: ICD-10-CM

## 2023-04-03 LAB
ANION GAP SERPL CALC-SCNC: 8 MMOL/L — SIGNIFICANT CHANGE UP (ref 5–17)
APPEARANCE UR: CLEAR — SIGNIFICANT CHANGE UP
APTT BLD: 30 SEC — SIGNIFICANT CHANGE UP (ref 27.5–35.5)
BILIRUB UR-MCNC: NEGATIVE — SIGNIFICANT CHANGE UP
BUN SERPL-MCNC: 15 MG/DL — SIGNIFICANT CHANGE UP (ref 7–23)
CALCIUM SERPL-MCNC: 9.3 MG/DL — SIGNIFICANT CHANGE UP (ref 8.4–10.5)
CHLORIDE SERPL-SCNC: 104 MMOL/L — SIGNIFICANT CHANGE UP (ref 96–108)
CO2 SERPL-SCNC: 28 MMOL/L — SIGNIFICANT CHANGE UP (ref 22–31)
COLOR SPEC: COLORLESS — SIGNIFICANT CHANGE UP
CREAT SERPL-MCNC: 0.81 MG/DL — SIGNIFICANT CHANGE UP (ref 0.5–1.3)
DIFF PNL FLD: NEGATIVE — SIGNIFICANT CHANGE UP
EGFR: 82 ML/MIN/1.73M2 — SIGNIFICANT CHANGE UP
GLUCOSE SERPL-MCNC: 89 MG/DL — SIGNIFICANT CHANGE UP (ref 70–99)
GLUCOSE UR QL: NEGATIVE — SIGNIFICANT CHANGE UP
HCT VFR BLD CALC: 30 % — LOW (ref 34.5–45)
HGB BLD-MCNC: 9.9 G/DL — LOW (ref 11.5–15.5)
INR BLD: 1 RATIO — SIGNIFICANT CHANGE UP (ref 0.88–1.16)
KETONES UR-MCNC: NEGATIVE — SIGNIFICANT CHANGE UP
LEUKOCYTE ESTERASE UR-ACNC: NEGATIVE — SIGNIFICANT CHANGE UP
MCHC RBC-ENTMCNC: 33 GM/DL — SIGNIFICANT CHANGE UP (ref 32–36)
MCHC RBC-ENTMCNC: 34.4 PG — HIGH (ref 27–34)
MCV RBC AUTO: 104.2 FL — HIGH (ref 80–100)
NITRITE UR-MCNC: NEGATIVE — SIGNIFICANT CHANGE UP
NRBC # BLD: 0 /100 WBCS — SIGNIFICANT CHANGE UP (ref 0–0)
PH UR: 8 — SIGNIFICANT CHANGE UP (ref 5–8)
PLATELET # BLD AUTO: 209 K/UL — SIGNIFICANT CHANGE UP (ref 150–400)
PLATELET RESPONSE ASPIRIN RESULT: 404 ARU — SIGNIFICANT CHANGE UP (ref 350–700)
PLATELET RESPONSE ASPIRIN RESULT: 410 ARU — SIGNIFICANT CHANGE UP (ref 350–700)
POTASSIUM SERPL-MCNC: 4.3 MMOL/L — SIGNIFICANT CHANGE UP (ref 3.5–5.3)
POTASSIUM SERPL-SCNC: 4.3 MMOL/L — SIGNIFICANT CHANGE UP (ref 3.5–5.3)
PROT UR-MCNC: NEGATIVE — SIGNIFICANT CHANGE UP
PROTHROM AB SERPL-ACNC: 11.5 SEC — SIGNIFICANT CHANGE UP (ref 10.5–13.4)
RAPID RVP RESULT: SIGNIFICANT CHANGE UP
RBC # BLD: 2.88 M/UL — LOW (ref 3.8–5.2)
RBC # FLD: 14.3 % — SIGNIFICANT CHANGE UP (ref 10.3–14.5)
SARS-COV-2 RNA SPEC QL NAA+PROBE: SIGNIFICANT CHANGE UP
SODIUM SERPL-SCNC: 140 MMOL/L — SIGNIFICANT CHANGE UP (ref 135–145)
SP GR SPEC: 1.01 — SIGNIFICANT CHANGE UP (ref 1.01–1.02)
UROBILINOGEN FLD QL: NEGATIVE — SIGNIFICANT CHANGE UP
WBC # BLD: 3.71 K/UL — LOW (ref 3.8–10.5)
WBC # FLD AUTO: 3.71 K/UL — LOW (ref 3.8–10.5)

## 2023-04-03 PROCEDURE — 99223 1ST HOSP IP/OBS HIGH 75: CPT

## 2023-04-03 PROCEDURE — 72156 MRI NECK SPINE W/O & W/DYE: CPT | Mod: 26

## 2023-04-03 RX ORDER — KETOROLAC TROMETHAMINE 30 MG/ML
15 SYRINGE (ML) INJECTION ONCE
Refills: 0 | Status: DISCONTINUED | OUTPATIENT
Start: 2023-04-03 | End: 2023-04-03

## 2023-04-03 RX ORDER — LACOSAMIDE 50 MG/1
200 TABLET ORAL EVERY 12 HOURS
Refills: 0 | Status: DISCONTINUED | OUTPATIENT
Start: 2023-04-03 | End: 2023-04-08

## 2023-04-03 RX ORDER — POLYETHYLENE GLYCOL 3350 17 G/17G
17 POWDER, FOR SOLUTION ORAL DAILY
Refills: 0 | Status: DISCONTINUED | OUTPATIENT
Start: 2023-04-03 | End: 2023-04-08

## 2023-04-03 RX ORDER — SENNA PLUS 8.6 MG/1
2 TABLET ORAL AT BEDTIME
Refills: 0 | Status: DISCONTINUED | OUTPATIENT
Start: 2023-04-03 | End: 2023-04-08

## 2023-04-03 RX ORDER — VALPROIC ACID (AS SODIUM SALT) 250 MG/5ML
250 SOLUTION, ORAL ORAL EVERY 8 HOURS
Refills: 0 | Status: DISCONTINUED | OUTPATIENT
Start: 2023-04-03 | End: 2023-04-08

## 2023-04-03 RX ORDER — CALCIUM CARBONATE 500(1250)
1 TABLET ORAL THREE TIMES A DAY
Refills: 0 | Status: DISCONTINUED | OUTPATIENT
Start: 2023-04-03 | End: 2023-04-08

## 2023-04-03 RX ORDER — POLYETHYLENE GLYCOL 3350 17 G/17G
17 POWDER, FOR SOLUTION ORAL DAILY
Refills: 0 | Status: DISCONTINUED | OUTPATIENT
Start: 2023-04-03 | End: 2023-04-03

## 2023-04-03 RX ORDER — ACETAMINOPHEN 500 MG
650 TABLET ORAL EVERY 6 HOURS
Refills: 0 | Status: DISCONTINUED | OUTPATIENT
Start: 2023-04-03 | End: 2023-04-08

## 2023-04-03 RX ORDER — AMLODIPINE BESYLATE 2.5 MG/1
10 TABLET ORAL AT BEDTIME
Refills: 0 | Status: DISCONTINUED | OUTPATIENT
Start: 2023-04-03 | End: 2023-04-08

## 2023-04-03 RX ORDER — HYDROMORPHONE HYDROCHLORIDE 2 MG/ML
4 INJECTION INTRAMUSCULAR; INTRAVENOUS; SUBCUTANEOUS ONCE
Refills: 0 | Status: DISCONTINUED | OUTPATIENT
Start: 2023-04-03 | End: 2023-04-03

## 2023-04-03 RX ORDER — HYDROMORPHONE HYDROCHLORIDE 2 MG/ML
2 INJECTION INTRAMUSCULAR; INTRAVENOUS; SUBCUTANEOUS EVERY 4 HOURS
Refills: 0 | Status: DISCONTINUED | OUTPATIENT
Start: 2023-04-03 | End: 2023-04-08

## 2023-04-03 RX ORDER — OLANZAPINE 15 MG/1
5 TABLET, FILM COATED ORAL AT BEDTIME
Refills: 0 | Status: DISCONTINUED | OUTPATIENT
Start: 2023-04-03 | End: 2023-04-08

## 2023-04-03 RX ORDER — ACYCLOVIR SODIUM 500 MG
400 VIAL (EA) INTRAVENOUS
Refills: 0 | Status: DISCONTINUED | OUTPATIENT
Start: 2023-04-03 | End: 2023-04-08

## 2023-04-03 RX ORDER — LANOLIN ALCOHOL/MO/W.PET/CERES
3 CREAM (GRAM) TOPICAL AT BEDTIME
Refills: 0 | Status: DISCONTINUED | OUTPATIENT
Start: 2023-04-03 | End: 2023-04-08

## 2023-04-03 RX ORDER — THIAMINE MONONITRATE (VIT B1) 100 MG
100 TABLET ORAL DAILY
Refills: 0 | Status: DISCONTINUED | OUTPATIENT
Start: 2023-04-03 | End: 2023-04-08

## 2023-04-03 RX ORDER — ATORVASTATIN CALCIUM 80 MG/1
80 TABLET, FILM COATED ORAL AT BEDTIME
Refills: 0 | Status: DISCONTINUED | OUTPATIENT
Start: 2023-04-03 | End: 2023-04-08

## 2023-04-03 RX ORDER — ACETAMINOPHEN 500 MG
975 TABLET ORAL EVERY 8 HOURS
Refills: 0 | Status: DISCONTINUED | OUTPATIENT
Start: 2023-04-03 | End: 2023-04-03

## 2023-04-03 RX ORDER — FENTANYL CITRATE 50 UG/ML
1 INJECTION INTRAVENOUS
Refills: 0 | Status: DISCONTINUED | OUTPATIENT
Start: 2023-04-03 | End: 2023-04-08

## 2023-04-03 RX ADMIN — Medication 3 MILLIGRAM(S): at 23:16

## 2023-04-03 RX ADMIN — POLYETHYLENE GLYCOL 3350 17 GRAM(S): 17 POWDER, FOR SOLUTION ORAL at 13:01

## 2023-04-03 RX ADMIN — Medication 15 MILLIGRAM(S): at 20:30

## 2023-04-03 RX ADMIN — ATORVASTATIN CALCIUM 80 MILLIGRAM(S): 80 TABLET, FILM COATED ORAL at 21:53

## 2023-04-03 RX ADMIN — Medication 1 TABLET(S): at 14:13

## 2023-04-03 RX ADMIN — Medication 100 MILLIGRAM(S): at 13:01

## 2023-04-03 RX ADMIN — Medication 15 MILLIGRAM(S): at 20:07

## 2023-04-03 RX ADMIN — HYDROMORPHONE HYDROCHLORIDE 4 MILLIGRAM(S): 2 INJECTION INTRAMUSCULAR; INTRAVENOUS; SUBCUTANEOUS at 01:08

## 2023-04-03 RX ADMIN — Medication 1 TABLET(S): at 21:53

## 2023-04-03 RX ADMIN — AMLODIPINE BESYLATE 10 MILLIGRAM(S): 2.5 TABLET ORAL at 21:53

## 2023-04-03 RX ADMIN — FENTANYL CITRATE 1 PATCH: 50 INJECTION INTRAVENOUS at 20:00

## 2023-04-03 RX ADMIN — HYDROMORPHONE HYDROCHLORIDE 4 MILLIGRAM(S): 2 INJECTION INTRAMUSCULAR; INTRAVENOUS; SUBCUTANEOUS at 04:51

## 2023-04-03 RX ADMIN — OLANZAPINE 5 MILLIGRAM(S): 15 TABLET, FILM COATED ORAL at 21:53

## 2023-04-03 RX ADMIN — Medication 400 MILLIGRAM(S): at 18:23

## 2023-04-03 RX ADMIN — LACOSAMIDE 200 MILLIGRAM(S): 50 TABLET ORAL at 18:22

## 2023-04-03 RX ADMIN — FENTANYL CITRATE 1 PATCH: 50 INJECTION INTRAVENOUS at 11:46

## 2023-04-03 RX ADMIN — Medication 250 MILLIGRAM(S): at 21:54

## 2023-04-03 RX ADMIN — HYDROMORPHONE HYDROCHLORIDE 2 MILLIGRAM(S): 2 INJECTION INTRAMUSCULAR; INTRAVENOUS; SUBCUTANEOUS at 23:16

## 2023-04-03 RX ADMIN — Medication 250 MILLIGRAM(S): at 14:13

## 2023-04-03 NOTE — CHART NOTE - NSCHARTNOTEFT_GEN_A_CORE
-discussed with attending, ok for patient to eat, further plans pending MR results.  -OK for head of bed to 15 degrees while eating to reduce aspiration risk

## 2023-04-03 NOTE — PROGRESS NOTE ADULT - SUBJECTIVE AND OBJECTIVE BOX
Patient is a 62y old  Female who presents with a chief complaint of C3 fracture (03 Apr 2023 04:42)      INTERVAL HPI/OVERNIGHT EVENTS: Patient transferred to ER because of fall and trauma to head and neck. She  was found with some abnormality on C3 level, may be fractured. Neurosurgeon consulted. Discontinued NPO. Patient can eat. F/u MRI neck. Continue cervical collar at all times. F/u hematology/ oncology for multiple myeloma.  Hematology workup so far negative.     Pain Location & Control:     MEDICATIONS  (STANDING):  acyclovir   Oral Tab/Cap 400 milliGRAM(s) Oral two times a day  amLODIPine   Tablet 10 milliGRAM(s) Oral at bedtime  atorvastatin 80 milliGRAM(s) Oral at bedtime  calcium carbonate   1250 mG (OsCal) 1 Tablet(s) Oral three times a day  fentaNYL   Patch 100 MICROgram(s)/Hr 1 Patch Transdermal every 72 hours  lacosamide 200 milliGRAM(s) Oral every 12 hours  OLANZapine 5 milliGRAM(s) Oral at bedtime  polyethylene glycol 3350 17 Gram(s) Oral daily  senna 2 Tablet(s) Oral at bedtime  thiamine 100 milliGRAM(s) Oral daily  valproic  acid Syrup 250 milliGRAM(s) Oral every 8 hours    MEDICATIONS  (PRN):  acetaminophen     Tablet .. 975 milliGRAM(s) Oral every 8 hours PRN Temp greater or equal to 38C (100.4F), Mild Pain (1 - 3), Moderate Pain (4 - 6)  HYDROmorphone   Tablet 2 milliGRAM(s) Oral every 4 hours PRN Severe Pain (7 - 10)  melatonin 3 milliGRAM(s) Oral at bedtime PRN Insomnia      Allergies    No Known Allergies    Intolerances        REVIEW OF SYSTEMS:  CONSTITUTIONAL: No fever, weight loss, or fatigue  EYES: No eye pain, visual disturbances, or discharge  ENMT:  No difficulty hearing, tinnitus, vertigo; No sinus or throat pain  NECK: No pain or stiffness  BREASTS: No pain, masses, or nipple discharge  RESPIRATORY: No cough, wheezing, chills or hemoptysis; No shortness of breath  CARDIOVASCULAR: No chest pain, palpitations, dizziness, or leg swelling  GASTROINTESTINAL: No abdominal or epigastric pain. No nausea, vomiting, or hematemesis; No diarrhea or constipation. No melena or hematochezia.  GENITOURINARY: No dysuria, frequency, hematuria, or incontinence  NEUROLOGICAL: No headaches, memory loss, loss of strength, numbness, or tremors  SKIN: No itching, burning, rashes, or lesions   LYMPH NODES: No enlarged glands  ENDOCRINE: No heat or cold intolerance; No hair loss; No polydipsia or polyuria  MUSCULOSKELETAL: No back pain  PSYCHIATRIC: No depression, anxiety, mood swings, or difficulty sleeping  HEME/LYMPH: No easy bruising, or bleeding gums  ALLERGY AND IMMUNOLOGIC: No hives or eczema    Vital Signs Last 24 Hrs  T(C): 36.5 (03 Apr 2023 09:04), Max: 37 (03 Apr 2023 01:00)  T(F): 97.7 (03 Apr 2023 09:04), Max: 98.6 (03 Apr 2023 01:00)  HR: 90 (03 Apr 2023 09:04) (74 - 90)  BP: 127/79 (03 Apr 2023 09:04) (123/76 - 137/81)  BP(mean): 100 (03 Apr 2023 01:00) (91 - 100)  RR: 18 (03 Apr 2023 09:04) (16 - 18)  SpO2: 95% (03 Apr 2023 09:04) (95% - 99%)    Parameters below as of 03 Apr 2023 09:04  Patient On (Oxygen Delivery Method): room air        PHYSICAL EXAM:  GENERAL: NAD, well-groomed, well-developed  HEAD:  Atraumatic, Normocephalic  EYES: EOMI, PERRLA, conjunctiva and sclera clear  ENMT: No tonsillar erythema, exudates, or enlargement; Moist mucous membranes, Good dentition, No lesions  NECK: pain, collar at all times  NERVOUS SYSTEM:  Alert & Oriented X2 , intact   CHEST/LUNG: Clear to auscultation bilaterally; No rales, rhonchi, wheezing, or rubs  HEART: Regular rate and rhythm; No murmurs, rubs, or gallops  ABDOMEN: Soft, Nontender, Nondistended; Bowel sounds present  EXTREMITIES:  2+ Peripheral Pulses, No clubbing or cyanosis  LYMPH: No lymphadenopathy noted  SKIN: No rashes or lesions  I  LABS:                        9.9    3.71  )-----------( 209      ( 03 Apr 2023 01:41 )             30.0     03 Apr 2023 01:41    140    |  104    |  15     ----------------------------<  89     4.3     |  28     |  0.81     Ca    9.3        03 Apr 2023 01:41      PT/INR - ( 03 Apr 2023 01:41 )   PT: 11.5 sec;   INR: 1.00 ratio         PTT - ( 03 Apr 2023 01:41 )  PTT:30.0 sec    CAPILLARY BLOOD GLUCOSE            Cultures      RADIOLOGY & ADDITIONAL TESTS:    Imaging Personally Reviewed:  [X ] YES  [ ] NO    Consultant(s) Notes Reviewed:  [ X] YES  [ ] NO    Care Discussed with Consultants/Other Providers [X ] YES  [ ] NO Patient is a 62y old  Female who presents with a chief complaint of C3 fracture (03 Apr 2023 04:42)      INTERVAL HPI/OVERNIGHT EVENTS: Patient transferred to ER because of fall and trauma to head and neck. She  was found with some abnormality on C3 level, may be fractured. Neurosurgeon consulted. Discontinued NPO. Patient can eat.  MRI neck showed no acute cervical fracture only T2-T3 old abnormality. F/u neurosurgeon for more recommendations or possible discharge.  . Continue cervical collar at all times. F/u hematology/ oncology for multiple myeloma.  Hematology workup so far negative.  Patient is retaining urine, bladder scan showed 400 cc. Straight cath ordered x1.  Medically clear for discharge if neuro clears.    Pain Location & Control:     MEDICATIONS  (STANDING):  acyclovir   Oral Tab/Cap 400 milliGRAM(s) Oral two times a day  amLODIPine   Tablet 10 milliGRAM(s) Oral at bedtime  atorvastatin 80 milliGRAM(s) Oral at bedtime  calcium carbonate   1250 mG (OsCal) 1 Tablet(s) Oral three times a day  fentaNYL   Patch 100 MICROgram(s)/Hr 1 Patch Transdermal every 72 hours  lacosamide 200 milliGRAM(s) Oral every 12 hours  OLANZapine 5 milliGRAM(s) Oral at bedtime  polyethylene glycol 3350 17 Gram(s) Oral daily  senna 2 Tablet(s) Oral at bedtime  thiamine 100 milliGRAM(s) Oral daily  valproic  acid Syrup 250 milliGRAM(s) Oral every 8 hours    MEDICATIONS  (PRN):  acetaminophen     Tablet .. 975 milliGRAM(s) Oral every 8 hours PRN Temp greater or equal to 38C (100.4F), Mild Pain (1 - 3), Moderate Pain (4 - 6)  HYDROmorphone   Tablet 2 milliGRAM(s) Oral every 4 hours PRN Severe Pain (7 - 10)  melatonin 3 milliGRAM(s) Oral at bedtime PRN Insomnia      Allergies    No Known Allergies    Intolerances        REVIEW OF SYSTEMS:  CONSTITUTIONAL: No fever, weight loss, or fatigue  EYES: No eye pain, visual disturbances, or discharge  ENMT:  No difficulty hearing, tinnitus, vertigo; No sinus or throat pain  NECK: No pain or stiffness  BREASTS: No pain, masses, or nipple discharge  RESPIRATORY: No cough, wheezing, chills or hemoptysis; No shortness of breath  CARDIOVASCULAR: No chest pain, palpitations, dizziness, or leg swelling  GASTROINTESTINAL: No abdominal or epigastric pain. No nausea, vomiting, or hematemesis; No diarrhea or constipation. No melena or hematochezia.  GENITOURINARY: No dysuria, frequency, hematuria, or incontinence  NEUROLOGICAL: No headaches, memory loss, loss of strength, numbness, or tremors  SKIN: No itching, burning, rashes, or lesions   LYMPH NODES: No enlarged glands  ENDOCRINE: No heat or cold intolerance; No hair loss; No polydipsia or polyuria  MUSCULOSKELETAL: No back pain  PSYCHIATRIC: No depression, anxiety, mood swings, or difficulty sleeping  HEME/LYMPH: No easy bruising, or bleeding gums  ALLERGY AND IMMUNOLOGIC: No hives or eczema    Vital Signs Last 24 Hrs  T(C): 36.5 (03 Apr 2023 09:04), Max: 37 (03 Apr 2023 01:00)  T(F): 97.7 (03 Apr 2023 09:04), Max: 98.6 (03 Apr 2023 01:00)  HR: 90 (03 Apr 2023 09:04) (74 - 90)  BP: 127/79 (03 Apr 2023 09:04) (123/76 - 137/81)  BP(mean): 100 (03 Apr 2023 01:00) (91 - 100)  RR: 18 (03 Apr 2023 09:04) (16 - 18)  SpO2: 95% (03 Apr 2023 09:04) (95% - 99%)    Parameters below as of 03 Apr 2023 09:04  Patient On (Oxygen Delivery Method): room air        PHYSICAL EXAM:  GENERAL: NAD, well-groomed, well-developed  HEAD:  Atraumatic, Normocephalic  EYES: EOMI, PERRLA, conjunctiva and sclera clear  ENMT: No tonsillar erythema, exudates, or enlargement; Moist mucous membranes, Good dentition, No lesions  NECK: pain, collar at all times  NERVOUS SYSTEM:  Alert & Oriented X2 , intact   CHEST/LUNG: Clear to auscultation bilaterally; No rales, rhonchi, wheezing, or rubs  HEART: Regular rate and rhythm; No murmurs, rubs, or gallops  ABDOMEN: Soft, Nontender, Nondistended; Bowel sounds present  EXTREMITIES:  2+ Peripheral Pulses, No clubbing or cyanosis  LYMPH: No lymphadenopathy noted  SKIN: No rashes or lesions  I  LABS:                        9.9    3.71  )-----------( 209      ( 03 Apr 2023 01:41 )             30.0     03 Apr 2023 01:41    140    |  104    |  15     ----------------------------<  89     4.3     |  28     |  0.81     Ca    9.3        03 Apr 2023 01:41      PT/INR - ( 03 Apr 2023 01:41 )   PT: 11.5 sec;   INR: 1.00 ratio         PTT - ( 03 Apr 2023 01:41 )  PTT:30.0 sec    CAPILLARY BLOOD GLUCOSE            Cultures      RADIOLOGY & ADDITIONAL TESTS:    Imaging Personally Reviewed:  [X ] YES  [ ] NO    Consultant(s) Notes Reviewed:  [ X] YES  [ ] NO    Care Discussed with Consultants/Other Providers [X ] YES  [ ] NO

## 2023-04-03 NOTE — H&P ADULT - NSHPREVIEWOFSYSTEMS_GEN_ALL_CORE
General: no weight change, no fever, no chills, no night sweats, no fatigue  Skin: no rash, no itching, no dryness, no hair loss  Opthalmologic: no eye pain, no eye redness, no eye swelling, no vision changes  ENMT: no hearing changes, no ear pain, no tinnitus, no vertigo, no nasal congestion, no sore throat, no dysphagia  Respiratory and thorax: no shortness of breath, no cough, no wheezing, no hemoptysis, no pleuritic chest pain  Cardiovascular: no chest pain, no dyspnea on exertion, no orthopnea, no palpitations, no peripheral edema  Gastrointestinal: no abdominal pain, no nausea, no vomiting, no diarrhea, no constipation  Genitourinary: no dysuria, no urinary frequency or hesitancy, no hematuria  Musculoskeletal: no pain, no lower extremity edema, no traumatic injury  Neurological: no weakness, no numbness, no loss of consciousness, no syncope, no dizziness, no headache  Psychiatric: no depression, no anxiety, no mood swings  Endo: no heat or cold intolerance, no hirsutism, no polyuria, no polydipsia

## 2023-04-03 NOTE — H&P ADULT - NSHPPHYSICALEXAM_GEN_ALL_CORE
T(C): 37 (04-03-23 @ 01:00), Max: 37 (04-03-23 @ 01:00)  HR: 75 (04-03-23 @ 01:00) (74 - 89)  BP: 131/84 (04-03-23 @ 01:00) (123/76 - 137/81)  RR: 18 (04-03-23 @ 01:00) (16 - 18)  SpO2: 97% (04-03-23 @ 01:00) (97% - 99%)    Constitutional: no acute distress, laying in bed comfortably, cervical collar in place, frail  ENMT: atraumatic, normocephalic, no lymphadenopathy  Eyes: pupils equally round and reactive to light, extraocular muscles intact, no conjunctival injection  Cardio: regular rate and rhythm, normal S1/S2, no murmurs, rubs, or gallops  Respiratory: lungs clear to auscultation bilaterally, no rales, rhonchi, or wheezes  GI: soft, nontender, nondistended, BSx4  MSK: extremities atraumatic, no cyanosis or clubbing  Skin: warm, dry, no rashes or lesions  Neuro: no focal deficits, sensation grossly intact, strength 5/5 throughout  Psych: alert and oriented x3, normal behavior, appropriate mood and affect

## 2023-04-03 NOTE — PROGRESS NOTE ADULT - PROBLEM SELECTOR PLAN 1
- appreciate neurosurgery consult  - CT C-spine w/ Left C3 on C4 facet joint widening and malalignment. Also read by Rads as possible C3 Left inferior articular process avulsion fx. However, this C3-4 malalignment was also seen on previous 2/25/23 CTA H/N.  - C-collar at all times   - MRI C-spine w/wo ordered  - Spine precautions: bedrest, log-roll only   - F/u ARU lab value, was on ASA81  - neurosurgeon cleared for diet  - pain control: c/w home fentanyl patch 100ug/hr, tylenol PRN for mild/moderate pain and home dilaudid 2mg PO q4h PRN severe pain  - bowel regimen - appreciate neurosurgery consult  - CT C-spine w/ Left C3 on C4 facet joint widening and malalignment. Also read by Rads as possible C3 Left inferior articular process avulsion fx. However, this C3-4 malalignment was also seen on previous 2/25/23 CTA H/N.  - C-collar at all times   - MRI C-spine  showed no acute cervical fracture only T2-T3 old abnormality. F/u neurosurgeon.   - Spine precautions: bedrest, log-roll only   - F/u ARU lab value, was on ASA81  - neurosurgeon cleared for diet  - pain control: c/w home fentanyl patch 100ug/hr, tylenol PRN for mild/moderate pain and home dilaudid 2mg PO q4h PRN severe pain  - bowel regimen

## 2023-04-03 NOTE — H&P ADULT - ASSESSMENT
Patient is a 62y Female with PMH of MM (dx 2018, on chemo) w/ extensive bone mets c/b multiple spinal compression fractures, seizure disorder, HTN, HLD, on prophylactic lovenox, presenting from [facility] after an unwitnessed fall, CT C-spine with possible C3 Left inferior articular process avulsion fx, admitted to medicine for further evaluation.

## 2023-04-03 NOTE — H&P ADULT - PROBLEM SELECTOR PLAN 1
- appreciate neurosurgery consult  - CT C-spine w/ Left C3 on C4 facet joint widening and malalignment. Also read by Rads as possible C3 Left inferior articular process avulsion fx. However, this C3-4 malalignment was also seen on previous 2/25/23 CTA H/N.  - C-collar at all times   - MRI C-spine w/wo ordered  - Spine precautions: bedrest, log-roll only   - F/u ARU lab value, was on ASA81  - NPO for now, SCDs for DVT PPx  - pain control: c/w home fentanyl patch 100ug/hr, tylenol PRN for mild/moderate pain and home dilaudid 2mg PO q4h PRN severe pain - appreciate neurosurgery consult  - CT C-spine w/ Left C3 on C4 facet joint widening and malalignment. Also read by Rads as possible C3 Left inferior articular process avulsion fx. However, this C3-4 malalignment was also seen on previous 2/25/23 CTA H/N.  - C-collar at all times   - MRI C-spine w/wo ordered  - Spine precautions: bedrest, log-roll only   - F/u ARU lab value, was on ASA81  - NPO for now, SCDs for DVT PPx  - pain control: c/w home fentanyl patch 100ug/hr, tylenol PRN for mild/moderate pain and home dilaudid 2mg PO q4h PRN severe pain  - bowel regimen

## 2023-04-03 NOTE — H&P ADULT - HISTORY OF PRESENT ILLNESS
Patient is a 62y Female with PMH of MM (dx 2018, on chemo) w/ extensive bone mets c/b multiple spinal compression fractures, seizure disorder, HTN, on prophylactic lovenox, presenting from [facility] after an unwitnessed fall. Patient states she does not remember the event, but prior documentation states that she tripped while walking, landing on her knees and hitting the left side of her head. She was complaining of pain in her left temporal region and her bilateral knees. She states she ambulates with assistance at baseline. Patient denies fever, chills, chest pain, shortness of breath, abdominal pain, nausea, vomiting, changes in bowel habits, or urinary symptoms.    In the ED, afebrile, VSS. Labs significant for WBC 3.7, Hgb 9.9. RVP negative. Imaging as below. S/p dilaudid 4mg PO x2 in the ED.       Xray bilateral knees    IMPRESSION:  No acute fracture or dislocation.      CT head and cervical spine    IMPRESSION:    No acute intracranial pathology.    Linear fragment seen lateral to the C3 left inferior articular process   for which possibly a small avulsion fracture cannot be excluded. If   clinically indicated, further evaluation can be obtained with MRI.    Extensive lucencies of the skull and spine compatible with patient's   history of multiple myeloma.      CT chest/abdomen/pelvis    IMPRESSION: Extensive lucencies throughout the visualized osseous   skeleton compatible with patient's known history of multiple myeloma. The   degree of lucency limits evaluation for nondisplaced fractures.    Age-indeterminate compression deformities of multiple thoracic and lumbar   vertebrae as above.    Small bilateral pleural effusions.    Additional findings as above.

## 2023-04-03 NOTE — H&P ADULT - PROBLEM SELECTOR PLAN 3
Diagnosed 2018, reportedly on chemo (though pt does not remember what)  - CT C/A/P w/ extensive lucencies throughout the visualized osseous skeleton compatible with patient's known history of multiple myeloma. There are age-indeterminate compression deformities of multiple thoracic and lumbar vertebrae. Also small bilateral pleural effusions  - [place] w/ [facility] [place]  - oncology consult in AM  - c/w home acyclovir 400mg BID  - c/w home fentanyl 100ug/hr patch  - PRN pain control as above Diagnosed 2018, reportedly on chemo (though pt does not remember what)  - CT C/A/P w/ extensive lucencies throughout the visualized osseous skeleton compatible with patient's known history of multiple myeloma. There are age-indeterminate compression deformities of multiple thoracic and lumbar vertebrae. Also small bilateral pleural effusions  - [place] [facility] [place] oncology consult in AM  - c/w home acyclovir 400mg BID  - c/w home fentanyl 100ug/hr patch  - PRN pain control as above  - bowel regimen

## 2023-04-03 NOTE — H&P ADULT - NSICDXPASTMEDICALHX_GEN_ALL_CORE_FT
PAST MEDICAL HISTORY:  H/O compression fracture of spine     HTN (hypertension)     Multiple myeloma     Seizure

## 2023-04-03 NOTE — H&P ADULT - PROBLEM SELECTOR PLAN 6
- c/w home lacosamide 200mg q12h  - c/w home valproic acid 250mg q8h  - f/u valproic acid and lacosamide levels

## 2023-04-03 NOTE — PATIENT PROFILE ADULT - FALL HARM RISK - HARM RISK INTERVENTIONS

## 2023-04-03 NOTE — H&P ADULT - PROBLEM SELECTOR PLAN 7
Hgb 9.9 on admission, baseline 8-9 per old labs. Likely 2/2 MM and chemotherapy. No s/s of bleeding  - monitor CBC daily

## 2023-04-03 NOTE — H&P ADULT - TIME BILLING
reviewing tests and imaging, independently obtaining a history, performing a physical examination, discussing the plan with the patient, ordering medications/tests, documenting clinical information, and coordinating care.

## 2023-04-03 NOTE — CHART NOTE - NSCHARTNOTEFT_GEN_A_CORE
-The spine precautions can be lifted and the patient can mobilize out of bed while wearing collar  -OK for outpatient follow up with Dr. Agee in 2 weeks

## 2023-04-03 NOTE — CONSULT NOTE ADULT - SUBJECTIVE AND OBJECTIVE BOX
CHIEF COMPLAINT  Fracture    HISTORY OF PRESENT ILLNESS  CECILIA DUNAWAY is a 62y Female who presents with a chief complaint of fracture    Patient was admitted on April 3rd after presenting to the Emergency Department with unwitnessed fall. She did not remember the event. She complained of pain in the head as well as in the knees. Patient was admitted after CT showed possible C3 fracture.    PAST MEDICAL AND SURGICAL HISTORY  Hypertension  Multiple Myeloma    FAMILY HISTORY  Non-contributory    SOCIAL HISTORY  Denies tobacco use    REVIEW OF SYSTEMS  A complete review of systems was performed; negative except per HPI    PHYSICAL EXAM  T(C): 36.4 (04-03-23 @ 14:19), Max: 37 (04-03-23 @ 01:00)  HR: 84 (04-03-23 @ 14:19) (74 - 90)  BP: 107/73 (04-03-23 @ 14:19) (107/73 - 137/81)  RR: 18 (04-03-23 @ 14:19) (16 - 18)  SpO2: 95% (04-03-23 @ 14:19) (95% - 99%)  Constitutional: alert, awake, in no acute distress  Eyes: PERRL, EOMI  HEENT: normocephalic, atraumatic  Neck: supple, non-tender, collar in place  Cardiovascular: normal perfusion, no peripheral edema  Respiratory: normal respiratory efforts; no increased use of accessory muscles  Gastrointestinal: soft, non-tender  Skin: warm, dry    LABORATORY DATA                        9.9    3.71  )-----------( 209      ( 03 Apr 2023 01:41 )             30.0     04-03    140  |  104  |  15  ----------------------------<  89  4.3   |  28  |  0.81    Ca    9.3      03 Apr 2023 01:41    RADIOLOGY REVIEW  IMPRESSION:  Vertebral plana appearance T3 and T6 which is old by imaging   characteristics. Heterogeneity to the marrow consistent with findings on   CT and patient's known diagnosis of myeloma. No epidural extension. No  evidence of cord compression    
p (5219)     HPI:  62F, PMH MM diagnosed in 2018. Not actively on chemo due to adverse event of seizure a month ago, last had chemo on 3/9. Gets Onc treatment from Dr. Denise Fatima at [facility]. S/p mechanical fall on 4/1, landed on Left side. CT C-spine w/ Left C3 on C4 facet joint widening and malalignment. Also read by Rads as possible C3 Left inferior articular process avulsion fx. However, this C3-4 malalignment was also seen on previous 2/25/23 CTA H/N. CT CAP shows extensive lytic spine lesions. Exam: AOx3, EOMI, no facial/drift, BRAVO 5/5. But with LUE Ojeda's and BLE clonus. SILT      =====================  PAST MEDICAL HISTORY     PAST SURGICAL HISTORY     No Known Allergies      MEDICATIONS:  Antibiotics:    Neuro:  HYDROmorphone   Tablet 4 milliGRAM(s) Oral Once    Other:      SOCIAL HISTORY:   Occupation:   Marital Status:     FAMILY HISTORY:      ROS: Negative except per HPI    LABS:

## 2023-04-03 NOTE — CHART NOTE - NSCHARTNOTEFT_GEN_A_CORE
Others' Prescriptions  Patient Name: Catherine Davis Date: 1960  Address: [address] [place] [id number]: Female  Rx Written	Rx Dispensed	Drug	Quantity	Days Supply	Prescriber Name  03/30/2023	03/30/2023	fentanyl 100 mcg/hr patch	2	6	Mikie Curry  Prescriber Mireya # [id number]  Payment Method Cash  [facility]  03/30/2023	03/30/2023	lacosamide 200 mg tablet	28	14	Mikie Curry  Prescriber Mireya # [id number]  Payment Method Cash  [facility]  11/27/2022	11/27/2022	hydromorphone 2 mg tablet	90	15	Mikie Curry  Prescriber Mireya # [id number]  Payment Method Cash  [facility]  11/26/2022	11/26/2022	fentanyl 75 mcg/hr patch	5	15	Mikie Curry  Prescriber Mireya # [id number]  Payment Method Cash  [facility]  11/11/2022	11/11/2022	hydromorphone 2 mg tablet	30	5	Mikie Curry  Prescriber Mireya # [id number]  Payment Method Cash  [facility]  11/11/2022	11/11/2022	fentanyl 75 mcg/hr patch	5	15	Mikie Curry  Prescriber Mireya # [id number]  Payment Method Cash  [facility]

## 2023-04-03 NOTE — CONSULT NOTE ADULT - ASSESSMENT
62F, PMH MM diagnosed in 2018. Not actively on chemo due to adverse event of seizure a month ago, last had chemo on 3/9. Gets Onc treatment from Dr. Denise Fatima at [facility] [place]. S/p mechanical fall on 4/1, landed on Left side. CT C-spine w/ Left C3 on C4 facet joint widening and malalignment. Also read by Rads as possible C3 Left inferior articular process avulsion fx. However, this C3-4 malalignment was also seen on previous 2/25/23 CTA H/N. CT CAP shows extensive lytic spine lesions. Exam: AOx3, EOMI, no facial/drift, BRAVO 5/5. But with LUE Ojeda's and BLE clonus. SILT  -C-collar at all times   -MRI C-spine w/wo  -Spine precautions: bedrest, log-roll only   -Rec admit to Medicine, q4h neurochecks. Med Onc consult  -Get ARU lab value, was on ASA81  -Keep NPO  
CECILIA DUNAWAY is a 62y Female who presents with a chief complaint of fracture    Multiple Myeloma  ·	Patient follows with Dr. Denise Fatima, [facility].  ·	Patient is on daratumumab + VRD with last dose of daratumumab on March 9th and bortezomib on March 2nd.  ·	No systemic therapy while inpatient.  ·	Continue acyclovir prophylaxis.    C3 Fracture  ·	Patient with known extensive osseous disease, severe collapse deformities of T3/T6.  ·	MRI in December 2022 had reported multiple compression fractures including at C3, T3, and L2.  ·	Optimize pain control.  ·	Follow-up surgery recommendations.    Will continue to follow.    Antoni Hull MD  Hematology/Oncology  O: [phone]

## 2023-04-03 NOTE — H&P ADULT - PROBLEM SELECTOR PLAN 2
Likely mechanical fall as per prior documentation, however patient currently does not remember the event  - Possible C3 avulsion fx as above, otherwise imaging negative for acute injury  - bed rest, fall precautions  - PT eval once cleared by neurosurgery Likely mechanical fall as per prior documentation, however patient currently does not remember the event  - Possible C3 avulsion fx as above, otherwise imaging negative for acute injury  - bed rest, fall precautions  - f/u UA  - PT eval once cleared by neurosurgery

## 2023-04-04 LAB
ANION GAP SERPL CALC-SCNC: 11 MMOL/L — SIGNIFICANT CHANGE UP (ref 5–17)
BUN SERPL-MCNC: 12 MG/DL — SIGNIFICANT CHANGE UP (ref 7–23)
CALCIUM SERPL-MCNC: 10.4 MG/DL — SIGNIFICANT CHANGE UP (ref 8.4–10.5)
CHLORIDE SERPL-SCNC: 100 MMOL/L — SIGNIFICANT CHANGE UP (ref 96–108)
CO2 SERPL-SCNC: 27 MMOL/L — SIGNIFICANT CHANGE UP (ref 22–31)
CREAT SERPL-MCNC: 0.87 MG/DL — SIGNIFICANT CHANGE UP (ref 0.5–1.3)
EGFR: 75 ML/MIN/1.73M2 — SIGNIFICANT CHANGE UP
GLUCOSE SERPL-MCNC: 103 MG/DL — HIGH (ref 70–99)
HCT VFR BLD CALC: 34.5 % — SIGNIFICANT CHANGE UP (ref 34.5–45)
HGB BLD-MCNC: 11.1 G/DL — LOW (ref 11.5–15.5)
MAGNESIUM SERPL-MCNC: 2.4 MG/DL — SIGNIFICANT CHANGE UP (ref 1.6–2.6)
MCHC RBC-ENTMCNC: 32.2 GM/DL — SIGNIFICANT CHANGE UP (ref 32–36)
MCHC RBC-ENTMCNC: 34.4 PG — HIGH (ref 27–34)
MCV RBC AUTO: 106.8 FL — HIGH (ref 80–100)
NRBC # BLD: 0 /100 WBCS — SIGNIFICANT CHANGE UP (ref 0–0)
PHOSPHATE SERPL-MCNC: 3 MG/DL — SIGNIFICANT CHANGE UP (ref 2.5–4.5)
PLATELET # BLD AUTO: 313 K/UL — SIGNIFICANT CHANGE UP (ref 150–400)
POTASSIUM SERPL-MCNC: 4.7 MMOL/L — SIGNIFICANT CHANGE UP (ref 3.5–5.3)
POTASSIUM SERPL-SCNC: 4.7 MMOL/L — SIGNIFICANT CHANGE UP (ref 3.5–5.3)
RBC # BLD: 3.23 M/UL — LOW (ref 3.8–5.2)
RBC # FLD: 14.3 % — SIGNIFICANT CHANGE UP (ref 10.3–14.5)
SODIUM SERPL-SCNC: 138 MMOL/L — SIGNIFICANT CHANGE UP (ref 135–145)
VALPROATE SERPL-MCNC: 83 UG/ML — SIGNIFICANT CHANGE UP (ref 50–100)
WBC # BLD: 5.25 K/UL — SIGNIFICANT CHANGE UP (ref 3.8–10.5)
WBC # FLD AUTO: 5.25 K/UL — SIGNIFICANT CHANGE UP (ref 3.8–10.5)

## 2023-04-04 RX ORDER — ASCORBIC ACID 60 MG
500 TABLET,CHEWABLE ORAL DAILY
Refills: 0 | Status: DISCONTINUED | OUTPATIENT
Start: 2023-04-04 | End: 2023-04-08

## 2023-04-04 RX ADMIN — Medication 500 MILLIGRAM(S): at 11:13

## 2023-04-04 RX ADMIN — AMLODIPINE BESYLATE 10 MILLIGRAM(S): 2.5 TABLET ORAL at 21:08

## 2023-04-04 RX ADMIN — Medication 250 MILLIGRAM(S): at 21:07

## 2023-04-04 RX ADMIN — Medication 250 MILLIGRAM(S): at 05:54

## 2023-04-04 RX ADMIN — LACOSAMIDE 200 MILLIGRAM(S): 50 TABLET ORAL at 05:54

## 2023-04-04 RX ADMIN — ATORVASTATIN CALCIUM 80 MILLIGRAM(S): 80 TABLET, FILM COATED ORAL at 21:07

## 2023-04-04 RX ADMIN — POLYETHYLENE GLYCOL 3350 17 GRAM(S): 17 POWDER, FOR SOLUTION ORAL at 11:13

## 2023-04-04 RX ADMIN — Medication 1 TABLET(S): at 11:13

## 2023-04-04 RX ADMIN — Medication 1 TABLET(S): at 13:41

## 2023-04-04 RX ADMIN — Medication 100 MILLIGRAM(S): at 11:13

## 2023-04-04 RX ADMIN — Medication 1 TABLET(S): at 05:54

## 2023-04-04 RX ADMIN — Medication 1 TABLET(S): at 21:07

## 2023-04-04 RX ADMIN — Medication 400 MILLIGRAM(S): at 05:54

## 2023-04-04 RX ADMIN — Medication 250 MILLIGRAM(S): at 13:41

## 2023-04-04 RX ADMIN — FENTANYL CITRATE 1 PATCH: 50 INJECTION INTRAVENOUS at 19:32

## 2023-04-04 RX ADMIN — FENTANYL CITRATE 1 PATCH: 50 INJECTION INTRAVENOUS at 07:45

## 2023-04-04 RX ADMIN — OLANZAPINE 5 MILLIGRAM(S): 15 TABLET, FILM COATED ORAL at 21:07

## 2023-04-04 RX ADMIN — HYDROMORPHONE HYDROCHLORIDE 2 MILLIGRAM(S): 2 INJECTION INTRAMUSCULAR; INTRAVENOUS; SUBCUTANEOUS at 00:16

## 2023-04-04 RX ADMIN — Medication 400 MILLIGRAM(S): at 17:35

## 2023-04-04 RX ADMIN — LACOSAMIDE 200 MILLIGRAM(S): 50 TABLET ORAL at 17:35

## 2023-04-04 NOTE — DIETITIAN INITIAL EVALUATION ADULT - PERTINENT LABORATORY DATA
04-03    140  |  104  |  15  ----------------------------<  89  4.3   |  28  |  0.81    Ca    9.3      03 Apr 2023 01:41

## 2023-04-04 NOTE — DIETITIAN INITIAL EVALUATION ADULT - PROBLEM SELECTOR PLAN 3
Diagnosed 2018, reportedly on chemo (though pt does not remember what)  - CT C/A/P w/ extensive lucencies throughout the visualized osseous skeleton compatible with patient's known history of multiple myeloma. There are age-indeterminate compression deformities of multiple thoracic and lumbar vertebrae. Also small bilateral pleural effusions  - follows w/ [facility]  - oncology consult in AM  - c/w home acyclovir 400mg BID  - c/w home fentanyl 100ug/hr patch  - PRN pain control as above  - bowel regimen

## 2023-04-04 NOTE — DIETITIAN INITIAL EVALUATION ADULT - ENERGY INTAKE
Pt reports appetite has improved and no issues tolerating PO diet despite wearing cervical collar. Requesting [employer] supplements per diet tech, amenable to vanilla. No specific food preferences offered. Stressed importance of alternative sources of protein intake to promote healing. Pt verbalized understanding. Fair (50-75%)

## 2023-04-04 NOTE — PROGRESS NOTE ADULT - PROBLEM SELECTOR PLAN 1
- appreciate neurosurgery consult  - CT C-spine w/ Left C3 on C4 facet joint widening and malalignment. Also read by Rads as possible C3 Left inferior articular process avulsion fx. However, this C3-4 malalignment was also seen on previous 2/25/23 CTA H/N.  - C-collar at all times   - MRI C-spine  showed no acute cervical fracture only T2-T3 old abnormality. Cleared by neuro. F/u Dr. Bonner in 2 weeks   - Spine precautions: bedrest, log-roll only   - F/u ARU lab value, was on ASA81  - neurosurgeon cleared for diet  - pain control: c/w home fentanyl patch 100ug/hr, tylenol PRN for mild/moderate pain and home dilaudid 2mg PO q4h PRN severe pain  - bowel regimen

## 2023-04-04 NOTE — DIETITIAN INITIAL EVALUATION ADULT - PROBLEM SELECTOR PLAN 2
Likely mechanical fall as per prior documentation, however patient currently does not remember the event  - Possible C3 avulsion fx as above, otherwise imaging negative for acute injury  - bed rest, fall precautions  - f/u UA  - PT eval once cleared by neurosurgery

## 2023-04-04 NOTE — DIETITIAN INITIAL EVALUATION ADULT - ADD RECOMMEND
1) continue regular, vegetarian diet with no dairy (per Pt request)  2) recommend [facility] Standard 1.0 2x/day (325 kcal, 16 g Pro/11oz) - needs provider to RN order  3) recommend LPS 2x/day (100 kcal, 15 g Pro/packet)  4) continue thiamine; recommend MVI and vitC for wound healing  5) obtain current wt to trend  6) Malnutrition sticker placed, RD to follow-up as per protocol   7) Monitor PO intake, weight, labs, skin, GI status, diet

## 2023-04-04 NOTE — DIETITIAN INITIAL EVALUATION ADULT - ETIOLOGY
related to increased demand for wound healing/cancer likely inability to consume adequate energy/protein

## 2023-04-04 NOTE — CHART NOTE - NSCHARTNOTEFT_GEN_A_CORE
-C-collar d/c'd given lack of acute process and mechanical pain, plan for outpatient follow up with Dr. Agee

## 2023-04-04 NOTE — DIETITIAN INITIAL EVALUATION ADULT - PERTINENT MEDS FT
MEDICATIONS  (STANDING):  acyclovir   Oral Tab/Cap 400 milliGRAM(s) Oral two times a day  amLODIPine   Tablet 10 milliGRAM(s) Oral at bedtime  atorvastatin 80 milliGRAM(s) Oral at bedtime  calcium carbonate   1250 mG (OsCal) 1 Tablet(s) Oral three times a day  fentaNYL   Patch 100 MICROgram(s)/Hr 1 Patch Transdermal every 72 hours  lacosamide 200 milliGRAM(s) Oral every 12 hours  OLANZapine 5 milliGRAM(s) Oral at bedtime  polyethylene glycol 3350 17 Gram(s) Oral daily  senna 2 Tablet(s) Oral at bedtime  thiamine 100 milliGRAM(s) Oral daily  valproic  acid Syrup 250 milliGRAM(s) Oral every 8 hours    MEDICATIONS  (PRN):  acetaminophen     Tablet .. 650 milliGRAM(s) Oral every 6 hours PRN Mild Pain (1 - 3)  HYDROmorphone   Tablet 2 milliGRAM(s) Oral every 4 hours PRN Severe Pain (7 - 10)  melatonin 3 milliGRAM(s) Oral at bedtime PRN Insomnia

## 2023-04-04 NOTE — DIETITIAN INITIAL EVALUATION ADULT - OTHER INFO
current dosing wt: 54.4 kg (4/2)  55.8 kg (10/2021) per michelle MONTENEGRO, no more recent wts to assess

## 2023-04-04 NOTE — DIETITIAN INITIAL EVALUATION ADULT - ORAL INTAKE PTA/DIET HISTORY
Chart reviewed, events noted. Pt reports eating well at Rehab, regular food. Per d/c paperwork, Pt was also ordered for LPS 2x/day and glucerna shakes 2x/day. Pt reports NKFA but does not currently eat dairy or meat. Does like fish. No issues chewing/swallowing. UBW unknown. Shellfish allergy documented in d/c paperwork in paper chart.

## 2023-04-04 NOTE — PROGRESS NOTE ADULT - SUBJECTIVE AND OBJECTIVE BOX
Patient is a 62y old  Female who presents with a chief complaint of Per chart, Pt is a 61 y/o F with PMH: "MM (dx 2018, on chemo) w/ extensive bone mets c/b multiple spinal compression fractures, seizure disorder, HTN, on prophylactic lovenox, [facility] unwitness[facility]. CT C-spine with possible C3 Left inferior articular process avulsion fx, admitted to medicine for further evaluation."    Pt seen and examined at bedside. C collar on.       MEDICATIONS  (STANDING):  acyclovir   Oral Tab/Cap 400 milliGRAM(s) Oral two times a day  amLODIPine   Tablet 10 milliGRAM(s) Oral at bedtime  ascorbic acid 500 milliGRAM(s) Oral daily  atorvastatin 80 milliGRAM(s) Oral at bedtime  calcium carbonate   1250 mG (OsCal) 1 Tablet(s) Oral three times a day  fentaNYL   Patch 100 MICROgram(s)/Hr 1 Patch Transdermal every 72 hours  lacosamide 200 milliGRAM(s) Oral every 12 hours  multivitamin 1 Tablet(s) Oral daily  OLANZapine 5 milliGRAM(s) Oral at bedtime  polyethylene glycol 3350 17 Gram(s) Oral daily  senna 2 Tablet(s) Oral at bedtime  thiamine 100 milliGRAM(s) Oral daily  valproic  acid Syrup 250 milliGRAM(s) Oral every 8 hours    MEDICATIONS  (PRN):  acetaminophen     Tablet .. 650 milliGRAM(s) Oral every 6 hours PRN Mild Pain (1 - 3)  HYDROmorphone   Tablet 2 milliGRAM(s) Oral every 4 hours PRN Severe Pain (7 - 10)  melatonin 3 milliGRAM(s) Oral at bedtime PRN Insomnia    Vital Signs Last 24 Hrs  T(C): 36.6 (04 Apr 2023 12:21), Max: 36.8 (04 Apr 2023 08:35)  T(F): 97.8 (04 Apr 2023 12:21), Max: 98.2 (04 Apr 2023 08:35)  HR: 94 (04 Apr 2023 12:21) (83 - 98)  BP: 100/67 (04 Apr 2023 12:21) (100/67 - 123/81)  BP(mean): --  RR: 18 (04 Apr 2023 12:21) (18 - 18)  SpO2: 97% (04 Apr 2023 12:21) (95% - 99%)    Parameters below as of 04 Apr 2023 12:21  Patient On (Oxygen Delivery Method): room air    PE  NAD  Awake, alert  Anicteric, MMM  C collar on  No c/c/e  No rash grossly                        11.1   5.25  )-----------( 313      ( 04 Apr 2023 10:24 )             34.5       04-04    138  |  100  |  12  ----------------------------<  103<H>  4.7   |  27  |  0.87    Ca    10.4      04 Apr 2023 10:24  Phos  3.0     04-04  Mg     2.4     04-04

## 2023-04-04 NOTE — PROGRESS NOTE ADULT - SUBJECTIVE AND OBJECTIVE BOX
Patient is a 62y old  Female who presents with a chief complaint of C3 fracture (2023 12:24)      INTERVAL HPI/OVERNIGHT EVENTS: Stable . MRI reviewed yesterday as discussed on last note. Neuro cleared patient. F/u Dr. Bonner in 2 weeks. Still needs to wear collar. Patient is medically clear for discharge. Seen by hematologist no new recs. Old compression fracture on T3-T6 and L2 multiple compression fracture  seen on  MRI 2022.     Pain Location & Control:     MEDICATIONS  (STANDING):  acyclovir   Oral Tab/Cap 400 milliGRAM(s) Oral two times a day  amLODIPine   Tablet 10 milliGRAM(s) Oral at bedtime  ascorbic acid 500 milliGRAM(s) Oral daily  atorvastatin 80 milliGRAM(s) Oral at bedtime  calcium carbonate   1250 mG (OsCal) 1 Tablet(s) Oral three times a day  fentaNYL   Patch 100 MICROgram(s)/Hr 1 Patch Transdermal every 72 hours  lacosamide 200 milliGRAM(s) Oral every 12 hours  multivitamin 1 Tablet(s) Oral daily  OLANZapine 5 milliGRAM(s) Oral at bedtime  polyethylene glycol 3350 17 Gram(s) Oral daily  senna 2 Tablet(s) Oral at bedtime  thiamine 100 milliGRAM(s) Oral daily  valproic  acid Syrup 250 milliGRAM(s) Oral every 8 hours    MEDICATIONS  (PRN):  acetaminophen     Tablet .. 650 milliGRAM(s) Oral every 6 hours PRN Mild Pain (1 - 3)  HYDROmorphone   Tablet 2 milliGRAM(s) Oral every 4 hours PRN Severe Pain (7 - 10)  melatonin 3 milliGRAM(s) Oral at bedtime PRN Insomnia      Allergies    acetaminophen (Unknown)  Benadryl (Unknown)  ibuprofen (Unknown)  oxycodone (Unknown)  Percocet (Unknown)    Intolerances        REVIEW OF SYSTEMS:  CONSTITUTIONAL: No fever, weight loss, or fatigue  EYES: No eye pain, visual disturbances, or discharge  ENMT:  No difficulty hearing, tinnitus, vertigo; No sinus or throat pain  NECK: No pain or stiffness  BREASTS: No pain, masses, or nipple discharge  RESPIRATORY: No cough, wheezing, chills or hemoptysis; No shortness of breath  CARDIOVASCULAR: No chest pain, palpitations, dizziness, or leg swelling  GASTROINTESTINAL: No abdominal or epigastric pain. No nausea, vomiting, or hematemesis; No diarrhea or constipation. No melena or hematochezia.  GENITOURINARY: No dysuria, frequency, hematuria, or incontinence  NEUROLOGICAL: No headaches, memory loss, loss of strength, numbness, or tremors  SKIN: No itching, burning, rashes, or lesions   LYMPH NODES: No enlarged glands  ENDOCRINE: No heat or cold intolerance; No hair loss; No polydipsia or polyuria  MUSCULOSKELETAL: No back pain  PSYCHIATRIC: No depression, anxiety, mood swings, or difficulty sleeping  HEME/LYMPH: No easy bruising, or bleeding gums  ALLERGY AND IMMUNOLOGIC: No hives or eczema    Vital Signs Last 24 Hrs  T(C): 36.7 (2023 16:07), Max: 36.8 (2023 08:35)  T(F): 98.1 (2023 16:07), Max: 98.2 (2023 08:35)  HR: 85 (2023 16:07) (83 - 98)  BP: 106/72 (2023 16:07) (100/67 - 123/81)  BP(mean): --  RR: 18 (2023 16:07) (18 - 18)  SpO2: 100% (2023 16:07) (97% - 100%)    Parameters below as of 2023 16:07  Patient On (Oxygen Delivery Method): room air        PHYSICAL EXAM:  GENERAL: NAD, well-groomed, well-developed  HEAD:  Atraumatic, Normocephalic  EYES: EOMI, PERRLA, conjunctiva and sclera clear  ENMT: No tonsillar erythema, exudates, or enlargement; Moist mucous membranes, Good dentition, No lesions  NECK: Supple, No JVD, Normal thyroid  NERVOUS SYSTEM:  Alert & Oriented X 2  CHEST/LUNG: Clear to auscultation bilaterally; No rales, rhonchi, wheezing, or rubs  HEART: Regular rate and rhythm; No murmurs, rubs, or gallops  ABDOMEN: Soft, Nontender, Nondistended; Bowel sounds present  EXTREMITIES:  2+ Peripheral Pulses, No clubbing or cyanosis  LYMPH: No lymphadenopathy noted  SKIN: No rashes or lesions      LABS:                        11.1   5.25  )-----------( 313      ( 2023 10:24 )             34.5     2023 10:24    138    |  100    |  12     ----------------------------<  103    4.7     |  27     |  0.87     Ca    10.4       2023 10:24  Phos  3.0       2023 10:24  Mg     2.4       2023 10:24      PT/INR - ( 2023 01:41 )   PT: 11.5 sec;   INR: 1.00 ratio         PTT - ( 2023 01:41 )  PTT:30.0 sec  Urinalysis Basic - ( 2023 13:08 )    Color: Colorless / Appearance: Clear / S.012 / pH: x  Gluc: x / Ketone: Negative  / Bili: Negative / Urobili: Negative   Blood: x / Protein: Negative / Nitrite: Negative   Leuk Esterase: Negative / RBC: x / WBC x   Sq Epi: x / Non Sq Epi: x / Bacteria: x      CAPILLARY BLOOD GLUCOSE            Cultures      RADIOLOGY & ADDITIONAL TESTS:    Imaging Personally Reviewed:  [X ] YES  [ ] NO    Consultant(s) Notes Reviewed:  [ X] YES  [ ] NO    Care Discussed with Consultants/Other Providers [X ] YES  [ ] NO

## 2023-04-04 NOTE — DIETITIAN INITIAL EVALUATION ADULT - PROBLEM SELECTOR PLAN 1
- appreciate neurosurgery consult  - CT C-spine w/ Left C3 on C4 facet joint widening and malalignment. Also read by Rads as possible C3 Left inferior articular process avulsion fx. However, this C3-4 malalignment was also seen on previous 2/25/23 CTA H/N.  - C-collar at all times   - MRI C-spine w/wo ordered  - Spine precautions: bedrest, log-roll only   - F/u ARU lab value, was on ASA81  - NPO for now, SCDs for DVT PPx  - pain control: c/w home fentanyl patch 100ug/hr, tylenol PRN for mild/moderate pain and home dilaudid 2mg PO q4h PRN severe pain  - bowel regimen

## 2023-04-04 NOTE — DIETITIAN INITIAL EVALUATION ADULT - REASON FOR ADMISSION
Per chart, Pt is a 63 y/o F with PMH: "MM (dx 2018, on chemo) w/ extensive bone mets c/b multiple spinal compression fractures, seizure disorder, HTN, on prophylactic lovenox, presenting from [facility] after an unwitnessed fall. CT C-spine with possible C3 Left inferior articular process avulsion fx, admitted to medicine for further evaluation."

## 2023-04-05 ENCOUNTER — TRANSCRIPTION ENCOUNTER (OUTPATIENT)
Age: 63
End: 2023-04-05

## 2023-04-05 RX ADMIN — Medication 100 MILLIGRAM(S): at 11:11

## 2023-04-05 RX ADMIN — Medication 250 MILLIGRAM(S): at 21:46

## 2023-04-05 RX ADMIN — ATORVASTATIN CALCIUM 80 MILLIGRAM(S): 80 TABLET, FILM COATED ORAL at 21:46

## 2023-04-05 RX ADMIN — Medication 400 MILLIGRAM(S): at 17:13

## 2023-04-05 RX ADMIN — Medication 1 TABLET(S): at 11:11

## 2023-04-05 RX ADMIN — FENTANYL CITRATE 1 PATCH: 50 INJECTION INTRAVENOUS at 20:55

## 2023-04-05 RX ADMIN — LACOSAMIDE 200 MILLIGRAM(S): 50 TABLET ORAL at 05:36

## 2023-04-05 RX ADMIN — AMLODIPINE BESYLATE 10 MILLIGRAM(S): 2.5 TABLET ORAL at 21:46

## 2023-04-05 RX ADMIN — Medication 1 TABLET(S): at 13:13

## 2023-04-05 RX ADMIN — Medication 400 MILLIGRAM(S): at 05:36

## 2023-04-05 RX ADMIN — Medication 500 MILLIGRAM(S): at 11:11

## 2023-04-05 RX ADMIN — LACOSAMIDE 200 MILLIGRAM(S): 50 TABLET ORAL at 17:14

## 2023-04-05 RX ADMIN — HYDROMORPHONE HYDROCHLORIDE 2 MILLIGRAM(S): 2 INJECTION INTRAMUSCULAR; INTRAVENOUS; SUBCUTANEOUS at 23:08

## 2023-04-05 RX ADMIN — Medication 1 TABLET(S): at 21:46

## 2023-04-05 RX ADMIN — Medication 250 MILLIGRAM(S): at 13:13

## 2023-04-05 RX ADMIN — Medication 250 MILLIGRAM(S): at 05:36

## 2023-04-05 RX ADMIN — Medication 1 TABLET(S): at 05:36

## 2023-04-05 RX ADMIN — FENTANYL CITRATE 1 PATCH: 50 INJECTION INTRAVENOUS at 08:00

## 2023-04-05 NOTE — PROGRESS NOTE ADULT - PROBLEM SELECTOR PLAN 1
- appreciate neurosurgery consult  - CT C-spine w/ Left C3 on C4 facet joint widening and malalignment. Also read by Rads as possible C3 Left inferior articular process avulsion fx. However, this C3-4 malalignment was also seen on previous 2/25/23 CTA H/N.  - C-collar dc by neuro. Cleared by neuro. F/u Dr. Bonner in 2 weeks   - MRI C-spine  showed no acute cervical fracture only T2-T3 old abnormality. Cleared by neuro. F/u Dr. Bonner in 2 weeks   - Spine precautions: bedrest, log-roll only   - F/u ARU lab value, was on ASA81  - neurosurgeon cleared for diet  - pain control: c/w home fentanyl patch 100ug/hr, tylenol PRN for mild/moderate pain and home dilaudid 2mg PO q4h PRN severe pain  - bowel regimen

## 2023-04-05 NOTE — PROGRESS NOTE ADULT - SUBJECTIVE AND OBJECTIVE BOX
Patient is a 62y old  Female who presents with a chief complaint of C3 fracture (05 Apr 2023 08:28)    Pt seen and examined at bedside. C collar removed yesterday. Feeling well, pain under control.     MEDICATIONS  (STANDING):  acyclovir   Oral Tab/Cap 400 milliGRAM(s) Oral two times a day  amLODIPine   Tablet 10 milliGRAM(s) Oral at bedtime  ascorbic acid 500 milliGRAM(s) Oral daily  atorvastatin 80 milliGRAM(s) Oral at bedtime  calcium carbonate   1250 mG (OsCal) 1 Tablet(s) Oral three times a day  fentaNYL   Patch 100 MICROgram(s)/Hr 1 Patch Transdermal every 72 hours  lacosamide 200 milliGRAM(s) Oral every 12 hours  multivitamin 1 Tablet(s) Oral daily  OLANZapine 5 milliGRAM(s) Oral at bedtime  polyethylene glycol 3350 17 Gram(s) Oral daily  senna 2 Tablet(s) Oral at bedtime  thiamine 100 milliGRAM(s) Oral daily  valproic  acid Syrup 250 milliGRAM(s) Oral every 8 hours    MEDICATIONS  (PRN):  acetaminophen     Tablet .. 650 milliGRAM(s) Oral every 6 hours PRN Mild Pain (1 - 3)  HYDROmorphone   Tablet 2 milliGRAM(s) Oral every 4 hours PRN Severe Pain (7 - 10)  melatonin 3 milliGRAM(s) Oral at bedtime PRN Insomnia    Vital Signs Last 24 Hrs  T(C): 36.8 (05 Apr 2023 11:30), Max: 36.8 (05 Apr 2023 11:30)  T(F): 98.3 (05 Apr 2023 11:30), Max: 98.3 (05 Apr 2023 11:30)  HR: 62 (05 Apr 2023 11:30) (62 - 89)  BP: 123/80 (05 Apr 2023 11:30) (100/64 - 128/74)  BP(mean): --  RR: 18 (05 Apr 2023 11:30) (18 - 18)  SpO2: 99% (05 Apr 2023 11:30) (98% - 100%)    Parameters below as of 05 Apr 2023 11:30  Patient On (Oxygen Delivery Method): room air        PE  NAD  Awake, alert  Anicteric, MMM  No c/c/e  No rash grossly  FROM                          11.1   5.25  )-----------( 313      ( 04 Apr 2023 10:24 )             34.5       04-04    138  |  100  |  12  ----------------------------<  103<H>  4.7   |  27  |  0.87    Ca    10.4      04 Apr 2023 10:24  Phos  3.0     04-04  Mg     2.4     04-04

## 2023-04-05 NOTE — DISCHARGE NOTE PROVIDER - NSDCMRMEDTOKEN_GEN_ALL_CORE_FT
acyclovir 400 mg oral tablet: 1 tab(s) orally 2 times a day  amLODIPine 10 mg oral tablet: 1 tab(s) orally once a day (at bedtime)  aspirin 81 mg oral tablet: 1 tab(s) orally once a day  atorvastatin 80 mg oral tablet: 1 tab(s) orally once a day (at bedtime)  calcium carbonate 1250 mg (500 mg elemental calcium) oral tablet: 1 tab(s) orally 3 times a day  docusate sodium 100 mg oral tablet: 2 tab(s) orally once a day (at bedtime)  enoxaparin 40 mg/0.4 mL injectable solution: 40 milligram(s) subcutaneously once a day while at rehab  fentaNYL 100 mcg/hr transdermal film, extended release: Apply topically to affected area every 72 hours  HYDROmorphone 4 mg oral tablet: orally give 0.5 tablet (2mg) every 4 hours as needed for moderate pain or 1 tablet (4mg) every 4 hours as needed for severe pain  lacosamide 200 mg oral tablet: 1 tab(s) orally every 12 hours  naloxone 4 mg/0.1 mL nasal spray: 4 milligram(s) intranasally as needed for  overdose  OLANZapine 2.5 mg oral tablet: 2 tab(s) orally once a day (at bedtime)  polyethylene glycol 3350 oral kit: 17 gram(s) orally once a day as needed for  constipation  Rozerem 8 mg oral tablet: 1 tab(s) orally once a day (at bedtime)  thiamine 100 mg oral tablet: 1 tab(s) orally once a day  valproic acid 250 mg/5 mL oral liquid: 5 milliliter(s) orally every 8 hours   acetaminophen 325 mg oral tablet: 2 tab(s) orally every 6 hours As needed Mild Pain (1 - 3)  acyclovir 400 mg oral tablet: 1 tab(s) orally 2 times a day  amLODIPine 10 mg oral tablet: 1 tab(s) orally once a day (at bedtime)  ascorbic acid 500 mg oral tablet: 1 tab(s) orally once a day  atorvastatin 80 mg oral tablet: 1 tab(s) orally once a day (at bedtime)  calcium carbonate 1250 mg (500 mg elemental calcium) oral tablet: 1 tab(s) orally 3 times a day  docusate sodium 100 mg oral tablet: 2 tab(s) orally once a day (at bedtime)  enoxaparin 40 mg/0.4 mL injectable solution: 40 milligram(s) subcutaneously once a day while at rehab  fentaNYL 100 mcg/hr transdermal film, extended release: Apply topically to affected area every 72 hours  HYDROmorphone 2 mg oral tablet: 1 tab(s) orally every 4 hours As needed Severe Pain (7 - 10)  lacosamide 200 mg oral tablet: 1 tab(s) orally every 12 hours  melatonin 3 mg oral tablet: 1 tab(s) orally once a day (at bedtime) As needed Insomnia  Multiple Vitamins oral tablet: 1 tab(s) orally once a day  naloxone 4 mg/0.1 mL nasal spray: 4 milligram(s) intranasally as needed for  overdose  OLANZapine 2.5 mg oral tablet: 2 tab(s) orally once a day (at bedtime)  polyethylene glycol 3350 oral kit: 17 gram(s) orally once a day as needed for  constipation  thiamine 100 mg oral tablet: 1 tab(s) orally once a day  valproic acid 250 mg/5 mL oral liquid: 5 milliliter(s) orally every 8 hours   acetaminophen 325 mg oral tablet: 2 tab(s) orally every 6 hours As needed Mild Pain (1 - 3)  acyclovir 400 mg oral tablet: 1 tab(s) orally 2 times a day  amLODIPine 10 mg oral tablet: 1 tab(s) orally once a day (at bedtime)  ascorbic acid 500 mg oral tablet: 1 tab(s) orally once a day  atorvastatin 80 mg oral tablet: 1 tab(s) orally once a day (at bedtime)  calcium carbonate 1250 mg (500 mg elemental calcium) oral tablet: 1 tab(s) orally 3 times a day  fentaNYL 100 mcg/hr transdermal film, extended release: Apply topically to affected area every 72 hours  HYDROmorphone 2 mg oral tablet: 1 tab(s) orally every 4 hours As needed Severe Pain (7 - 10)  lacosamide 200 mg oral tablet: 1 tab(s) orally every 12 hours  melatonin 3 mg oral tablet: 1 tab(s) orally once a day (at bedtime) As needed Insomnia  Multiple Vitamins oral tablet: 1 tab(s) orally once a day  naloxone 4 mg/0.1 mL nasal spray: 4 milligram(s) intranasally as needed for  overdose  OLANZapine 2.5 mg oral tablet: 2 tab(s) orally once a day (at bedtime)  polyethylene glycol 3350 oral kit: 17 gram(s) orally once a day as needed for  constipation  senna leaf extract oral tablet: 2 tab(s) orally once a day (at bedtime)  thiamine 100 mg oral tablet: 1 tab(s) orally once a day  valproic acid 250 mg/5 mL oral liquid: 5 milliliter(s) orally every 8 hours   acyclovir 400 mg oral tablet: 1 tab(s) orally 2 times a day  amLODIPine 10 mg oral tablet: 1 tab(s) orally once a day (at bedtime)  ascorbic acid 500 mg oral tablet: 1 tab(s) orally once a day  atorvastatin 80 mg oral tablet: 1 tab(s) orally once a day (at bedtime)  calcium carbonate 1250 mg (500 mg elemental calcium) oral tablet: 1 tab(s) orally 3 times a day  fentaNYL 100 mcg/hr transdermal film, extended release: Apply topically to affected area every 72 hours  HYDROmorphone 2 mg oral tablet: 1 tab(s) orally every 4 hours As needed Severe Pain (7 - 10)  lacosamide 200 mg oral tablet: 1 tab(s) orally every 12 hours  melatonin 3 mg oral tablet: 1 tab(s) orally once a day (at bedtime) As needed Insomnia  Multiple Vitamins oral tablet: 1 tab(s) orally once a day  naloxone 4 mg/0.1 mL nasal spray: 4 milligram(s) intranasally as needed for  overdose  OLANZapine 2.5 mg oral tablet: 2 tab(s) orally once a day (at bedtime)  polyethylene glycol 3350 oral kit: 17 gram(s) orally once a day as needed for  constipation  senna leaf extract oral tablet: 2 tab(s) orally once a day (at bedtime)  thiamine 100 mg oral tablet: 1 tab(s) orally once a day  valproic acid 250 mg/5 mL oral liquid: 5 milliliter(s) orally every 8 hours

## 2023-04-05 NOTE — DISCHARGE NOTE PROVIDER - NSDCCPCAREPLAN_GEN_ALL_CORE_FT
PRINCIPAL DISCHARGE DIAGNOSIS  Diagnosis: C3 cervical fracture  Assessment and Plan of Treatment: - C-collar at all times   - MRI C-spine  showed no acute cervical fracture only T2-T3 old abnormality. Cleared by neuro. F/u Dr. Bonner in 2 weeks   - Spine precautions: bedrest, log-roll only   - neurosurgeon cleared for diet  - pain control: c/w home fentanyl patch 100ug/hr, tylenol PRN for mild/moderate pain and home dilaudid 2mg PO q4h PRN severe pain  - bowel regimen.      SECONDARY DISCHARGE DIAGNOSES  Diagnosis: Multiple myeloma  Assessment and Plan of Treatment: - [facility]  - c/w home acyclovir 400mg BID  - c/w home fentanyl 100ug/hr patch  -  pain control as prescribed  - bowel regimen.     PRINCIPAL DISCHARGE DIAGNOSIS  Diagnosis: C3 cervical fracture  Assessment and Plan of Treatment: - Neurosugery has cleared you to no longer require C-collar   - MRI C-spine showed no acute cervical fracture only T2-T3 old abnormality. Cleared by neuro. F/u Dr. Bonner in 2 weeks   - Spine precautions: bedrest, log-roll only   - neurosurgeon cleared for diet  - pain control: c/w home fentanyl patch 100ug/hr, tylenol PRN for mild/moderate pain and home dilaudid 2mg PO q4h PRN severe pain  - bowel regimen.  - Please follow-up with your primary care physician within one week of discharge.        SECONDARY DISCHARGE DIAGNOSES  Diagnosis: Multiple myeloma  Assessment and Plan of Treatment: - follows w/ [facility] [place]  - c/w home acyclovir 400mg BID  - c/w home fentanyl 100ug/hr patch  -  pain control as prescribed  - bowel regimen.     PRINCIPAL DISCHARGE DIAGNOSIS  Diagnosis: C3 cervical fracture  Assessment and Plan of Treatment: - Neurosugery has cleared you to no longer require C-collar   - MRI C-spine showed no acute cervical fracture only T2-T3 old abnormality. Cleared by neuro. Follow up with neurosurgery Dr. Bonner in 2 weeks.  - Spine precautions: bedrest, log-roll only   - neurosurgeon cleared for diet  - pain control: c/w home fentanyl patch 100ug/hr, tylenol PRN for mild/moderate pain and home dilaudid 2mg PO q4h PRN severe pain  - bowel regimen.  - Please follow-up with your primary care physician within one week of discharge.        SECONDARY DISCHARGE DIAGNOSES  Diagnosis: Multiple myeloma  Assessment and Plan of Treatment: - follows with [facility] [place]  - continue home acyclovir 400mg twice a day  - continue home fentanyl 100ug/hr patch  - pain control as prescribed  - bowel regimen    Diagnosis: Seizure disorder  Assessment and Plan of Treatment: - continue home lacosamide and valproic acid    Diagnosis: Hypertension  Assessment and Plan of Treatment: - continue home amlodipine 10mg at bedtime    Diagnosis: Hyperlipidemia  Assessment and Plan of Treatment:   - continue home atorvastatin 80mg at bedtime     PRINCIPAL DISCHARGE DIAGNOSIS  Diagnosis: C3 cervical fracture  Assessment and Plan of Treatment: - Neurosugery has cleared you to no longer require C-collar   - MRI C-spine showed no acute cervical fracture only T2-T3 old abnormality. Cleared by neuro. Follow up with neurosurgery Dr. Bonner in 2 weeks.  - Spine precautions: bedrest, log-roll only   - neurosurgeon cleared for diet  - pain control: c/w home fentanyl patch 100ug/hr, tylenol PRN for mild/moderate pain and home dilaudid 2mg PO q4h PRN severe pain  - bowel regimen.  - Please follow-up with your primary care physician within one week of discharge.        SECONDARY DISCHARGE DIAGNOSES  Diagnosis: Multiple myeloma  Assessment and Plan of Treatment: - Patient follows with Dr. Denise Fatima, [facility].  - continue home acyclovir 400mg twice a day  - continue home fentanyl 100ug/hr patch  - pain control as prescribed  - bowel regimen    Diagnosis: Seizure disorder  Assessment and Plan of Treatment: - continue home lacosamide and valproic acid    Diagnosis: Hypertension  Assessment and Plan of Treatment: - continue home amlodipine 10mg at bedtime    Diagnosis: Hyperlipidemia  Assessment and Plan of Treatment:   - continue home atorvastatin 80mg at bedtime

## 2023-04-05 NOTE — DISCHARGE NOTE PROVIDER - HOSPITAL COURSE
Patient is a 62y Female with PMH of MM (dx 2018, on chemo) w/ extensive bone mets c/b multiple spinal compression fractures, seizure disorder, HTN, HLD, on prophylactic lovenox, presenting from [facility] after an unwitnessed fall, CT C-spine with possible C3 Left inferior articular process avulsion fx, admitted to medicine for further evaluation.    # Cervical spine fracture.   ·  Plan: - appreciate neurosurgery consult  - CT C-spine w/ Left C3 on C4 facet joint widening and malalignment. Also read by Rads as possible C3 Left inferior articular process avulsion fx. However, this C3-4 malalignment was also seen on previous 2/25/23 CTA H/N.  - C-collar at all times   - MRI C-spine  showed no acute cervical fracture only T2-T3 old abnormality. Cleared by neuro. F/u Dr. Bonner in 2 weeks   - Spine precautions: bedrest, log-roll only   - F/u ARU lab value, was on ASA81  - neurosurgeon cleared for diet  - pain control: c/w home fentanyl patch 100ug/hr, tylenol PRN for mild/moderate pain and home dilaudid 2mg PO q4h PRN severe pain  - bowel regimen.    #: Accidental fall.   ·  Plan: Likely mechanical fall as per prior documentation, however patient currently does not remember the event  - Possible C3 avulsion fx as above, otherwise imaging negative for acute injury  - bed rest, fall precautions  - PT eval once cleared by neurosurgery.    # Multiple myeloma.   ·  Plan: Diagnosed 2018, reportedly on chemo (though pt does not remember what)  - CT C/A/P w/ extensive lucencies throughout the visualized osseous skeleton compatible with patient's known history of multiple myeloma. There are age-indeterminate compression deformities of multiple thoracic and lumbar vertebrae. Also small bilateral pleural effusions  - [place] [facility] [place] c/w home acyclovir 400mg BID  - c/w home fentanyl 100ug/hr patch  - PRN pain control as above  - bowel regimen.    # HTN (hypertension).   ·  Plan: - c/w home amlodipine 10mg qhs.    #: HLD (hyperlipidemia).   ·  Plan: - c/w home atorvastatin 80mg qhs.    #: Seizure disorder.   ·  Plan: - c/w home lacosamide 200mg q12h  - c/w home valproic acid 250mg q8h  - f/u valproic acid and lacosamide levels.     #Anemia.   ·  Plan: Hgb 9.9 on admission, baseline 8-9 per old labs. Likely 2/2 MM and chemotherapy. No s/s of bleeding  - monitor CBC daily.  .  # Urinary retention.   ·  Plan: straight cath PRN      Medically cleared for discharge by attending ----  Discharge medication  and follow up discussed with attending --------- Patient is a 62y Female with PMH of MM (dx 2018, on chemo) w/ extensive bone mets c/b multiple spinal compression fractures, seizure disorder, HTN, HLD, on prophylactic lovenox, presenting from [facility] after an unwitnessed fall, CT C-spine with possible C3 Left inferior articular process avulsion fx, admitted to medicine for further evaluation.    # Cervical spine fracture.   ·  Plan: - appreciate neurosurgery consult  - CT C-spine w/ Left C3 on C4 facet joint widening and malalignment. Also read by Rads as possible C3 Left inferior articular process avulsion fx. However, this C3-4 malalignment was also seen on previous 2/25/23 CTA H/N.  - C-collar at all times   - MRI C-spine  showed no acute cervical fracture only T2-T3 old abnormality. Cleared by neuro. F/u Dr. Bonner in 2 weeks   - Spine precautions: bedrest, log-roll only   - F/u ARU lab value, was on ASA81  - neurosurgeon cleared for diet  - pain control: c/w home fentanyl patch 100ug/hr, tylenol PRN for mild/moderate pain and home dilaudid 2mg PO q4h PRN severe pain  - bowel regimen.    #: Accidental fall.   ·  Plan: Likely mechanical fall as per prior documentation, however patient currently does not remember the event  - Possible C3 avulsion fx as above, otherwise imaging negative for acute injury  - bed rest, fall precautions  - PT eval once cleared by neurosurgery.    # Multiple myeloma.   ·  Plan: Diagnosed 2018, reportedly on chemo (though pt does not remember what)  - CT C/A/P w/ extensive lucencies throughout the visualized osseous skeleton compatible with patient's known history of multiple myeloma. There are age-indeterminate compression deformities of multiple thoracic and lumbar vertebrae. Also small bilateral pleural effusions  - follows w/ [facility] [place]  - c/w home acyclovir 400mg BID  - c/w home fentanyl 100ug/hr patch  - PRN pain control as above  - bowel regimen.    # HTN (hypertension).   ·  Plan: - c/w home amlodipine 10mg qhs.    #: HLD (hyperlipidemia).   ·  Plan: - c/w home atorvastatin 80mg qhs.    #: Seizure disorder.   ·  Plan: - c/w home lacosamide 200mg q12h  - c/w home valproic acid 250mg q8h  - f/u valproic acid and lacosamide levels.     #Anemia.   ·  Plan: Hgb 9.9 on admission, baseline 8-9 per old labs. Likely 2/2 MM and chemotherapy. No s/s of bleeding  - monitor CBC daily.  .  # Urinary retention.   ·  Plan: straight cath PRN      Medically cleared for discharge by attending Dr. Curry  Discharge medication  and follow up discussed with attending Dr. Curry Patient is a 62y Female with PMH of MM (dx 2018, on chemo) w/ extensive bone mets c/b multiple spinal compression fractures, seizure disorder, HTN, HLD, on prophylactic lovenox, presenting from [facility] after an unwitnessed fall, CT C-spine with possible C3 Left inferior articular process avulsion fx, admitted to medicine for further evaluation.    # Cervical spine fracture.   ·  Plan: - appreciate neurosurgery consult  - CT C-spine w/ Left C3 on C4 facet joint widening and malalignment. Also read by Rads as possible C3 Left inferior articular process avulsion fx. However, this C3-4 malalignment was also seen on previous 2/25/23 CTA H/N.  - C-collar at all times   - MRI C-spine  showed no acute cervical fracture only T2-T3 old abnormality. Cleared by neuro. F/u Dr. Bonner in 2 weeks   - Spine precautions: bedrest, log-roll only   - F/u ARU lab value, was on ASA81  - neurosurgeon cleared for diet  - pain control: c/w home fentanyl patch 100ug/hr, tylenol PRN for mild/moderate pain and home dilaudid 2mg PO q4h PRN severe pain  - bowel regimen.    #: Accidental fall.   ·  Plan: Likely mechanical fall as per prior documentation, however patient currently does not remember the event  - Possible C3 avulsion fx as above, otherwise imaging negative for acute injury  - bed rest, fall precautions  - PT eval once cleared by neurosurgery.    # Multiple myeloma.   ·  Plan: Diagnosed 2018, reportedly on chemo (though pt does not remember what)  - CT C/A/P w/ extensive lucencies throughout the visualized osseous skeleton compatible with patient's known history of multiple myeloma. There are age-indeterminate compression deformities of multiple thoracic and lumbar vertebrae. Also small bilateral pleural effusions  - follows w/ [facility] [place]  - c/w home acyclovir 400mg BID  - c/w home fentanyl 100ug/hr patch  - PRN pain control as above  - bowel regimen.    # HTN (hypertension).   ·  Plan: - c/w home amlodipine 10mg qhs.    #: HLD (hyperlipidemia).   ·  Plan: - c/w home atorvastatin 80mg qhs.    #: Seizure disorder.   ·  Plan: - c/w home lacosamide 200mg q12h  - c/w home valproic acid 250mg q8h  - f/u valproic acid and lacosamide levels.     #Anemia.   ·  Plan: Hgb 9.9 on admission, baseline 8-9 per old labs. Likely 2/2 MM and chemotherapy. No s/s of bleeding  - monitor CBC daily.  .  # Urinary retention.   ·  Plan: straight cath PRN    Medically cleared for discharge by attending Dr. Curry  Discharge medication  and follow up discussed with attending Dr. Curry

## 2023-04-05 NOTE — PROGRESS NOTE ADULT - SUBJECTIVE AND OBJECTIVE BOX
Patient is a 62y old  Female who presents with a chief complaint of C3 fracture (05 Apr 2023 14:36)      INTERVAL HPI/OVERNIGHT EVENTS: Medically stable for discharge. Neurosurgical team  d/c cervical collar given lack of acute process and mechanical pain. Plan for outpatient f/u with Dr. Bonner.     Pain Location & Control:     MEDICATIONS  (STANDING):  acyclovir   Oral Tab/Cap 400 milliGRAM(s) Oral two times a day  amLODIPine   Tablet 10 milliGRAM(s) Oral at bedtime  ascorbic acid 500 milliGRAM(s) Oral daily  atorvastatin 80 milliGRAM(s) Oral at bedtime  calcium carbonate   1250 mG (OsCal) 1 Tablet(s) Oral three times a day  fentaNYL   Patch 100 MICROgram(s)/Hr 1 Patch Transdermal every 72 hours  lacosamide 200 milliGRAM(s) Oral every 12 hours  multivitamin 1 Tablet(s) Oral daily  OLANZapine 5 milliGRAM(s) Oral at bedtime  polyethylene glycol 3350 17 Gram(s) Oral daily  senna 2 Tablet(s) Oral at bedtime  thiamine 100 milliGRAM(s) Oral daily  valproic  acid Syrup 250 milliGRAM(s) Oral every 8 hours    MEDICATIONS  (PRN):  acetaminophen     Tablet .. 650 milliGRAM(s) Oral every 6 hours PRN Mild Pain (1 - 3)  HYDROmorphone   Tablet 2 milliGRAM(s) Oral every 4 hours PRN Severe Pain (7 - 10)  melatonin 3 milliGRAM(s) Oral at bedtime PRN Insomnia      Allergies    acetaminophen (Unknown)  Benadryl (Unknown)  ibuprofen (Unknown)  oxycodone (Unknown)  Percocet (Unknown)    Intolerances        REVIEW OF SYSTEMS:  CONSTITUTIONAL: No fever, weight loss, or fatigue  EYES: No eye pain, visual disturbances, or discharge  ENMT:  No difficulty hearing, tinnitus, vertigo; No sinus or throat pain  NECK: No pain or stiffness  BREASTS: No pain, masses, or nipple discharge  RESPIRATORY: No cough, wheezing, chills or hemoptysis; No shortness of breath  CARDIOVASCULAR: No chest pain, palpitations, dizziness, or leg swelling  GASTROINTESTINAL: No abdominal or epigastric pain. No nausea, vomiting, or hematemesis; No diarrhea or constipation. No melena or hematochezia.  GENITOURINARY: No dysuria, frequency, hematuria, or incontinence  NEUROLOGICAL: No headaches, memory loss, loss of strength, numbness, or tremors  SKIN: No itching, burning, rashes, or lesions   LYMPH NODES: No enlarged glands  ENDOCRINE: No heat or cold intolerance; No hair loss; No polydipsia or polyuria  MUSCULOSKELETAL: No back pain  PSYCHIATRIC: No depression, anxiety, mood swings, or difficulty sleeping  HEME/LYMPH: No easy bruising, or bleeding gums  ALLERGY AND IMMUNOLOGIC: No hives or eczema    Vital Signs Last 24 Hrs  T(C): 36.8 (05 Apr 2023 11:30), Max: 36.8 (05 Apr 2023 11:30)  T(F): 98.3 (05 Apr 2023 11:30), Max: 98.3 (05 Apr 2023 11:30)  HR: 62 (05 Apr 2023 11:55) (62 - 89)  BP: 123/80 (05 Apr 2023 11:55) (100/64 - 128/74)  BP(mean): --  RR: 18 (05 Apr 2023 11:30) (18 - 18)  SpO2: 99% (05 Apr 2023 11:55) (98% - 100%)    Parameters below as of 05 Apr 2023 11:55  Patient On (Oxygen Delivery Method): room air        PHYSICAL EXAM:  GENERAL: NAD, well-groomed, well-developed  HEAD:  Atraumatic, Normocephalic  EYES: EOMI, PERRLA, conjunctiva and sclera clear  ENMT: No tonsillar erythema, exudates, or enlargement; Moist mucous membranes, Good dentition, No lesions  NECK: Supple, No JVD, Normal thyroid  NERVOUS SYSTEM:  Alert & Oriented X 2  CHEST/LUNG: Clear to auscultation bilaterally; No rales, rhonchi, wheezing, or rubs  HEART: Regular rate and rhythm; No murmurs, rubs, or gallops  ABDOMEN: Soft, Nontender, Nondistended; Bowel sounds present  EXTREMITIES:  2+ Peripheral Pulses, No clubbing or cyanosis  LYMPH: No lymphadenopathy noted  SKIN: No rashes or lesions    LABS:      Ca    10.4       04 Apr 2023 10:24          CAPILLARY BLOOD GLUCOSE            Cultures      RADIOLOGY & ADDITIONAL TESTS:    Imaging Personally Reviewed:  [X ] YES  [ ] NO    Consultant(s) Notes Reviewed:  [ X] YES  [ ] NO    Care Discussed with Consultants/Other Providers [X ] YES  [ ] NO

## 2023-04-05 NOTE — DISCHARGE NOTE PROVIDER - PROVIDER TOKENS
PROVIDER:[TOKEN:[[id number]]] PROVIDER:[TOKEN:[[id number]]],PROVIDER:[TOKEN:[[id number]],FOLLOWUP:[1 week],ESTABLISHEDPATIENT:[T]] PROVIDER:[TOKEN:[[id number]]],PROVIDER:[TOKEN:[[id number]],FOLLOWUP:[1 week],ESTABLISHEDPATIENT:[T]],FREE:[LAST:[Fatima],FIRST:[Denise],PHONE:[phone]:[(   )    -],ADDRESS:[[facility] [place]ESTABLISHEDPATIENT:[T]] PROVIDER:[TOKEN:[[id number]],FOLLOWUP:[2 weeks]],PROVIDER:[TOKEN:[[id number]],FOLLOWUP:[1 week],ESTABLISHEDPATIENT:[T]],FREE:[LAST:[Fatima],FIRST:[Denise],PHONE:[phone]:[(   )    -],ADDRESS:[[facility] [place]ESTABLISHEDPATIENT:[T]]

## 2023-04-05 NOTE — PHYSICAL THERAPY INITIAL EVALUATION ADULT - ADDITIONAL COMMENTS
Pt lives alone in a PH +2 steps to enter + first floor set up. Pt own a wheelchair, cane and RW. Pt typically used the w/c when outside the home. Pt has a HHA 7hrs/day x 6 days/week. Pt was admitted from rehab where she was receiving assist for all mobility and ADLs.

## 2023-04-05 NOTE — PHYSICAL THERAPY INITIAL EVALUATION ADULT - BALANCE TRAINING, PT EVAL
GOAL: Pt will maintain good dynamic standing balance for 10mins to facilitate hygiene activities in 4 weeks.

## 2023-04-05 NOTE — DISCHARGE NOTE PROVIDER - CARE PROVIDER_API CALL
Dale Agee)  Neurosurgery  [address], [address]  [place] [address]  Phone: [phone]  Fax: [phone]  Follow Up Time:    Dale Agee)  Neurosurgery  [address], [address]  [place] [address]  Phone: [phone]  Fax: [phone]  Follow Up Time:     Silvio Brown)  Internal Medicine  [address]  [place] [address]  Phone: [phone]  Fax: [phone]  Established Patient  Follow Up Time: 1 week   Dale Agee)  Neurosurgery  [address], [address]  [place] [address]  Phone: [phone]  Fax: [phone]  Follow Up Time:     Silvio Brown)  Internal Medicine  [address]  [place] [address]  Phone: [phone]  Fax: [phone]  Established Patient  Follow Up Time: 1 week    Denise Fatima  [facility]  Phone: [phone]  Fax: (   )    -  Established Patient  Follow Up Time:    Dale Agee)  Neurosurgery  [address], [address]  [place] [address]  Phone: [phone]  Fax: [phone]  Follow Up Time: 2 weeks    Silvio Brown)  Internal Medicine  [address]  [place] [address]  Phone: [phone]  Fax: [phone]  Established Patient  Follow Up Time: 1 week    Denise Fatima  [facility]  Phone: [phone]  Fax: (   )    -  Established Patient  Follow Up Time:

## 2023-04-05 NOTE — PHYSICAL THERAPY INITIAL EVALUATION ADULT - PERTINENT HX OF CURRENT PROBLEM, REHAB EVAL
Pt is a 63 y/o female admitted s/p fall at rehab. PMH: M (dx 2018, on chemo) w/ extensive bone mets c/b multiple spinal compression fractures, seizure disorder, HTN, on prophylactic lovenox. Per documentation, pt tripped while walking, landing on her knees and hitting the left side of her head. She was complaining of pain in her left temporal region and her bilateral knees. CT chest/abdomen shows Extensive lucencies throughout the visualized osseous skeleton compatible with patient's known history of multiple myeloma. The degree of lucency limits evaluation for nondisplaced fractures. Age-indeterminate compression deformities of multiple thoracic and lumbar vertebrae as above. Small bilateral pleural effusions.  CTH (-). CT Chest shows Chronic fracture deformity of the sternum. Severe compression deformity of T3, T6, T10, T11. Moderate compression deformities of L1, L2 and a lumbarized S1. Mild compression deformity along the inferior endplate of L3. These are of uncertain acuity. The paravertebral soft tissues are unremarkable. CT Cervical spine shows Linear fragment seen lateral to the C3 left inferior articular process for which possibly a small avulsion fracture cannot be excluded. If clinically indicated, further evaluation can be obtained with MRI.  MRI spine shows Vertebral plana appearance T3 and T6 which is old by imaging characteristics. Heterogeneity to the marrow consistent with findings on CT and patient's known diagnosis of myeloma. No epidural extension. No   evidence of cord compression. Neurosx consulted and c-collar discharged (4/5).

## 2023-04-05 NOTE — DISCHARGE NOTE PROVIDER - NSDCFUADDAPPT_GEN_ALL_CORE_FT
APPTS ARE READY TO BE MADE: [ ] YES    Best Family or Patient Contact (if needed):    Additional Information about above appointments (if needed):    1:   2:   3:     Other comments or requests:    APPTS ARE READY TO BE MADE: [ x] YES    Best Family or Patient Contact (if needed):    Additional Information about above appointments (if needed):    1:   2:   3:     Other comments or requests:    APPTS ARE READY TO BE MADE: [ x] YES    Best Family or Patient Contact (if needed):    Additional Information about above appointments (if needed):    1:   2:   3:     Other comments or requests:           Patient is being discharged to rehab. Patient/caregiver will arrange follow up care appointments.  APPTS ARE READY TO BE MADE: [x] YES    Best Family or Patient Contact (if needed):    Additional Information about above appointments (if needed):    1:   2:   3:     Other comments or requests:           Patient is being discharged to rehab. Patient/caregiver will arrange follow up care appointments.

## 2023-04-06 ENCOUNTER — TRANSCRIPTION ENCOUNTER (OUTPATIENT)
Age: 63
End: 2023-04-06

## 2023-04-06 PROCEDURE — 86077 PHYS BLOOD BANK SERV XMATCH: CPT

## 2023-04-06 RX ORDER — ASCORBIC ACID 60 MG
1 TABLET,CHEWABLE ORAL
Qty: 0 | Refills: 0 | DISCHARGE
Start: 2023-04-06

## 2023-04-06 RX ORDER — HYDROMORPHONE HYDROCHLORIDE 2 MG/ML
1 INJECTION INTRAMUSCULAR; INTRAVENOUS; SUBCUTANEOUS
Qty: 0 | Refills: 0 | DISCHARGE
Start: 2023-04-06

## 2023-04-06 RX ORDER — ACETAMINOPHEN 500 MG
2 TABLET ORAL
Qty: 0 | Refills: 0 | DISCHARGE
Start: 2023-04-06

## 2023-04-06 RX ORDER — LANOLIN ALCOHOL/MO/W.PET/CERES
1 CREAM (GRAM) TOPICAL
Qty: 0 | Refills: 0 | DISCHARGE
Start: 2023-04-06

## 2023-04-06 RX ADMIN — FENTANYL CITRATE 1 PATCH: 50 INJECTION INTRAVENOUS at 07:31

## 2023-04-06 RX ADMIN — FENTANYL CITRATE 1 PATCH: 50 INJECTION INTRAVENOUS at 11:46

## 2023-04-06 RX ADMIN — ATORVASTATIN CALCIUM 80 MILLIGRAM(S): 80 TABLET, FILM COATED ORAL at 21:55

## 2023-04-06 RX ADMIN — OLANZAPINE 5 MILLIGRAM(S): 15 TABLET, FILM COATED ORAL at 21:55

## 2023-04-06 RX ADMIN — Medication 400 MILLIGRAM(S): at 17:51

## 2023-04-06 RX ADMIN — POLYETHYLENE GLYCOL 3350 17 GRAM(S): 17 POWDER, FOR SOLUTION ORAL at 11:45

## 2023-04-06 RX ADMIN — LACOSAMIDE 200 MILLIGRAM(S): 50 TABLET ORAL at 05:48

## 2023-04-06 RX ADMIN — Medication 250 MILLIGRAM(S): at 05:48

## 2023-04-06 RX ADMIN — Medication 1 TABLET(S): at 05:49

## 2023-04-06 RX ADMIN — Medication 250 MILLIGRAM(S): at 13:51

## 2023-04-06 RX ADMIN — HYDROMORPHONE HYDROCHLORIDE 2 MILLIGRAM(S): 2 INJECTION INTRAMUSCULAR; INTRAVENOUS; SUBCUTANEOUS at 18:55

## 2023-04-06 RX ADMIN — Medication 400 MILLIGRAM(S): at 05:48

## 2023-04-06 RX ADMIN — Medication 650 MILLIGRAM(S): at 22:45

## 2023-04-06 RX ADMIN — Medication 650 MILLIGRAM(S): at 21:57

## 2023-04-06 RX ADMIN — HYDROMORPHONE HYDROCHLORIDE 2 MILLIGRAM(S): 2 INJECTION INTRAMUSCULAR; INTRAVENOUS; SUBCUTANEOUS at 19:25

## 2023-04-06 RX ADMIN — FENTANYL CITRATE 1 PATCH: 50 INJECTION INTRAVENOUS at 19:38

## 2023-04-06 RX ADMIN — Medication 1 TABLET(S): at 21:55

## 2023-04-06 RX ADMIN — HYDROMORPHONE HYDROCHLORIDE 2 MILLIGRAM(S): 2 INJECTION INTRAMUSCULAR; INTRAVENOUS; SUBCUTANEOUS at 00:00

## 2023-04-06 RX ADMIN — Medication 250 MILLIGRAM(S): at 21:55

## 2023-04-06 RX ADMIN — Medication 1 TABLET(S): at 13:51

## 2023-04-06 RX ADMIN — Medication 500 MILLIGRAM(S): at 11:45

## 2023-04-06 RX ADMIN — Medication 1 TABLET(S): at 11:45

## 2023-04-06 RX ADMIN — Medication 100 MILLIGRAM(S): at 11:46

## 2023-04-06 RX ADMIN — LACOSAMIDE 200 MILLIGRAM(S): 50 TABLET ORAL at 17:51

## 2023-04-06 RX ADMIN — AMLODIPINE BESYLATE 10 MILLIGRAM(S): 2.5 TABLET ORAL at 21:57

## 2023-04-06 NOTE — CHART NOTE - NSCHARTNOTEFT_GEN_A_CORE
Provider alerted by case management of patients' daughter concerned for lumps felt on right lower extremity 2 days ago that have not been followed up yet. Daughter states she alerted RN 2 days prior of finding. Provider examined patient at bedside. Patient states she has never noticed nodules and denies any pain in calves bilaterally. NO fever, chills, malaise, night sweats, myalgias, weight changes ( weight loss/gain), anorexia, generalized fatigue.      Vital Signs Last 24 Hrs  T(C): 36.9 (06 Apr 2023 16:12), Max: 37.2 (05 Apr 2023 20:41)  T(F): 98.5 (06 Apr 2023 16:12), Max: 98.9 (05 Apr 2023 20:41)  HR: 98 (06 Apr 2023 16:12) (83 - 98)  BP: 113/76 (06 Apr 2023 16:12) (104/74 - 128/82)  BP(mean): --  RR: 18 (06 Apr 2023 16:12) (18 - 18)  SpO2: 99% (06 Apr 2023 16:12) (96% - 100%)    Parameters below as of 06 Apr 2023 16:12  Patient On (Oxygen Delivery Method): room air        Physical Exam:  General: WN/WD NAD  Neurology: A&Ox3, nonfocal, BRAVO x 4  Head:  Normocephalic, atraumatic  MSK: No edema, + peripheral pulses, FROM all 4 extremity. Lower extremities nontender to palpation. No discoloration noted to either extremity. Two small nodules approximately 1 cm each noted on deep palpation of right lateral lower extremity located along edge of tibia bone. Nodules nontender and mobile.      Radiology:  Xray bilateral knees IMPRESSION: No acute fracture or dislocation.      CT head and cervical spine IMPRESSION: No acute intracranial pathology.  Linear fragment seen lateral to the C3 left inferior articular process   for which possibly a small avulsion fracture cannot be excluded. If   clinically indicated, further evaluation can be obtained with MRI.  Extensive lucencies of the skull and spine compatible with patient's   history of multiple myeloma.      CT chest/abdomen/pelvis IMPRESSION: Extensive lucencies throughout the visualized osseous   skeleton compatible with patient's known history of multiple myeloma. The   degree of lucency limits evaluation for nondisplaced fractures. Age-indeterminate compression deformities of multiple thoracic and lumbar   vertebrae as above. Small bilateral pleural effusions.    Additional findings as above.   (03 Apr 2023 04:42)        Assessment & Plan:  > Dr. Curry alerted and aware  > No new orders; b/l knee radiographs performed on admission  > Discussed findings with daughter at length. Daughter refuses discharge until interventions taken. Discharge cancelled. Case management alerted.  > Dr. Curry to assess tomorrow AM  > Daughter updated        Ashley MENDOZA  Dept of Medicine

## 2023-04-06 NOTE — PROGRESS NOTE ADULT - SUBJECTIVE AND OBJECTIVE BOX
Patient is a 62y old  Female who presents with a chief complaint of C3 fracture (06 Apr 2023 12:43)    Patient seen and examined at bedside this morning. Patient resting in recliner, offers no new complaints. No acute events overnight.    MEDICATIONS  (STANDING):  acyclovir   Oral Tab/Cap 400 milliGRAM(s) Oral two times a day  amLODIPine   Tablet 10 milliGRAM(s) Oral at bedtime  ascorbic acid 500 milliGRAM(s) Oral daily  atorvastatin 80 milliGRAM(s) Oral at bedtime  calcium carbonate   1250 mG (OsCal) 1 Tablet(s) Oral three times a day  fentaNYL   Patch 100 MICROgram(s)/Hr 1 Patch Transdermal every 72 hours  lacosamide 200 milliGRAM(s) Oral every 12 hours  multivitamin 1 Tablet(s) Oral daily  OLANZapine 5 milliGRAM(s) Oral at bedtime  polyethylene glycol 3350 17 Gram(s) Oral daily  senna 2 Tablet(s) Oral at bedtime  thiamine 100 milliGRAM(s) Oral daily  valproic  acid Syrup 250 milliGRAM(s) Oral every 8 hours    MEDICATIONS  (PRN):  acetaminophen     Tablet .. 650 milliGRAM(s) Oral every 6 hours PRN Mild Pain (1 - 3)  HYDROmorphone   Tablet 2 milliGRAM(s) Oral every 4 hours PRN Severe Pain (7 - 10)  melatonin 3 milliGRAM(s) Oral at bedtime PRN Insomnia    Vital Signs Last 24 Hrs  T(C): 37.1 (06 Apr 2023 14:16), Max: 37.4 (05 Apr 2023 16:43)  T(F): 98.7 (06 Apr 2023 14:16), Max: 99.3 (05 Apr 2023 16:43)  HR: 96 (06 Apr 2023 14:16) (83 - 96)  BP: 115/77 (06 Apr 2023 14:16) (90/62 - 128/82)  BP(mean): --  RR: 18 (06 Apr 2023 14:16) (16 - 18)  SpO2: 96% (06 Apr 2023 14:16) (96% - 100%)    Parameters below as of 06 Apr 2023 14:16  Patient On (Oxygen Delivery Method): room air        PE  NAD  Awake, alert  Anicteric, MMM  RRR  CTAB  Abd soft, NT, ND  No c/c/e

## 2023-04-06 NOTE — PROGRESS NOTE ADULT - SUBJECTIVE AND OBJECTIVE BOX
Patient is a 62y old  Female who presents with a chief complaint of C3 fracture (05 Apr 2023 17:07)      INTERVAL HPI/OVERNIGHT EVENTS: Stable. Waiting for MILENA. Medically clear for discharge.     Pain Location & Control:     MEDICATIONS  (STANDING):  acyclovir   Oral Tab/Cap 400 milliGRAM(s) Oral two times a day  amLODIPine   Tablet 10 milliGRAM(s) Oral at bedtime  ascorbic acid 500 milliGRAM(s) Oral daily  atorvastatin 80 milliGRAM(s) Oral at bedtime  calcium carbonate   1250 mG (OsCal) 1 Tablet(s) Oral three times a day  fentaNYL   Patch 100 MICROgram(s)/Hr 1 Patch Transdermal every 72 hours  lacosamide 200 milliGRAM(s) Oral every 12 hours  multivitamin 1 Tablet(s) Oral daily  OLANZapine 5 milliGRAM(s) Oral at bedtime  polyethylene glycol 3350 17 Gram(s) Oral daily  senna 2 Tablet(s) Oral at bedtime  thiamine 100 milliGRAM(s) Oral daily  valproic  acid Syrup 250 milliGRAM(s) Oral every 8 hours    MEDICATIONS  (PRN):  acetaminophen     Tablet .. 650 milliGRAM(s) Oral every 6 hours PRN Mild Pain (1 - 3)  HYDROmorphone   Tablet 2 milliGRAM(s) Oral every 4 hours PRN Severe Pain (7 - 10)  melatonin 3 milliGRAM(s) Oral at bedtime PRN Insomnia      Allergies    acetaminophen (Unknown)  Benadryl (Unknown)  ibuprofen (Unknown)  oxycodone (Unknown)  Percocet (Unknown)    Intolerances        REVIEW OF SYSTEMS:  CONSTITUTIONAL: No fever, weight loss, or fatigue  EYES: No eye pain, visual disturbances, or discharge  ENMT:  No difficulty hearing, tinnitus, vertigo; No sinus or throat pain  NECK: No pain or stiffness  BREASTS: No pain, masses, or nipple discharge  RESPIRATORY: No cough, wheezing, chills or hemoptysis; No shortness of breath  CARDIOVASCULAR: No chest pain, palpitations, dizziness, or leg swelling  GASTROINTESTINAL: No abdominal or epigastric pain. No nausea, vomiting, or hematemesis; No diarrhea or constipation. No melena or hematochezia.  GENITOURINARY: No dysuria, frequency, hematuria, or incontinence  NEUROLOGICAL: No headaches, memory loss, loss of strength, numbness, or tremors  SKIN: No itching, burning, rashes, or lesions   LYMPH NODES: No enlarged glands  ENDOCRINE: No heat or cold intolerance; No hair loss; No polydipsia or polyuria  MUSCULOSKELETAL: No back pain  PSYCHIATRIC: No depression, anxiety, mood swings, or difficulty sleeping  HEME/LYMPH: No easy bruising, or bleeding gums  ALLERGY AND IMMUNOLOGIC: No hives or eczema    Vital Signs Last 24 Hrs  T(C): 36.5 (06 Apr 2023 08:29), Max: 37.4 (05 Apr 2023 16:43)  T(F): 97.7 (06 Apr 2023 08:29), Max: 99.3 (05 Apr 2023 16:43)  HR: 83 (06 Apr 2023 08:29) (83 - 91)  BP: 109/70 (06 Apr 2023 08:29) (90/62 - 128/82)  BP(mean): --  RR: 18 (06 Apr 2023 08:29) (16 - 18)  SpO2: 98% (06 Apr 2023 08:29) (97% - 100%)    Parameters below as of 06 Apr 2023 08:29  Patient On (Oxygen Delivery Method): room air        PHYSICAL EXAM:  GENERAL: NAD, well-groomed, well-developed  HEAD:  Atraumatic, Normocephalic  EYES: EOMI, PERRLA, conjunctiva and sclera clear  ENMT: No tonsillar erythema, exudates, or enlargement; Moist mucous membranes, Good dentition, No lesions  NECK: Supple, No JVD, Normal thyroid  NERVOUS SYSTEM:  Alert & Oriented X3, Good concentration; Motor Strength 5/5 B/L upper and lower extremities; DTRs 2+ intact and symmetric  CHEST/LUNG: Clear to auscultation bilaterally; No rales, rhonchi, wheezing, or rubs  HEART: Regular rate and rhythm; No murmurs, rubs, or gallops  ABDOMEN: Soft, Nontender, Nondistended; Bowel sounds present  EXTREMITIES:  2+ Peripheral Pulses, No clubbing or cyanosis  LYMPH: No lymphadenopathy noted  SKIN: b/l knee bruises due to fall      LABS:              CAPILLARY BLOOD GLUCOSE            Cultures      RADIOLOGY & ADDITIONAL TESTS:    Imaging Personally Reviewed:  [X ] YES  [ ] NO    Consultant(s) Notes Reviewed:  [ X] YES  [ ] NO    Care Discussed with Consultants/Other Providers [X ] YES  [ ] NO

## 2023-04-06 NOTE — DISCHARGE NOTE NURSING/CASE MANAGEMENT/SOCIAL WORK - PATIENT PORTAL LINK FT
You can access the FollowMyHealth Patient Portal offered by [facility] by registering at the following website: [email or web address]. By joining [facility]’s FollowMyHealth portal, you will also be able to view your health information using other applications (apps) compatible with our system.

## 2023-04-07 LAB
ANION GAP SERPL CALC-SCNC: 8 MMOL/L — SIGNIFICANT CHANGE UP (ref 5–17)
BASE EXCESS BLDV CALC-SCNC: 5.2 MMOL/L — HIGH (ref -2–3)
BUN SERPL-MCNC: 22 MG/DL — SIGNIFICANT CHANGE UP (ref 7–23)
CA-I SERPL-SCNC: 1.29 MMOL/L — SIGNIFICANT CHANGE UP (ref 1.15–1.33)
CALCIUM SERPL-MCNC: 8.9 MG/DL — SIGNIFICANT CHANGE UP (ref 8.4–10.5)
CHLORIDE BLDV-SCNC: 105 MMOL/L — SIGNIFICANT CHANGE UP (ref 96–108)
CHLORIDE SERPL-SCNC: 102 MMOL/L — SIGNIFICANT CHANGE UP (ref 96–108)
CO2 BLDV-SCNC: 33 MMOL/L — HIGH (ref 22–26)
CO2 SERPL-SCNC: 29 MMOL/L — SIGNIFICANT CHANGE UP (ref 22–31)
CREAT SERPL-MCNC: 0.98 MG/DL — SIGNIFICANT CHANGE UP (ref 0.5–1.3)
EGFR: 65 ML/MIN/1.73M2 — SIGNIFICANT CHANGE UP
GAS PNL BLDV: 138 MMOL/L — SIGNIFICANT CHANGE UP (ref 136–145)
GAS PNL BLDV: SIGNIFICANT CHANGE UP
GLUCOSE BLDC GLUCOMTR-MCNC: 133 MG/DL — HIGH (ref 70–99)
GLUCOSE BLDV-MCNC: 150 MG/DL — HIGH (ref 70–99)
GLUCOSE SERPL-MCNC: 151 MG/DL — HIGH (ref 70–99)
HCO3 BLDV-SCNC: 31 MMOL/L — HIGH (ref 22–29)
HCT VFR BLD CALC: 25.2 % — LOW (ref 34.5–45)
HCT VFR BLDA CALC: 23 % — LOW (ref 34.5–46.5)
HGB BLD CALC-MCNC: 7.8 G/DL — LOW (ref 11.7–16.1)
HGB BLD-MCNC: 8.3 G/DL — LOW (ref 11.5–15.5)
LACOSAMIDE (VIMPAT) RESULT: 11.56 UG/ML — HIGH (ref 1–10)
LACTATE BLDV-MCNC: 0.7 MMOL/L — SIGNIFICANT CHANGE UP (ref 0.5–2)
MAGNESIUM SERPL-MCNC: 2.2 MG/DL — SIGNIFICANT CHANGE UP (ref 1.6–2.6)
MCHC RBC-ENTMCNC: 32.9 GM/DL — SIGNIFICANT CHANGE UP (ref 32–36)
MCHC RBC-ENTMCNC: 33.9 PG — SIGNIFICANT CHANGE UP (ref 27–34)
MCV RBC AUTO: 102.9 FL — HIGH (ref 80–100)
NRBC # BLD: 0 /100 WBCS — SIGNIFICANT CHANGE UP (ref 0–0)
PCO2 BLDV: 54 MMHG — HIGH (ref 39–42)
PH BLDV: 7.37 — SIGNIFICANT CHANGE UP (ref 7.32–7.43)
PHOSPHATE SERPL-MCNC: 3.6 MG/DL — SIGNIFICANT CHANGE UP (ref 2.5–4.5)
PLATELET # BLD AUTO: 255 K/UL — SIGNIFICANT CHANGE UP (ref 150–400)
PO2 BLDV: 54 MMHG — HIGH (ref 25–45)
POTASSIUM BLDV-SCNC: 3.8 MMOL/L — SIGNIFICANT CHANGE UP (ref 3.5–5.1)
POTASSIUM SERPL-MCNC: 3.8 MMOL/L — SIGNIFICANT CHANGE UP (ref 3.5–5.3)
POTASSIUM SERPL-SCNC: 3.8 MMOL/L — SIGNIFICANT CHANGE UP (ref 3.5–5.3)
RBC # BLD: 2.45 M/UL — LOW (ref 3.8–5.2)
RBC # FLD: 14 % — SIGNIFICANT CHANGE UP (ref 10.3–14.5)
SAO2 % BLDV: 88.6 % — HIGH (ref 67–88)
SODIUM SERPL-SCNC: 139 MMOL/L — SIGNIFICANT CHANGE UP (ref 135–145)
WBC # BLD: 7.48 K/UL — SIGNIFICANT CHANGE UP (ref 3.8–10.5)
WBC # FLD AUTO: 7.48 K/UL — SIGNIFICANT CHANGE UP (ref 3.8–10.5)

## 2023-04-07 PROCEDURE — 70450 CT HEAD/BRAIN W/O DYE: CPT | Mod: 26

## 2023-04-07 PROCEDURE — 93971 EXTREMITY STUDY: CPT | Mod: 26,RT

## 2023-04-07 RX ORDER — SENNA PLUS 8.6 MG/1
2 TABLET ORAL
Qty: 60 | Refills: 0
Start: 2023-04-07 | End: 2023-05-06

## 2023-04-07 RX ORDER — SODIUM CHLORIDE 9 MG/ML
1000 INJECTION, SOLUTION INTRAVENOUS
Refills: 0 | Status: DISCONTINUED | OUTPATIENT
Start: 2023-04-07 | End: 2023-04-08

## 2023-04-07 RX ORDER — ONDANSETRON 8 MG/1
4 TABLET, FILM COATED ORAL ONCE
Refills: 0 | Status: COMPLETED | OUTPATIENT
Start: 2023-04-07 | End: 2023-04-07

## 2023-04-07 RX ADMIN — Medication 250 MILLIGRAM(S): at 14:30

## 2023-04-07 RX ADMIN — Medication 100 MILLIGRAM(S): at 13:35

## 2023-04-07 RX ADMIN — FENTANYL CITRATE 1 PATCH: 50 INJECTION INTRAVENOUS at 20:48

## 2023-04-07 RX ADMIN — Medication 1 TABLET(S): at 22:34

## 2023-04-07 RX ADMIN — Medication 400 MILLIGRAM(S): at 05:38

## 2023-04-07 RX ADMIN — Medication 1 TABLET(S): at 13:36

## 2023-04-07 RX ADMIN — Medication 500 MILLIGRAM(S): at 13:35

## 2023-04-07 RX ADMIN — Medication 250 MILLIGRAM(S): at 22:34

## 2023-04-07 RX ADMIN — Medication 1 TABLET(S): at 05:38

## 2023-04-07 RX ADMIN — LACOSAMIDE 200 MILLIGRAM(S): 50 TABLET ORAL at 05:37

## 2023-04-07 RX ADMIN — ATORVASTATIN CALCIUM 80 MILLIGRAM(S): 80 TABLET, FILM COATED ORAL at 22:34

## 2023-04-07 RX ADMIN — Medication 250 MILLIGRAM(S): at 05:41

## 2023-04-07 RX ADMIN — Medication 1 TABLET(S): at 13:35

## 2023-04-07 RX ADMIN — SENNA PLUS 2 TABLET(S): 8.6 TABLET ORAL at 22:35

## 2023-04-07 RX ADMIN — AMLODIPINE BESYLATE 10 MILLIGRAM(S): 2.5 TABLET ORAL at 22:35

## 2023-04-07 RX ADMIN — FENTANYL CITRATE 1 PATCH: 50 INJECTION INTRAVENOUS at 07:30

## 2023-04-07 RX ADMIN — Medication 400 MILLIGRAM(S): at 17:14

## 2023-04-07 RX ADMIN — LACOSAMIDE 200 MILLIGRAM(S): 50 TABLET ORAL at 17:14

## 2023-04-07 RX ADMIN — ONDANSETRON 4 MILLIGRAM(S): 8 TABLET, FILM COATED ORAL at 20:12

## 2023-04-07 RX ADMIN — SODIUM CHLORIDE 60 MILLILITER(S): 9 INJECTION, SOLUTION INTRAVENOUS at 20:44

## 2023-04-07 NOTE — PROGRESS NOTE ADULT - PROBLEM SELECTOR PLAN 8
Diet: NPO  DVT Prophylaxis: SCDs  Full code

## 2023-04-07 NOTE — PROGRESS NOTE ADULT - PROBLEM SELECTOR PLAN 5
- c/w home atorvastatin 80mg qhs

## 2023-04-07 NOTE — PROVIDER CONTACT NOTE (OTHER) - ASSESSMENT
Pt A&Ox2 at baseline. VSS stable. Pt vomited x1 small amount undigested food. Pt denies abd pain. No signs of distress noted.

## 2023-04-07 NOTE — PROVIDER CONTACT NOTE (OTHER) - SITUATION
Pt transferred to stretcher for d/c to [facility]. Pt vomited in hallway while leaving unit. pt transferred back into bed.

## 2023-04-07 NOTE — PROGRESS NOTE ADULT - SUBJECTIVE AND OBJECTIVE BOX
Patient is a 62y old  Female who presents with a chief complaint of C3 fracture (07 Apr 2023 11:18)    Patient seen and examined this morning. Patient resting comfortably, no complaints offered.    MEDICATIONS  (STANDING):  acyclovir   Oral Tab/Cap 400 milliGRAM(s) Oral two times a day  amLODIPine   Tablet 10 milliGRAM(s) Oral at bedtime  ascorbic acid 500 milliGRAM(s) Oral daily  atorvastatin 80 milliGRAM(s) Oral at bedtime  calcium carbonate   1250 mG (OsCal) 1 Tablet(s) Oral three times a day  fentaNYL   Patch 100 MICROgram(s)/Hr 1 Patch Transdermal every 72 hours  lacosamide 200 milliGRAM(s) Oral every 12 hours  multivitamin 1 Tablet(s) Oral daily  OLANZapine 5 milliGRAM(s) Oral at bedtime  polyethylene glycol 3350 17 Gram(s) Oral daily  senna 2 Tablet(s) Oral at bedtime  thiamine 100 milliGRAM(s) Oral daily  valproic  acid Syrup 250 milliGRAM(s) Oral every 8 hours    MEDICATIONS  (PRN):  acetaminophen     Tablet .. 650 milliGRAM(s) Oral every 6 hours PRN Mild Pain (1 - 3)  HYDROmorphone   Tablet 2 milliGRAM(s) Oral every 4 hours PRN Severe Pain (7 - 10)  melatonin 3 milliGRAM(s) Oral at bedtime PRN Insomnia    Vital Signs Last 24 Hrs  T(C): 37.1 (07 Apr 2023 13:49), Max: 37.1 (07 Apr 2023 13:49)  T(F): 98.7 (07 Apr 2023 13:49), Max: 98.7 (07 Apr 2023 13:49)  HR: 90 (07 Apr 2023 13:49) (69 - 104)  BP: 100/67 (07 Apr 2023 13:49) (97/65 - 131/84)  BP(mean): --  RR: 18 (07 Apr 2023 13:49) (17 - 18)  SpO2: 99% (07 Apr 2023 13:49) (98% - 100%)    Parameters below as of 07 Apr 2023 13:49  Patient On (Oxygen Delivery Method): room air        PE  frail  Awake, alert  Anicteric, MMM  RRR  CTAB  Abd soft, NT, ND  No c/c/e      ACC: [id number] EXAM:  DUPLEX EXT VEINS LOWER RT   ORDERED BY: TAYLOR POE     PROCEDURE DATE:  04/07/2023          INTERPRETATION:  CLINICAL INFORMATION: Right leg swelling after fall.    COMPARISON: None available.    TECHNIQUE: Duplex sonography of the RIGHT LOWER extremity veins with   color and spectral Doppler, with and without compression.    FINDINGS:    There is normal compressibility of the right common femoral, femoral and   popliteal veins.  The contralateral common femoral vein is patent.  Doppler examination shows normal spontaneous and phasic flow.    No calf vein thrombosis is detected.    IMPRESSION:  No evidence of right lower extremity deep venous thrombosis.

## 2023-04-07 NOTE — PROGRESS NOTE ADULT - SUBJECTIVE AND OBJECTIVE BOX
Patient is a 62y old  Female who presents with a chief complaint of C3 fracture (06 Apr 2023 15:57)      INTERVAL HPI/OVERNIGHT EVENTS: Apparently patient did not want to leave. Family wanted to check on her legs and some skin discoloration. Otherwise patient ready for discharge.     Pain Location & Control:     MEDICATIONS  (STANDING):  acyclovir   Oral Tab/Cap 400 milliGRAM(s) Oral two times a day  amLODIPine   Tablet 10 milliGRAM(s) Oral at bedtime  ascorbic acid 500 milliGRAM(s) Oral daily  atorvastatin 80 milliGRAM(s) Oral at bedtime  calcium carbonate   1250 mG (OsCal) 1 Tablet(s) Oral three times a day  fentaNYL   Patch 100 MICROgram(s)/Hr 1 Patch Transdermal every 72 hours  lacosamide 200 milliGRAM(s) Oral every 12 hours  multivitamin 1 Tablet(s) Oral daily  OLANZapine 5 milliGRAM(s) Oral at bedtime  polyethylene glycol 3350 17 Gram(s) Oral daily  senna 2 Tablet(s) Oral at bedtime  thiamine 100 milliGRAM(s) Oral daily  valproic  acid Syrup 250 milliGRAM(s) Oral every 8 hours    MEDICATIONS  (PRN):  acetaminophen     Tablet .. 650 milliGRAM(s) Oral every 6 hours PRN Mild Pain (1 - 3)  HYDROmorphone   Tablet 2 milliGRAM(s) Oral every 4 hours PRN Severe Pain (7 - 10)  melatonin 3 milliGRAM(s) Oral at bedtime PRN Insomnia      Allergies    acetaminophen (Unknown)  Benadryl (Unknown)  ibuprofen (Unknown)  oxycodone (Unknown)  Percocet (Unknown)    Intolerances        REVIEW OF SYSTEMS:  CONSTITUTIONAL: No fever, weight loss, or fatigue  EYES: No eye pain, visual disturbances, or discharge  ENMT:  No difficulty hearing, tinnitus, vertigo; No sinus or throat pain  NECK: No pain or stiffness  BREASTS: No pain, masses, or nipple discharge  RESPIRATORY: No cough, wheezing, chills or hemoptysis; No shortness of breath  CARDIOVASCULAR: No chest pain, palpitations, dizziness, or leg swelling  GASTROINTESTINAL: No abdominal or epigastric pain. No nausea, vomiting, or hematemesis; No diarrhea or constipation. No melena or hematochezia.  GENITOURINARY: No dysuria, frequency, hematuria, or incontinence  NEUROLOGICAL: No headaches, memory loss, loss of strength, numbness, or tremors  SKIN: No itching, burning, rashes, or lesions   LYMPH NODES: No enlarged glands  ENDOCRINE: No heat or cold intolerance; No hair loss; No polydipsia or polyuria  MUSCULOSKELETAL: No back pain  PSYCHIATRIC: No depression, anxiety, mood swings, or difficulty sleeping  HEME/LYMPH: No easy bruising, or bleeding gums  ALLERGY AND IMMUNOLOGIC: No hives or eczema    Vital Signs Last 24 Hrs  T(C): 36.5 (07 Apr 2023 08:01), Max: 37.1 (06 Apr 2023 14:16)  T(F): 97.7 (07 Apr 2023 08:01), Max: 98.7 (06 Apr 2023 14:16)  HR: 69 (07 Apr 2023 08:01) (69 - 104)  BP: 97/65 (07 Apr 2023 08:01) (97/65 - 131/84)  BP(mean): --  RR: 17 (07 Apr 2023 08:01) (17 - 18)  SpO2: 100% (07 Apr 2023 08:01) (96% - 100%)    Parameters below as of 07 Apr 2023 05:10  Patient On (Oxygen Delivery Method): room air        PHYSICAL EXAM:  GENERAL: NAD, well-groomed, well-developed  HEAD:  Atraumatic, Normocephalic  EYES: EOMI, PERRLA, conjunctiva and sclera clear  ENMT: No tonsillar erythema, exudates, or enlargement; Moist mucous membranes, Good dentition, No lesions  NECK: Supple, No JVD, Normal thyroid  NERVOUS SYSTEM:  Alert & Oriented X3, Good concentration; Motor Strength 5/5 B/L upper and lower extremities; DTRs 2+ intact and symmetric  CHEST/LUNG: Clear to auscultation bilaterally; No rales, rhonchi, wheezing, or rubs  HEART: Regular rate and rhythm; No murmurs, rubs, or gallops  ABDOMEN: Soft, Nontender, Nondistended; Bowel sounds present  EXTREMITIES:  2+ Peripheral Pulses, No clubbing or cyanosis  LYMPH: No lymphadenopathy noted  SKIN: No rashes or lesions      LABS:              CAPILLARY BLOOD GLUCOSE            Cultures      RADIOLOGY & ADDITIONAL TESTS:    Imaging Personally Reviewed:  [X ] YES  [ ] NO    Consultant(s) Notes Reviewed:  [ X] YES  [ ] NO    Care Discussed with Consultants/Other Providers [ X] YES  [ ] NO Patient is a 62y old  Female who presents with a chief complaint of C3 fracture (06 Apr 2023 15:57)      INTERVAL HPI/OVERNIGHT EVENTS: Apparently patient did not want to leave. Family wanted to check on her legs and some skin discoloration. Otherwise patient ready for discharge.  Patient daughter requested to evaluate R leg calf area due to some lump. I looked at area and there is no lump most likely venous  insufficiency even no varicoses. I ordered venous doppler STAT and came back negative. Patient medically clear for discharge     Pain Location & Control:     MEDICATIONS  (STANDING):  acyclovir   Oral Tab/Cap 400 milliGRAM(s) Oral two times a day  amLODIPine   Tablet 10 milliGRAM(s) Oral at bedtime  ascorbic acid 500 milliGRAM(s) Oral daily  atorvastatin 80 milliGRAM(s) Oral at bedtime  calcium carbonate   1250 mG (OsCal) 1 Tablet(s) Oral three times a day  fentaNYL   Patch 100 MICROgram(s)/Hr 1 Patch Transdermal every 72 hours  lacosamide 200 milliGRAM(s) Oral every 12 hours  multivitamin 1 Tablet(s) Oral daily  OLANZapine 5 milliGRAM(s) Oral at bedtime  polyethylene glycol 3350 17 Gram(s) Oral daily  senna 2 Tablet(s) Oral at bedtime  thiamine 100 milliGRAM(s) Oral daily  valproic  acid Syrup 250 milliGRAM(s) Oral every 8 hours    MEDICATIONS  (PRN):  acetaminophen     Tablet .. 650 milliGRAM(s) Oral every 6 hours PRN Mild Pain (1 - 3)  HYDROmorphone   Tablet 2 milliGRAM(s) Oral every 4 hours PRN Severe Pain (7 - 10)  melatonin 3 milliGRAM(s) Oral at bedtime PRN Insomnia      Allergies    acetaminophen (Unknown)  Benadryl (Unknown)  ibuprofen (Unknown)  oxycodone (Unknown)  Percocet (Unknown)    Intolerances        REVIEW OF SYSTEMS:  CONSTITUTIONAL: No fever, weight loss, or fatigue  EYES: No eye pain, visual disturbances, or discharge  ENMT:  No difficulty hearing, tinnitus, vertigo; No sinus or throat pain  NECK: No pain or stiffness  BREASTS: No pain, masses, or nipple discharge  RESPIRATORY: No cough, wheezing, chills or hemoptysis; No shortness of breath  CARDIOVASCULAR: No chest pain, palpitations, dizziness, or leg swelling  GASTROINTESTINAL: No abdominal or epigastric pain. No nausea, vomiting, or hematemesis; No diarrhea or constipation. No melena or hematochezia.  GENITOURINARY: No dysuria, frequency, hematuria, or incontinence  NEUROLOGICAL: No headaches, memory loss, loss of strength, numbness, or tremors  SKIN: No itching, burning, rashes, or lesions   LYMPH NODES: No enlarged glands  ENDOCRINE: No heat or cold intolerance; No hair loss; No polydipsia or polyuria  MUSCULOSKELETAL: No back pain  PSYCHIATRIC: No depression, anxiety, mood swings, or difficulty sleeping  HEME/LYMPH: No easy bruising, or bleeding gums  ALLERGY AND IMMUNOLOGIC: No hives or eczema    Vital Signs Last 24 Hrs  T(C): 36.5 (07 Apr 2023 08:01), Max: 37.1 (06 Apr 2023 14:16)  T(F): 97.7 (07 Apr 2023 08:01), Max: 98.7 (06 Apr 2023 14:16)  HR: 69 (07 Apr 2023 08:01) (69 - 104)  BP: 97/65 (07 Apr 2023 08:01) (97/65 - 131/84)  BP(mean): --  RR: 17 (07 Apr 2023 08:01) (17 - 18)  SpO2: 100% (07 Apr 2023 08:01) (96% - 100%)    Parameters below as of 07 Apr 2023 05:10  Patient On (Oxygen Delivery Method): room air        PHYSICAL EXAM:  GENERAL: NAD, well-groomed, well-developed  HEAD:  Atraumatic, Normocephalic  EYES: EOMI, PERRLA, conjunctiva and sclera clear  ENMT: No tonsillar erythema, exudates, or enlargement; Moist mucous membranes, Good dentition, No lesions  NECK: Supple, No JVD, Normal thyroid  NERVOUS SYSTEM:  Alert & Oriented X3, Good concentration; Motor Strength 5/5 B/L upper and lower extremities; DTRs 2+ intact and symmetric  CHEST/LUNG: Clear to auscultation bilaterally; No rales, rhonchi, wheezing, or rubs  HEART: Regular rate and rhythm; No murmurs, rubs, or gallops  ABDOMEN: Soft, Nontender, Nondistended; Bowel sounds present  EXTREMITIES:  2+ Peripheral Pulses, No clubbing or cyanosis  LYMPH: No lymphadenopathy noted  SKIN: No rashes or lesions      LABS:              CAPILLARY BLOOD GLUCOSE            Cultures      RADIOLOGY & ADDITIONAL TESTS:    Imaging Personally Reviewed:  [X ] YES  [ ] NO    Consultant(s) Notes Reviewed:  [ X] YES  [ ] NO    Care Discussed with Consultants/Other Providers [ X] YES  [ ] NO

## 2023-04-07 NOTE — CHART NOTE - NSCHARTNOTEFT_GEN_A_CORE
Medicine PA Note     CECILIA DUNAWAY  MRN-[id number]  Allergies    acetaminophen (Unknown)  Benadryl (Unknown)  ibuprofen (Unknown)  oxycodone (Unknown)  Percocet (Unknown)    Intolerances         Notified by RN, Pt c/o nausea and vomiting upon transfer to [facility] at discharge. Pt seen and examined at bedside. Pt reports nausea started after dinner and upon movement reports one episode of emesis consistent of previous dinner. Pt denies abd pain at this time. Pt unable to contribute further to interview in setting of lethargy. Daughter at bedside, Avani, able to provide collateral. She states Pt ate typical diet for dinner however had noticed Pt was not eating much and reportedly more somnolent than usual. She does report patient passing normal BM's as well.    Vital Signs Last 24 Hrs  T(C): 37.1 (04-07-23 @ 20:38), Max: 37.1 (04-07-23 @ 13:49)  T(F): 98.7 (04-07-23 @ 20:38), Max: 98.7 (04-07-23 @ 13:49)  HR: 99 (04-07-23 @ 20:02) (69 - 99)  BP: 141/90 (04-07-23 @ 20:02) (97/65 - 141/90)  BP(mean): --  RR: 18 (04-07-23 @ 20:02) (17 - 18)  SpO2: 97% (04-07-23 @ 20:02) (97% - 100%)          PHYSICAL EXAM:  GENERAL: NAD, appears lethargic  CHEST/LUNG: Clear to auscultation bilaterally; No wheezes, rhonchi or rales appreciated  HEART: Regular rate and rhythm; No murmurs, rubs, or gallops  ABDOMEN: Soft, Nontender, Nondistended; Bowel sounds present. No rebound, or guarding noted.  EXTREMITIES:  2+ Peripheral Pulses, No clubbing, cyanosis, or edema  NEUROLOGY: Responds to verbal stimuli, Noted to fall asleep with questioning but able to be easily awoken. CN 2-12 grossly intact. Muscle strength 4/5 globally.      Assessment/Plan: HPI:  Patient is a 62y Female with PMH of MM (dx 2018, on chemo) w/ extensive bone mets c/b multiple spinal compression fractures, seizure disorder, HTN, on prophylactic lovenox, presenting from [facility] after an unwitnessed fall. Patient states she does not remember the event, but prior documentation states that she tripped while walking, landing on her knees and hitting the left side of her head. She was complaining of pain in her left temporal region and her bilateral knees. She states she ambulates with assistance at baseline. Patient denies fever, chills, chest pain, shortness of breath, abdominal pain, nausea, vomiting, changes in bowel habits, or urinary symptoms.    N/V and lethargy  >VS hemodynamically stable, FS WNL and rectal temp WNL  >Zofran given  >CBC, BMP, VBG ordered STAT  >Gentle IVF overnight  >Will hold off on abd imaging in setting of benign exam and isolate emesis episode, if worsens then will consider  >CTH given new lethargy  >Neuro checks q4 hours  >Above plan discussed with Dr. Curry  > Will endorse to AM team, attending to follow    Odalis Gaspar PA-C,   Department of Medicine

## 2023-04-07 NOTE — PROGRESS NOTE ADULT - PROBLEM SELECTOR PLAN 3
Diagnosed 2018, reportedly on chemo (though pt does not remember what)  - CT C/A/P w/ extensive lucencies throughout the visualized osseous skeleton compatible with patient's known history of multiple myeloma. There are age-indeterminate compression deformities of multiple thoracic and lumbar vertebrae. Also small bilateral pleural effusions  - follows w/ [facility] [place]  - oncology consult in AM  - c/w home acyclovir 400mg BID  - c/w home fentanyl 100ug/hr patch  - PRN pain control as above  - bowel regimen
Diagnosed 2018, reportedly on chemo (though pt does not remember what)  - CT C/A/P w/ extensive lucencies throughout the visualized osseous skeleton compatible with patient's known history of multiple myeloma. There are age-indeterminate compression deformities of multiple thoracic and lumbar vertebrae. Also small bilateral pleural effusions  - follows w/ [facility] [place]  - oncology consult in AM  - c/w home acyclovir 400mg BID  - c/w home fentanyl 100ug/hr patch  - PRN pain control as above  - bowel regimen
Diagnosed 2018, reportedly on chemo (though pt does not remember what)  - CT C/A/P w/ extensive lucencies throughout the visualized osseous skeleton compatible with patient's known history of multiple myeloma. There are age-indeterminate compression deformities of multiple thoracic and lumbar vertebrae. Also small bilateral pleural effusions  - follows w/ [facility] [place]  - oncology consult in AM  - c/w home acyclovir 400mg BID  - c/w home fentanyl 100ug/hr patch  - PRN pain control as above  - bowel regimen
Diagnosed 2018, reportedly on chemo (though pt does not remember what)  - CT C/A/P w/ extensive lucencies throughout the visualized osseous skeleton compatible with patient's known history of multiple myeloma. There are age-indeterminate compression deformities of multiple thoracic and lumbar vertebrae. Also small bilateral pleural effusions  - [place] w/ [facility] [place]  - oncology consult in AM  - c/w home acyclovir 400mg BID  - c/w home fentanyl 100ug/hr patch  - PRN pain control as above  - bowel regimen
Diagnosed 2018, reportedly on chemo (though pt does not remember what)  - CT C/A/P w/ extensive lucencies throughout the visualized osseous skeleton compatible with patient's known history of multiple myeloma. There are age-indeterminate compression deformities of multiple thoracic and lumbar vertebrae. Also small bilateral pleural effusions  - [place] w/ [facility] [place]  - oncology consult in AM  - c/w home acyclovir 400mg BID  - c/w home fentanyl 100ug/hr patch  - PRN pain control as above  - bowel regimen

## 2023-04-07 NOTE — PROVIDER CONTACT NOTE (OTHER) - ACTION/TREATMENT ORDERED:
Zofran given as per orders. KELLY Gaspar at bedside to assess pt. FS (133) and rectal temp (989.7) done as per PA request. IV fluids to be started as per orders. No further interventions at this time.

## 2023-04-07 NOTE — PROGRESS NOTE ADULT - NS ATTEND OPT1 GEN_ALL_CORE
I attest my time as attending is greater than 50% of the total combined time spent on qualifying patient care activities by the PA/NP and attending.
I attest my time as attending is greater than 50% of the total combined time spent on qualifying patient care activities by the PA/NP and attending.
I independently performed the documented:
I independently performed the documented:

## 2023-04-07 NOTE — PROGRESS NOTE ADULT - TIME BILLING
Discussed with daughter
Discussed with PA. Discussed with daughter regarding advanced care planning  for at least 30 minutes. Reviewed MOLST form and decided to keep it as  full code.
Discussed with NP
Discussed with PA
Discussed with nurses

## 2023-04-07 NOTE — PROGRESS NOTE ADULT - NS ATTEND AMEND GEN_ALL_CORE FT
Agree with above
Chart reviewed, d/w pt and PA, agree with above. Pt feeling well, pending rehab placement.
Patient is clinically stable, MRI C-spine  showed no acute cervical fracture only T2-T3 old abnormality.   Cleared by neurosurgery, discharge planning to [facility].
As above.    Plan for discharge today. [place] [facility] [place]

## 2023-04-07 NOTE — PROGRESS NOTE ADULT - PROBLEM SELECTOR PLAN 4
- c/w home amlodipine 10mg qhs

## 2023-04-07 NOTE — PROGRESS NOTE ADULT - NUTRITIONAL ASSESSMENT
This patient has been assessed with a concern for Malnutrition and has been determined to have a diagnosis/diagnoses of Moderate protein-calorie malnutrition.    This patient is being managed with:   Diet Regular-  [employer] Vegetarian (Accepts Fish)  No Dairy  Liquid Protein Supplement     Qty per Day:  2  Entered: Apr 4 2023 11:16AM  

## 2023-04-08 VITALS
HEART RATE: 81 BPM | SYSTOLIC BLOOD PRESSURE: 108 MMHG | DIASTOLIC BLOOD PRESSURE: 68 MMHG | RESPIRATION RATE: 18 BRPM | TEMPERATURE: 98 F | OXYGEN SATURATION: 100 %

## 2023-04-08 LAB
HCT VFR BLD CALC: 26.2 % — LOW (ref 34.5–45)
HGB BLD-MCNC: 8.3 G/DL — LOW (ref 11.5–15.5)
MCHC RBC-ENTMCNC: 31.7 GM/DL — LOW (ref 32–36)
MCHC RBC-ENTMCNC: 33.3 PG — SIGNIFICANT CHANGE UP (ref 27–34)
MCV RBC AUTO: 105.2 FL — HIGH (ref 80–100)
NRBC # BLD: 0 /100 WBCS — SIGNIFICANT CHANGE UP (ref 0–0)
PLATELET # BLD AUTO: 274 K/UL — SIGNIFICANT CHANGE UP (ref 150–400)
RBC # BLD: 2.49 M/UL — LOW (ref 3.8–5.2)
RBC # FLD: 14.3 % — SIGNIFICANT CHANGE UP (ref 10.3–14.5)
WBC # BLD: 5.69 K/UL — SIGNIFICANT CHANGE UP (ref 3.8–10.5)
WBC # FLD AUTO: 5.69 K/UL — SIGNIFICANT CHANGE UP (ref 3.8–10.5)

## 2023-04-08 RX ADMIN — LACOSAMIDE 200 MILLIGRAM(S): 50 TABLET ORAL at 05:26

## 2023-04-08 RX ADMIN — Medication 250 MILLIGRAM(S): at 05:27

## 2023-04-08 RX ADMIN — Medication 100 MILLIGRAM(S): at 11:07

## 2023-04-08 RX ADMIN — Medication 1 TABLET(S): at 05:26

## 2023-04-08 RX ADMIN — Medication 1 TABLET(S): at 13:23

## 2023-04-08 RX ADMIN — FENTANYL CITRATE 1 PATCH: 50 INJECTION INTRAVENOUS at 08:11

## 2023-04-08 RX ADMIN — Medication 500 MILLIGRAM(S): at 11:07

## 2023-04-08 RX ADMIN — Medication 250 MILLIGRAM(S): at 13:23

## 2023-04-08 RX ADMIN — Medication 400 MILLIGRAM(S): at 05:26

## 2023-04-08 RX ADMIN — Medication 1 TABLET(S): at 11:07

## 2023-04-08 NOTE — PROGRESS NOTE ADULT - PROVIDER SPECIALTY LIST ADULT
Heme/Onc
Internal Medicine
Heme/Onc
Internal Medicine

## 2023-04-08 NOTE — PROGRESS NOTE ADULT - SUBJECTIVE AND OBJECTIVE BOX
Pt is seen and examined  pt is awake and lying in bed  Was scheduled for discharge yesterday but while on stretche \r leaving developed N/V  x 1  No nausea today. Mayra PO        PAST MEDICAL & SURGICAL HISTORY:  Multiple myeloma      H/O compression fracture of spine      HTN (hypertension)      Seizure      No significant past surgical history          ROS:  Negative except for:    MEDICATIONS  (STANDING):  acyclovir   Oral Tab/Cap 400 milliGRAM(s) Oral two times a day  amLODIPine   Tablet 10 milliGRAM(s) Oral at bedtime  ascorbic acid 500 milliGRAM(s) Oral daily  atorvastatin 80 milliGRAM(s) Oral at bedtime  calcium carbonate   1250 mG (OsCal) 1 Tablet(s) Oral three times a day  dextrose 5% + sodium chloride 0.45%. 1000 milliLiter(s) (60 mL/Hr) IV Continuous <Continuous>  fentaNYL   Patch 100 MICROgram(s)/Hr 1 Patch Transdermal every 72 hours  lacosamide 200 milliGRAM(s) Oral every 12 hours  multivitamin 1 Tablet(s) Oral daily  OLANZapine 5 milliGRAM(s) Oral at bedtime  polyethylene glycol 3350 17 Gram(s) Oral daily  senna 2 Tablet(s) Oral at bedtime  thiamine 100 milliGRAM(s) Oral daily  valproic  acid Syrup 250 milliGRAM(s) Oral every 8 hours    MEDICATIONS  (PRN):  acetaminophen     Tablet .. 650 milliGRAM(s) Oral every 6 hours PRN Mild Pain (1 - 3)  HYDROmorphone   Tablet 2 milliGRAM(s) Oral every 4 hours PRN Severe Pain (7 - 10)  melatonin 3 milliGRAM(s) Oral at bedtime PRN Insomnia      Allergies    acetaminophen (Unknown)  Benadryl (Unknown)  ibuprofen (Unknown)  oxycodone (Unknown)  Percocet (Unknown)    Intolerances        Vital Signs Last 24 Hrs  T(C): 36.3 (08 Apr 2023 05:06), Max: 37.1 (07 Apr 2023 13:49)  T(F): 97.4 (08 Apr 2023 05:06), Max: 98.7 (07 Apr 2023 13:49)  HR: 55 (08 Apr 2023 05:06) (55 - 99)  BP: 102/69 (08 Apr 2023 05:06) (100/67 - 141/90)  BP(mean): --  RR: 18 (08 Apr 2023 05:06) (18 - 18)  SpO2: 100% (08 Apr 2023 05:06) (96% - 100%)    Parameters below as of 08 Apr 2023 05:06  Patient On (Oxygen Delivery Method): room air        PHYSICAL EXAM    Chronically Ill F in NAD  Thin  Chest Clear Bilat  CVS S1,S2+  Abd Soft, Non Tender + BS  Ext No edema      LABS:                          8.3    5.69  )-----------( 274      ( 08 Apr 2023 07:07 )             26.2     Serial CBC's  04-08 @ 07:07  Hct-26.2 / Hgb-8.3 / Plat-274 / RBC-2.49 / WBC-5.69          Serial CBC's  04-07 @ 21:46  Hct-25.2 / Hgb-8.3 / Plat-255 / RBC-2.45 / WBC-7.48            04-07    139  |  102  |  22  ----------------------------<  151<H>  3.8   |  29  |  0.98    Ca    8.9      07 Apr 2023 21:46  Phos  3.6     04-07  Mg     2.2     04-07            WBC Count: 5.69 K/uL (04-08 @ 07:07)  Hemoglobin: 8.3 g/dL (04-08 @ 07:07)            RADIOLOGY & ADDITIONAL STUDIES:

## 2023-04-08 NOTE — PROGRESS NOTE ADULT - ASSESSMENT
1. Multiple Myeloma    -- on Sophy+VRD, last Sophy on 3/9  -- [place] Fatima [place] [facility] [place]  -- No plan for chemo while inpt or rehab  -- cont Acyclovir ppx    2. C3 Fracture    -- has known extensive osseous disease, severe collapse deformities of T3/T6.  -- MRI in December 2022 had reported multiple compression fractures including at C3, T3, and L2.  --  No epidural extension. No evidence of cord compression  -- Optimize pain control.  -- No surgical intervention planned.   -- Cleared by neurosurgery. Cervical collar removed.    D/C Planning. No Hem/Onc Objection    Micha Winters MD
CECILIA DUNAWAY is a 62y Female who presents with a chief complaint of fracture    Multiple Myeloma  ·	Patient follows with Dr. Denise Fatima, [facility].  ·	Patient is on daratumumab + VRD with last dose of daratumumab on March 9th and bortezomib on March 2nd.  ·	No systemic therapy while inpatient.  ·	Continue acyclovir prophylaxis.    C3 Fracture  ·	Patient with known extensive osseous disease, severe collapse deformities of T3/T6.  ·	MRI in December 2022 had reported multiple compression fractures including at C3, T3, and L2.  ·	MRI C spine shows   Vertebral plana appearance T3 and T6 which is old by imaging   characteristics. Heterogeneity to the marrow consistent with findings on   CT and patient's known diagnosis of myeloma. No epidural extension. No   evidence of cord compression  ·	Optimize pain control.  ·	No surgical intervention planned.   ·	Cleared by neurosurgery. Cervical collar removed.    Will continue to follow.    Isidoro Mayer PA-C  Hematology/Oncology  [facility]  [phone] (office)
Patient is a 62y Female with PMH of MM (dx 2018, on chemo) w/ extensive bone mets c/b multiple spinal compression fractures, seizure disorder, HTN, HLD, on prophylactic lovenox, presenting from [facility] after an unwitnessed fall, CT C-spine with possible C3 Left inferior articular process avulsion fx, admitted to medicine for further evaluation.
CECILIA DUNAWAY is a 62y Female who presents with a chief complaint of fracture    Multiple Myeloma  ·	Patient follows with Dr. Denise Fatima, [facility].  ·	Patient is on daratumumab + VRD with last dose of daratumumab on March 9th and bortezomib on March 2nd.  ·	No systemic therapy while inpatient.  ·	Continue acyclovir prophylaxis.    C3 Fracture  ·	Patient with known extensive osseous disease, severe collapse deformities of T3/T6.  ·	MRI in December 2022 had reported multiple compression fractures including at C3, T3, and L2.  ·	MRI C spine shows   Vertebral plana appearance T3 and T6 which is old by imaging   characteristics. Heterogeneity to the marrow consistent with findings on   CT and patient's known diagnosis of myeloma. No epidural extension. No   evidence of cord compression  ·	Optimize pain control.  ·	No surgical intervention planned.   ·	Cleared by neurosurgery. Cervical collar removed.    Plan for discharge to sub acute rehab    Will continue to follow.    Anne-Marie Arredondo NP  Hematology/ Oncology  [facility]  [phone] (office)  [phone] (alt office)  Evenings and weekends please call MD on call or office  
Patient is a 62y Female with PMH of MM (dx 2018, on chemo) w/ extensive bone mets c/b multiple spinal compression fractures, seizure disorder, HTN, HLD, on prophylactic lovenox, presenting from [facility] after an unwitnessed fall, CT C-spine with possible C3 Left inferior articular process avulsion fx, admitted to medicine for further evaluation.
CECILIA DUNAWAY is a 62y Female who presents with a chief complaint of fracture    Multiple Myeloma  ·	Patient follows with Dr. Denise Fatima, [facility].  ·	Patient is on daratumumab + VRD with last dose of daratumumab on March 9th and bortezomib on March 2nd.  ·	No systemic therapy while inpatient.  ·	Continue acyclovir prophylaxis.    C3 Fracture  ·	Patient with known extensive osseous disease, severe collapse deformities of T3/T6.  ·	MRI in December 2022 had reported multiple compression fractures including at C3, T3, and L2.  ·	MRI C spine shows   Vertebral plana appearance T3 and T6 which is old by imaging   characteristics. Heterogeneity to the marrow consistent with findings on   CT and patient's known diagnosis of myeloma. No epidural extension. No   evidence of cord compression  ·	Optimize pain control.  ·	No surgical intervention planned. Wearing cervical collar.  ·	Follow-up surgery recommendations.    Will continue to follow.    Isidoro Mayer PA-C  Hematology/Oncology  [facility]  [phone] (office)
CECILIA DUNAWAY is a 62y Female who presents with a chief complaint of fracture    Multiple Myeloma  ·	Patient follows with Dr. Denise Fatima, [facility].  ·	Patient is on daratumumab + VRD with last dose of daratumumab on March 9th and bortezomib on March 2nd.  ·	No systemic therapy while inpatient.  ·	Continue acyclovir prophylaxis.    C3 Fracture  ·	Patient with known extensive osseous disease, severe collapse deformities of T3/T6.  ·	MRI in December 2022 had reported multiple compression fractures including at C3, T3, and L2.  ·	MRI C spine shows   Vertebral plana appearance T3 and T6 which is old by imaging   characteristics. Heterogeneity to the marrow consistent with findings on   CT and patient's known diagnosis of myeloma. No epidural extension. No   evidence of cord compression  ·	Optimize pain control.  ·	No surgical intervention planned.   ·	Cleared by neurosurgery. Cervical collar removed.    Planned for discharge today 4/7      Anne-Marie Arredondo NP  Hematology/ Oncology  [facility]  [phone] (office)  [phone] (alt office)  Evenings and weekends please call MD on call or office  
Patient is a 62y Female with PMH of MM (dx 2018, on chemo) w/ extensive bone mets c/b multiple spinal compression fractures, seizure disorder, HTN, HLD, on prophylactic lovenox, presenting from [facility] after an unwitnessed fall, CT C-spine with possible C3 Left inferior articular process avulsion fx, admitted to medicine for further evaluation.
Patient is a 62y Female with PMH of MM (dx 2018, on chemo) w/ extensive bone mets c/b multiple spinal compression fractures, seizure disorder, HTN, HLD, on prophylactic lovenox, presenting from [facility] after an unwitnessed fall, CT C-spine with possible C3 Left inferior articular process avulsion fx, admitted to medicine for further evaluation.
Patient is a 62y Female with PMH of MM (dx 2018, on chemo) w/ extensive bone mets c/b multiple spinal compression fractures, seizure disorder, HTN, HLD, on prophylactic lovenox, presenting from [facility] after an unwitnessed fall, CT C-spine with possible C3 Left inferior articular process avulsion fx, admitted to medicine for further evaluation.

## 2023-04-08 NOTE — PROGRESS NOTE ADULT - REASON FOR ADMISSION
C3 fracture

## 2023-04-08 NOTE — PROGRESS NOTE ADULT - SUBJECTIVE AND OBJECTIVE BOX
Date of Service: 04-08-23 @ 13:32           CARDIOLOGY     PROGRESS  NOTE   ________________________________________________    CHIEF COMPLAINT:Patient is a 62y old  Female who presents with a chief complaint of C3 fracture (08 Apr 2023 09:50)  no complain  	  REVIEW OF SYSTEMS:  CONSTITUTIONAL: No fever, weight loss, or fatigue  EYES: No eye pain, visual disturbances, or discharge  ENT:  No difficulty hearing, tinnitus, vertigo; No sinus or throat pain  NECK: No pain or stiffness  RESPIRATORY: No cough, wheezing, chills or hemoptysis; No Shortness of Breath  CARDIOVASCULAR: No chest pain, palpitations, passing out, dizziness, or leg swelling  GASTROINTESTINAL: No abdominal or epigastric pain. No nausea, vomiting, or hematemesis; No diarrhea or constipation. No melena or hematochezia.  GENITOURINARY: No dysuria, frequency, hematuria, or incontinence  NEUROLOGICAL: No headaches, memory loss, loss of strength, numbness, or tremors  SKIN: No itching, burning, rashes, or lesions   LYMPH Nodes: No enlarged glands  ENDOCRINE: No heat or cold intolerance; No hair loss  MUSCULOSKELETAL: No joint pain or swelling; No muscle, back, or extremity pain  PSYCHIATRIC: No depression, anxiety, mood swings, or difficulty sleeping  HEME/LYMPH: No easy bruising, or bleeding gums  ALLERGY AND IMMUNOLOGIC: No hives or eczema	    [ ] All others negative	  x] Unable to obtain    PHYSICAL EXAM:  T(C): 36.3 (04-08-23 @ 08:25), Max: 37.1 (04-07-23 @ 13:49)  HR: 82 (04-08-23 @ 08:25) (55 - 99)  BP: 119/76 (04-08-23 @ 08:25) (100/67 - 141/90)  RR: 18 (04-08-23 @ 08:25) (18 - 18)  SpO2: 99% (04-08-23 @ 08:25) (96% - 100%)  Wt(kg): --  I&O's Summary    07 Apr 2023 07:01  -  08 Apr 2023 07:00  --------------------------------------------------------  IN: 480 mL / OUT: 1000 mL / NET: -520 mL    08 Apr 2023 07:01  -  08 Apr 2023 13:32  --------------------------------------------------------  IN: 240 mL / OUT: 650 mL / NET: -410 mL        Appearance: Normal	  HEENT:   Normal oral mucosa, PERRL, EOMI	  Lymphatic: No lymphadenopathy  Cardiovascular: Normal S1 S2, No JVD, + murmurs, No edema  Respiratory: rhonchi  Gastrointestinal:  Soft, Non-tender, + BS	  Skin: No rashes, No ecchymoses, No cyanosis	  Neurologic: Non-focal  Extremities: Normal range of motion, No clubbing, cyanosis or edema  Vascular: Peripheral pulses palpable 2+ bilaterally    MEDICATIONS  (STANDING):  acyclovir   Oral Tab/Cap 400 milliGRAM(s) Oral two times a day  amLODIPine   Tablet 10 milliGRAM(s) Oral at bedtime  ascorbic acid 500 milliGRAM(s) Oral daily  atorvastatin 80 milliGRAM(s) Oral at bedtime  calcium carbonate   1250 mG (OsCal) 1 Tablet(s) Oral three times a day  dextrose 5% + sodium chloride 0.45%. 1000 milliLiter(s) (60 mL/Hr) IV Continuous <Continuous>  fentaNYL   Patch 100 MICROgram(s)/Hr 1 Patch Transdermal every 72 hours  lacosamide 200 milliGRAM(s) Oral every 12 hours  multivitamin 1 Tablet(s) Oral daily  OLANZapine 5 milliGRAM(s) Oral at bedtime  polyethylene glycol 3350 17 Gram(s) Oral daily  senna 2 Tablet(s) Oral at bedtime  thiamine 100 milliGRAM(s) Oral daily  valproic  acid Syrup 250 milliGRAM(s) Oral every 8 hours      TELEMETRY: 	    ECG:  	  RADIOLOGY:  OTHER: 	  	  LABS:	 	    CARDIAC MARKERS:                                8.3    5.69  )-----------( 274      ( 08 Apr 2023 07:07 )             26.2     04-07    139  |  102  |  22  ----------------------------<  151<H>  3.8   |  29  |  0.98    Ca    8.9      07 Apr 2023 21:46  Phos  3.6     04-07  Mg     2.2     04-07      proBNP:   Lipid Profile:   HgA1c:   TSH:         Assessment and plan  ---------------------------Problem/Plan - 2:  ·  Problem: Accidental fall.   ·  Plan: Likely mechanical fall as per prior documentation, however patient currently does not remember the event  - Possible C3 avulsion fx as above, otherwise imaging negative for acute injury  - bed rest, fall precautions  - f/u UA  - PT eval once cleared by neurosurgery.    Problem/Plan - 3:  ·  Problem: Multiple myeloma.   ·  Plan: Diagnosed 2018, reportedly on chemo (though pt does not remember what)  - CT C/A/P w/ extensive lucencies throughout the visualized osseous skeleton compatible with patient's known history of multiple myeloma. There are age-indeterminate compression deformities of multiple thoracic and lumbar vertebrae. Also small bilateral pleural effusions  - follows w/ [facility]- [place]ology consult in AM  - c/w home acyclovir 400mg BID  - c/w home fentanyl 100ug/hr patch  - PRN pain control as above  - bowel regimen.    Problem/Plan - 4:  ·  Problem: HTN (hypertension).   ·  Plan: - c/w home amlodipine 10mg qhs.    Problem/Plan - 5:  ·  Problem: HLD (hyperlipidemia).   ·  Plan: - c/w home atorvastatin 80mg qhs.    Problem/Plan - 6:  ·  Problem: Seizure disorder.   ·  Plan: - c/w home lacosamide 200mg q12h  - c/w home valproic acid 250mg q8h  - f/u valproic acid and lacosamide levels.    Problem/Plan - 7:  ·  Problem: Anemia.   ·  Plan: Hgb 9.9 on admission, baseline 8-9 per old labs. Likely 2/2 MM and chemotherapy. No s/s of bleeding  - monitor CBC daily.    Problem/Plan - 8:  ·  Problem: Prophylactic measure.   ·  Plan: Diet: NPO  DVT Prophylaxis: SCDs  Full code.    Problem/Plan - 9:  ·  Problem: Urinary retention.   ·  Plan: straight cath PRN.  ·  Problem: Cervical spine fracture.   ·  Plan: - appreciate neurosurgery consult  - CT C-spine w/ Left C3 on C4 facet joint widening and malalignment. Also read by Rads as possible C3 Left inferior articular process avulsion fx. However, this C3-4 malalignment was also seen on previous 2/25/23 CTA H/N.  - C-collar dc by neuro. Cleared by neuro. F/u Dr. Bonner in 2 weeks   - MRI C-spine  showed no acute cervical fracture only T2-T3 old abnormality. Cleared by neuro. F/u Dr. Bonner in 2 weeks   - Spine precautions: bedrest, log-roll only   - F/u ARU lab value, was on ASA81  - neurosurgeon cleared for diet  - pain control: c/w home fentanyl patch 100ug/hr, tylenol PRN for mild/moderate pain and home dilaudid 2mg PO q4h PRN severe pain  - bowel regimen.  dc home as per PMD, pt has no complain

## 2023-04-19 PROBLEM — M47.812 CERVICAL SPONDYLOSIS: Status: ACTIVE | Noted: 2018-02-26

## 2023-04-25 ENCOUNTER — APPOINTMENT (OUTPATIENT)
Dept: SPINE | Facility: CLINIC | Age: 63
End: 2023-04-25

## 2023-04-25 DIAGNOSIS — M47.812 SPONDYLOSIS W/OUT MYELOPATHY OR RADICULOPATHY, CERVICAL REGION: ICD-10-CM

## 2023-04-25 NOTE — ASSESSMENT
[FreeTextEntry1] : 63 y/o F with PMH of MM (diagnosed in 2018, on chemo) w/ extensive bone mets c/b multiple spinal compression fractures, seizure disorder, HTN, HLD, presented to [facility] s/p a mechanical fall on 4/1/23. CT C-spine w/ Left C3 on C4 facet joint widening and malalignment. Also read by Rads as possible C3 Left inferior articular process avulsion fx. However, this C3-4 malalignment was also seen on previous CTA H/N.  MRI C-spine  showed no acute cervical fracture only T2-T3 old fractures. \par \par \par ****INCOMPLETE****

## 2023-04-25 NOTE — HISTORY OF PRESENT ILLNESS
[FreeTextEntry1] : Patient is a 62 year old female with PMH of MM (diagnosed in 2018, on chemo) w/ extensive bone mets c/b multiple spinal compression fractures, seizure disorder, HTN, HLD, on prophylactic Lovenox, presented to [facility] from [facility] after an unwitnessed fall. \par  \par CT C-spine w/ Left C3 on C4 facet joint widening and malalignment. Also read by Rads as possible C3 Left inferior articular process avulsion fx. However, this C3-4 malalignment was also seen on previous 2/25/23 CTA H/N.\par MRI C-spine  showed no acute cervical fracture only T2-T3 old abnormality.\par \par \par  \par \par \par

## 2023-05-02 RX ORDER — RAMELTEON 8 MG
1 TABLET ORAL
Refills: 0 | DISCHARGE

## 2023-05-02 RX ORDER — DOCUSATE SODIUM 100 MG
2 CAPSULE ORAL
Refills: 0 | DISCHARGE

## 2023-05-02 RX ORDER — HYDROMORPHONE HYDROCHLORIDE 2 MG/ML
0 INJECTION INTRAMUSCULAR; INTRAVENOUS; SUBCUTANEOUS
Refills: 0 | DISCHARGE

## 2023-05-02 RX ORDER — ENOXAPARIN SODIUM 100 MG/ML
40 INJECTION SUBCUTANEOUS
Refills: 0 | DISCHARGE

## 2023-05-02 RX ORDER — ASPIRIN/CALCIUM CARB/MAGNESIUM 324 MG
1 TABLET ORAL
Refills: 0 | DISCHARGE

## 2023-08-18 NOTE — PROGRESS NOTE ADULT - PROBLEM/PLAN-9
DISPLAY PLAN FREE TEXT
Doxepin Counseling:  Patient advised that the medication is sedating and not to drive a car after taking this medication. Patient informed of potential adverse effects including but not limited to dry mouth, urinary retention, and blurry vision.  The patient verbalized understanding of the proper use and possible adverse effects of doxepin.  All of the patient's questions and concerns were addressed.

## 2024-01-29 NOTE — PATIENT PROFILE ADULT - ARRIVAL FROM
Patient: Radha James    Procedure: Procedure(s):  LAPAROSCOPIC CHOLECYSTECTOMY       Anesthesia Type:  General    Note:  Disposition: Outpatient   Postop Pain Control: Uneventful            Sign Out: Well controlled pain   PONV: No   Neuro/Psych: Uneventful            Sign Out: Acceptable/Baseline neuro status   Airway/Respiratory: Uneventful            Sign Out: Acceptable/Baseline resp. status   CV/Hemodynamics: Uneventful            Sign Out: Acceptable CV status; No obvious hypovolemia; No obvious fluid overload   Other NRE: NONE   DID A NON-ROUTINE EVENT OCCUR? No           Last vitals:  Vitals Value Taken Time   /82 01/29/24 1145   Temp 97  F (36.1  C) 01/29/24 1130   Pulse 73 01/29/24 1156   Resp 7 01/29/24 1156   SpO2 100 % 01/29/24 1156   Vitals shown include unfiled device data.    Electronically Signed By: Jojo Magana MD  January 29, 2024  12:36 PM   [facility]

## 2024-03-02 NOTE — PHYSICAL THERAPY INITIAL EVALUATION ADULT - NSPTDISCHREC_GEN_A_CORE
Subacute Rehab prior to d/c home to restore pts prior level of function. If pt d/c home will require assist/supervision with ALL functional mobility and home PT for general strengthening, fall prevention, and safety assessment. DME: RW, 3:1 commode, transport wheelchair for community distances./Sub-acute Rehab Pt. offered vaccine but declines

## 2024-06-29 ENCOUNTER — INPATIENT (INPATIENT)
Facility: HOSPITAL | Age: 64
LOS: 5 days | Discharge: HOME | DRG: 313 | End: 2024-07-05
Attending: HOSPITALIST | Admitting: INTERNAL MEDICINE
Payer: MEDICARE

## 2024-06-29 VITALS
TEMPERATURE: 98 F | WEIGHT: 74.96 LBS | OXYGEN SATURATION: 98 % | HEART RATE: 89 BPM | HEIGHT: 59 IN | SYSTOLIC BLOOD PRESSURE: 149 MMHG | RESPIRATION RATE: 18 BRPM | DIASTOLIC BLOOD PRESSURE: 97 MMHG

## 2024-06-29 DIAGNOSIS — R07.9 CHEST PAIN, UNSPECIFIED: ICD-10-CM

## 2024-06-29 DIAGNOSIS — Z90.711 ACQUIRED ABSENCE OF UTERUS WITH REMAINING CERVICAL STUMP: Chronic | ICD-10-CM

## 2024-06-29 DIAGNOSIS — Z98.890 OTHER SPECIFIED POSTPROCEDURAL STATES: Chronic | ICD-10-CM

## 2024-06-29 DIAGNOSIS — Z98.891 HISTORY OF UTERINE SCAR FROM PREVIOUS SURGERY: Chronic | ICD-10-CM

## 2024-06-29 LAB
ADD ON TEST-SPECIMEN IN LAB: SIGNIFICANT CHANGE UP
ALBUMIN SERPL ELPH-MCNC: 4.6 G/DL — SIGNIFICANT CHANGE UP (ref 3.3–5)
ALP SERPL-CCNC: 64 U/L — SIGNIFICANT CHANGE UP (ref 40–120)
ALT FLD-CCNC: 20 U/L — SIGNIFICANT CHANGE UP (ref 10–45)
ANION GAP SERPL CALC-SCNC: 10 MMOL/L — SIGNIFICANT CHANGE UP (ref 5–17)
APTT BLD: 28 SEC — SIGNIFICANT CHANGE UP (ref 24.5–35.6)
AST SERPL-CCNC: 48 U/L — HIGH (ref 10–40)
BASOPHILS # BLD AUTO: 0.01 K/UL — SIGNIFICANT CHANGE UP (ref 0–0.2)
BASOPHILS NFR BLD AUTO: 0.1 % — SIGNIFICANT CHANGE UP (ref 0–2)
BILIRUB SERPL-MCNC: 0.2 MG/DL — SIGNIFICANT CHANGE UP (ref 0.2–1.2)
BUN SERPL-MCNC: 12 MG/DL — SIGNIFICANT CHANGE UP (ref 7–23)
CALCIUM SERPL-MCNC: 10.5 MG/DL — SIGNIFICANT CHANGE UP (ref 8.4–10.5)
CHLORIDE SERPL-SCNC: 102 MMOL/L — SIGNIFICANT CHANGE UP (ref 96–108)
CO2 SERPL-SCNC: 26 MMOL/L — SIGNIFICANT CHANGE UP (ref 22–31)
CREAT SERPL-MCNC: 0.86 MG/DL — SIGNIFICANT CHANGE UP (ref 0.5–1.3)
EGFR: 76 ML/MIN/1.73M2 — SIGNIFICANT CHANGE UP
EOSINOPHIL # BLD AUTO: 0.01 K/UL — SIGNIFICANT CHANGE UP (ref 0–0.5)
EOSINOPHIL NFR BLD AUTO: 0.1 % — SIGNIFICANT CHANGE UP (ref 0–6)
GLUCOSE SERPL-MCNC: 146 MG/DL — HIGH (ref 70–99)
HCT VFR BLD CALC: 36.2 % — SIGNIFICANT CHANGE UP (ref 34.5–45)
HGB BLD-MCNC: 12.6 G/DL — SIGNIFICANT CHANGE UP (ref 11.5–15.5)
IMM GRANULOCYTES NFR BLD AUTO: 0.4 % — SIGNIFICANT CHANGE UP (ref 0–0.9)
INR BLD: 0.95 RATIO — SIGNIFICANT CHANGE UP (ref 0.85–1.18)
LIDOCAIN IGE QN: 24 U/L — SIGNIFICANT CHANGE UP (ref 7–60)
LYMPHOCYTES # BLD AUTO: 1.45 K/UL — SIGNIFICANT CHANGE UP (ref 1–3.3)
LYMPHOCYTES # BLD AUTO: 19.1 % — SIGNIFICANT CHANGE UP (ref 13–44)
MCHC RBC-ENTMCNC: 34.8 GM/DL — SIGNIFICANT CHANGE UP (ref 32–36)
MCHC RBC-ENTMCNC: 35 PG — HIGH (ref 27–34)
MCV RBC AUTO: 100.6 FL — HIGH (ref 80–100)
MONOCYTES # BLD AUTO: 0.49 K/UL — SIGNIFICANT CHANGE UP (ref 0–0.9)
MONOCYTES NFR BLD AUTO: 6.5 % — SIGNIFICANT CHANGE UP (ref 2–14)
NEUTROPHILS # BLD AUTO: 5.59 K/UL — SIGNIFICANT CHANGE UP (ref 1.8–7.4)
NEUTROPHILS NFR BLD AUTO: 73.8 % — SIGNIFICANT CHANGE UP (ref 43–77)
NRBC # BLD: 0 /100 WBCS — SIGNIFICANT CHANGE UP (ref 0–0)
PLATELET # BLD AUTO: 201 K/UL — SIGNIFICANT CHANGE UP (ref 150–400)
POTASSIUM SERPL-MCNC: 5.6 MMOL/L — HIGH (ref 3.5–5.3)
POTASSIUM SERPL-SCNC: 5.6 MMOL/L — HIGH (ref 3.5–5.3)
PROT SERPL-MCNC: 6.9 G/DL — SIGNIFICANT CHANGE UP (ref 6–8.3)
PROTHROM AB SERPL-ACNC: 10.5 SEC — SIGNIFICANT CHANGE UP (ref 9.5–13)
RBC # BLD: 3.6 M/UL — LOW (ref 3.8–5.2)
RBC # FLD: 11.9 % — SIGNIFICANT CHANGE UP (ref 10.3–14.5)
SODIUM SERPL-SCNC: 138 MMOL/L — SIGNIFICANT CHANGE UP (ref 135–145)
TROPONIN T, HIGH SENSITIVITY RESULT: 198 NG/L — HIGH (ref 0–51)
TROPONIN T, HIGH SENSITIVITY RESULT: 263 NG/L — HIGH (ref 0–51)
WBC # BLD: 7.58 K/UL — SIGNIFICANT CHANGE UP (ref 3.8–10.5)
WBC # FLD AUTO: 7.58 K/UL — SIGNIFICANT CHANGE UP (ref 3.8–10.5)

## 2024-06-29 PROCEDURE — 99285 EMERGENCY DEPT VISIT HI MDM: CPT

## 2024-06-29 PROCEDURE — 71046 X-RAY EXAM CHEST 2 VIEWS: CPT | Mod: 26

## 2024-06-29 PROCEDURE — 99223 1ST HOSP IP/OBS HIGH 75: CPT

## 2024-06-29 RX ORDER — FAMOTIDINE 40 MG
20 TABLET ORAL ONCE
Refills: 0 | Status: COMPLETED | OUTPATIENT
Start: 2024-06-29 | End: 2024-06-29

## 2024-06-29 RX ORDER — HEPARIN SODIUM 50 [USP'U]/ML
2700 INJECTION, SOLUTION INTRAVENOUS EVERY 6 HOURS
Refills: 0 | Status: DISCONTINUED | OUTPATIENT
Start: 2024-06-29 | End: 2024-07-01

## 2024-06-29 RX ORDER — HEPARIN SODIUM 50 [USP'U]/ML
INJECTION, SOLUTION INTRAVENOUS
Qty: 25000 | Refills: 0 | Status: DISCONTINUED | OUTPATIENT
Start: 2024-06-29 | End: 2024-06-29

## 2024-06-29 RX ORDER — HEPARIN SODIUM 50 [USP'U]/ML
2700 INJECTION, SOLUTION INTRAVENOUS ONCE
Refills: 0 | Status: COMPLETED | OUTPATIENT
Start: 2024-06-29 | End: 2024-06-29

## 2024-06-29 RX ORDER — CLOPIDOGREL BISULFATE 75 MG/1
300 TABLET, FILM COATED ORAL ONCE
Refills: 0 | Status: COMPLETED | OUTPATIENT
Start: 2024-06-29 | End: 2024-06-29

## 2024-06-29 RX ORDER — HYDROMORPHONE HCL 0.2 MG/ML
1 INJECTION, SOLUTION INTRAVENOUS ONCE
Refills: 0 | Status: DISCONTINUED | OUTPATIENT
Start: 2024-06-29 | End: 2024-06-29

## 2024-06-29 RX ORDER — HEPARIN SODIUM 50 [USP'U]/ML
2700 INJECTION, SOLUTION INTRAVENOUS EVERY 6 HOURS
Refills: 0 | Status: DISCONTINUED | OUTPATIENT
Start: 2024-06-29 | End: 2024-06-29

## 2024-06-29 RX ORDER — HEPARIN SODIUM 50 [USP'U]/ML
2700 INJECTION, SOLUTION INTRAVENOUS ONCE
Refills: 0 | Status: DISCONTINUED | OUTPATIENT
Start: 2024-06-29 | End: 2024-06-29

## 2024-06-29 RX ORDER — HEPARIN SODIUM 50 [USP'U]/ML
INJECTION, SOLUTION INTRAVENOUS
Qty: 25000 | Refills: 0 | Status: DISCONTINUED | OUTPATIENT
Start: 2024-06-29 | End: 2024-07-01

## 2024-06-29 RX ORDER — HYDROMORPHONE HCL 0.2 MG/ML
0.5 INJECTION, SOLUTION INTRAVENOUS ONCE
Refills: 0 | Status: DISCONTINUED | OUTPATIENT
Start: 2024-06-29 | End: 2024-06-29

## 2024-06-29 RX ORDER — ONDANSETRON HYDROCHLORIDE 2 MG/ML
4 INJECTION INTRAMUSCULAR; INTRAVENOUS ONCE
Refills: 0 | Status: COMPLETED | OUTPATIENT
Start: 2024-06-29 | End: 2024-06-29

## 2024-06-29 RX ORDER — DEXTROSE MONOHYDRATE AND SODIUM CHLORIDE 5; .3 G/100ML; G/100ML
500 INJECTION, SOLUTION INTRAVENOUS ONCE
Refills: 0 | Status: COMPLETED | OUTPATIENT
Start: 2024-06-29 | End: 2024-06-29

## 2024-06-29 RX ADMIN — HEPARIN SODIUM 500 UNIT(S)/HR: 50 INJECTION, SOLUTION INTRAVENOUS at 20:34

## 2024-06-29 RX ADMIN — CLOPIDOGREL BISULFATE 300 MILLIGRAM(S): 75 TABLET, FILM COATED ORAL at 20:35

## 2024-06-29 RX ADMIN — HYDROMORPHONE HCL 0.5 MILLIGRAM(S): 0.2 INJECTION, SOLUTION INTRAVENOUS at 19:26

## 2024-06-29 RX ADMIN — ONDANSETRON HYDROCHLORIDE 4 MILLIGRAM(S): 2 INJECTION INTRAMUSCULAR; INTRAVENOUS at 22:19

## 2024-06-29 RX ADMIN — Medication 20 MILLIGRAM(S): at 18:49

## 2024-06-29 RX ADMIN — HEPARIN SODIUM 2700 UNIT(S): 50 INJECTION, SOLUTION INTRAVENOUS at 20:34

## 2024-06-29 RX ADMIN — DEXTROSE MONOHYDRATE AND SODIUM CHLORIDE 500 MILLILITER(S): 5; .3 INJECTION, SOLUTION INTRAVENOUS at 18:50

## 2024-06-29 RX ADMIN — HYDROMORPHONE HCL 1 MILLIGRAM(S): 0.2 INJECTION, SOLUTION INTRAVENOUS at 18:49

## 2024-06-30 DIAGNOSIS — S32.000A WEDGE COMPRESSION FRACTURE OF UNSPECIFIED LUMBAR VERTEBRA, INITIAL ENCOUNTER FOR CLOSED FRACTURE: ICD-10-CM

## 2024-06-30 DIAGNOSIS — C90.00 MULTIPLE MYELOMA NOT HAVING ACHIEVED REMISSION: ICD-10-CM

## 2024-06-30 DIAGNOSIS — R09.89 OTHER SPECIFIED SYMPTOMS AND SIGNS INVOLVING THE CIRCULATORY AND RESPIRATORY SYSTEMS: ICD-10-CM

## 2024-06-30 DIAGNOSIS — G40.909 EPILEPSY, UNSPECIFIED, NOT INTRACTABLE, WITHOUT STATUS EPILEPTICUS: ICD-10-CM

## 2024-06-30 DIAGNOSIS — E87.5 HYPERKALEMIA: ICD-10-CM

## 2024-06-30 DIAGNOSIS — R07.9 CHEST PAIN, UNSPECIFIED: ICD-10-CM

## 2024-06-30 PROBLEM — I10 ESSENTIAL (PRIMARY) HYPERTENSION: Chronic | Status: ACTIVE | Noted: 2023-04-03

## 2024-06-30 PROBLEM — R56.9 UNSPECIFIED CONVULSIONS: Chronic | Status: ACTIVE | Noted: 2023-04-03

## 2024-06-30 PROBLEM — Z87.81 PERSONAL HISTORY OF (HEALED) TRAUMATIC FRACTURE: Chronic | Status: ACTIVE | Noted: 2023-04-03

## 2024-06-30 LAB
ALBUMIN SERPL ELPH-MCNC: 4 G/DL — SIGNIFICANT CHANGE UP (ref 3.3–5)
ALP SERPL-CCNC: 57 U/L — SIGNIFICANT CHANGE UP (ref 40–120)
ALT FLD-CCNC: 16 U/L — SIGNIFICANT CHANGE UP (ref 10–45)
ANION GAP SERPL CALC-SCNC: 10 MMOL/L — SIGNIFICANT CHANGE UP (ref 5–17)
ANION GAP SERPL CALC-SCNC: 11 MMOL/L — SIGNIFICANT CHANGE UP (ref 5–17)
APTT BLD: 29.2 SEC — SIGNIFICANT CHANGE UP (ref 24.5–35.6)
APTT BLD: 30.2 SEC — SIGNIFICANT CHANGE UP (ref 24.5–35.6)
APTT BLD: 94.9 SEC — HIGH (ref 24.5–35.6)
AST SERPL-CCNC: 22 U/L — SIGNIFICANT CHANGE UP (ref 10–40)
BILIRUB SERPL-MCNC: 0.2 MG/DL — SIGNIFICANT CHANGE UP (ref 0.2–1.2)
BUN SERPL-MCNC: 11 MG/DL — SIGNIFICANT CHANGE UP (ref 7–23)
BUN SERPL-MCNC: 11 MG/DL — SIGNIFICANT CHANGE UP (ref 7–23)
CALCIUM SERPL-MCNC: 9.3 MG/DL — SIGNIFICANT CHANGE UP (ref 8.4–10.5)
CALCIUM SERPL-MCNC: 9.7 MG/DL — SIGNIFICANT CHANGE UP (ref 8.4–10.5)
CHLORIDE SERPL-SCNC: 102 MMOL/L — SIGNIFICANT CHANGE UP (ref 96–108)
CHLORIDE SERPL-SCNC: 105 MMOL/L — SIGNIFICANT CHANGE UP (ref 96–108)
CK MB BLD-MCNC: 8.7 % — HIGH (ref 0–3.5)
CK MB BLD-MCNC: 9.1 % — HIGH (ref 0–3.5)
CK MB CFR SERPL CALC: 15.5 NG/ML — HIGH (ref 0–3.8)
CK MB CFR SERPL CALC: 17.5 NG/ML — HIGH (ref 0–3.8)
CK SERPL-CCNC: 171 U/L — HIGH (ref 25–170)
CK SERPL-CCNC: 202 U/L — HIGH (ref 25–170)
CO2 SERPL-SCNC: 26 MMOL/L — SIGNIFICANT CHANGE UP (ref 22–31)
CO2 SERPL-SCNC: 26 MMOL/L — SIGNIFICANT CHANGE UP (ref 22–31)
CREAT SERPL-MCNC: 0.83 MG/DL — SIGNIFICANT CHANGE UP (ref 0.5–1.3)
CREAT SERPL-MCNC: 0.9 MG/DL — SIGNIFICANT CHANGE UP (ref 0.5–1.3)
EGFR: 72 ML/MIN/1.73M2 — SIGNIFICANT CHANGE UP
EGFR: 79 ML/MIN/1.73M2 — SIGNIFICANT CHANGE UP
GLUCOSE SERPL-MCNC: 104 MG/DL — HIGH (ref 70–99)
GLUCOSE SERPL-MCNC: 71 MG/DL — SIGNIFICANT CHANGE UP (ref 70–99)
HCT VFR BLD CALC: 32.8 % — LOW (ref 34.5–45)
HGB BLD-MCNC: 11.4 G/DL — LOW (ref 11.5–15.5)
MCHC RBC-ENTMCNC: 34.8 GM/DL — SIGNIFICANT CHANGE UP (ref 32–36)
MCHC RBC-ENTMCNC: 35.1 PG — HIGH (ref 27–34)
MCV RBC AUTO: 100.9 FL — HIGH (ref 80–100)
NRBC # BLD: 0 /100 WBCS — SIGNIFICANT CHANGE UP (ref 0–0)
PLATELET # BLD AUTO: 135 K/UL — LOW (ref 150–400)
POTASSIUM SERPL-MCNC: 4.7 MMOL/L — SIGNIFICANT CHANGE UP (ref 3.5–5.3)
POTASSIUM SERPL-MCNC: 4.8 MMOL/L — SIGNIFICANT CHANGE UP (ref 3.5–5.3)
POTASSIUM SERPL-SCNC: 4.7 MMOL/L — SIGNIFICANT CHANGE UP (ref 3.5–5.3)
POTASSIUM SERPL-SCNC: 4.8 MMOL/L — SIGNIFICANT CHANGE UP (ref 3.5–5.3)
PROT SERPL-MCNC: 5.7 G/DL — LOW (ref 6–8.3)
RBC # BLD: 3.25 M/UL — LOW (ref 3.8–5.2)
RBC # FLD: 11.9 % — SIGNIFICANT CHANGE UP (ref 10.3–14.5)
SODIUM SERPL-SCNC: 138 MMOL/L — SIGNIFICANT CHANGE UP (ref 135–145)
SODIUM SERPL-SCNC: 142 MMOL/L — SIGNIFICANT CHANGE UP (ref 135–145)
TROPONIN T, HIGH SENSITIVITY RESULT: 223 NG/L — HIGH (ref 0–51)
TROPONIN T, HIGH SENSITIVITY RESULT: 316 NG/L — HIGH (ref 0–51)
WBC # BLD: 6.3 K/UL — SIGNIFICANT CHANGE UP (ref 3.8–10.5)
WBC # FLD AUTO: 6.3 K/UL — SIGNIFICANT CHANGE UP (ref 3.8–10.5)

## 2024-06-30 PROCEDURE — 99233 SBSQ HOSP IP/OBS HIGH 50: CPT

## 2024-06-30 RX ORDER — CALCIUM CARBONATE 500(1250)
1 TABLET ORAL
Refills: 0 | DISCHARGE

## 2024-06-30 RX ORDER — FENTANYL CITRATE 50 UG/ML
1 INJECTION INTRAVENOUS
Refills: 0 | DISCHARGE

## 2024-06-30 RX ORDER — MEGESTROL ACETATE 40 MG/ML
1 SUSPENSION ORAL
Qty: 0 | Refills: 0 | DISCHARGE

## 2024-06-30 RX ORDER — HYDROMORPHONE HCL 0.2 MG/ML
0.2 INJECTION, SOLUTION INTRAVENOUS EVERY 4 HOURS
Refills: 0 | Status: DISCONTINUED | OUTPATIENT
Start: 2024-06-29 | End: 2024-07-05

## 2024-06-30 RX ORDER — AMLODIPINE BESYLATE 2.5 MG/1
1 TABLET ORAL
Qty: 0 | Refills: 0 | DISCHARGE

## 2024-06-30 RX ORDER — MAGNESIUM, ALUMINUM HYDROXIDE 400-400
30 TABLET,CHEWABLE ORAL EVERY 4 HOURS
Refills: 0 | Status: DISCONTINUED | OUTPATIENT
Start: 2024-06-29 | End: 2024-06-30

## 2024-06-30 RX ORDER — BISACODYL 5 MG
5 TABLET, DELAYED RELEASE (ENTERIC COATED) ORAL DAILY
Refills: 0 | Status: DISCONTINUED | OUTPATIENT
Start: 2024-06-29 | End: 2024-07-05

## 2024-06-30 RX ORDER — ACYCLOVIR SODIUM 500 MG
1 VIAL (EA) INTRAVENOUS
Qty: 0 | Refills: 0 | DISCHARGE

## 2024-06-30 RX ORDER — ONDANSETRON HYDROCHLORIDE 2 MG/ML
4 INJECTION INTRAMUSCULAR; INTRAVENOUS EVERY 8 HOURS
Refills: 0 | Status: DISCONTINUED | OUTPATIENT
Start: 2024-06-29 | End: 2024-06-30

## 2024-06-30 RX ORDER — LAMOTRIGINE 100 MG/1
75 TABLET ORAL EVERY 12 HOURS
Refills: 0 | Status: DISCONTINUED | OUTPATIENT
Start: 2024-06-30 | End: 2024-07-05

## 2024-06-30 RX ORDER — POLYETHYLENE GLYCOL 3350 17 G/17G
17 POWDER, FOR SOLUTION ORAL
Qty: 0 | Refills: 0 | DISCHARGE

## 2024-06-30 RX ORDER — LAMOTRIGINE 100 MG/1
3 TABLET ORAL
Refills: 0 | DISCHARGE

## 2024-06-30 RX ORDER — NALOXONE HYDROCHLORIDE 4 MG/.1ML
4 SPRAY NASAL
Refills: 0 | DISCHARGE

## 2024-06-30 RX ORDER — NALOXONE HYDROCHLORIDE 1 MG/ML
0.4 INJECTION PARENTERAL ONCE
Refills: 0 | Status: DISCONTINUED | OUTPATIENT
Start: 2024-06-29 | End: 2024-07-05

## 2024-06-30 RX ORDER — POLYETHYLENE GLYCOL 3350 1 G/G
17 POWDER ORAL DAILY
Refills: 0 | Status: DISCONTINUED | OUTPATIENT
Start: 2024-06-29 | End: 2024-07-05

## 2024-06-30 RX ORDER — ONDANSETRON 8 MG/1
1 TABLET, FILM COATED ORAL
Qty: 0 | Refills: 0 | DISCHARGE

## 2024-06-30 RX ORDER — FENTANYL CITRATE 50 UG/ML
1 INJECTION, SOLUTION INTRAMUSCULAR; INTRAVENOUS
Refills: 0 | Status: DISCONTINUED | OUTPATIENT
Start: 2024-06-30 | End: 2024-07-05

## 2024-06-30 RX ORDER — ATORVASTATIN CALCIUM 80 MG/1
1 TABLET, FILM COATED ORAL
Refills: 0 | DISCHARGE

## 2024-06-30 RX ORDER — SENNA PLUS 8.6 MG/1
1 TABLET ORAL
Qty: 0 | Refills: 0 | DISCHARGE

## 2024-06-30 RX ORDER — LACOSAMIDE 50 MG/1
1 TABLET ORAL
Refills: 0 | DISCHARGE

## 2024-06-30 RX ORDER — VALPROIC ACID (AS SODIUM SALT) 250 MG/5ML
5 SOLUTION, ORAL ORAL
Refills: 0 | DISCHARGE

## 2024-06-30 RX ORDER — ATORVASTATIN CALCIUM 20 MG/1
80 TABLET, FILM COATED ORAL AT BEDTIME
Refills: 0 | Status: DISCONTINUED | OUTPATIENT
Start: 2024-06-30 | End: 2024-07-05

## 2024-06-30 RX ORDER — CLOPIDOGREL BISULFATE 75 MG/1
75 TABLET, FILM COATED ORAL DAILY
Refills: 0 | Status: DISCONTINUED | OUTPATIENT
Start: 2024-06-30 | End: 2024-07-05

## 2024-06-30 RX ORDER — HYDROMORPHONE HCL 0.2 MG/ML
0.5 INJECTION, SOLUTION INTRAVENOUS EVERY 4 HOURS
Refills: 0 | Status: DISCONTINUED | OUTPATIENT
Start: 2024-06-29 | End: 2024-07-05

## 2024-06-30 RX ORDER — ACYCLOVIR SODIUM 500 MG
1 VIAL (EA) INTRAVENOUS
Refills: 0 | DISCHARGE

## 2024-06-30 RX ORDER — ACYCLOVIR SODIUM 50 MG/ML
400 VIAL (ML) INTRAVENOUS
Refills: 0 | Status: DISCONTINUED | OUTPATIENT
Start: 2024-06-30 | End: 2024-07-05

## 2024-06-30 RX ORDER — CANNABIDIOL 100 MG/ML
0.4 SOLUTION ORAL
Qty: 0 | Refills: 0 | DISCHARGE

## 2024-06-30 RX ORDER — POLYETHYLENE GLYCOL 3350 17 G/17G
17 POWDER, FOR SOLUTION ORAL
Refills: 0 | DISCHARGE

## 2024-06-30 RX ORDER — SENNOSIDES 8.6 MG
2 TABLET ORAL AT BEDTIME
Refills: 0 | Status: DISCONTINUED | OUTPATIENT
Start: 2024-06-29 | End: 2024-07-05

## 2024-06-30 RX ORDER — THIAMINE MONONITRATE (VIT B1) 100 MG
1 TABLET ORAL
Refills: 0 | DISCHARGE

## 2024-06-30 RX ORDER — SODIUM CHLORIDE 0.9 % (FLUSH) 0.9 %
1000 SYRINGE (ML) INJECTION
Refills: 0 | Status: COMPLETED | OUTPATIENT
Start: 2024-06-29 | End: 2024-06-30

## 2024-06-30 RX ORDER — AMLODIPINE BESYLATE 2.5 MG/1
1 TABLET ORAL
Refills: 0 | DISCHARGE

## 2024-06-30 RX ORDER — OLANZAPINE 15 MG/1
2 TABLET, FILM COATED ORAL
Refills: 0 | DISCHARGE

## 2024-06-30 RX ADMIN — HEPARIN SODIUM 500 UNIT(S)/HR: 50 INJECTION, SOLUTION INTRAVENOUS at 12:31

## 2024-06-30 RX ADMIN — LAMOTRIGINE 75 MILLIGRAM(S): 100 TABLET ORAL at 17:51

## 2024-06-30 RX ADMIN — Medication 400 MILLIGRAM(S): at 17:50

## 2024-06-30 RX ADMIN — LAMOTRIGINE 75 MILLIGRAM(S): 100 TABLET ORAL at 05:28

## 2024-06-30 RX ADMIN — HEPARIN SODIUM 350 UNIT(S)/HR: 50 INJECTION, SOLUTION INTRAVENOUS at 04:00

## 2024-06-30 RX ADMIN — HEPARIN SODIUM 2700 UNIT(S): 50 INJECTION, SOLUTION INTRAVENOUS at 20:35

## 2024-06-30 RX ADMIN — FENTANYL CITRATE 1 PATCH: 50 INJECTION, SOLUTION INTRAMUSCULAR; INTRAVENOUS at 19:00

## 2024-06-30 RX ADMIN — HEPARIN SODIUM 500 UNIT(S)/HR: 50 INJECTION, SOLUTION INTRAVENOUS at 12:04

## 2024-06-30 RX ADMIN — FENTANYL CITRATE 1 PATCH: 50 INJECTION, SOLUTION INTRAMUSCULAR; INTRAVENOUS at 05:30

## 2024-06-30 RX ADMIN — CLOPIDOGREL BISULFATE 75 MILLIGRAM(S): 75 TABLET, FILM COATED ORAL at 13:11

## 2024-06-30 RX ADMIN — FENTANYL CITRATE 1 PATCH: 50 INJECTION, SOLUTION INTRAMUSCULAR; INTRAVENOUS at 12:45

## 2024-06-30 RX ADMIN — Medication 400 MILLIGRAM(S): at 05:28

## 2024-06-30 RX ADMIN — HEPARIN SODIUM 650 UNIT(S)/HR: 50 INJECTION, SOLUTION INTRAVENOUS at 19:31

## 2024-06-30 RX ADMIN — HEPARIN SODIUM 0 UNIT(S)/HR: 50 INJECTION, SOLUTION INTRAVENOUS at 02:54

## 2024-06-30 RX ADMIN — Medication 100 MILLILITER(S): at 03:40

## 2024-06-30 RX ADMIN — FENTANYL CITRATE 1 PATCH: 50 INJECTION, SOLUTION INTRAMUSCULAR; INTRAVENOUS at 12:44

## 2024-06-30 RX ADMIN — FENTANYL CITRATE 1 PATCH: 50 INJECTION, SOLUTION INTRAMUSCULAR; INTRAVENOUS at 05:28

## 2024-06-30 RX ADMIN — Medication 1 TABLET(S): at 11:46

## 2024-07-01 LAB
ANION GAP SERPL CALC-SCNC: 9 MMOL/L — SIGNIFICANT CHANGE UP (ref 5–17)
APTT BLD: 175 SEC — CRITICAL HIGH (ref 24.5–35.6)
APTT BLD: 68.5 SEC — HIGH (ref 24.5–35.6)
BUN SERPL-MCNC: 13 MG/DL — SIGNIFICANT CHANGE UP (ref 7–23)
CALCIUM SERPL-MCNC: 9 MG/DL — SIGNIFICANT CHANGE UP (ref 8.4–10.5)
CHLORIDE SERPL-SCNC: 106 MMOL/L — SIGNIFICANT CHANGE UP (ref 96–108)
CK MB CFR SERPL CALC: 10.3 NG/ML — HIGH (ref 0–3.8)
CO2 SERPL-SCNC: 26 MMOL/L — SIGNIFICANT CHANGE UP (ref 22–31)
CREAT SERPL-MCNC: 0.88 MG/DL — SIGNIFICANT CHANGE UP (ref 0.5–1.3)
EGFR: 74 ML/MIN/1.73M2 — SIGNIFICANT CHANGE UP
GLUCOSE SERPL-MCNC: 77 MG/DL — SIGNIFICANT CHANGE UP (ref 70–99)
HCT VFR BLD CALC: 41.3 % — SIGNIFICANT CHANGE UP (ref 34.5–45)
HGB BLD-MCNC: 13.8 G/DL — SIGNIFICANT CHANGE UP (ref 11.5–15.5)
INR BLD: 1 RATIO — SIGNIFICANT CHANGE UP (ref 0.85–1.18)
MAGNESIUM SERPL-MCNC: 2.2 MG/DL — SIGNIFICANT CHANGE UP (ref 1.6–2.6)
MCHC RBC-ENTMCNC: 33.4 GM/DL — SIGNIFICANT CHANGE UP (ref 32–36)
MCHC RBC-ENTMCNC: 34 PG — SIGNIFICANT CHANGE UP (ref 27–34)
MCV RBC AUTO: 101.7 FL — HIGH (ref 80–100)
NRBC # BLD: 0 /100 WBCS — SIGNIFICANT CHANGE UP (ref 0–0)
PHOSPHATE SERPL-MCNC: 2.4 MG/DL — LOW (ref 2.5–4.5)
PLATELET # BLD AUTO: 155 K/UL — SIGNIFICANT CHANGE UP (ref 150–400)
POTASSIUM SERPL-MCNC: 4.2 MMOL/L — SIGNIFICANT CHANGE UP (ref 3.5–5.3)
POTASSIUM SERPL-SCNC: 4.2 MMOL/L — SIGNIFICANT CHANGE UP (ref 3.5–5.3)
PROTHROM AB SERPL-ACNC: 10.5 SEC — SIGNIFICANT CHANGE UP (ref 9.5–13)
RBC # BLD: 4.06 M/UL — SIGNIFICANT CHANGE UP (ref 3.8–5.2)
RBC # FLD: 11.9 % — SIGNIFICANT CHANGE UP (ref 10.3–14.5)
SODIUM SERPL-SCNC: 141 MMOL/L — SIGNIFICANT CHANGE UP (ref 135–145)
TROPONIN T, HIGH SENSITIVITY RESULT: 145 NG/L — HIGH (ref 0–51)
WBC # BLD: 7.09 K/UL — SIGNIFICANT CHANGE UP (ref 3.8–10.5)
WBC # FLD AUTO: 7.09 K/UL — SIGNIFICANT CHANGE UP (ref 3.8–10.5)

## 2024-07-01 PROCEDURE — 99233 SBSQ HOSP IP/OBS HIGH 50: CPT

## 2024-07-01 PROCEDURE — 93454 CORONARY ARTERY ANGIO S&I: CPT | Mod: 26

## 2024-07-01 PROCEDURE — 99152 MOD SED SAME PHYS/QHP 5/>YRS: CPT

## 2024-07-01 PROCEDURE — 99233 SBSQ HOSP IP/OBS HIGH 50: CPT | Mod: GC

## 2024-07-01 RX ORDER — SODIUM CHLORIDE 0.9 % (FLUSH) 0.9 %
250 SYRINGE (ML) INJECTION ONCE
Refills: 0 | Status: DISCONTINUED | OUTPATIENT
Start: 2024-07-01 | End: 2024-07-05

## 2024-07-01 RX ORDER — ENOXAPARIN SODIUM 100 MG/ML
40 INJECTION SUBCUTANEOUS EVERY 24 HOURS
Refills: 0 | Status: DISCONTINUED | OUTPATIENT
Start: 2024-07-01 | End: 2024-07-05

## 2024-07-01 RX ORDER — SODIUM CHLORIDE 0.9 % (FLUSH) 0.9 %
1000 SYRINGE (ML) INJECTION
Refills: 0 | Status: DISCONTINUED | OUTPATIENT
Start: 2024-07-01 | End: 2024-07-05

## 2024-07-01 RX ADMIN — LAMOTRIGINE 75 MILLIGRAM(S): 100 TABLET ORAL at 05:14

## 2024-07-01 RX ADMIN — Medication 400 MILLIGRAM(S): at 17:53

## 2024-07-01 RX ADMIN — Medication 400 MILLIGRAM(S): at 05:14

## 2024-07-01 RX ADMIN — HEPARIN SODIUM 500 UNIT(S)/HR: 50 INJECTION, SOLUTION INTRAVENOUS at 08:22

## 2024-07-01 RX ADMIN — CLOPIDOGREL BISULFATE 75 MILLIGRAM(S): 75 TABLET, FILM COATED ORAL at 11:48

## 2024-07-01 RX ADMIN — ATORVASTATIN CALCIUM 80 MILLIGRAM(S): 20 TABLET, FILM COATED ORAL at 21:09

## 2024-07-01 RX ADMIN — HEPARIN SODIUM 0 UNIT(S)/HR: 50 INJECTION, SOLUTION INTRAVENOUS at 05:35

## 2024-07-01 RX ADMIN — HEPARIN SODIUM 500 UNIT(S)/HR: 50 INJECTION, SOLUTION INTRAVENOUS at 13:26

## 2024-07-01 RX ADMIN — Medication 1 TABLET(S): at 11:47

## 2024-07-01 RX ADMIN — Medication 2 TABLET(S): at 21:09

## 2024-07-01 RX ADMIN — HEPARIN SODIUM 500 UNIT(S)/HR: 50 INJECTION, SOLUTION INTRAVENOUS at 06:38

## 2024-07-01 RX ADMIN — FENTANYL CITRATE 1 PATCH: 50 INJECTION, SOLUTION INTRAMUSCULAR; INTRAVENOUS at 09:16

## 2024-07-01 RX ADMIN — LAMOTRIGINE 75 MILLIGRAM(S): 100 TABLET ORAL at 17:53

## 2024-07-01 RX ADMIN — ENOXAPARIN SODIUM 40 MILLIGRAM(S): 100 INJECTION SUBCUTANEOUS at 21:09

## 2024-07-02 ENCOUNTER — RESULT REVIEW (OUTPATIENT)
Age: 64
End: 2024-07-02

## 2024-07-02 ENCOUNTER — TRANSCRIPTION ENCOUNTER (OUTPATIENT)
Age: 64
End: 2024-07-02

## 2024-07-02 PROCEDURE — 93306 TTE W/DOPPLER COMPLETE: CPT | Mod: 26

## 2024-07-02 PROCEDURE — 99232 SBSQ HOSP IP/OBS MODERATE 35: CPT

## 2024-07-02 RX ORDER — POLYETHYLENE GLYCOL 3350 1 G/G
17 POWDER ORAL
Qty: 0 | Refills: 0 | DISCHARGE
Start: 2024-07-02

## 2024-07-02 RX ORDER — SENNOSIDES 8.6 MG
2 TABLET ORAL
Qty: 0 | Refills: 0 | DISCHARGE
Start: 2024-07-02

## 2024-07-02 RX ORDER — CLOPIDOGREL BISULFATE 75 MG/1
1 TABLET, FILM COATED ORAL
Qty: 30 | Refills: 0
Start: 2024-07-02 | End: 2024-07-31

## 2024-07-02 RX ORDER — FENTANYL CITRATE 50 UG/ML
1 INJECTION, SOLUTION INTRAMUSCULAR; INTRAVENOUS
Qty: 0 | Refills: 0 | DISCHARGE

## 2024-07-02 RX ORDER — ATORVASTATIN CALCIUM 20 MG/1
1 TABLET, FILM COATED ORAL
Qty: 30 | Refills: 0
Start: 2024-07-02 | End: 2024-07-31

## 2024-07-02 RX ADMIN — Medication 400 MILLIGRAM(S): at 05:30

## 2024-07-02 RX ADMIN — FENTANYL CITRATE 1 PATCH: 50 INJECTION, SOLUTION INTRAMUSCULAR; INTRAVENOUS at 04:36

## 2024-07-02 RX ADMIN — ENOXAPARIN SODIUM 40 MILLIGRAM(S): 100 INJECTION SUBCUTANEOUS at 21:19

## 2024-07-02 RX ADMIN — FENTANYL CITRATE 1 PATCH: 50 INJECTION, SOLUTION INTRAMUSCULAR; INTRAVENOUS at 08:01

## 2024-07-02 RX ADMIN — ATORVASTATIN CALCIUM 80 MILLIGRAM(S): 20 TABLET, FILM COATED ORAL at 21:19

## 2024-07-02 RX ADMIN — CLOPIDOGREL BISULFATE 75 MILLIGRAM(S): 75 TABLET, FILM COATED ORAL at 11:34

## 2024-07-02 RX ADMIN — Medication 1 TABLET(S): at 11:34

## 2024-07-02 RX ADMIN — LAMOTRIGINE 75 MILLIGRAM(S): 100 TABLET ORAL at 17:24

## 2024-07-02 RX ADMIN — LAMOTRIGINE 75 MILLIGRAM(S): 100 TABLET ORAL at 05:30

## 2024-07-02 RX ADMIN — Medication 400 MILLIGRAM(S): at 17:24

## 2024-07-03 PROCEDURE — 99232 SBSQ HOSP IP/OBS MODERATE 35: CPT

## 2024-07-03 RX ADMIN — LAMOTRIGINE 75 MILLIGRAM(S): 100 TABLET ORAL at 17:20

## 2024-07-03 RX ADMIN — Medication 400 MILLIGRAM(S): at 05:32

## 2024-07-03 RX ADMIN — Medication 1 TABLET(S): at 11:47

## 2024-07-03 RX ADMIN — FENTANYL CITRATE 1 PATCH: 50 INJECTION, SOLUTION INTRAMUSCULAR; INTRAVENOUS at 07:17

## 2024-07-03 RX ADMIN — LAMOTRIGINE 75 MILLIGRAM(S): 100 TABLET ORAL at 05:32

## 2024-07-03 RX ADMIN — CLOPIDOGREL BISULFATE 75 MILLIGRAM(S): 75 TABLET, FILM COATED ORAL at 11:46

## 2024-07-03 RX ADMIN — ATORVASTATIN CALCIUM 80 MILLIGRAM(S): 20 TABLET, FILM COATED ORAL at 21:40

## 2024-07-03 RX ADMIN — Medication 400 MILLIGRAM(S): at 17:22

## 2024-07-03 RX ADMIN — ENOXAPARIN SODIUM 40 MILLIGRAM(S): 100 INJECTION SUBCUTANEOUS at 21:40

## 2024-07-04 PROCEDURE — 93010 ELECTROCARDIOGRAM REPORT: CPT

## 2024-07-04 PROCEDURE — 99231 SBSQ HOSP IP/OBS SF/LOW 25: CPT

## 2024-07-04 RX ADMIN — ENOXAPARIN SODIUM 40 MILLIGRAM(S): 100 INJECTION SUBCUTANEOUS at 22:05

## 2024-07-04 RX ADMIN — FENTANYL CITRATE 1 PATCH: 50 INJECTION, SOLUTION INTRAMUSCULAR; INTRAVENOUS at 07:20

## 2024-07-04 RX ADMIN — ATORVASTATIN CALCIUM 80 MILLIGRAM(S): 20 TABLET, FILM COATED ORAL at 21:32

## 2024-07-04 RX ADMIN — Medication 2 TABLET(S): at 21:31

## 2024-07-04 RX ADMIN — FENTANYL CITRATE 1 PATCH: 50 INJECTION, SOLUTION INTRAMUSCULAR; INTRAVENOUS at 05:25

## 2024-07-04 RX ADMIN — FENTANYL CITRATE 1 PATCH: 50 INJECTION, SOLUTION INTRAMUSCULAR; INTRAVENOUS at 19:57

## 2024-07-04 RX ADMIN — LAMOTRIGINE 75 MILLIGRAM(S): 100 TABLET ORAL at 17:49

## 2024-07-04 RX ADMIN — LAMOTRIGINE 75 MILLIGRAM(S): 100 TABLET ORAL at 05:25

## 2024-07-04 RX ADMIN — POLYETHYLENE GLYCOL 3350 17 GRAM(S): 1 POWDER ORAL at 11:43

## 2024-07-04 RX ADMIN — Medication 400 MILLIGRAM(S): at 05:25

## 2024-07-04 RX ADMIN — CLOPIDOGREL BISULFATE 75 MILLIGRAM(S): 75 TABLET, FILM COATED ORAL at 11:42

## 2024-07-04 RX ADMIN — Medication 400 MILLIGRAM(S): at 17:49

## 2024-07-04 RX ADMIN — Medication 1 TABLET(S): at 11:42

## 2024-07-05 ENCOUNTER — NON-APPOINTMENT (OUTPATIENT)
Age: 64
End: 2024-07-05

## 2024-07-05 ENCOUNTER — TRANSCRIPTION ENCOUNTER (OUTPATIENT)
Age: 64
End: 2024-07-05

## 2024-07-05 VITALS
TEMPERATURE: 98 F | HEART RATE: 82 BPM | SYSTOLIC BLOOD PRESSURE: 113 MMHG | RESPIRATION RATE: 18 BRPM | DIASTOLIC BLOOD PRESSURE: 66 MMHG | OXYGEN SATURATION: 96 %

## 2024-07-05 PROCEDURE — 80053 COMPREHEN METABOLIC PANEL: CPT

## 2024-07-05 PROCEDURE — 99285 EMERGENCY DEPT VISIT HI MDM: CPT

## 2024-07-05 PROCEDURE — 85610 PROTHROMBIN TIME: CPT

## 2024-07-05 PROCEDURE — 85027 COMPLETE CBC AUTOMATED: CPT

## 2024-07-05 PROCEDURE — 36415 COLL VENOUS BLD VENIPUNCTURE: CPT

## 2024-07-05 PROCEDURE — 82550 ASSAY OF CK (CPK): CPT

## 2024-07-05 PROCEDURE — 84100 ASSAY OF PHOSPHORUS: CPT

## 2024-07-05 PROCEDURE — 93306 TTE W/DOPPLER COMPLETE: CPT

## 2024-07-05 PROCEDURE — 82553 CREATINE MB FRACTION: CPT

## 2024-07-05 PROCEDURE — 99239 HOSP IP/OBS DSCHRG MGMT >30: CPT

## 2024-07-05 PROCEDURE — 85025 COMPLETE CBC W/AUTO DIFF WBC: CPT

## 2024-07-05 PROCEDURE — 97530 THERAPEUTIC ACTIVITIES: CPT

## 2024-07-05 PROCEDURE — 84484 ASSAY OF TROPONIN QUANT: CPT

## 2024-07-05 PROCEDURE — 83735 ASSAY OF MAGNESIUM: CPT

## 2024-07-05 PROCEDURE — 97161 PT EVAL LOW COMPLEX 20 MIN: CPT

## 2024-07-05 PROCEDURE — C1887: CPT

## 2024-07-05 PROCEDURE — 83690 ASSAY OF LIPASE: CPT

## 2024-07-05 PROCEDURE — C1769: CPT

## 2024-07-05 PROCEDURE — 80048 BASIC METABOLIC PNL TOTAL CA: CPT

## 2024-07-05 PROCEDURE — 97116 GAIT TRAINING THERAPY: CPT

## 2024-07-05 PROCEDURE — C1894: CPT

## 2024-07-05 PROCEDURE — 85730 THROMBOPLASTIN TIME PARTIAL: CPT

## 2024-07-05 PROCEDURE — 93454 CORONARY ARTERY ANGIO S&I: CPT

## 2024-07-05 PROCEDURE — 71046 X-RAY EXAM CHEST 2 VIEWS: CPT

## 2024-07-05 PROCEDURE — 93005 ELECTROCARDIOGRAM TRACING: CPT

## 2024-07-05 RX ADMIN — Medication 400 MILLIGRAM(S): at 05:46

## 2024-07-05 RX ADMIN — CLOPIDOGREL BISULFATE 75 MILLIGRAM(S): 75 TABLET, FILM COATED ORAL at 11:31

## 2024-07-05 RX ADMIN — FENTANYL CITRATE 1 PATCH: 50 INJECTION, SOLUTION INTRAMUSCULAR; INTRAVENOUS at 08:23

## 2024-07-05 RX ADMIN — POLYETHYLENE GLYCOL 3350 17 GRAM(S): 1 POWDER ORAL at 11:32

## 2024-07-05 RX ADMIN — Medication 5 MILLIGRAM(S): at 12:44

## 2024-07-05 RX ADMIN — Medication 1 TABLET(S): at 11:31

## 2024-07-05 RX ADMIN — LAMOTRIGINE 75 MILLIGRAM(S): 100 TABLET ORAL at 05:46

## 2024-07-09 ENCOUNTER — NON-APPOINTMENT (OUTPATIENT)
Age: 64
End: 2024-07-09
